# Patient Record
Sex: FEMALE | Race: WHITE | NOT HISPANIC OR LATINO | Employment: OTHER | RURAL
[De-identification: names, ages, dates, MRNs, and addresses within clinical notes are randomized per-mention and may not be internally consistent; named-entity substitution may affect disease eponyms.]

---

## 2021-02-03 ENCOUNTER — HISTORICAL (OUTPATIENT)
Dept: ADMINISTRATIVE | Facility: HOSPITAL | Age: 61
End: 2021-02-03

## 2021-02-03 LAB
BACTERIA #/AREA URNS HPF: ABNORMAL /HPF
BILIRUB UR QL STRIP: NEGATIVE MG/DL
CLARITY UR: ABNORMAL
COLOR UR: YELLOW
CREAT UR-MCNC: 58 MG/DL (ref 28–217)
GLUCOSE UR STRIP-MCNC: NEGATIVE MG/DL
KETONES UR STRIP-SCNC: NEGATIVE MG/DL
LEUKOCYTE ESTERASE UR QL STRIP: NEGATIVE LEU/UL
MICROALBUMIN UR-MCNC: 1.4 MG/DL (ref 0–2.8)
MICROALBUMIN/CREAT RATIO PNL UR: 24.1 MG/G (ref 0–30)
NITRITE UR QL STRIP: POSITIVE
PH UR STRIP: 7 PH UNITS (ref 5–8)
PROT UR QL STRIP: NEGATIVE MG/DL
RBC # UR STRIP: NEGATIVE ERY/UL
RBC #/AREA URNS HPF: ABNORMAL /HPF (ref 0–3)
SP GR UR STRIP: 1.02 (ref 1–1.03)
SQUAMOUS #/AREA URNS LPF: ABNORMAL /LPF
UROBILINOGEN UR STRIP-ACNC: 0.2 EU/DL
WBC #/AREA URNS HPF: ABNORMAL /HPF (ref 0–5)

## 2021-02-04 LAB
25(OH)D3 SERPL-MCNC: 21.5 NG/ML
ALBUMIN SERPL BCP-MCNC: 3.8 G/DL (ref 3.5–5)
ALBUMIN/GLOB SERPL: 1.1 {RATIO}
ALP SERPL-CCNC: 155 U/L (ref 46–118)
ALT SERPL W P-5'-P-CCNC: 48 U/L (ref 13–56)
ANION GAP SERPL CALCULATED.3IONS-SCNC: 5 MMOL/L (ref 7–16)
AST SERPL W P-5'-P-CCNC: 29 U/L (ref 15–37)
BASOPHILS # BLD AUTO: 0.08 X10E3/UL (ref 0–0.2)
BASOPHILS NFR BLD AUTO: 0.8 % (ref 0–1)
BILIRUB SERPL-MCNC: 0.5 MG/DL (ref 0–1.2)
BUN SERPL-MCNC: 25 MG/DL (ref 7–18)
BUN/CREAT SERPL: 22
CALCIUM SERPL-MCNC: 8.8 MG/DL (ref 8.5–10.1)
CHLORIDE SERPL-SCNC: 105 MMOL/L (ref 98–107)
CO2 SERPL-SCNC: 33 MMOL/L (ref 21–32)
CREAT SERPL-MCNC: 1.14 MG/DL (ref 0.5–1.02)
EOSINOPHIL # BLD AUTO: 0.47 X10E3/UL (ref 0–0.5)
EOSINOPHIL NFR BLD AUTO: 5 % (ref 1–4)
ERYTHROCYTE [DISTWIDTH] IN BLOOD BY AUTOMATED COUNT: 14.1 % (ref 11.5–14.5)
EST. AVERAGE GLUCOSE BLD GHB EST-MCNC: 177 MG/DL
GLOBULIN SER-MCNC: 3.4 G/DL (ref 2–4)
GLUCOSE SERPL-MCNC: 214 MG/DL (ref 74–106)
HBA1C MFR BLD HPLC: 7.9 %
HCT VFR BLD AUTO: 38.6 % (ref 38–47)
HGB BLD-MCNC: 11.8 G/DL (ref 12–16)
IMM GRANULOCYTES # BLD AUTO: 0.04 X10E3/UL (ref 0–0.04)
IMM GRANULOCYTES NFR BLD: 0.4 % (ref 0–0.4)
LYMPHOCYTES # BLD AUTO: 2 X10E3/UL (ref 1–4.8)
LYMPHOCYTES NFR BLD AUTO: 21.1 % (ref 27–41)
MCH RBC QN AUTO: 27.1 PG (ref 27–31)
MCHC RBC AUTO-ENTMCNC: 30.6 G/DL (ref 32–36)
MCV RBC AUTO: 88.7 FL (ref 80–96)
MONOCYTES # BLD AUTO: 0.74 X10E3/UL (ref 0–0.8)
MONOCYTES NFR BLD AUTO: 7.8 % (ref 2–6)
MPC BLD CALC-MCNC: 12.4 FL (ref 9.4–12.4)
NEUTROPHILS # BLD AUTO: 6.16 X10E3/UL (ref 1.8–7.7)
NEUTROPHILS NFR BLD AUTO: 64.9 % (ref 53–65)
NRBC # BLD AUTO: 0 X10E3/UL (ref 0–0)
NRBC, AUTO (.00): 0 /100 (ref 0–0)
PLATELET # BLD AUTO: 242 X10E3/UL (ref 150–400)
POTASSIUM SERPL-SCNC: 4.3 MMOL/L (ref 3.5–5.1)
PROT SERPL-MCNC: 7.2 G/DL (ref 6.4–8.2)
RBC # BLD AUTO: 4.35 X10E6/UL (ref 4.2–5.4)
SODIUM SERPL-SCNC: 139 MMOL/L (ref 136–145)
T4 FREE SERPL-MCNC: 1.07 NG/DL (ref 0.76–1.46)
TSH SERPL DL<=0.005 MIU/L-ACNC: 1.16 UIU/ML (ref 0.36–3.74)
VIT B12 SERPL-MCNC: 455 PG/ML (ref 193–986)
WBC # BLD AUTO: 9.49 X10E3/UL (ref 4.5–11)

## 2021-02-06 LAB
REPORT: 38
REPORT: NORMAL

## 2021-07-23 ENCOUNTER — HOSPITAL ENCOUNTER (INPATIENT)
Facility: HOSPITAL | Age: 61
LOS: 2 days | Discharge: HOME | DRG: 638 | End: 2021-07-26
Attending: EMERGENCY MEDICINE | Admitting: INTERNAL MEDICINE
Payer: COMMERCIAL

## 2021-07-23 DIAGNOSIS — F32.A ANXIETY AND DEPRESSION: Chronic | ICD-10-CM

## 2021-07-23 DIAGNOSIS — K31.84 GASTROPARESIS DUE TO DM: Chronic | ICD-10-CM

## 2021-07-23 DIAGNOSIS — F41.9 ANXIETY AND DEPRESSION: Chronic | ICD-10-CM

## 2021-07-23 DIAGNOSIS — M79.89 PAIN AND SWELLING OF LEFT LOWER EXTREMITY: ICD-10-CM

## 2021-07-23 DIAGNOSIS — L02.619 CELLULITIS AND ABSCESS OF FOOT: Primary | ICD-10-CM

## 2021-07-23 DIAGNOSIS — E11.43 GASTROPARESIS DUE TO DM: Chronic | ICD-10-CM

## 2021-07-23 DIAGNOSIS — E11.649 TYPE 2 DIABETES MELLITUS WITH HYPOGLYCEMIA, WITH LONG-TERM CURRENT USE OF INSULIN: Chronic | ICD-10-CM

## 2021-07-23 DIAGNOSIS — L03.119 CELLULITIS IN DIABETIC FOOT: ICD-10-CM

## 2021-07-23 DIAGNOSIS — L03.119 CELLULITIS AND ABSCESS OF FOOT: Primary | ICD-10-CM

## 2021-07-23 DIAGNOSIS — M06.9 RA (RHEUMATOID ARTHRITIS): Chronic | ICD-10-CM

## 2021-07-23 DIAGNOSIS — E11.628 CELLULITIS IN DIABETIC FOOT: ICD-10-CM

## 2021-07-23 DIAGNOSIS — Z79.4 TYPE 2 DIABETES MELLITUS WITH HYPOGLYCEMIA, WITH LONG-TERM CURRENT USE OF INSULIN: Chronic | ICD-10-CM

## 2021-07-23 DIAGNOSIS — B37.0 ORAL CANDIDIASIS: Chronic | ICD-10-CM

## 2021-07-23 DIAGNOSIS — K21.9 GERD (GASTROESOPHAGEAL REFLUX DISEASE): Chronic | ICD-10-CM

## 2021-07-23 DIAGNOSIS — M79.605 PAIN AND SWELLING OF LEFT LOWER EXTREMITY: ICD-10-CM

## 2021-07-23 LAB
ALBUMIN SERPL BCP-MCNC: 2.9 G/DL (ref 3.5–5)
ALBUMIN/GLOB SERPL: 0.9 {RATIO}
ALP SERPL-CCNC: 134 U/L (ref 46–118)
ALT SERPL W P-5'-P-CCNC: 29 U/L (ref 13–56)
ANION GAP SERPL CALCULATED.3IONS-SCNC: 11 MMOL/L (ref 7–16)
AST SERPL W P-5'-P-CCNC: 19 U/L (ref 15–37)
BACTERIA #/AREA URNS HPF: ABNORMAL /HPF
BASOPHILS # BLD AUTO: 0.06 K/UL (ref 0–0.2)
BASOPHILS NFR BLD AUTO: 0.8 % (ref 0–1)
BILIRUB SERPL-MCNC: 0.3 MG/DL (ref 0–1.2)
BILIRUB UR QL STRIP: NEGATIVE
BUN SERPL-MCNC: 21 MG/DL (ref 7–18)
BUN/CREAT SERPL: 17 (ref 6–20)
CALCIUM SERPL-MCNC: 8.7 MG/DL (ref 8.5–10.1)
CHLORIDE SERPL-SCNC: 110 MMOL/L (ref 98–107)
CLARITY UR: CLEAR
CO2 SERPL-SCNC: 29 MMOL/L (ref 21–32)
COLOR UR: ABNORMAL
CREAT SERPL-MCNC: 1.21 MG/DL (ref 0.55–1.02)
CRP SERPL-MCNC: <0.2 MG/DL (ref 0–0.8)
DIFFERENTIAL METHOD BLD: ABNORMAL
EOSINOPHIL # BLD AUTO: 0.25 K/UL (ref 0–0.5)
EOSINOPHIL NFR BLD AUTO: 3.2 % (ref 1–4)
ERYTHROCYTE [DISTWIDTH] IN BLOOD BY AUTOMATED COUNT: 13.2 % (ref 11.5–14.5)
ERYTHROCYTE [SEDIMENTATION RATE] IN BLOOD BY WESTERGREN METHOD: 20 MM/HR (ref 0–30)
GLOBULIN SER-MCNC: 3.4 G/DL (ref 2–4)
GLUCOSE SERPL-MCNC: 152 MG/DL (ref 70–105)
GLUCOSE SERPL-MCNC: 51 MG/DL (ref 70–105)
GLUCOSE SERPL-MCNC: 73 MG/DL (ref 74–106)
GLUCOSE UR STRIP-MCNC: NEGATIVE MG/DL
HCT VFR BLD AUTO: 34.3 % (ref 38–47)
HGB BLD-MCNC: 10.7 G/DL (ref 12–16)
IMM GRANULOCYTES # BLD AUTO: 0.04 K/UL (ref 0–0.04)
IMM GRANULOCYTES NFR BLD: 0.5 % (ref 0–0.4)
KETONES UR STRIP-SCNC: NEGATIVE MG/DL
LEUKOCYTE ESTERASE UR QL STRIP: ABNORMAL
LYMPHOCYTES # BLD AUTO: 1.87 K/UL (ref 1–4.8)
LYMPHOCYTES NFR BLD AUTO: 23.9 % (ref 27–41)
MCH RBC QN AUTO: 27 PG (ref 27–31)
MCHC RBC AUTO-ENTMCNC: 31.2 G/DL (ref 32–36)
MCV RBC AUTO: 86.6 FL (ref 80–96)
MONOCYTES # BLD AUTO: 0.63 K/UL (ref 0–0.8)
MONOCYTES NFR BLD AUTO: 8 % (ref 2–6)
MPC BLD CALC-MCNC: 11.6 FL (ref 9.4–12.4)
MUCOUS THREADS #/AREA URNS HPF: ABNORMAL /HPF
NEUTROPHILS # BLD AUTO: 4.98 K/UL (ref 1.8–7.7)
NEUTROPHILS NFR BLD AUTO: 63.6 % (ref 53–65)
NITRITE UR QL STRIP: NEGATIVE
NRBC # BLD AUTO: 0 X10E3/UL
NRBC, AUTO (.00): 0 %
PH UR STRIP: 6.5 PH UNITS
PLATELET # BLD AUTO: 202 K/UL (ref 150–400)
POTASSIUM SERPL-SCNC: 4.4 MMOL/L (ref 3.5–5.1)
PROT SERPL-MCNC: 6.3 G/DL (ref 6.4–8.2)
PROT UR QL STRIP: NEGATIVE
RBC # BLD AUTO: 3.96 M/UL (ref 4.2–5.4)
RBC # UR STRIP: NEGATIVE /UL
RBC #/AREA URNS HPF: ABNORMAL /HPF
SODIUM SERPL-SCNC: 146 MMOL/L (ref 136–145)
SP GR UR STRIP: 1.01
SQUAMOUS #/AREA URNS LPF: ABNORMAL /LPF
TRICHOMONAS #/AREA URNS HPF: ABNORMAL /HPF
UROBILINOGEN UR STRIP-ACNC: 0.2 MG/DL
WBC # BLD AUTO: 7.83 K/UL (ref 4.5–11)
WBC #/AREA URNS HPF: ABNORMAL /HPF
YEAST #/AREA URNS HPF: ABNORMAL /HPF

## 2021-07-23 PROCEDURE — 63600175 PHARM REV CODE 636 W HCPCS: Performed by: EMERGENCY MEDICINE

## 2021-07-23 PROCEDURE — 99285 EMERGENCY DEPT VISIT HI MDM: CPT | Mod: 25

## 2021-07-23 PROCEDURE — 85025 COMPLETE CBC W/AUTO DIFF WBC: CPT | Performed by: EMERGENCY MEDICINE

## 2021-07-23 PROCEDURE — 96365 THER/PROPH/DIAG IV INF INIT: CPT

## 2021-07-23 PROCEDURE — 63600175 PHARM REV CODE 636 W HCPCS

## 2021-07-23 PROCEDURE — 82962 GLUCOSE BLOOD TEST: CPT

## 2021-07-23 PROCEDURE — S0030 INJECTION, METRONIDAZOLE: HCPCS | Performed by: EMERGENCY MEDICINE

## 2021-07-23 PROCEDURE — 86140 C-REACTIVE PROTEIN: CPT | Performed by: EMERGENCY MEDICINE

## 2021-07-23 PROCEDURE — 85651 RBC SED RATE NONAUTOMATED: CPT | Performed by: EMERGENCY MEDICINE

## 2021-07-23 PROCEDURE — 25000003 PHARM REV CODE 250: Performed by: EMERGENCY MEDICINE

## 2021-07-23 PROCEDURE — 81001 URINALYSIS AUTO W/SCOPE: CPT | Performed by: EMERGENCY MEDICINE

## 2021-07-23 PROCEDURE — 96367 TX/PROPH/DG ADDL SEQ IV INF: CPT

## 2021-07-23 PROCEDURE — 99223 1ST HOSP IP/OBS HIGH 75: CPT | Mod: AI,GC,, | Performed by: INTERNAL MEDICINE

## 2021-07-23 PROCEDURE — 96375 TX/PRO/DX INJ NEW DRUG ADDON: CPT

## 2021-07-23 PROCEDURE — 84075 ASSAY ALKALINE PHOSPHATASE: CPT | Performed by: EMERGENCY MEDICINE

## 2021-07-23 PROCEDURE — 81003 URINALYSIS AUTO W/O SCOPE: CPT | Performed by: EMERGENCY MEDICINE

## 2021-07-23 PROCEDURE — 36415 COLL VENOUS BLD VENIPUNCTURE: CPT | Performed by: EMERGENCY MEDICINE

## 2021-07-23 PROCEDURE — 99223 PR INITIAL HOSPITAL CARE,LEVL III: ICD-10-PCS | Mod: AI,GC,, | Performed by: INTERNAL MEDICINE

## 2021-07-23 PROCEDURE — 87040 BLOOD CULTURE FOR BACTERIA: CPT | Performed by: EMERGENCY MEDICINE

## 2021-07-23 PROCEDURE — 99284 EMERGENCY DEPT VISIT MOD MDM: CPT | Mod: ,,, | Performed by: EMERGENCY MEDICINE

## 2021-07-23 PROCEDURE — 99284 PR EMERGENCY DEPT VISIT,LEVEL IV: ICD-10-PCS | Mod: ,,, | Performed by: EMERGENCY MEDICINE

## 2021-07-23 RX ORDER — PREDNISONE 5 MG/1
5 TABLET ORAL DAILY
COMMUNITY

## 2021-07-23 RX ORDER — CITALOPRAM 40 MG/1
20 TABLET, FILM COATED ORAL NIGHTLY
COMMUNITY
End: 2022-05-10

## 2021-07-23 RX ORDER — NYSTATIN 100000 [USP'U]/ML
6 SUSPENSION ORAL
COMMUNITY

## 2021-07-23 RX ORDER — METRONIDAZOLE 500 MG/100ML
500 INJECTION, SOLUTION INTRAVENOUS
Status: COMPLETED | OUTPATIENT
Start: 2021-07-23 | End: 2021-07-23

## 2021-07-23 RX ORDER — GABAPENTIN 600 MG/1
1200 TABLET ORAL 3 TIMES DAILY
COMMUNITY
End: 2021-10-25 | Stop reason: SDUPTHER

## 2021-07-23 RX ORDER — HYDROXYCHLOROQUINE SULFATE 200 MG/1
TABLET, FILM COATED ORAL
COMMUNITY
End: 2021-07-24 | Stop reason: CLARIF

## 2021-07-23 RX ORDER — ONDANSETRON 2 MG/ML
INJECTION INTRAMUSCULAR; INTRAVENOUS
Status: COMPLETED
Start: 2021-07-23 | End: 2021-07-23

## 2021-07-23 RX ORDER — CLINDAMYCIN HYDROCHLORIDE 300 MG/1
200 CAPSULE ORAL EVERY 6 HOURS
Status: ON HOLD | COMMUNITY
End: 2021-07-26 | Stop reason: HOSPADM

## 2021-07-23 RX ORDER — PROMETHAZINE HYDROCHLORIDE 25 MG/1
25 TABLET ORAL EVERY 6 HOURS PRN
COMMUNITY

## 2021-07-23 RX ORDER — OMEPRAZOLE/SODIUM BICARBONATE 20MG-1.1G
1 CAPSULE ORAL DAILY
COMMUNITY
End: 2022-05-10

## 2021-07-23 RX ORDER — ETANERCEPT 50 MG/ML
1 SOLUTION SUBCUTANEOUS
COMMUNITY
Start: 2021-05-13

## 2021-07-23 RX ORDER — ALBUTEROL SULFATE 0.83 MG/ML
SOLUTION RESPIRATORY (INHALATION)
COMMUNITY
End: 2022-05-10

## 2021-07-23 RX ORDER — TRIAMTERENE AND HYDROCHLOROTHIAZIDE 37.5; 25 MG/1; MG/1
1 CAPSULE ORAL DAILY
COMMUNITY

## 2021-07-23 RX ORDER — CYCLOBENZAPRINE HCL 10 MG
10 TABLET ORAL NIGHTLY
COMMUNITY

## 2021-07-23 RX ORDER — OXYCODONE AND ACETAMINOPHEN 10; 325 MG/1; MG/1
1 TABLET ORAL EVERY 8 HOURS PRN
COMMUNITY

## 2021-07-23 RX ORDER — HYDROXYCHLOROQUINE SULFATE 200 MG/1
200 TABLET, FILM COATED ORAL 2 TIMES DAILY
COMMUNITY

## 2021-07-23 RX ORDER — HUMAN INSULIN 100 [IU]/ML
26 INJECTION, SUSPENSION SUBCUTANEOUS
COMMUNITY
End: 2022-05-10

## 2021-07-23 RX ADMIN — METRONIDAZOLE 500 MG: 500 INJECTION, SOLUTION INTRAVENOUS at 09:07

## 2021-07-23 RX ADMIN — ONDANSETRON 4 MG: 2 INJECTION INTRAMUSCULAR; INTRAVENOUS at 09:07

## 2021-07-23 RX ADMIN — CEFTRIAXONE SODIUM 2 G: 2 INJECTION, POWDER, FOR SOLUTION INTRAMUSCULAR; INTRAVENOUS at 08:07

## 2021-07-24 PROBLEM — M06.9 RA (RHEUMATOID ARTHRITIS): Chronic | Status: ACTIVE | Noted: 2021-07-24

## 2021-07-24 PROBLEM — F41.9 ANXIETY AND DEPRESSION: Chronic | Status: ACTIVE | Noted: 2021-07-24

## 2021-07-24 PROBLEM — L03.119 CELLULITIS AND ABSCESS OF FOOT: Status: ACTIVE | Noted: 2021-07-24

## 2021-07-24 PROBLEM — L02.619 CELLULITIS AND ABSCESS OF FOOT: Status: ACTIVE | Noted: 2021-07-24

## 2021-07-24 PROBLEM — F32.A ANXIETY AND DEPRESSION: Chronic | Status: ACTIVE | Noted: 2021-07-24

## 2021-07-24 PROBLEM — E11.649 TYPE 2 DIABETES MELLITUS WITH HYPOGLYCEMIA, WITH LONG-TERM CURRENT USE OF INSULIN: Chronic | Status: ACTIVE | Noted: 2021-07-24

## 2021-07-24 PROBLEM — K31.84 GASTROPARESIS DUE TO DM: Chronic | Status: ACTIVE | Noted: 2021-07-24

## 2021-07-24 PROBLEM — Z79.4 TYPE 2 DIABETES MELLITUS WITH HYPOGLYCEMIA, WITH LONG-TERM CURRENT USE OF INSULIN: Chronic | Status: ACTIVE | Noted: 2021-07-24

## 2021-07-24 PROBLEM — E11.628 CELLULITIS IN DIABETIC FOOT: Status: ACTIVE | Noted: 2021-07-24

## 2021-07-24 PROBLEM — K21.9 GERD (GASTROESOPHAGEAL REFLUX DISEASE): Chronic | Status: ACTIVE | Noted: 2021-07-24

## 2021-07-24 PROBLEM — B37.0 ORAL CANDIDIASIS: Chronic | Status: ACTIVE | Noted: 2021-07-24

## 2021-07-24 PROBLEM — E11.43 GASTROPARESIS DUE TO DM: Chronic | Status: ACTIVE | Noted: 2021-07-24

## 2021-07-24 LAB
EST. AVERAGE GLUCOSE BLD GHB EST-MCNC: 190 MG/DL
GLUCOSE SERPL-MCNC: 318 MG/DL (ref 70–105)
GLUCOSE SERPL-MCNC: 75 MG/DL (ref 70–105)
GLUCOSE SERPL-MCNC: 95 MG/DL (ref 70–105)
GLUCOSE SERPL-MCNC: 98 MG/DL (ref 70–105)
HBA1C MFR BLD HPLC: 8.3 % (ref 4.5–6.6)

## 2021-07-24 PROCEDURE — 63600175 PHARM REV CODE 636 W HCPCS: Performed by: EMERGENCY MEDICINE

## 2021-07-24 PROCEDURE — 11000001 HC ACUTE MED/SURG PRIVATE ROOM

## 2021-07-24 PROCEDURE — 36415 COLL VENOUS BLD VENIPUNCTURE: CPT | Performed by: EMERGENCY MEDICINE

## 2021-07-24 PROCEDURE — C9399 UNCLASSIFIED DRUGS OR BIOLOG: HCPCS | Performed by: EMERGENCY MEDICINE

## 2021-07-24 PROCEDURE — 63600175 PHARM REV CODE 636 W HCPCS: Performed by: FAMILY MEDICINE

## 2021-07-24 PROCEDURE — 25000003 PHARM REV CODE 250: Performed by: FAMILY MEDICINE

## 2021-07-24 PROCEDURE — 25000003 PHARM REV CODE 250: Performed by: EMERGENCY MEDICINE

## 2021-07-24 PROCEDURE — 87070 CULTURE OTHR SPECIMN AEROBIC: CPT | Performed by: EMERGENCY MEDICINE

## 2021-07-24 PROCEDURE — 63600175 PHARM REV CODE 636 W HCPCS: Performed by: INTERNAL MEDICINE

## 2021-07-24 PROCEDURE — 82962 GLUCOSE BLOOD TEST: CPT

## 2021-07-24 PROCEDURE — S0030 INJECTION, METRONIDAZOLE: HCPCS | Performed by: EMERGENCY MEDICINE

## 2021-07-24 PROCEDURE — 83036 HEMOGLOBIN GLYCOSYLATED A1C: CPT | Performed by: EMERGENCY MEDICINE

## 2021-07-24 RX ORDER — KETOROLAC TROMETHAMINE 30 MG/ML
INJECTION, SOLUTION INTRAMUSCULAR; INTRAVENOUS
Status: DISPENSED
Start: 2021-07-24 | End: 2021-07-24

## 2021-07-24 RX ORDER — PANTOPRAZOLE SODIUM 40 MG/1
40 TABLET, DELAYED RELEASE ORAL DAILY
Status: DISCONTINUED | OUTPATIENT
Start: 2021-07-24 | End: 2021-07-26 | Stop reason: HOSPADM

## 2021-07-24 RX ORDER — HYDROXYCHLOROQUINE SULFATE 200 MG/1
200 TABLET, FILM COATED ORAL 2 TIMES DAILY
Status: DISCONTINUED | OUTPATIENT
Start: 2021-07-24 | End: 2021-07-26 | Stop reason: HOSPADM

## 2021-07-24 RX ORDER — NYSTATIN 100000 [USP'U]/ML
6 SUSPENSION ORAL
Status: DISCONTINUED | OUTPATIENT
Start: 2021-07-24 | End: 2021-07-26 | Stop reason: HOSPADM

## 2021-07-24 RX ORDER — CITALOPRAM 20 MG/1
20 TABLET, FILM COATED ORAL NIGHTLY
Status: DISCONTINUED | OUTPATIENT
Start: 2021-07-24 | End: 2021-07-26 | Stop reason: HOSPADM

## 2021-07-24 RX ORDER — SIMETHICONE 80 MG
1 TABLET,CHEWABLE ORAL 3 TIMES DAILY PRN
Status: DISCONTINUED | OUTPATIENT
Start: 2021-07-24 | End: 2021-07-26 | Stop reason: HOSPADM

## 2021-07-24 RX ORDER — OXYCODONE AND ACETAMINOPHEN 10; 325 MG/1; MG/1
1 TABLET ORAL EVERY 8 HOURS PRN
Status: DISCONTINUED | OUTPATIENT
Start: 2021-07-24 | End: 2021-07-26 | Stop reason: HOSPADM

## 2021-07-24 RX ORDER — TALC
6 POWDER (GRAM) TOPICAL NIGHTLY PRN
Status: DISCONTINUED | OUTPATIENT
Start: 2021-07-24 | End: 2021-07-26 | Stop reason: HOSPADM

## 2021-07-24 RX ORDER — IBUPROFEN 200 MG
16 TABLET ORAL
Status: DISCONTINUED | OUTPATIENT
Start: 2021-07-24 | End: 2021-07-24 | Stop reason: RX

## 2021-07-24 RX ORDER — GLUCAGON 1 MG
1 KIT INJECTION
Status: DISCONTINUED | OUTPATIENT
Start: 2021-07-24 | End: 2021-07-24

## 2021-07-24 RX ORDER — SODIUM CHLORIDE 0.9 % (FLUSH) 0.9 %
10 SYRINGE (ML) INJECTION
Status: DISCONTINUED | OUTPATIENT
Start: 2021-07-24 | End: 2021-07-26 | Stop reason: HOSPADM

## 2021-07-24 RX ORDER — PROMETHAZINE HYDROCHLORIDE 25 MG/1
25 TABLET ORAL EVERY 6 HOURS PRN
Status: DISCONTINUED | OUTPATIENT
Start: 2021-07-24 | End: 2021-07-26 | Stop reason: HOSPADM

## 2021-07-24 RX ORDER — KETOROLAC TROMETHAMINE 30 MG/ML
30 INJECTION, SOLUTION INTRAMUSCULAR; INTRAVENOUS
Status: COMPLETED | OUTPATIENT
Start: 2021-07-24 | End: 2021-07-24

## 2021-07-24 RX ORDER — KETOROLAC TROMETHAMINE 15 MG/ML
15 INJECTION, SOLUTION INTRAMUSCULAR; INTRAVENOUS EVERY 6 HOURS PRN
Status: DISCONTINUED | OUTPATIENT
Start: 2021-07-24 | End: 2021-07-26 | Stop reason: HOSPADM

## 2021-07-24 RX ORDER — IBUPROFEN 200 MG
24 TABLET ORAL
Status: DISCONTINUED | OUTPATIENT
Start: 2021-07-24 | End: 2021-07-24

## 2021-07-24 RX ORDER — INSULIN ASPART 100 [IU]/ML
1-10 INJECTION, SOLUTION INTRAVENOUS; SUBCUTANEOUS
Status: DISCONTINUED | OUTPATIENT
Start: 2021-07-24 | End: 2021-07-26 | Stop reason: HOSPADM

## 2021-07-24 RX ORDER — GLUCAGON 1 MG
1 KIT INJECTION
Status: DISCONTINUED | OUTPATIENT
Start: 2021-07-24 | End: 2021-07-26 | Stop reason: HOSPADM

## 2021-07-24 RX ORDER — METRONIDAZOLE 500 MG/100ML
500 INJECTION, SOLUTION INTRAVENOUS
Status: DISCONTINUED | OUTPATIENT
Start: 2021-07-24 | End: 2021-07-26 | Stop reason: HOSPADM

## 2021-07-24 RX ORDER — IBUPROFEN 200 MG
24 TABLET ORAL
Status: DISCONTINUED | OUTPATIENT
Start: 2021-07-24 | End: 2021-07-24 | Stop reason: RX

## 2021-07-24 RX ORDER — HEPARIN SODIUM 5000 [USP'U]/ML
5000 INJECTION, SOLUTION INTRAVENOUS; SUBCUTANEOUS EVERY 12 HOURS
Status: DISCONTINUED | OUTPATIENT
Start: 2021-07-24 | End: 2021-07-26 | Stop reason: HOSPADM

## 2021-07-24 RX ORDER — CYCLOBENZAPRINE HCL 10 MG
10 TABLET ORAL NIGHTLY
Status: DISCONTINUED | OUTPATIENT
Start: 2021-07-24 | End: 2021-07-26 | Stop reason: HOSPADM

## 2021-07-24 RX ORDER — TRIAMTERENE/HYDROCHLOROTHIAZID 37.5-25 MG
1 TABLET ORAL DAILY
Status: DISCONTINUED | OUTPATIENT
Start: 2021-07-24 | End: 2021-07-26 | Stop reason: HOSPADM

## 2021-07-24 RX ORDER — ACETAMINOPHEN 325 MG/1
650 TABLET ORAL EVERY 6 HOURS PRN
Status: DISCONTINUED | OUTPATIENT
Start: 2021-07-24 | End: 2021-07-26 | Stop reason: HOSPADM

## 2021-07-24 RX ORDER — PREDNISONE 2.5 MG/1
5 TABLET ORAL DAILY
Status: DISCONTINUED | OUTPATIENT
Start: 2021-07-24 | End: 2021-07-26 | Stop reason: HOSPADM

## 2021-07-24 RX ORDER — GABAPENTIN 400 MG/1
1200 CAPSULE ORAL 3 TIMES DAILY
Status: DISCONTINUED | OUTPATIENT
Start: 2021-07-24 | End: 2021-07-26 | Stop reason: HOSPADM

## 2021-07-24 RX ADMIN — TRIAMTERENE AND HYDROCHLOROTHIAZIDE 1 TABLET: 37.5; 25 TABLET ORAL at 09:07

## 2021-07-24 RX ADMIN — HYDROXYCHLOROQUINE SULFATE 200 MG: 200 TABLET, FILM COATED ORAL at 09:07

## 2021-07-24 RX ADMIN — NYSTATIN 600000 UNITS: 500000 SUSPENSION ORAL at 06:07

## 2021-07-24 RX ADMIN — PANTOPRAZOLE SODIUM 40 MG: 40 TABLET, DELAYED RELEASE ORAL at 09:07

## 2021-07-24 RX ADMIN — GABAPENTIN 1200 MG: 400 CAPSULE ORAL at 09:07

## 2021-07-24 RX ADMIN — OXYCODONE HYDROCHLORIDE AND ACETAMINOPHEN 1 TABLET: 10; 325 TABLET ORAL at 03:07

## 2021-07-24 RX ADMIN — PROMETHAZINE HYDROCHLORIDE 25 MG: 25 TABLET ORAL at 11:07

## 2021-07-24 RX ADMIN — METRONIDAZOLE 500 MG: 500 INJECTION, SOLUTION INTRAVENOUS at 12:07

## 2021-07-24 RX ADMIN — OXYCODONE HYDROCHLORIDE AND ACETAMINOPHEN 1 TABLET: 10; 325 TABLET ORAL at 10:07

## 2021-07-24 RX ADMIN — CEFEPIME HYDROCHLORIDE 1 G: 1 INJECTION, POWDER, FOR SOLUTION INTRAMUSCULAR; INTRAVENOUS at 08:07

## 2021-07-24 RX ADMIN — INSULIN DETEMIR 10 UNITS: 100 INJECTION, SOLUTION SUBCUTANEOUS at 09:07

## 2021-07-24 RX ADMIN — CITALOPRAM HYDROBROMIDE 20 MG: 20 TABLET ORAL at 09:07

## 2021-07-24 RX ADMIN — CITALOPRAM HYDROBROMIDE 20 MG: 20 TABLET ORAL at 04:07

## 2021-07-24 RX ADMIN — GABAPENTIN 1200 MG: 400 CAPSULE ORAL at 05:07

## 2021-07-24 RX ADMIN — CYCLOBENZAPRINE 10 MG: 10 TABLET, FILM COATED ORAL at 09:07

## 2021-07-24 RX ADMIN — METRONIDAZOLE 500 MG: 500 INJECTION, SOLUTION INTRAVENOUS at 09:07

## 2021-07-24 RX ADMIN — KETOROLAC TROMETHAMINE 30 MG: 30 INJECTION, SOLUTION INTRAMUSCULAR; INTRAVENOUS at 02:07

## 2021-07-24 RX ADMIN — NYSTATIN 600000 UNITS: 500000 SUSPENSION ORAL at 05:07

## 2021-07-24 RX ADMIN — HEPARIN SODIUM 5000 UNITS: 5000 INJECTION INTRAVENOUS; SUBCUTANEOUS at 09:07

## 2021-07-24 RX ADMIN — OXYCODONE HYDROCHLORIDE AND ACETAMINOPHEN 1 TABLET: 10; 325 TABLET ORAL at 12:07

## 2021-07-24 RX ADMIN — CEFEPIME HYDROCHLORIDE 1 G: 1 INJECTION, POWDER, FOR SOLUTION INTRAMUSCULAR; INTRAVENOUS at 12:07

## 2021-07-24 RX ADMIN — CYCLOBENZAPRINE 10 MG: 10 TABLET, FILM COATED ORAL at 03:07

## 2021-07-24 RX ADMIN — PREDNISONE 5 MG: 2.5 TABLET ORAL at 09:07

## 2021-07-25 LAB
ALBUMIN SERPL BCP-MCNC: 2.7 G/DL (ref 3.5–5)
ALBUMIN/GLOB SERPL: 0.8 {RATIO}
ALP SERPL-CCNC: 127 U/L (ref 46–118)
ALT SERPL W P-5'-P-CCNC: 24 U/L (ref 13–56)
ANION GAP SERPL CALCULATED.3IONS-SCNC: 12 MMOL/L (ref 7–16)
AST SERPL W P-5'-P-CCNC: 17 U/L (ref 15–37)
BASOPHILS # BLD AUTO: 0.07 K/UL (ref 0–0.2)
BASOPHILS NFR BLD AUTO: 1 % (ref 0–1)
BILIRUB SERPL-MCNC: 0.3 MG/DL (ref 0–1.2)
BUN SERPL-MCNC: 18 MG/DL (ref 7–18)
BUN/CREAT SERPL: 14 (ref 6–20)
CALCIUM SERPL-MCNC: 8.3 MG/DL (ref 8.5–10.1)
CHLORIDE SERPL-SCNC: 104 MMOL/L (ref 98–107)
CO2 SERPL-SCNC: 30 MMOL/L (ref 21–32)
CREAT SERPL-MCNC: 1.33 MG/DL (ref 0.55–1.02)
DIFFERENTIAL METHOD BLD: ABNORMAL
EOSINOPHIL # BLD AUTO: 0.32 K/UL (ref 0–0.5)
EOSINOPHIL NFR BLD AUTO: 4.4 % (ref 1–4)
ERYTHROCYTE [DISTWIDTH] IN BLOOD BY AUTOMATED COUNT: 13.2 % (ref 11.5–14.5)
GLOBULIN SER-MCNC: 3.2 G/DL (ref 2–4)
GLUCOSE SERPL-MCNC: 179 MG/DL (ref 70–105)
GLUCOSE SERPL-MCNC: 202 MG/DL (ref 70–105)
GLUCOSE SERPL-MCNC: 224 MG/DL (ref 70–105)
GLUCOSE SERPL-MCNC: 235 MG/DL (ref 74–106)
GLUCOSE SERPL-MCNC: 370 MG/DL (ref 70–105)
GLUCOSE SERPL-MCNC: 383 MG/DL (ref 70–105)
HCT VFR BLD AUTO: 34.5 % (ref 38–47)
HGB BLD-MCNC: 10.5 G/DL (ref 12–16)
IMM GRANULOCYTES # BLD AUTO: 0.02 K/UL (ref 0–0.04)
IMM GRANULOCYTES NFR BLD: 0.3 % (ref 0–0.4)
LYMPHOCYTES # BLD AUTO: 2.8 K/UL (ref 1–4.8)
LYMPHOCYTES NFR BLD AUTO: 38.1 % (ref 27–41)
MCH RBC QN AUTO: 26.6 PG (ref 27–31)
MCHC RBC AUTO-ENTMCNC: 30.4 G/DL (ref 32–36)
MCV RBC AUTO: 87.6 FL (ref 80–96)
MONOCYTES # BLD AUTO: 0.71 K/UL (ref 0–0.8)
MONOCYTES NFR BLD AUTO: 9.7 % (ref 2–6)
MPC BLD CALC-MCNC: 11.1 FL (ref 9.4–12.4)
NEUTROPHILS # BLD AUTO: 3.42 K/UL (ref 1.8–7.7)
NEUTROPHILS NFR BLD AUTO: 46.5 % (ref 53–65)
NRBC # BLD AUTO: 0 X10E3/UL
NRBC, AUTO (.00): 0 %
PLATELET # BLD AUTO: 189 K/UL (ref 150–400)
POTASSIUM SERPL-SCNC: 4.1 MMOL/L (ref 3.5–5.1)
PROT SERPL-MCNC: 5.9 G/DL (ref 6.4–8.2)
RBC # BLD AUTO: 3.94 M/UL (ref 4.2–5.4)
SODIUM SERPL-SCNC: 142 MMOL/L (ref 136–145)
WBC # BLD AUTO: 7.34 K/UL (ref 4.5–11)

## 2021-07-25 PROCEDURE — 82962 GLUCOSE BLOOD TEST: CPT

## 2021-07-25 PROCEDURE — 36415 COLL VENOUS BLD VENIPUNCTURE: CPT | Performed by: INTERNAL MEDICINE

## 2021-07-25 PROCEDURE — 25000003 PHARM REV CODE 250: Performed by: EMERGENCY MEDICINE

## 2021-07-25 PROCEDURE — S0030 INJECTION, METRONIDAZOLE: HCPCS | Performed by: EMERGENCY MEDICINE

## 2021-07-25 PROCEDURE — C9399 UNCLASSIFIED DRUGS OR BIOLOG: HCPCS | Performed by: INTERNAL MEDICINE

## 2021-07-25 PROCEDURE — 25000003 PHARM REV CODE 250: Performed by: INTERNAL MEDICINE

## 2021-07-25 PROCEDURE — 99223 PR INITIAL HOSPITAL CARE,LEVL III: ICD-10-PCS | Mod: ,,, | Performed by: SURGERY

## 2021-07-25 PROCEDURE — 99232 SBSQ HOSP IP/OBS MODERATE 35: CPT | Mod: ,,, | Performed by: INTERNAL MEDICINE

## 2021-07-25 PROCEDURE — 63600175 PHARM REV CODE 636 W HCPCS: Performed by: INTERNAL MEDICINE

## 2021-07-25 PROCEDURE — 80053 COMPREHEN METABOLIC PANEL: CPT | Performed by: INTERNAL MEDICINE

## 2021-07-25 PROCEDURE — 63600175 PHARM REV CODE 636 W HCPCS: Performed by: FAMILY MEDICINE

## 2021-07-25 PROCEDURE — 63600175 PHARM REV CODE 636 W HCPCS: Performed by: EMERGENCY MEDICINE

## 2021-07-25 PROCEDURE — 99223 1ST HOSP IP/OBS HIGH 75: CPT | Mod: ,,, | Performed by: SURGERY

## 2021-07-25 PROCEDURE — 85025 COMPLETE CBC W/AUTO DIFF WBC: CPT | Performed by: INTERNAL MEDICINE

## 2021-07-25 PROCEDURE — 11000001 HC ACUTE MED/SURG PRIVATE ROOM

## 2021-07-25 PROCEDURE — 25000003 PHARM REV CODE 250: Performed by: FAMILY MEDICINE

## 2021-07-25 PROCEDURE — 99232 PR SUBSEQUENT HOSPITAL CARE,LEVL II: ICD-10-PCS | Mod: ,,, | Performed by: INTERNAL MEDICINE

## 2021-07-25 RX ORDER — TROLAMINE SALICYLATE 10 G/100G
CREAM TOPICAL
Status: DISCONTINUED | OUTPATIENT
Start: 2021-07-25 | End: 2021-07-26 | Stop reason: HOSPADM

## 2021-07-25 RX ADMIN — CITALOPRAM HYDROBROMIDE 20 MG: 20 TABLET ORAL at 08:07

## 2021-07-25 RX ADMIN — HUMAN INSULIN 10 UNITS: 100 INJECTION, SOLUTION SUBCUTANEOUS at 12:07

## 2021-07-25 RX ADMIN — KETOROLAC TROMETHAMINE 15 MG: 15 INJECTION, SOLUTION INTRAMUSCULAR; INTRAVENOUS at 10:07

## 2021-07-25 RX ADMIN — PREDNISONE 5 MG: 2.5 TABLET ORAL at 09:07

## 2021-07-25 RX ADMIN — HEPARIN SODIUM 5000 UNITS: 5000 INJECTION INTRAVENOUS; SUBCUTANEOUS at 08:07

## 2021-07-25 RX ADMIN — INSULIN ASPART 4 UNITS: 100 INJECTION, SOLUTION INTRAVENOUS; SUBCUTANEOUS at 12:07

## 2021-07-25 RX ADMIN — METRONIDAZOLE 500 MG: 500 INJECTION, SOLUTION INTRAVENOUS at 05:07

## 2021-07-25 RX ADMIN — METRONIDAZOLE 500 MG: 500 INJECTION, SOLUTION INTRAVENOUS at 08:07

## 2021-07-25 RX ADMIN — HEPARIN SODIUM 5000 UNITS: 5000 INJECTION INTRAVENOUS; SUBCUTANEOUS at 09:07

## 2021-07-25 RX ADMIN — CEFEPIME HYDROCHLORIDE 1 G: 1 INJECTION, POWDER, FOR SOLUTION INTRAMUSCULAR; INTRAVENOUS at 12:07

## 2021-07-25 RX ADMIN — VANCOMYCIN HYDROCHLORIDE 2000 MG: 1 INJECTION, POWDER, LYOPHILIZED, FOR SOLUTION INTRAVENOUS at 06:07

## 2021-07-25 RX ADMIN — GABAPENTIN 1200 MG: 400 CAPSULE ORAL at 02:07

## 2021-07-25 RX ADMIN — HYDROXYCHLOROQUINE SULFATE 200 MG: 200 TABLET, FILM COATED ORAL at 09:07

## 2021-07-25 RX ADMIN — CEFEPIME HYDROCHLORIDE 1 G: 1 INJECTION, POWDER, FOR SOLUTION INTRAMUSCULAR; INTRAVENOUS at 03:07

## 2021-07-25 RX ADMIN — OXYCODONE HYDROCHLORIDE AND ACETAMINOPHEN 1 TABLET: 10; 325 TABLET ORAL at 10:07

## 2021-07-25 RX ADMIN — NYSTATIN 600000 UNITS: 500000 SUSPENSION ORAL at 06:07

## 2021-07-25 RX ADMIN — INSULIN ASPART 2 UNITS: 100 INJECTION, SOLUTION INTRAVENOUS; SUBCUTANEOUS at 06:07

## 2021-07-25 RX ADMIN — INSULIN ASPART 5 UNITS: 100 INJECTION, SOLUTION INTRAVENOUS; SUBCUTANEOUS at 08:07

## 2021-07-25 RX ADMIN — PANTOPRAZOLE SODIUM 40 MG: 40 TABLET, DELAYED RELEASE ORAL at 09:07

## 2021-07-25 RX ADMIN — CEFEPIME HYDROCHLORIDE 1 G: 1 INJECTION, POWDER, FOR SOLUTION INTRAMUSCULAR; INTRAVENOUS at 08:07

## 2021-07-25 RX ADMIN — INSULIN DETEMIR 10 UNITS: 100 INJECTION, SOLUTION SUBCUTANEOUS at 09:07

## 2021-07-25 RX ADMIN — GABAPENTIN 1200 MG: 400 CAPSULE ORAL at 08:07

## 2021-07-25 RX ADMIN — HYDROXYCHLOROQUINE SULFATE 200 MG: 200 TABLET, FILM COATED ORAL at 08:07

## 2021-07-25 RX ADMIN — KETOROLAC TROMETHAMINE 15 MG: 15 INJECTION, SOLUTION INTRAMUSCULAR; INTRAVENOUS at 12:07

## 2021-07-25 RX ADMIN — TROLAMINE SALICYLATE: 10 CREAM TOPICAL at 12:07

## 2021-07-25 RX ADMIN — NYSTATIN 600000 UNITS: 500000 SUSPENSION ORAL at 12:07

## 2021-07-25 RX ADMIN — GABAPENTIN 1200 MG: 400 CAPSULE ORAL at 09:07

## 2021-07-25 RX ADMIN — CYCLOBENZAPRINE 10 MG: 10 TABLET, FILM COATED ORAL at 08:07

## 2021-07-25 RX ADMIN — NYSTATIN 600000 UNITS: 500000 SUSPENSION ORAL at 04:07

## 2021-07-25 RX ADMIN — TRIAMTERENE AND HYDROCHLOROTHIAZIDE 1 TABLET: 37.5; 25 TABLET ORAL at 09:07

## 2021-07-25 RX ADMIN — INSULIN ASPART 4 UNITS: 100 INJECTION, SOLUTION INTRAVENOUS; SUBCUTANEOUS at 04:07

## 2021-07-25 RX ADMIN — METRONIDAZOLE 500 MG: 500 INJECTION, SOLUTION INTRAVENOUS at 02:07

## 2021-07-25 RX ADMIN — OXYCODONE HYDROCHLORIDE AND ACETAMINOPHEN 1 TABLET: 10; 325 TABLET ORAL at 09:07

## 2021-07-25 RX ADMIN — KETOROLAC TROMETHAMINE 15 MG: 15 INJECTION, SOLUTION INTRAMUSCULAR; INTRAVENOUS at 04:07

## 2021-07-26 VITALS
DIASTOLIC BLOOD PRESSURE: 66 MMHG | OXYGEN SATURATION: 96 % | HEART RATE: 85 BPM | TEMPERATURE: 99 F | WEIGHT: 213 LBS | HEIGHT: 70 IN | SYSTOLIC BLOOD PRESSURE: 108 MMHG | BODY MASS INDEX: 30.49 KG/M2 | RESPIRATION RATE: 18 BRPM

## 2021-07-26 LAB
ALBUMIN SERPL BCP-MCNC: 2.6 G/DL (ref 3.5–5)
ALBUMIN/GLOB SERPL: 0.9 {RATIO}
ALP SERPL-CCNC: 127 U/L (ref 46–118)
ALT SERPL W P-5'-P-CCNC: 26 U/L (ref 13–56)
ANION GAP SERPL CALCULATED.3IONS-SCNC: 9 MMOL/L (ref 7–16)
AST SERPL W P-5'-P-CCNC: 15 U/L (ref 15–37)
BASOPHILS # BLD AUTO: 0.07 K/UL (ref 0–0.2)
BASOPHILS NFR BLD AUTO: 1 % (ref 0–1)
BILIRUB SERPL-MCNC: 0.3 MG/DL (ref 0–1.2)
BUN SERPL-MCNC: 20 MG/DL (ref 7–18)
BUN SERPL-MCNC: 20 MG/DL (ref 7–18)
BUN/CREAT SERPL: 15 (ref 6–20)
BUN/CREAT SERPL: 15 (ref 6–20)
CALCIUM SERPL-MCNC: 8.3 MG/DL (ref 8.5–10.1)
CHLORIDE SERPL-SCNC: 106 MMOL/L (ref 98–107)
CO2 SERPL-SCNC: 28 MMOL/L (ref 21–32)
CREAT SERPL-MCNC: 1.34 MG/DL (ref 0.55–1.02)
CREAT SERPL-MCNC: 1.34 MG/DL (ref 0.55–1.02)
DIFFERENTIAL METHOD BLD: ABNORMAL
EOSINOPHIL # BLD AUTO: 0.34 K/UL (ref 0–0.5)
EOSINOPHIL NFR BLD AUTO: 4.6 % (ref 1–4)
ERYTHROCYTE [DISTWIDTH] IN BLOOD BY AUTOMATED COUNT: 13.2 % (ref 11.5–14.5)
GLOBULIN SER-MCNC: 3 G/DL (ref 2–4)
GLUCOSE SERPL-MCNC: 320 MG/DL (ref 70–105)
GLUCOSE SERPL-MCNC: 329 MG/DL (ref 70–105)
GLUCOSE SERPL-MCNC: 365 MG/DL (ref 70–105)
GLUCOSE SERPL-MCNC: 385 MG/DL (ref 74–106)
HCT VFR BLD AUTO: 33.5 % (ref 38–47)
HGB BLD-MCNC: 10.3 G/DL (ref 12–16)
IMM GRANULOCYTES # BLD AUTO: 0.04 K/UL (ref 0–0.04)
IMM GRANULOCYTES NFR BLD: 0.5 % (ref 0–0.4)
LYMPHOCYTES # BLD AUTO: 2.52 K/UL (ref 1–4.8)
LYMPHOCYTES NFR BLD AUTO: 34.3 % (ref 27–41)
MCH RBC QN AUTO: 26.7 PG (ref 27–31)
MCHC RBC AUTO-ENTMCNC: 30.7 G/DL (ref 32–36)
MCV RBC AUTO: 86.8 FL (ref 80–96)
MICROORGANISM SPEC CULT: ABNORMAL
MONOCYTES # BLD AUTO: 0.69 K/UL (ref 0–0.8)
MONOCYTES NFR BLD AUTO: 9.4 % (ref 2–6)
MPC BLD CALC-MCNC: 11.2 FL (ref 9.4–12.4)
NEUTROPHILS # BLD AUTO: 3.68 K/UL (ref 1.8–7.7)
NEUTROPHILS NFR BLD AUTO: 50.2 % (ref 53–65)
NRBC # BLD AUTO: 0 X10E3/UL
NRBC, AUTO (.00): 0 %
PLATELET # BLD AUTO: 177 K/UL (ref 150–400)
POTASSIUM SERPL-SCNC: 4.3 MMOL/L (ref 3.5–5.1)
PROT SERPL-MCNC: 5.6 G/DL (ref 6.4–8.2)
RBC # BLD AUTO: 3.86 M/UL (ref 4.2–5.4)
SODIUM SERPL-SCNC: 139 MMOL/L (ref 136–145)
WBC # BLD AUTO: 7.34 K/UL (ref 4.5–11)

## 2021-07-26 PROCEDURE — 82962 GLUCOSE BLOOD TEST: CPT

## 2021-07-26 PROCEDURE — 96372 THER/PROPH/DIAG INJ SC/IM: CPT

## 2021-07-26 PROCEDURE — 99239 PR HOSPITAL DISCHARGE DAY,>30 MIN: ICD-10-PCS | Mod: ,,, | Performed by: INTERNAL MEDICINE

## 2021-07-26 PROCEDURE — 25000003 PHARM REV CODE 250: Performed by: EMERGENCY MEDICINE

## 2021-07-26 PROCEDURE — 25000003 PHARM REV CODE 250: Performed by: FAMILY MEDICINE

## 2021-07-26 PROCEDURE — S0030 INJECTION, METRONIDAZOLE: HCPCS | Performed by: EMERGENCY MEDICINE

## 2021-07-26 PROCEDURE — 63600175 PHARM REV CODE 636 W HCPCS: Performed by: FAMILY MEDICINE

## 2021-07-26 PROCEDURE — 85025 COMPLETE CBC W/AUTO DIFF WBC: CPT | Performed by: INTERNAL MEDICINE

## 2021-07-26 PROCEDURE — C9399 UNCLASSIFIED DRUGS OR BIOLOG: HCPCS | Performed by: INTERNAL MEDICINE

## 2021-07-26 PROCEDURE — 99239 HOSP IP/OBS DSCHRG MGMT >30: CPT | Mod: ,,, | Performed by: INTERNAL MEDICINE

## 2021-07-26 PROCEDURE — 82565 ASSAY OF CREATININE: CPT | Performed by: INTERNAL MEDICINE

## 2021-07-26 PROCEDURE — 63600175 PHARM REV CODE 636 W HCPCS: Performed by: INTERNAL MEDICINE

## 2021-07-26 PROCEDURE — 36415 COLL VENOUS BLD VENIPUNCTURE: CPT | Performed by: INTERNAL MEDICINE

## 2021-07-26 PROCEDURE — 84520 ASSAY OF UREA NITROGEN: CPT | Performed by: INTERNAL MEDICINE

## 2021-07-26 PROCEDURE — 63600175 PHARM REV CODE 636 W HCPCS: Performed by: EMERGENCY MEDICINE

## 2021-07-26 RX ORDER — LEVOFLOXACIN 500 MG/1
500 TABLET, FILM COATED ORAL DAILY
Qty: 10 TABLET | Refills: 0 | Status: SHIPPED | OUTPATIENT
Start: 2021-07-26 | End: 2021-08-05

## 2021-07-26 RX ADMIN — PREDNISONE 5 MG: 2.5 TABLET ORAL at 08:07

## 2021-07-26 RX ADMIN — CEFEPIME HYDROCHLORIDE 1 G: 1 INJECTION, POWDER, FOR SOLUTION INTRAMUSCULAR; INTRAVENOUS at 04:07

## 2021-07-26 RX ADMIN — NYSTATIN 600000 UNITS: 500000 SUSPENSION ORAL at 11:07

## 2021-07-26 RX ADMIN — NYSTATIN 600000 UNITS: 500000 SUSPENSION ORAL at 06:07

## 2021-07-26 RX ADMIN — PANTOPRAZOLE SODIUM 40 MG: 40 TABLET, DELAYED RELEASE ORAL at 08:07

## 2021-07-26 RX ADMIN — INSULIN ASPART 8 UNITS: 100 INJECTION, SOLUTION INTRAVENOUS; SUBCUTANEOUS at 06:07

## 2021-07-26 RX ADMIN — HEPARIN SODIUM 5000 UNITS: 5000 INJECTION INTRAVENOUS; SUBCUTANEOUS at 08:07

## 2021-07-26 RX ADMIN — VANCOMYCIN HYDROCHLORIDE 2000 MG: 1 INJECTION, POWDER, LYOPHILIZED, FOR SOLUTION INTRAVENOUS at 06:07

## 2021-07-26 RX ADMIN — OXYCODONE HYDROCHLORIDE AND ACETAMINOPHEN 1 TABLET: 10; 325 TABLET ORAL at 08:07

## 2021-07-26 RX ADMIN — INSULIN DETEMIR 10 UNITS: 100 INJECTION, SOLUTION SUBCUTANEOUS at 09:07

## 2021-07-26 RX ADMIN — PROMETHAZINE HYDROCHLORIDE 25 MG: 25 TABLET ORAL at 01:07

## 2021-07-26 RX ADMIN — METRONIDAZOLE 500 MG: 500 INJECTION, SOLUTION INTRAVENOUS at 04:07

## 2021-07-26 RX ADMIN — TRIAMTERENE AND HYDROCHLOROTHIAZIDE 1 TABLET: 37.5; 25 TABLET ORAL at 08:07

## 2021-07-26 RX ADMIN — KETOROLAC TROMETHAMINE 15 MG: 15 INJECTION, SOLUTION INTRAMUSCULAR; INTRAVENOUS at 10:07

## 2021-07-26 RX ADMIN — GABAPENTIN 1200 MG: 400 CAPSULE ORAL at 08:07

## 2021-07-29 LAB
BACTERIA BLD CULT: NORMAL
BACTERIA BLD CULT: NORMAL

## 2021-08-02 ENCOUNTER — OFFICE VISIT (OUTPATIENT)
Dept: SURGERY | Facility: CLINIC | Age: 61
End: 2021-08-02
Attending: SURGERY
Payer: COMMERCIAL

## 2021-08-02 DIAGNOSIS — S91.301A WOUND OF RIGHT FOOT: Primary | ICD-10-CM

## 2021-08-02 PROCEDURE — 99213 OFFICE O/P EST LOW 20 MIN: CPT | Mod: PBBFAC | Performed by: SURGERY

## 2021-08-02 PROCEDURE — 99204 PR OFFICE/OUTPT VISIT, NEW, LEVL IV, 45-59 MIN: ICD-10-PCS | Mod: S$PBB,,, | Performed by: SURGERY

## 2021-08-02 PROCEDURE — 99204 OFFICE O/P NEW MOD 45 MIN: CPT | Mod: S$PBB,,, | Performed by: SURGERY

## 2021-10-25 ENCOUNTER — OFFICE VISIT (OUTPATIENT)
Dept: FAMILY MEDICINE | Facility: CLINIC | Age: 61
End: 2021-10-25
Payer: COMMERCIAL

## 2021-10-25 VITALS
WEIGHT: 211.38 LBS | SYSTOLIC BLOOD PRESSURE: 128 MMHG | HEART RATE: 101 BPM | OXYGEN SATURATION: 98 % | HEIGHT: 69 IN | TEMPERATURE: 98 F | BODY MASS INDEX: 31.31 KG/M2 | DIASTOLIC BLOOD PRESSURE: 78 MMHG | RESPIRATION RATE: 16 BRPM

## 2021-10-25 DIAGNOSIS — E11.39 TYPE 2 DIABETES MELLITUS WITH OTHER OPHTHALMIC COMPLICATION, WITH LONG-TERM CURRENT USE OF INSULIN: Primary | ICD-10-CM

## 2021-10-25 DIAGNOSIS — Z79.4 TYPE 2 DIABETES MELLITUS WITH OTHER OPHTHALMIC COMPLICATION, WITH LONG-TERM CURRENT USE OF INSULIN: Primary | ICD-10-CM

## 2021-10-25 DIAGNOSIS — M15.9 GENERALIZED OSTEOARTHRITIS: ICD-10-CM

## 2021-10-25 DIAGNOSIS — G62.9 NEUROPATHY: ICD-10-CM

## 2021-10-25 DIAGNOSIS — K31.84 GASTROPARESIS: ICD-10-CM

## 2021-10-25 PROBLEM — H04.123 DRY EYE SYNDROME, BILATERAL: Status: ACTIVE | Noted: 2017-10-06

## 2021-10-25 PROBLEM — T85.193S: Status: ACTIVE | Noted: 2021-10-25

## 2021-10-25 PROBLEM — I10 HYPERTENSION: Status: ACTIVE | Noted: 2021-10-25

## 2021-10-25 PROBLEM — G89.29 CHRONIC PAIN: Status: ACTIVE | Noted: 2021-10-25

## 2021-10-25 PROBLEM — M06.9 RHEUMATOID ARTHRITIS: Status: ACTIVE | Noted: 2019-09-20

## 2021-10-25 PROBLEM — E11.9 DIABETES MELLITUS: Status: ACTIVE | Noted: 2019-09-20

## 2021-10-25 PROCEDURE — 99202 OFFICE O/P NEW SF 15 MIN: CPT | Mod: 25,,, | Performed by: FAMILY MEDICINE

## 2021-10-25 PROCEDURE — 96372 THER/PROPH/DIAG INJ SC/IM: CPT | Mod: ,,, | Performed by: FAMILY MEDICINE

## 2021-10-25 PROCEDURE — 99202 PR OFFICE/OUTPT VISIT, NEW, LEVL II, 15-29 MIN: ICD-10-PCS | Mod: 25,,, | Performed by: FAMILY MEDICINE

## 2021-10-25 PROCEDURE — 96372 PR INJECTION,THERAP/PROPH/DIAG2ST, IM OR SUBCUT: ICD-10-PCS | Mod: ,,, | Performed by: FAMILY MEDICINE

## 2021-10-25 RX ORDER — LANOLIN ALCOHOL/MO/W.PET/CERES
1 CREAM (GRAM) TOPICAL DAILY
COMMUNITY

## 2021-10-25 RX ORDER — ZINC GLUCONATE 50 MG
50 TABLET ORAL DAILY
COMMUNITY

## 2021-10-25 RX ORDER — LEFLUNOMIDE 20 MG/1
1 TABLET ORAL DAILY
COMMUNITY
Start: 2021-09-21

## 2021-10-25 RX ORDER — PROMETHAZINE HYDROCHLORIDE 25 MG/ML
50 INJECTION, SOLUTION INTRAMUSCULAR; INTRAVENOUS
Status: COMPLETED | OUTPATIENT
Start: 2021-10-25 | End: 2021-10-25

## 2021-10-25 RX ORDER — GABAPENTIN 600 MG/1
1200 TABLET ORAL 3 TIMES DAILY
Qty: 540 TABLET | Refills: 1 | Status: SHIPPED | OUTPATIENT
Start: 2021-10-25 | End: 2022-05-10

## 2021-10-25 RX ORDER — HYDROGEN PEROXIDE 3 %
20 SOLUTION, NON-ORAL MISCELLANEOUS DAILY
COMMUNITY

## 2021-10-25 RX ORDER — PROMETHAZINE HYDROCHLORIDE 50 MG/ML
50 INJECTION, SOLUTION INTRAMUSCULAR; INTRAVENOUS
Status: DISCONTINUED | OUTPATIENT
Start: 2021-10-25 | End: 2021-10-25

## 2021-10-25 RX ORDER — DIAPER,BRIEF,ADULT, DISPOSABLE
500 EACH MISCELLANEOUS 2 TIMES DAILY
COMMUNITY

## 2021-10-25 RX ORDER — FLUCONAZOLE 100 MG/1
TABLET ORAL
COMMUNITY
Start: 2021-10-13 | End: 2022-05-10

## 2021-10-25 RX ORDER — IBUPROFEN 100 MG/5ML
1000 SUSPENSION, ORAL (FINAL DOSE FORM) ORAL DAILY
COMMUNITY

## 2021-10-25 RX ORDER — VITAMIN B COMPLEX
1 CAPSULE ORAL DAILY
COMMUNITY

## 2021-10-25 RX ORDER — METOCLOPRAMIDE 10 MG/1
10 TABLET ORAL 4 TIMES DAILY
Qty: 120 TABLET | Refills: 0 | Status: SHIPPED | OUTPATIENT
Start: 2021-10-25 | End: 2021-11-24

## 2021-10-25 RX ADMIN — PROMETHAZINE HYDROCHLORIDE 50 MG: 25 INJECTION, SOLUTION INTRAMUSCULAR; INTRAVENOUS at 04:10

## 2022-05-11 ENCOUNTER — HOSPITAL ENCOUNTER (INPATIENT)
Facility: HOSPITAL | Age: 62
LOS: 11 days | Discharge: HOME | DRG: 605 | End: 2022-05-23
Attending: EMERGENCY MEDICINE | Admitting: INTERNAL MEDICINE
Payer: COMMERCIAL

## 2022-05-11 DIAGNOSIS — E11.9 TYPE 2 DIABETES MELLITUS WITHOUT COMPLICATION, WITH LONG-TERM CURRENT USE OF INSULIN: ICD-10-CM
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
## 2022-05-11 DIAGNOSIS — S61.459A CAT BITE OF HAND, UNSPECIFIED LATERALITY, INITIAL ENCOUNTER: ICD-10-CM
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
## 2022-05-11 DIAGNOSIS — Z79.4 TYPE 2 DIABETES MELLITUS WITHOUT COMPLICATION, WITH LONG-TERM CURRENT USE OF INSULIN: ICD-10-CM

## 2022-05-11 DIAGNOSIS — M06.9 RHEUMATOID ARTHRITIS, INVOLVING UNSPECIFIED SITE, UNSPECIFIED WHETHER RHEUMATOID FACTOR PRESENT: ICD-10-CM

## 2022-05-11 DIAGNOSIS — S61.451A CAT BITE OF RIGHT HAND, INITIAL ENCOUNTER: ICD-10-CM

## 2022-05-11 DIAGNOSIS — L03.90 WOUND CELLULITIS: ICD-10-CM

## 2022-05-11 LAB
ALBUMIN SERPL BCP-MCNC: 2.9 G/DL (ref 3.5–5)
ALBUMIN/GLOB SERPL: 0.9 {RATIO}
ALP SERPL-CCNC: 189 U/L (ref 46–118)
ALT SERPL W P-5'-P-CCNC: 77 U/L (ref 13–56)
ANION GAP SERPL CALCULATED.3IONS-SCNC: 10 MMOL/L (ref 7–16)
AST SERPL W P-5'-P-CCNC: 54 U/L (ref 15–37)
BASOPHILS # BLD AUTO: 0.03 K/UL (ref 0–0.2)
BASOPHILS NFR BLD AUTO: 0.2 % (ref 0–1)
BILIRUB SERPL-MCNC: 0.5 MG/DL (ref 0–1.2)
BUN SERPL-MCNC: 19 MG/DL (ref 7–18)
BUN/CREAT SERPL: 16 (ref 6–20)
CALCIUM SERPL-MCNC: 9 MG/DL (ref 8.5–10.1)
CHLORIDE SERPL-SCNC: 102 MMOL/L (ref 98–107)
CO2 SERPL-SCNC: 28 MMOL/L (ref 21–32)
CREAT SERPL-MCNC: 1.19 MG/DL (ref 0.55–1.02)
CRP SERPL-MCNC: 12.4 MG/DL (ref 0–0.8)
DIFFERENTIAL METHOD BLD: ABNORMAL
EOSINOPHIL # BLD AUTO: 0.01 K/UL (ref 0–0.5)
EOSINOPHIL NFR BLD AUTO: 0.1 % (ref 1–4)
ERYTHROCYTE [DISTWIDTH] IN BLOOD BY AUTOMATED COUNT: 13.6 % (ref 11.5–14.5)
ERYTHROCYTE [SEDIMENTATION RATE] IN BLOOD BY WESTERGREN METHOD: 41 MM/HR (ref 0–30)
FLUAV AG UPPER RESP QL IA.RAPID: NEGATIVE
FLUBV AG UPPER RESP QL IA.RAPID: NEGATIVE
GLOBULIN SER-MCNC: 3.4 G/DL (ref 2–4)
GLUCOSE SERPL-MCNC: 208 MG/DL (ref 74–106)
GLUCOSE SERPL-MCNC: 295 MG/DL (ref 70–105)
GLUCOSE SERPL-MCNC: 398 MG/DL (ref 70–105)
HCT VFR BLD AUTO: 35.8 % (ref 38–47)
HGB BLD-MCNC: 11.3 G/DL (ref 12–16)
IMM GRANULOCYTES # BLD AUTO: 0.1 K/UL (ref 0–0.04)
IMM GRANULOCYTES NFR BLD: 0.6 % (ref 0–0.4)
LYMPHOCYTES # BLD AUTO: 0.5 K/UL (ref 1–4.8)
LYMPHOCYTES NFR BLD AUTO: 3.2 % (ref 27–41)
MCH RBC QN AUTO: 28.1 PG (ref 27–31)
MCHC RBC AUTO-ENTMCNC: 31.6 G/DL (ref 32–36)
MCV RBC AUTO: 89.1 FL (ref 80–96)
MONOCYTES # BLD AUTO: 1.03 K/UL (ref 0–0.8)
MONOCYTES NFR BLD AUTO: 6.5 % (ref 2–6)
MPC BLD CALC-MCNC: 10.5 FL (ref 9.4–12.4)
NEUTROPHILS # BLD AUTO: 14.2 K/UL (ref 1.8–7.7)
NEUTROPHILS NFR BLD AUTO: 89.4 % (ref 53–65)
NRBC # BLD AUTO: 0 X10E3/UL
NRBC, AUTO (.00): 0 %
PLATELET # BLD AUTO: 230 K/UL (ref 150–400)
POTASSIUM SERPL-SCNC: 4.4 MMOL/L (ref 3.5–5.1)
PROT SERPL-MCNC: 6.3 G/DL (ref 6.4–8.2)
RBC # BLD AUTO: 4.02 M/UL (ref 4.2–5.4)
SARS-COV+SARS-COV-2 AG RESP QL IA.RAPID: NEGATIVE
SODIUM SERPL-SCNC: 136 MMOL/L (ref 136–145)
WBC # BLD AUTO: 15.87 K/UL (ref 4.5–11)

## 2022-05-11 PROCEDURE — 63600175 PHARM REV CODE 636 W HCPCS: Performed by: NURSE PRACTITIONER

## 2022-05-11 PROCEDURE — 96375 TX/PRO/DX INJ NEW DRUG ADDON: CPT | Performed by: NURSE PRACTITIONER

## 2022-05-11 PROCEDURE — 99283 EMERGENCY DEPT VISIT LOW MDM: CPT | Mod: ,,, | Performed by: NURSE PRACTITIONER

## 2022-05-11 PROCEDURE — 96365 THER/PROPH/DIAG IV INF INIT: CPT | Performed by: NURSE PRACTITIONER

## 2022-05-11 PROCEDURE — 82962 GLUCOSE BLOOD TEST: CPT

## 2022-05-11 PROCEDURE — 86140 C-REACTIVE PROTEIN: CPT | Performed by: NURSE PRACTITIONER

## 2022-05-11 PROCEDURE — 87428 SARSCOV & INF VIR A&B AG IA: CPT | Performed by: NURSE PRACTITIONER

## 2022-05-11 PROCEDURE — 85025 COMPLETE CBC W/AUTO DIFF WBC: CPT | Performed by: NURSE PRACTITIONER

## 2022-05-11 PROCEDURE — 99285 EMERGENCY DEPT VISIT HI MDM: CPT | Mod: 25 | Performed by: NURSE PRACTITIONER

## 2022-05-11 PROCEDURE — 80053 COMPREHEN METABOLIC PANEL: CPT | Performed by: NURSE PRACTITIONER

## 2022-05-11 PROCEDURE — 36415 COLL VENOUS BLD VENIPUNCTURE: CPT | Performed by: NURSE PRACTITIONER

## 2022-05-11 PROCEDURE — 96372 THER/PROPH/DIAG INJ SC/IM: CPT

## 2022-05-11 PROCEDURE — 82310 ASSAY OF CALCIUM: CPT | Performed by: NURSE PRACTITIONER

## 2022-05-11 PROCEDURE — 85651 RBC SED RATE NONAUTOMATED: CPT | Performed by: NURSE PRACTITIONER

## 2022-05-11 PROCEDURE — 25000003 PHARM REV CODE 250: Performed by: FAMILY MEDICINE

## 2022-05-11 PROCEDURE — G0378 HOSPITAL OBSERVATION PER HR: HCPCS

## 2022-05-11 PROCEDURE — 99283 PR EMERGENCY DEPT VISIT,LEVEL III: ICD-10-PCS | Mod: ,,, | Performed by: NURSE PRACTITIONER

## 2022-05-11 PROCEDURE — 63600175 PHARM REV CODE 636 W HCPCS: Performed by: FAMILY MEDICINE

## 2022-05-11 PROCEDURE — 84075 ASSAY ALKALINE PHOSPHATASE: CPT | Performed by: NURSE PRACTITIONER

## 2022-05-11 PROCEDURE — 25000003 PHARM REV CODE 250: Performed by: NURSE PRACTITIONER

## 2022-05-11 RX ORDER — CLINDAMYCIN PHOSPHATE 600 MG/50ML
600 INJECTION, SOLUTION INTRAVENOUS
Status: DISCONTINUED | OUTPATIENT
Start: 2022-05-11 | End: 2022-05-11

## 2022-05-11 RX ORDER — ONDANSETRON 2 MG/ML
4 INJECTION INTRAMUSCULAR; INTRAVENOUS ONCE
Status: COMPLETED | OUTPATIENT
Start: 2022-05-11 | End: 2022-05-11

## 2022-05-11 RX ORDER — CLINDAMYCIN PHOSPHATE 900 MG/50ML
900 INJECTION, SOLUTION INTRAVENOUS
Status: COMPLETED | OUTPATIENT
Start: 2022-05-11 | End: 2022-05-11

## 2022-05-11 RX ORDER — NALOXONE HCL 0.4 MG/ML
0.02 VIAL (ML) INJECTION
Status: DISCONTINUED | OUTPATIENT
Start: 2022-05-11 | End: 2022-05-23 | Stop reason: HOSPADM

## 2022-05-11 RX ORDER — CYCLOBENZAPRINE HCL 10 MG
10 TABLET ORAL NIGHTLY
Status: DISCONTINUED | OUTPATIENT
Start: 2022-05-11 | End: 2022-05-23 | Stop reason: HOSPADM

## 2022-05-11 RX ORDER — POLYETHYLENE GLYCOL 3350 17 G/17G
17 POWDER, FOR SOLUTION ORAL DAILY
Status: DISCONTINUED | OUTPATIENT
Start: 2022-05-12 | End: 2022-05-23 | Stop reason: HOSPADM

## 2022-05-11 RX ORDER — ASCORBIC ACID 500 MG
1000 TABLET ORAL DAILY
Status: DISCONTINUED | OUTPATIENT
Start: 2022-05-12 | End: 2022-05-23 | Stop reason: HOSPADM

## 2022-05-11 RX ORDER — INSULIN ASPART 100 [IU]/ML
1-10 INJECTION, SOLUTION INTRAVENOUS; SUBCUTANEOUS
Status: DISCONTINUED | OUTPATIENT
Start: 2022-05-11 | End: 2022-05-15

## 2022-05-11 RX ORDER — ACETAMINOPHEN 325 MG/1
650 TABLET ORAL EVERY 8 HOURS PRN
Status: DISCONTINUED | OUTPATIENT
Start: 2022-05-11 | End: 2022-05-23 | Stop reason: HOSPADM

## 2022-05-11 RX ORDER — LANOLIN ALCOHOL/MO/W.PET/CERES
1 CREAM (GRAM) TOPICAL DAILY
Status: DISCONTINUED | OUTPATIENT
Start: 2022-05-12 | End: 2022-05-23 | Stop reason: HOSPADM

## 2022-05-11 RX ORDER — OXYCODONE AND ACETAMINOPHEN 10; 325 MG/1; MG/1
1 TABLET ORAL EVERY 8 HOURS PRN
Status: DISCONTINUED | OUTPATIENT
Start: 2022-05-11 | End: 2022-05-23 | Stop reason: HOSPADM

## 2022-05-11 RX ORDER — GABAPENTIN 300 MG/1
600 CAPSULE ORAL 3 TIMES DAILY
Refills: 1 | Status: DISCONTINUED | OUTPATIENT
Start: 2022-05-11 | End: 2022-05-23 | Stop reason: HOSPADM

## 2022-05-11 RX ORDER — PANTOPRAZOLE SODIUM 40 MG/1
40 TABLET, DELAYED RELEASE ORAL DAILY
Status: DISCONTINUED | OUTPATIENT
Start: 2022-05-12 | End: 2022-05-23 | Stop reason: HOSPADM

## 2022-05-11 RX ORDER — DOXYCYCLINE 100 MG/1
100 CAPSULE ORAL 2 TIMES DAILY
Status: DISCONTINUED | OUTPATIENT
Start: 2022-05-11 | End: 2022-05-23 | Stop reason: HOSPADM

## 2022-05-11 RX ORDER — SODIUM CHLORIDE 0.9 % (FLUSH) 0.9 %
10 SYRINGE (ML) INJECTION EVERY 12 HOURS PRN
Status: DISCONTINUED | OUTPATIENT
Start: 2022-05-11 | End: 2022-05-23 | Stop reason: HOSPADM

## 2022-05-11 RX ORDER — ENOXAPARIN SODIUM 100 MG/ML
40 INJECTION SUBCUTANEOUS EVERY 24 HOURS
Status: DISCONTINUED | OUTPATIENT
Start: 2022-05-11 | End: 2022-05-23 | Stop reason: HOSPADM

## 2022-05-11 RX ORDER — GLUCAGON 1 MG
1 KIT INJECTION
Status: DISCONTINUED | OUTPATIENT
Start: 2022-05-11 | End: 2022-05-23 | Stop reason: HOSPADM

## 2022-05-11 RX ORDER — TRIAMTERENE/HYDROCHLOROTHIAZID 37.5-25 MG
1 TABLET ORAL DAILY
Status: DISCONTINUED | OUTPATIENT
Start: 2022-05-12 | End: 2022-05-23

## 2022-05-11 RX ORDER — LEFLUNOMIDE 20 MG/1
20 TABLET ORAL DAILY
Status: DISCONTINUED | OUTPATIENT
Start: 2022-05-12 | End: 2022-05-23 | Stop reason: HOSPADM

## 2022-05-11 RX ORDER — ONDANSETRON 2 MG/ML
4 INJECTION INTRAMUSCULAR; INTRAVENOUS EVERY 6 HOURS PRN
Status: DISCONTINUED | OUTPATIENT
Start: 2022-05-11 | End: 2022-05-23 | Stop reason: HOSPADM

## 2022-05-11 RX ORDER — CLINDAMYCIN PHOSPHATE 600 MG/50ML
600 INJECTION, SOLUTION INTRAVENOUS
Status: DISCONTINUED | OUTPATIENT
Start: 2022-05-12 | End: 2022-05-13

## 2022-05-11 RX ORDER — HYDROXYCHLOROQUINE SULFATE 200 MG/1
200 TABLET, FILM COATED ORAL 2 TIMES DAILY
Status: DISCONTINUED | OUTPATIENT
Start: 2022-05-11 | End: 2022-05-23 | Stop reason: HOSPADM

## 2022-05-11 RX ORDER — PROMETHAZINE HYDROCHLORIDE 25 MG/ML
25 INJECTION, SOLUTION INTRAMUSCULAR; INTRAVENOUS EVERY 6 HOURS PRN
Status: DISCONTINUED | OUTPATIENT
Start: 2022-05-11 | End: 2022-05-23 | Stop reason: HOSPADM

## 2022-05-11 RX ORDER — PREDNISONE 5 MG/1
5 TABLET ORAL DAILY
Status: DISCONTINUED | OUTPATIENT
Start: 2022-05-12 | End: 2022-05-23 | Stop reason: HOSPADM

## 2022-05-11 RX ADMIN — CLINDAMYCIN PHOSPHATE 900 MG: 900 INJECTION, SOLUTION INTRAVENOUS at 05:05

## 2022-05-11 RX ADMIN — CYCLOBENZAPRINE 10 MG: 10 TABLET, FILM COATED ORAL at 10:05

## 2022-05-11 RX ADMIN — INSULIN ASPART 10 UNITS: 100 INJECTION, SOLUTION INTRAVENOUS; SUBCUTANEOUS at 11:05

## 2022-05-11 RX ADMIN — OXYCODONE HYDROCHLORIDE AND ACETAMINOPHEN 1 TABLET: 10; 325 TABLET ORAL at 10:05

## 2022-05-11 RX ADMIN — DOXYCYCLINE HYCLATE 100 MG: 100 CAPSULE ORAL at 10:05

## 2022-05-11 RX ADMIN — ENOXAPARIN SODIUM 40 MG: 40 INJECTION SUBCUTANEOUS at 10:05

## 2022-05-11 RX ADMIN — ONDANSETRON 4 MG: 2 INJECTION INTRAMUSCULAR; INTRAVENOUS at 06:05

## 2022-05-11 RX ADMIN — HYDROXYCHLOROQUINE SULFATE 200 MG: 200 TABLET, FILM COATED ORAL at 11:05

## 2022-05-11 RX ADMIN — GABAPENTIN 600 MG: 300 CAPSULE ORAL at 10:05

## 2022-05-11 NOTE — ED TRIAGE NOTES
Pt presents to the ed c/o a cat bite to the right hand. Pt states swelling and redness has gotten worse

## 2022-05-11 NOTE — ED PROVIDER NOTES
Encounter Date: 2022       History     Chief Complaint   Patient presents with    Animal Bite     61-year-old female presents to the ER with complaint of a pain and swelling to her right hand. She states she presented to ER on last night and was given IV antibiotics along with PO antibiotics of doxycycline and Ceftin. She states the pain has gotten worse and the swelling has gotten worse, with limitation and range of motion to right hand. She states she went to primary this morning first thing, and was instructed to return to ED for further evaluation      The history is provided by the patient and medical records. No  was used.   Animal Bite   The incident occurred two days ago. The incident occurred at home. There is an injury to the right hand and left hand. The pain is at a severity of 9/10. Incident type: no foreign body  Associated symptoms include nausea, inability to bear weight, tingling and weakness. Pertinent negatives include no chest pain, no vomiting and no cough. There have been no prior injuries to these areas. She is right-handed. Recently, medical care has been given by the PCP and at this facility. Services received include medications given and tests performed.     Review of patient's allergies indicates:   Allergen Reactions    Influenza virus vaccines     Penicillins      Past Medical History:   Diagnosis Date    Anxiety and depression     Diabetes mellitus     Gastroparesis due to DM     Hypertension     Peripheral neuropathy     Rheumatoid arthritis      Past Surgical History:   Procedure Laterality Date     SECTION      GASTRIC STIMULATOR IMPLANT SURGERY  2010    HYSTERECTOMY       Family History   Problem Relation Age of Onset    Heart disease Mother     Cancer Father     Diabetes Son     Diabetes Son      Social History     Tobacco Use    Smoking status: Never Smoker    Smokeless tobacco: Never Used   Substance Use Topics    Alcohol use:  Never     Review of Systems   Constitutional: Positive for chills and fever.   HENT: Negative for sinus pressure and sinus pain.    Eyes: Negative for pain and redness.   Respiratory: Negative for cough and shortness of breath.    Cardiovascular: Negative for chest pain and palpitations.   Gastrointestinal: Positive for nausea. Negative for abdominal distention and vomiting.   Genitourinary: Negative for difficulty urinating and urgency.   Musculoskeletal: Positive for arthralgias, joint swelling and myalgias.   Skin: Positive for color change and wound.   Allergic/Immunologic: Negative for environmental allergies.   Neurological: Positive for tingling and weakness. Negative for dizziness.   Hematological: Negative for adenopathy. Does not bruise/bleed easily.   Psychiatric/Behavioral: Negative for agitation and confusion.       Physical Exam     Initial Vitals [05/11/22 1656]   BP Pulse Resp Temp SpO2   129/78 (!) 122 18 98.6 °F (37 °C) 97 %      MAP       --         Physical Exam    Nursing note and vitals reviewed.  Constitutional: She appears well-developed and well-nourished.   HENT:   Head: Normocephalic and atraumatic.   Eyes: EOM are normal. Pupils are equal, round, and reactive to light.   Neck: Neck supple.   Normal range of motion.  Cardiovascular: Normal rate and regular rhythm.   Pulmonary/Chest: She has no wheezes. She has no rhonchi.   Abdominal: Abdomen is soft. She exhibits no distension. There is no abdominal tenderness.   Musculoskeletal:         General: Tenderness and edema present.      Right hand: Swelling and tenderness present. Decreased range of motion.      Left hand: Swelling present. Decreased range of motion.        Arms:       Cervical back: Normal range of motion and neck supple.     Neurological: She is alert and oriented to person, place, and time.   Skin: Skin is warm and dry. Capillary refill takes 2 to 3 seconds. There is erythema.        Psychiatric: She has a normal mood and  affect. Thought content normal.         Medical Screening Exam   See Full Note    ED Course   Procedures  Labs Reviewed   C-REACTIVE PROTEIN - Abnormal; Notable for the following components:       Result Value    CRP 12.40 (*)     All other components within normal limits   SEDIMENTATION RATE, AUTOMATED - Abnormal; Notable for the following components:    ESR Westergren 41 (*)     All other components within normal limits   COMPREHENSIVE METABOLIC PANEL - Abnormal; Notable for the following components:    Glucose 208 (*)     BUN 19 (*)     Creatinine 1.19 (*)     Total Protein 6.3 (*)     Albumin 2.9 (*)     Alk Phos 189 (*)     ALT 77 (*)     AST 54 (*)     eGFR 49 (*)     All other components within normal limits   CBC WITH DIFFERENTIAL - Abnormal; Notable for the following components:    WBC 15.87 (*)     RBC 4.02 (*)     Hemoglobin 11.3 (*)     Hematocrit 35.8 (*)     MCHC 31.6 (*)     Neutrophils % 89.4 (*)     Lymphocytes % 3.2 (*)     Monocytes % 6.5 (*)     Eosinophils % 0.1 (*)     Immature Granulocytes % 0.6 (*)     Neutrophils, Abs 14.20 (*)     Lymphocytes, Absolute 0.50 (*)     Monocytes, Absolute 1.03 (*)     Immature Granulocytes, Absolute 0.10 (*)     All other components within normal limits   CBC W/ AUTO DIFFERENTIAL    Narrative:     The following orders were created for panel order CBC auto differential.  Procedure                               Abnormality         Status                     ---------                               -----------         ------                     CBC with Differential[[id number]]        Abnormal            Final result                 Please view results for these tests on the individual orders.   SARS-COV2 (COVID) W/ FLU ANTIGEN          Imaging Results    None          Medications   clindamycin in D5W 900 mg/50 mL IVPB 900 mg (0 mg Intravenous Stopped 5/11/22 1844)   ondansetron injection 4 mg (4 mg Intravenous Given 5/11/22 8095)                       Clinical  Impression:   Final diagnoses:  [L03.90] Wound cellulitis          ED Disposition Condition    Observation               CARISA Vasquez  05/11/22 1948

## 2022-05-12 PROBLEM — S61.459A CAT BITE OF HAND: Status: RESOLVED | Noted: 2022-05-11 | Resolved: 2022-05-12
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
## 2022-05-12 LAB
ANION GAP SERPL CALCULATED.3IONS-SCNC: 18 MMOL/L (ref 7–16)
BASOPHILS # BLD AUTO: 0.05 K/UL (ref 0–0.2)
BASOPHILS NFR BLD AUTO: 0.3 % (ref 0–1)
BUN SERPL-MCNC: 21 MG/DL (ref 7–18)
BUN/CREAT SERPL: 19 (ref 6–20)
CALCIUM SERPL-MCNC: 9.2 MG/DL (ref 8.5–10.1)
CHLORIDE SERPL-SCNC: 100 MMOL/L (ref 98–107)
CO2 SERPL-SCNC: 24 MMOL/L (ref 21–32)
CREAT SERPL-MCNC: 1.11 MG/DL (ref 0.55–1.02)
DIFFERENTIAL METHOD BLD: ABNORMAL
EOSINOPHIL # BLD AUTO: 0.04 K/UL (ref 0–0.5)
EOSINOPHIL NFR BLD AUTO: 0.3 % (ref 1–4)
ERYTHROCYTE [DISTWIDTH] IN BLOOD BY AUTOMATED COUNT: 13.5 % (ref 11.5–14.5)
GLUCOSE SERPL-MCNC: 230 MG/DL (ref 70–105)
GLUCOSE SERPL-MCNC: 247 MG/DL (ref 74–106)
GLUCOSE SERPL-MCNC: 288 MG/DL (ref 70–105)
GLUCOSE SERPL-MCNC: 320 MG/DL (ref 70–105)
GLUCOSE SERPL-MCNC: 366 MG/DL (ref 70–105)
HCT VFR BLD AUTO: 35.6 % (ref 38–47)
HGB BLD-MCNC: 11 G/DL (ref 12–16)
IMM GRANULOCYTES # BLD AUTO: 0.1 K/UL (ref 0–0.04)
IMM GRANULOCYTES NFR BLD: 0.7 % (ref 0–0.4)
LYMPHOCYTES # BLD AUTO: 1.45 K/UL (ref 1–4.8)
LYMPHOCYTES NFR BLD AUTO: 9.7 % (ref 27–41)
MCH RBC QN AUTO: 27.4 PG (ref 27–31)
MCHC RBC AUTO-ENTMCNC: 30.9 G/DL (ref 32–36)
MCV RBC AUTO: 88.6 FL (ref 80–96)
MONOCYTES # BLD AUTO: 1.46 K/UL (ref 0–0.8)
MONOCYTES NFR BLD AUTO: 9.7 % (ref 2–6)
MPC BLD CALC-MCNC: 11.3 FL (ref 9.4–12.4)
NEUTROPHILS # BLD AUTO: 11.9 K/UL (ref 1.8–7.7)
NEUTROPHILS NFR BLD AUTO: 79.3 % (ref 53–65)
NRBC # BLD AUTO: 0.03 X10E3/UL
NRBC, AUTO (.00): 0.2 %
PLATELET # BLD AUTO: 194 K/UL (ref 150–400)
POTASSIUM SERPL-SCNC: 4.9 MMOL/L (ref 3.5–5.1)
RBC # BLD AUTO: 4.02 M/UL (ref 4.2–5.4)
SODIUM SERPL-SCNC: 137 MMOL/L (ref 136–145)
WBC # BLD AUTO: 15 K/UL (ref 4.5–11)

## 2022-05-12 PROCEDURE — 96376 TX/PRO/DX INJ SAME DRUG ADON: CPT

## 2022-05-12 PROCEDURE — 85025 COMPLETE CBC W/AUTO DIFF WBC: CPT | Performed by: FAMILY MEDICINE

## 2022-05-12 PROCEDURE — 99222 PR INITIAL HOSPITAL CARE,LEVL II: ICD-10-PCS | Mod: ,,, | Performed by: STUDENT IN AN ORGANIZED HEALTH CARE EDUCATION/TRAINING PROGRAM

## 2022-05-12 PROCEDURE — 99222 1ST HOSP IP/OBS MODERATE 55: CPT | Mod: GC,,, | Performed by: INTERNAL MEDICINE

## 2022-05-12 PROCEDURE — 11000001 HC ACUTE MED/SURG PRIVATE ROOM

## 2022-05-12 PROCEDURE — C9399 UNCLASSIFIED DRUGS OR BIOLOG: HCPCS | Performed by: FAMILY MEDICINE

## 2022-05-12 PROCEDURE — 82962 GLUCOSE BLOOD TEST: CPT

## 2022-05-12 PROCEDURE — 25000003 PHARM REV CODE 250: Performed by: FAMILY MEDICINE

## 2022-05-12 PROCEDURE — 63600175 PHARM REV CODE 636 W HCPCS: Performed by: FAMILY MEDICINE

## 2022-05-12 PROCEDURE — 63600175 PHARM REV CODE 636 W HCPCS: Performed by: INTERNAL MEDICINE

## 2022-05-12 PROCEDURE — 36415 COLL VENOUS BLD VENIPUNCTURE: CPT | Performed by: FAMILY MEDICINE

## 2022-05-12 PROCEDURE — 63600175 PHARM REV CODE 636 W HCPCS

## 2022-05-12 PROCEDURE — 99222 PR INITIAL HOSPITAL CARE,LEVL II: ICD-10-PCS | Mod: GC,,, | Performed by: INTERNAL MEDICINE

## 2022-05-12 PROCEDURE — 96375 TX/PRO/DX INJ NEW DRUG ADDON: CPT

## 2022-05-12 PROCEDURE — G0378 HOSPITAL OBSERVATION PER HR: HCPCS

## 2022-05-12 PROCEDURE — 99222 1ST HOSP IP/OBS MODERATE 55: CPT | Mod: ,,, | Performed by: STUDENT IN AN ORGANIZED HEALTH CARE EDUCATION/TRAINING PROGRAM

## 2022-05-12 PROCEDURE — 96372 THER/PROPH/DIAG INJ SC/IM: CPT | Mod: 59

## 2022-05-12 PROCEDURE — 80048 BASIC METABOLIC PNL TOTAL CA: CPT | Performed by: FAMILY MEDICINE

## 2022-05-12 RX ORDER — MORPHINE SULFATE 2 MG/ML
INJECTION, SOLUTION INTRAMUSCULAR; INTRAVENOUS
Status: COMPLETED
Start: 2022-05-12 | End: 2022-05-12

## 2022-05-12 RX ORDER — HYDRALAZINE HYDROCHLORIDE 20 MG/ML
10 INJECTION INTRAMUSCULAR; INTRAVENOUS EVERY 6 HOURS PRN
Status: DISCONTINUED | OUTPATIENT
Start: 2022-05-12 | End: 2022-05-23 | Stop reason: HOSPADM

## 2022-05-12 RX ORDER — MORPHINE SULFATE 2 MG/ML
2 INJECTION, SOLUTION INTRAMUSCULAR; INTRAVENOUS
Status: DISCONTINUED | OUTPATIENT
Start: 2022-05-12 | End: 2022-05-17

## 2022-05-12 RX ORDER — MORPHINE SULFATE 2 MG/ML
2 INJECTION, SOLUTION INTRAMUSCULAR; INTRAVENOUS
Status: DISCONTINUED | OUTPATIENT
Start: 2022-05-12 | End: 2022-05-12

## 2022-05-12 RX ADMIN — GABAPENTIN 600 MG: 300 CAPSULE ORAL at 08:05

## 2022-05-12 RX ADMIN — INSULIN ASPART 4 UNITS: 100 INJECTION, SOLUTION INTRAVENOUS; SUBCUTANEOUS at 01:05

## 2022-05-12 RX ADMIN — INSULIN DETEMIR 20 UNITS: 100 INJECTION, SOLUTION SUBCUTANEOUS at 08:05

## 2022-05-12 RX ADMIN — ENOXAPARIN SODIUM 40 MG: 40 INJECTION SUBCUTANEOUS at 05:05

## 2022-05-12 RX ADMIN — MORPHINE SULFATE 2 MG: 2 INJECTION, SOLUTION INTRAMUSCULAR; INTRAVENOUS at 12:05

## 2022-05-12 RX ADMIN — DOXYCYCLINE HYCLATE 100 MG: 100 CAPSULE ORAL at 09:05

## 2022-05-12 RX ADMIN — INSULIN ASPART 10 UNITS: 100 INJECTION, SOLUTION INTRAVENOUS; SUBCUTANEOUS at 08:05

## 2022-05-12 RX ADMIN — CYCLOBENZAPRINE 10 MG: 10 TABLET, FILM COATED ORAL at 10:05

## 2022-05-12 RX ADMIN — OXYCODONE HYDROCHLORIDE AND ACETAMINOPHEN 1000 MG: 500 TABLET ORAL at 08:05

## 2022-05-12 RX ADMIN — ACETAMINOPHEN 650 MG: 325 TABLET ORAL at 08:05

## 2022-05-12 RX ADMIN — GABAPENTIN 600 MG: 300 CAPSULE ORAL at 04:05

## 2022-05-12 RX ADMIN — MORPHINE SULFATE 2 MG: 2 INJECTION, SOLUTION INTRAMUSCULAR; INTRAVENOUS at 08:05

## 2022-05-12 RX ADMIN — MORPHINE SULFATE 2 MG: 2 INJECTION, SOLUTION INTRAMUSCULAR; INTRAVENOUS at 01:05

## 2022-05-12 RX ADMIN — OXYCODONE HYDROCHLORIDE AND ACETAMINOPHEN 1 TABLET: 10; 325 TABLET ORAL at 04:05

## 2022-05-12 RX ADMIN — MORPHINE SULFATE 2 MG: 2 INJECTION, SOLUTION INTRAMUSCULAR; INTRAVENOUS at 10:05

## 2022-05-12 RX ADMIN — CLINDAMYCIN IN 5 PERCENT DEXTROSE 600 MG: 12 INJECTION, SOLUTION INTRAVENOUS at 05:05

## 2022-05-12 RX ADMIN — HYDROXYCHLOROQUINE SULFATE 200 MG: 200 TABLET, FILM COATED ORAL at 10:05

## 2022-05-12 RX ADMIN — INSULIN ASPART 3 UNITS: 100 INJECTION, SOLUTION INTRAVENOUS; SUBCUTANEOUS at 10:05

## 2022-05-12 RX ADMIN — INSULIN ASPART 8 UNITS: 100 INJECTION, SOLUTION INTRAVENOUS; SUBCUTANEOUS at 05:05

## 2022-05-12 RX ADMIN — CLINDAMYCIN IN 5 PERCENT DEXTROSE 600 MG: 12 INJECTION, SOLUTION INTRAVENOUS at 12:05

## 2022-05-12 RX ADMIN — TRIAMTERENE AND HYDROCHLOROTHIAZIDE 1 TABLET: 37.5; 25 TABLET ORAL at 08:05

## 2022-05-12 RX ADMIN — MORPHINE SULFATE 2 MG: 2 INJECTION, SOLUTION INTRAMUSCULAR; INTRAVENOUS at 05:05

## 2022-05-12 RX ADMIN — PANTOPRAZOLE SODIUM 40 MG: 40 TABLET, DELAYED RELEASE ORAL at 08:05

## 2022-05-12 RX ADMIN — POLYETHYLENE GLYCOL 3350 17 G: 17 POWDER, FOR SOLUTION ORAL at 08:05

## 2022-05-12 RX ADMIN — PREDNISONE 5 MG: 5 TABLET ORAL at 08:05

## 2022-05-12 RX ADMIN — FERROUS SULFATE TAB 325 MG (65 MG ELEMENTAL FE) 1 EACH: 325 (65 FE) TAB at 08:05

## 2022-05-12 RX ADMIN — GABAPENTIN 600 MG: 300 CAPSULE ORAL at 10:05

## 2022-05-12 RX ADMIN — HYDROXYCHLOROQUINE SULFATE 200 MG: 200 TABLET, FILM COATED ORAL at 08:05

## 2022-05-12 RX ADMIN — OXYCODONE HYDROCHLORIDE AND ACETAMINOPHEN 1 TABLET: 10; 325 TABLET ORAL at 05:05

## 2022-05-12 RX ADMIN — CLINDAMYCIN IN 5 PERCENT DEXTROSE 600 MG: 12 INJECTION, SOLUTION INTRAVENOUS at 08:05

## 2022-05-12 NOTE — SUBJECTIVE & OBJECTIVE
No current facility-administered medications on file prior to encounter.     Current Outpatient Medications on File Prior to Encounter   Medication Sig    ascorbic acid, vitamin C, (VITAMIN C) 1000 MG tablet Take 1,000 mg by mouth once daily.    b complex vitamins capsule Take 1 capsule by mouth once daily.    cefUROXime (CEFTIN) 500 MG tablet Take 500 mg by mouth 2 (two) times daily.    cyanocobalamin 1,000 mcg/mL injection Inject 1,000 mcg into the muscle every 30 days.    cyclobenzaprine (FLEXERIL) 10 MG tablet Take 10 mg by mouth every evening.     doxycycline (VIBRAMYCIN) 100 MG Cap Take 1 capsule (100 mg total) by mouth 2 (two) times daily. for 10 days    esomeprazole (NEXIUM) 20 MG capsule Take 20 mg by mouth once daily.    etanercept (ENBREL MINI) 50 mg/mL (1 mL) Crtg 1 mL by abdominal subcutaneous route every .     ferrous sulfate (FEOSOL) Tab tablet Take 1 tablet by mouth once daily.    gabapentin (NEURONTIN) 600 MG tablet Take 2 tablets (1,200 mg total) by mouth 3 (three) times daily.    hydrOXYchloroQUINE (PLAQUENIL) 200 mg tablet Take 200 mg by mouth 2 (two) times daily.     insulin regular 100 unit/mL Inj injection 3 (three) times daily before meals.    ketorolac (TORADOL) 10 mg tablet Take 10 mg by mouth every 8 (eight) hours as needed.    leflunomide (ARAVA) 20 MG Tab Take 1 tablet by mouth once daily. Monday, Wednesday, Friday    LEVEMIR U-100 INSULIN 100 unit/mL injection SMARTSI Unit(s) SUB-Q Daily    lysine (L-LYSINE) 500 mg Tab Take 500 mg by mouth 2 (two) times a day.    metroNIDAZOLE (FLAGYL) 500 MG tablet Take 500 mg by mouth 3 (three) times daily.    MIRALAX 17 gram/dose powder Take by mouth.    nystatin (MYCOSTATIN) 100,000 unit/mL suspension Take 6 mLs by mouth 3 (three) times daily before meals.     oxyCODONE (ROXICODONE) 10 mg Tab immediate release tablet Take 10 mg by mouth every 8 (eight) hours as needed.    oxyCODONE-acetaminophen (PERCOCET)  mg per tablet Take 1  tablet by mouth every 8 (eight) hours as needed for Pain.     predniSONE (DELTASONE) 5 MG tablet Take 5 mg by mouth once daily.     promethazine (PHENERGAN) 25 MG tablet Take 25 mg by mouth every 6 (six) hours as needed for Nausea.     sucralfate (CARAFATE) 1 gram tablet Take 1 g by mouth 4 (four) times daily.    triamterene-hydrochlorothiazide 37.5-25 mg (DYAZIDE) 37.5-25 mg per capsule Take 1 capsule by mouth once daily.     zinc gluconate 50 mg tablet Take 50 mg by mouth once daily.       Review of patient's allergies indicates:   Allergen Reactions    Influenza virus vaccines     Penicillins        Past Medical History:   Diagnosis Date    Diabetes mellitus     Gastroparesis due to DM     Hypertension     Peripheral neuropathy     Rheumatoid arthritis      Past Surgical History:   Procedure Laterality Date     SECTION      GASTRIC STIMULATOR IMPLANT SURGERY  2010    HYSTERECTOMY       Family History       Problem Relation (Age of Onset)    Cancer Father    Diabetes Son, Son    Heart disease Mother          Tobacco Use    Smoking status: Never Smoker    Smokeless tobacco: Never Used   Substance and Sexual Activity    Alcohol use: Never    Drug use: Not on file    Sexual activity: Not on file     Review of Systems   Constitutional:  Positive for fever.   Skin:  Positive for wound.   Objective:     Vital Signs (Most Recent):  Temp: 98.1 °F (36.7 °C) (22 1145)  Pulse: 91 (22 1145)  Resp: 18 (22 1336)  BP: 125/67 (22 1145)  SpO2: 97 % (22 1145) Vital Signs (24h Range):  Temp:  [98.1 °F (36.7 °C)-101.1 °F (38.4 °C)] 98.1 °F (36.7 °C)  Pulse:  [] 91  Resp:  [16-20] 18  SpO2:  [97 %-99 %] 97 %  BP: (105-147)/(60-78) 125/67     Weight: 90.7 kg (200 lb)  Body mass index is 28.7 kg/m².    Physical Exam  Vitals and nursing note reviewed.   HENT:      Head: Normocephalic.      Mouth/Throat:      Mouth: Mucous membranes are moist.   Eyes:      Conjunctiva/sclera: Conjunctivae  normal.   Cardiovascular:      Rate and Rhythm: Normal rate.   Pulmonary:      Effort: Pulmonary effort is normal.   Abdominal:      General: Abdomen is flat.      Palpations: Abdomen is soft.   Musculoskeletal:         General: Swelling present.      Comments: RIGHT hand  Decreased mobility right hand/fingers due to edema   Skin:     General: Skin is warm and dry.      Capillary Refill: Capillary refill takes less than 2 seconds.      Findings: Erythema present.   Neurological:      Mental Status: She is alert and oriented to person, place, and time.   Psychiatric:         Mood and Affect: Mood normal.       Significant Labs:  I have reviewed all pertinent lab results within the past 24 hours.  CBC:   Recent Labs   Lab 05/12/22 0454   WBC 15.00*   RBC 4.02*   HGB 11.0*   HCT 35.6*      MCV 88.6   MCH 27.4   MCHC 30.9*     BMP:   Recent Labs   Lab 05/12/22  0454   *      K 4.9      CO2 24   BUN 21*   CREATININE 1.11*   CALCIUM 9.2     CMP:   Recent Labs   Lab 05/11/22  1739 05/12/22  0454   * 247*   CALCIUM 9.0 9.2   ALBUMIN 2.9*  --    PROT 6.3*  --     137   K 4.4 4.9   CO2 28 24    100   BUN 19* 21*   CREATININE 1.19* 1.11*   ALKPHOS 189*  --    ALT 77*  --    AST 54*  --    BILITOT 0.5  --      LFTs:   Recent Labs   Lab 05/11/22 1739   ALT 77*   AST 54*   ALKPHOS 189*   BILITOT 0.5   PROT 6.3*   ALBUMIN 2.9*       Significant Diagnostics:  I have reviewed all pertinent imaging results/findings within the past 24 hours.

## 2022-05-12 NOTE — ASSESSMENT & PLAN NOTE
05/13 Bilateral hands cellulitis right much greater than left  RIGHT hand abscess, edema, cellulitis  Npo after mn for I/D RIGHT Hand per Dr. Sandoval  Continue iv abx  Patient to get PICC in IR

## 2022-05-12 NOTE — H&P
[facility] - Orthopedic  General Surgery  History & Physical    Patient Name: Silvana Sarah  MRN: [id number]  Admission Date: 5/11/2022  Attending Physician: Gabriel Diallo MD   Primary Care Provider: Sera Byrd NP    Patient information was obtained from patient and ER records.     Subjective:     Chief Complaint/Reason for Admission: RIGHT hand pain, edema    History of Present Illness: 62 y/o female cat bite multiple to hands 4 days ago  Failed outpatient treatment   Returned to ER yesterday admitted for iv antibiotics  US with fluid collection right hand approximately 3 cm  Fever 101, leukocytosis  PMH: DM, RA      No current facility-administered medications on file prior to encounter.     Current Outpatient Medications on File Prior to Encounter   Medication Sig    ascorbic acid, vitamin C, (VITAMIN C) 1000 MG tablet Take 1,000 mg by mouth once daily.    b complex vitamins capsule Take 1 capsule by mouth once daily.    cefUROXime (CEFTIN) 500 MG tablet Take 500 mg by mouth 2 (two) times daily.    cyanocobalamin 1,000 mcg/mL injection Inject 1,000 mcg into the muscle every 30 days.    cyclobenzaprine (FLEXERIL) 10 MG tablet Take 10 mg by mouth every evening.     doxycycline (VIBRAMYCIN) 100 MG Cap Take 1 capsule (100 mg total) by mouth 2 (two) times daily. for 10 days    esomeprazole (NEXIUM) 20 MG capsule Take 20 mg by mouth once daily.    etanercept (ENBREL MINI) 50 mg/mL (1 mL) Crtg 1 mL by abdominal subcutaneous route every Sunday.     ferrous sulfate (FEOSOL) Tab tablet Take 1 tablet by mouth once daily.    gabapentin (NEURONTIN) 600 MG tablet Take 2 tablets (1,200 mg total) by mouth 3 (three) times daily.    hydrOXYchloroQUINE (PLAQUENIL) 200 mg tablet Take 200 mg by mouth 2 (two) times daily.     insulin regular 100 unit/mL Inj injection 3 (three) times daily before meals.    ketorolac (TORADOL) 10 mg tablet Take 10 mg by mouth every 8 (eight) hours as needed.    leflunomide  (ARAVA) 20 MG Tab Take 1 tablet by mouth once daily. Monday, Wednesday, Friday    LEVEMIR U-100 INSULIN 100 unit/mL injection SMARTSI Unit(s) SUB-Q Daily    lysine (L-LYSINE) 500 mg Tab Take 500 mg by mouth 2 (two) times a day.    metroNIDAZOLE (FLAGYL) 500 MG tablet Take 500 mg by mouth 3 (three) times daily.    MIRALAX 17 gram/dose powder Take by mouth.    nystatin (MYCOSTATIN) 100,000 unit/mL suspension Take 6 mLs by mouth 3 (three) times daily before meals.     oxyCODONE (ROXICODONE) 10 mg Tab immediate release tablet Take 10 mg by mouth every 8 (eight) hours as needed.    oxyCODONE-acetaminophen (PERCOCET)  mg per tablet Take 1 tablet by mouth every 8 (eight) hours as needed for Pain.     predniSONE (DELTASONE) 5 MG tablet Take 5 mg by mouth once daily.     promethazine (PHENERGAN) 25 MG tablet Take 25 mg by mouth every 6 (six) hours as needed for Nausea.     sucralfate (CARAFATE) 1 gram tablet Take 1 g by mouth 4 (four) times daily.    triamterene-hydrochlorothiazide 37.5-25 mg (DYAZIDE) 37.5-25 mg per capsule Take 1 capsule by mouth once daily.     zinc gluconate 50 mg tablet Take 50 mg by mouth once daily.       Review of patient's allergies indicates:   Allergen Reactions    Influenza virus vaccines     Penicillins        Past Medical History:   Diagnosis Date    Diabetes mellitus     Gastroparesis due to DM     Hypertension     Peripheral neuropathy     Rheumatoid arthritis      Past Surgical History:   Procedure Laterality Date     SECTION      GASTRIC STIMULATOR IMPLANT SURGERY  2010    HYSTERECTOMY       Family History       Problem Relation (Age of Onset)    Cancer Father    Diabetes Son, Son    Heart disease Mother          Tobacco Use    Smoking status: Never Smoker    Smokeless tobacco: Never Used   Substance and Sexual Activity    Alcohol use: Never    Drug use: Not on file    Sexual activity: Not on file     Review of Systems   Constitutional:  Positive  for fever.   Skin:  Positive for wound.   Objective:     Vital Signs (Most Recent):  Temp: 98.1 °F (36.7 °C) (05/12/22 1145)  Pulse: 91 (05/12/22 1145)  Resp: 18 (05/12/22 1336)  BP: 125/67 (05/12/22 1145)  SpO2: 97 % (05/12/22 1145) Vital Signs (24h Range):  Temp:  [98.1 °F (36.7 °C)-101.1 °F (38.4 °C)] 98.1 °F (36.7 °C)  Pulse:  [] 91  Resp:  [16-20] 18  SpO2:  [97 %-99 %] 97 %  BP: (105-147)/(60-78) 125/67     Weight: 90.7 kg (200 lb)  Body mass index is 28.7 kg/m².    Physical Exam  Vitals and nursing note reviewed.   HENT:      Head: Normocephalic.      Mouth/Throat:      Mouth: Mucous membranes are moist.   Eyes:      Conjunctiva/sclera: Conjunctivae normal.   Cardiovascular:      Rate and Rhythm: Normal rate.   Pulmonary:      Effort: Pulmonary effort is normal.   Abdominal:      General: Abdomen is flat.      Palpations: Abdomen is soft.   Musculoskeletal:         General: Swelling present.      Comments: RIGHT hand  Decreased mobility right hand/fingers due to edema   Skin:     General: Skin is warm and dry.      Capillary Refill: Capillary refill takes less than 2 seconds.      Findings: Erythema present.   Neurological:      Mental Status: She is alert and oriented to person, place, and time.   Psychiatric:         Mood and Affect: Mood normal.       Significant Labs:  I have reviewed all pertinent lab results within the past 24 hours.  CBC:   Recent Labs   Lab 05/12/22  0454   WBC 15.00*   RBC 4.02*   HGB 11.0*   HCT 35.6*      MCV 88.6   MCH 27.4   MCHC 30.9*     BMP:   Recent Labs   Lab 05/12/22  0454   *      K 4.9      CO2 24   BUN 21*   CREATININE 1.11*   CALCIUM 9.2     CMP:   Recent Labs   Lab 05/11/22  1739 05/12/22  0454   * 247*   CALCIUM 9.0 9.2   ALBUMIN 2.9*  --    PROT 6.3*  --     137   K 4.4 4.9   CO2 28 24    100   BUN 19* 21*   CREATININE 1.19* 1.11*   ALKPHOS 189*  --    ALT 77*  --    AST 54*  --    BILITOT 0.5  --      LFTs:   Recent  Labs   Lab 05/11/22  1739   ALT 77*   AST 54*   ALKPHOS 189*   BILITOT 0.5   PROT 6.3*   ALBUMIN 2.9*       Significant Diagnostics:  I have reviewed all pertinent imaging results/findings within the past 24 hours.      Assessment/Plan:     Wound cellulitis  05/13 Bilateral hands cellulitis right much greater than left  RIGHT hand abscess, edema, cellulitis  Npo after mn for I/D RIGHT Hand per Dr. Sandoval  Continue iv abx  Patient to get PICC in IR      VTE Risk Mitigation (From admission, onward)         Ordered     enoxaparin injection 40 mg  Daily         05/11/22 2102     IP VTE HIGH RISK PATIENT  Once         05/11/22 2102     Place FRANCISCA hose  Until discontinued         05/11/22 2102                Lashell Becker, YUANP  General Surgery  [facility] - Orthopedic

## 2022-05-12 NOTE — ASSESSMENT & PLAN NOTE
Wounds from cat bites to bilateral hands and forearms with Clindamycin and Doxycycline initiated on 5/10/22. Right hand and Wrist XRays with soft tissue swelling. Leukocytosis of 15K, which is improved from 18K one day prior.  - Clindamycin 600mg IV q8h  - Doxycycline 100mg PO BID   - Continued home Percocet for pain control   - Blood cultures x2 pending  - Monitor CBC  - US right forearm ordered for soft tissue  - Consider surgical consult for I+D

## 2022-05-12 NOTE — HPI
62 yo female with PMH of T1DM, RA and HTN who presents to ED with c/o progressively worsening swelling and redness of bilateral hands. Patient was bitten and scratched multiple times by cat on May 9, 2022. She came to ED on 5/10/22 and received IV Clindamycin and Doxycycline PO. She was also prescribed Flagyl PO outpatient. Xray of right hand and wrist were positive for soft tissue swelling. Leukocytosis of 18K which has improved to 15K. Blood cultures were obtained on 5/10/22 and remain pending. Upon examination, bilateral hands (R >L) and right forearm are erythematous and edematous. There are multiple puncture sites and scratch marks noted to bilateral hands and forearms. Wounds appear clean and dry.      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 Her rheumatologist is Dr. Kamari Almazan and she sees Pain Management for chronic pain treatment. Patient has gastric stimulator to assist with DM gastroparesis. She will be admitted to [facility] at this time for further evaluation and management.

## 2022-05-12 NOTE — ASSESSMENT & PLAN NOTE
Elevated ESR of 41 and CRP of 12.4. Patient takes multiple immunosuppressive medications.   - Continued home medications  - Monitor CBC

## 2022-05-12 NOTE — PROGRESS NOTES
[facility]  [facility] Medicine  Progress Note    Patient Name: Silvana Sarah  MRN: [id number]  Patient Class: IP- Inpatient   Admission Date: 5/11/2022  Length of Stay: 0 days  Attending Physician: Gabriel Diallo MD  Primary Care Provider: Sera Byrd NP        Subjective:     Principal Problem:Cat bite of hand        HPI:  62 yo female with PMH of T1DM, RA and HTN who presents to ED with c/o progressively worsening swelling and redness of bilateral hands. Patient was bitten and scratched multiple times by cat on May 9, 2022. She came to ED on 5/10/22 and received IV Clindamycin and Doxycycline PO. She was also prescribed Flagyl PO outpatient. Xray of right hand and wrist were positive for soft tissue swelling. Leukocytosis of 18K which has improved to 15K. Blood cultures were obtained on 5/10/22 and remain pending. Upon examination, bilateral hands (R >L) and right forearm are erythematous and edematous. There are multiple puncture sites and scratch marks noted to bilateral hands and forearms. Wounds appear clean and dry.      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 Her rheumatologist is Dr. Kamari Almazan and she sees Pain Management for chronic pain treatment. Patient has gastric stimulator to assist with DM gastroparesis. She will be admitted to [facility] at this time for further evaluation and management.            Overview/Hospital Course:  No notes on file    Interval History: Patient complaining of worsening right arm pain today. Her arm looks very tense, red and swollen, so soft tissue US ordered and possible consult for I+D will be placed to surgery. UA grew e.coli, but patient already getting broad spectrum antibiotic coverage for cellulitis. She did also have a fever this morning with a temperature of 101.1 F.     Review of Systems   Constitutional:  Negative for appetite change, chills and fever.   HENT:  Negative for  congestion, rhinorrhea and sore throat.    Eyes:  Negative for visual disturbance.   Respiratory:  Negative for cough, shortness of breath and wheezing.    Cardiovascular:  Negative for chest pain, palpitations and leg swelling.   Gastrointestinal:  Negative for abdominal pain, constipation, diarrhea, nausea and vomiting.   Genitourinary:  Negative for dysuria, frequency and hematuria.   Musculoskeletal:  Positive for arthralgias and myalgias.   Skin:  Positive for wound.   Neurological:  Negative for syncope, weakness and headaches.   Psychiatric/Behavioral:  Negative for dysphoric mood and sleep disturbance. The patient is not nervous/anxious.    Objective:     Vital Signs (Most Recent):  Temp: (!) 101.1 °F (38.4 °C) (05/12/22 0715)  Pulse: 97 (05/12/22 0715)  Resp: 20 (05/12/22 0830)  BP: (!) 144/71 (05/12/22 0715)  SpO2: 97 % (05/12/22 0715)   Vital Signs (24h Range):  Temp:  [98.6 °F (37 °C)-101.1 °F (38.4 °C)] 101.1 °F (38.4 °C)  Pulse:  [] 97  Resp:  [16-20] 20  SpO2:  [97 %-99 %] 97 %  BP: (105-147)/(60-78) 144/71     Weight: 90.7 kg (200 lb)  Body mass index is 28.7 kg/m².    Intake/Output Summary (Last 24 hours) at 5/12/2022 0958  Last data filed at 5/11/2022 1844  Gross per 24 hour   Intake 50 ml   Output --   Net 50 ml      Physical Exam  Vitals and nursing note reviewed.   Constitutional:       General: She is not in acute distress.     Appearance: Normal appearance. She is normal weight. She is ill-appearing.   HENT:      Head: Normocephalic.      Right Ear: External ear normal.      Left Ear: External ear normal.      Nose: Nose normal. No congestion or rhinorrhea.      Mouth/Throat:      Mouth: Mucous membranes are moist.      Pharynx: Oropharynx is clear. No oropharyngeal exudate or posterior oropharyngeal erythema.   Eyes:      General: No scleral icterus.        Right eye: No discharge.         Left eye: No discharge.      Conjunctiva/sclera: Conjunctivae normal.      Pupils: Pupils are  equal, round, and reactive to light.   Cardiovascular:      Rate and Rhythm: Normal rate and regular rhythm.      Pulses: Normal pulses.      Heart sounds: Normal heart sounds. No murmur heard.  Pulmonary:      Effort: Pulmonary effort is normal. No respiratory distress.      Breath sounds: Normal breath sounds. No wheezing, rhonchi or rales.   Abdominal:      General: Bowel sounds are normal. There is no distension.      Palpations: Abdomen is soft. There is no mass.      Tenderness: There is no abdominal tenderness.   Musculoskeletal:      Right forearm: Swelling, edema and tenderness present.      Left forearm: No swelling.      Right wrist: Swelling and tenderness present. No crepitus.      Left wrist: No swelling.      Right hand: Swelling and tenderness present.      Left hand: Swelling and tenderness present.      Cervical back: Normal range of motion and neck supple.      Right lower leg: No edema.      Left lower leg: No edema.   Skin:     General: Skin is warm and dry.      Findings: Lesion (Multiple puncture and scratches to bilateral hands and forearms) present. No rash.   Neurological:      General: No focal deficit present.      Mental Status: She is alert and oriented to person, place, and time.      Cranial Nerves: No cranial nerve deficit.      Sensory: No sensory deficit.      Motor: No weakness.   Psychiatric:         Mood and Affect: Mood normal.         Behavior: Behavior normal.         Thought Content: Thought content normal.         Judgment: Judgment normal.       Significant Labs: All pertinent labs within the past 24 hours have been reviewed.    Significant Imaging: I have reviewed all pertinent imaging results/findings within the past 24 hours.      Assessment/Plan:      Wound cellulitis  Wounds from cat bites to bilateral hands and forearms with Clindamycin and Doxycycline initiated on 5/10/22. Right hand and Wrist XRays with soft tissue swelling. Leukocytosis of 15K, which is improved  from 18K one day prior.  - Clindamycin 600mg IV q8h  - Doxycycline 100mg PO BID   - Continued home Percocet for pain control   - Blood cultures x2 pending  - Monitor CBC  - US right forearm ordered for soft tissue  - Consider surgical consult for I+D      Diabetes mellitus  Complicated with neuropathy and gastroparesis. Patient has gastric stimulator.   - Diabetic Diet  - POCT glucose QID AC & HS  - Levemir 20U Daily   - Moderate SSI    Rheumatoid arthritis  Elevated ESR of 41 and CRP of 12.4. Patient takes multiple immunosuppressive medications.   - Continued home medications  - Monitor CBC        VTE Risk Mitigation (From admission, onward)         Ordered     enoxaparin injection 40 mg  Daily         05/11/22 2102     IP VTE HIGH RISK PATIENT  Once         05/11/22 2102     Place FRANCISCA hose  Until discontinued         05/11/22 2102                Discharge Planning   DANE:      Code Status: Full Code   Is the patient medically ready for discharge?:     Reason for patient still in hospital (select all that apply): Treatment                     Noy Gomez MD  Department of Hospital Medicine   [facility]

## 2022-05-12 NOTE — ASSESSMENT & PLAN NOTE
Complicated with neuropathy and gastroparesis. Patient has gastric stimulator.   - Diabetic Diet  - POCT glucose QID AC & HS  - Levemir 20U Daily   - Moderate SSI

## 2022-05-12 NOTE — HPI
60 y/o female cat bite multiple to hands 4 days ago  Failed outpatient treatment   Returned to ER yesterday admitted for iv antibiotics  US with fluid collection right hand approximately 3 cm  Fever 101, leukocytosis  PMH: DM, RA

## 2022-05-12 NOTE — NURSING
At 1200 I was informed that the pt wanted some pain med, I checked to see if she could have anything for pain, the pt has percocet ordered every 8hrs, the last time she had it was at 0555, I asked the [facility] to call in her room and let her know that she could not have anything at this time.

## 2022-05-12 NOTE — SUBJECTIVE & OBJECTIVE
Past Medical History:   Diagnosis Date    Anxiety and depression     Diabetes mellitus     Gastroparesis due to DM     Hypertension     Peripheral neuropathy     Rheumatoid arthritis        Past Surgical History:   Procedure Laterality Date     SECTION      GASTRIC STIMULATOR IMPLANT SURGERY  2010    HYSTERECTOMY         Review of patient's allergies indicates:   Allergen Reactions    Influenza virus vaccines     Penicillins        Current Facility-Administered Medications on File Prior to Encounter   Medication    [COMPLETED] doxycycline (VIBRAMYCIN) 100 mg in dextrose 5 % 250 mL IVPB     Current Outpatient Medications on File Prior to Encounter   Medication Sig    ascorbic acid, vitamin C, (VITAMIN C) 1000 MG tablet Take 1,000 mg by mouth once daily.    b complex vitamins capsule Take 1 capsule by mouth once daily.    cefUROXime (CEFTIN) 500 MG tablet Take 500 mg by mouth 2 (two) times daily.    cyanocobalamin 1,000 mcg/mL injection Inject 1,000 mcg into the muscle every 30 days.    cyclobenzaprine (FLEXERIL) 10 MG tablet Take 10 mg by mouth every evening.     doxycycline (VIBRAMYCIN) 100 MG Cap Take 1 capsule (100 mg total) by mouth 2 (two) times daily. for 10 days    esomeprazole (NEXIUM) 20 MG capsule Take 20 mg by mouth once daily.    etanercept (ENBREL MINI) 50 mg/mL (1 mL) Crtg 1 mL by abdominal subcutaneous route every .     ferrous sulfate (FEOSOL) Tab tablet Take 1 tablet by mouth once daily.    gabapentin (NEURONTIN) 600 MG tablet Take 2 tablets (1,200 mg total) by mouth 3 (three) times daily.    hydrOXYchloroQUINE (PLAQUENIL) 200 mg tablet Take 200 mg by mouth 2 (two) times daily.     insulin regular 100 unit/mL Inj injection 3 (three) times daily before meals.    ketorolac (TORADOL) 10 mg tablet Take 10 mg by mouth every 8 (eight) hours as needed.    leflunomide (ARAVA) 20 MG Tab Take 1 tablet by mouth once daily. Monday, Wednesday, Friday    LEVEMIR U-100 INSULIN 100 unit/mL injection  SMARTSI Unit(s) SUB-Q Daily    lysine (L-LYSINE) 500 mg Tab Take 500 mg by mouth 2 (two) times a day.    metroNIDAZOLE (FLAGYL) 500 MG tablet Take 500 mg by mouth 3 (three) times daily.    MIRALAX 17 gram/dose powder Take by mouth.    nystatin (MYCOSTATIN) 100,000 unit/mL suspension Take 6 mLs by mouth 3 (three) times daily before meals.     oxyCODONE (ROXICODONE) 10 mg Tab immediate release tablet Take 10 mg by mouth every 8 (eight) hours as needed.    oxyCODONE-acetaminophen (PERCOCET)  mg per tablet Take 1 tablet by mouth every 8 (eight) hours as needed for Pain.     predniSONE (DELTASONE) 5 MG tablet Take 5 mg by mouth once daily.     promethazine (PHENERGAN) 25 MG tablet Take 25 mg by mouth every 6 (six) hours as needed for Nausea.     sucralfate (CARAFATE) 1 gram tablet Take 1 g by mouth 4 (four) times daily.    triamterene-hydrochlorothiazide 37.5-25 mg (DYAZIDE) 37.5-25 mg per capsule Take 1 capsule by mouth once daily.     zinc gluconate 50 mg tablet Take 50 mg by mouth once daily.     Family History       Problem Relation (Age of Onset)    Cancer Father    Diabetes Son, Son    Heart disease Mother          Tobacco Use    Smoking status: Never Smoker    Smokeless tobacco: Never Used   Substance and Sexual Activity    Alcohol use: Never    Drug use: Not on file    Sexual activity: Not on file     Review of Systems   Constitutional:  Negative for appetite change, chills and fever.   HENT:  Negative for congestion, rhinorrhea and sore throat.    Eyes:  Negative for visual disturbance.   Respiratory:  Negative for cough, shortness of breath and wheezing.    Cardiovascular:  Negative for chest pain, palpitations and leg swelling.   Gastrointestinal:  Negative for abdominal pain, constipation, diarrhea, nausea and vomiting.   Genitourinary:  Negative for dysuria, frequency and hematuria.   Musculoskeletal:  Positive for arthralgias and myalgias.   Skin:  Positive for wound.   Neurological:  Negative for  syncope, weakness and headaches.   Psychiatric/Behavioral:  Negative for dysphoric mood and sleep disturbance. The patient is not nervous/anxious.    Objective:     Vital Signs (Most Recent):  Temp: 98.6 °F (37 °C) (05/11/22 1656)  Pulse: (!) 122 (05/11/22 1656)  Resp: 18 (05/11/22 1656)  BP: 129/78 (05/11/22 1656)  SpO2: 97 % (05/11/22 1656)   Vital Signs (24h Range):  Temp:  [98.6 °F (37 °C)] 98.6 °F (37 °C)  Pulse:  [122] 122  Resp:  [18] 18  SpO2:  [97 %] 97 %  BP: (129)/(78) 129/78     Weight: 90.7 kg (200 lb)  Body mass index is 28.7 kg/m².    Physical Exam  Vitals and nursing note reviewed.   Constitutional:       General: She is not in acute distress.     Appearance: Normal appearance. She is normal weight. She is ill-appearing.   HENT:      Head: Normocephalic.      Right Ear: External ear normal.      Left Ear: External ear normal.      Nose: Nose normal. No congestion or rhinorrhea.      Mouth/Throat:      Mouth: Mucous membranes are moist.      Pharynx: Oropharynx is clear. No oropharyngeal exudate or posterior oropharyngeal erythema.   Eyes:      General: No scleral icterus.        Right eye: No discharge.         Left eye: No discharge.      Conjunctiva/sclera: Conjunctivae normal.      Pupils: Pupils are equal, round, and reactive to light.   Cardiovascular:      Rate and Rhythm: Normal rate and regular rhythm.      Pulses: Normal pulses.      Heart sounds: Normal heart sounds. No murmur heard.  Pulmonary:      Effort: Pulmonary effort is normal. No respiratory distress.      Breath sounds: Normal breath sounds. No wheezing, rhonchi or rales.   Abdominal:      General: Bowel sounds are normal. There is no distension.      Palpations: Abdomen is soft. There is no mass.      Tenderness: There is no abdominal tenderness.   Musculoskeletal:      Right forearm: Swelling, edema and tenderness present.      Left forearm: No swelling.      Right wrist: Swelling and tenderness present. No crepitus.      Left  wrist: No swelling.      Right hand: Swelling and tenderness present.      Left hand: Swelling and tenderness present.      Cervical back: Normal range of motion and neck supple.      Right lower leg: No edema.      Left lower leg: No edema.   Skin:     General: Skin is warm and dry.      Findings: Lesion (Multiple puncture and scratches to bilateral hands and forearms) present. No rash.   Neurological:      General: No focal deficit present.      Mental Status: She is alert and oriented to person, place, and time.      Cranial Nerves: No cranial nerve deficit.      Sensory: No sensory deficit.      Motor: No weakness.   Psychiatric:         Mood and Affect: Mood normal.         Behavior: Behavior normal.         Thought Content: Thought content normal.         Judgment: Judgment normal.         CRANIAL NERVES     CN III, IV, VI   Pupils are equal, round, and reactive to light.     Significant Labs: All pertinent labs within the past 24 hours have been reviewed.    Significant Imaging: I have reviewed all pertinent imaging results/findings within the past 24 hours.

## 2022-05-12 NOTE — ASSESSMENT & PLAN NOTE
Wounds to bilateral hands and forearms with Clindamycin and Doxycycline initiated on 5/10/22. Right hand and Wrist XRays with soft tissue swelling. Leukocytosis of 15K, which is improved from 18K one day prior.  - Clindamycin 600mg IV q8h  - Doxycycline 100mg PO BID   - Continued home Percocet for pain control   - Blood cultures x2 pending  - Monitor CBC

## 2022-05-12 NOTE — NURSING
At 1610 another Staff RN has informed me that the pt is standing outside her door looking for the nurse, I checked the order for the picc line, there is not an order for the picc line at this time, I called the [facility] phone and spoke with Dr. Rader and she said she is going to come up to floor, I informed her that the pt does not have an iv again and that I did not have an order for picc line, she said she would order it, I called Radiology to see if they are still here to place a picc line and they are gone for today, I left the pt's name with the lady on the phone to get the pt as early as they could because pt has to have surgery in am.

## 2022-05-12 NOTE — ASSESSMENT & PLAN NOTE
Wounds to bilateral hands and forearms with Clindamycin and Doxycycline initiated on 5/10/22. Right hand and Wrist XRays with soft tissue swelling. Leukocytosis of 15K, which is improved from 18K one day prior.  - Clindamycin 600mg IV q8h  - Doxycycline 100mg PO BID   - Continued home Percocet for pain control   - Blood cultures x2 pending  - Monitor CBC  - US right forearm ordered for soft tissue  - Consider surgical consult for I+D

## 2022-05-12 NOTE — SUBJECTIVE & OBJECTIVE
Interval History: Patient complaining of worsening right arm pain today. Her arm looks very tense, red and swollen, so soft tissue US ordered and possible consult for I+D will be placed to surgery. UA grew e.coli, but patient already getting broad spectrum antibiotic coverage for cellulitis. She did also have a fever this morning with a temperature of 101.1 F.     Review of Systems   Constitutional:  Negative for appetite change, chills and fever.   HENT:  Negative for congestion, rhinorrhea and sore throat.    Eyes:  Negative for visual disturbance.   Respiratory:  Negative for cough, shortness of breath and wheezing.    Cardiovascular:  Negative for chest pain, palpitations and leg swelling.   Gastrointestinal:  Negative for abdominal pain, constipation, diarrhea, nausea and vomiting.   Genitourinary:  Negative for dysuria, frequency and hematuria.   Musculoskeletal:  Positive for arthralgias and myalgias.   Skin:  Positive for wound.   Neurological:  Negative for syncope, weakness and headaches.   Psychiatric/Behavioral:  Negative for dysphoric mood and sleep disturbance. The patient is not nervous/anxious.    Objective:     Vital Signs (Most Recent):  Temp: (!) 101.1 °F (38.4 °C) (05/12/22 0715)  Pulse: 97 (05/12/22 0715)  Resp: 20 (05/12/22 0830)  BP: (!) 144/71 (05/12/22 0715)  SpO2: 97 % (05/12/22 0715)   Vital Signs (24h Range):  Temp:  [98.6 °F (37 °C)-101.1 °F (38.4 °C)] 101.1 °F (38.4 °C)  Pulse:  [] 97  Resp:  [16-20] 20  SpO2:  [97 %-99 %] 97 %  BP: (105-147)/(60-78) 144/71     Weight: 90.7 kg (200 lb)  Body mass index is 28.7 kg/m².    Intake/Output Summary (Last 24 hours) at 5/12/2022 0986  Last data filed at 5/11/2022 1841  Gross per 24 hour   Intake 50 ml   Output --   Net 50 ml      Physical Exam  Vitals and nursing note reviewed.   Constitutional:       General: She is not in acute distress.     Appearance: Normal appearance. She is normal weight. She is ill-appearing.   HENT:      Head:  Normocephalic.      Right Ear: External ear normal.      Left Ear: External ear normal.      Nose: Nose normal. No congestion or rhinorrhea.      Mouth/Throat:      Mouth: Mucous membranes are moist.      Pharynx: Oropharynx is clear. No oropharyngeal exudate or posterior oropharyngeal erythema.   Eyes:      General: No scleral icterus.        Right eye: No discharge.         Left eye: No discharge.      Conjunctiva/sclera: Conjunctivae normal.      Pupils: Pupils are equal, round, and reactive to light.   Cardiovascular:      Rate and Rhythm: Normal rate and regular rhythm.      Pulses: Normal pulses.      Heart sounds: Normal heart sounds. No murmur heard.  Pulmonary:      Effort: Pulmonary effort is normal. No respiratory distress.      Breath sounds: Normal breath sounds. No wheezing, rhonchi or rales.   Abdominal:      General: Bowel sounds are normal. There is no distension.      Palpations: Abdomen is soft. There is no mass.      Tenderness: There is no abdominal tenderness.   Musculoskeletal:      Right forearm: Swelling, edema and tenderness present.      Left forearm: No swelling.      Right wrist: Swelling and tenderness present. No crepitus.      Left wrist: No swelling.      Right hand: Swelling and tenderness present.      Left hand: Swelling and tenderness present.      Cervical back: Normal range of motion and neck supple.      Right lower leg: No edema.      Left lower leg: No edema.   Skin:     General: Skin is warm and dry.      Findings: Lesion (Multiple puncture and scratches to bilateral hands and forearms) present. No rash.   Neurological:      General: No focal deficit present.      Mental Status: She is alert and oriented to person, place, and time.      Cranial Nerves: No cranial nerve deficit.      Sensory: No sensory deficit.      Motor: No weakness.   Psychiatric:         Mood and Affect: Mood normal.         Behavior: Behavior normal.         Thought Content: Thought content normal.          Judgment: Judgment normal.       Significant Labs: All pertinent labs within the past 24 hours have been reviewed.    Significant Imaging: I have reviewed all pertinent imaging results/findings within the past 24 hours.

## 2022-05-12 NOTE — H&P
[facility]  [facility] Medicine  History & Physical    Patient Name: Silvana Sarah  MRN: [id number]  Patient Class: OP- Observation  Admission Date: 2022  Attending Physician: Roverto Michaud MD   Primary Care Provider: Sera Byrd NP         Patient information was obtained from patient and ER records.     Subjective:     Principal Problem:Cat bite of hand    Chief Complaint:   Chief Complaint   Patient presents with    Animal Bite        HPI: 60 yo female with PMH of T1DM, RA and HTN who presents to ED with c/o progressively worsening swelling and redness of bilateral hands. Patient was bitten and scratched multiple times by cat on May 9, 2022. She came to ED on 5/10/22 and received IV Clindamycin and Doxycycline PO. She was also prescribed Flagyl PO outpatient. Xray of right hand and wrist were positive for soft tissue swelling. Leukocytosis of 18K which has improved to 15K. Blood cultures were obtained on 5/10/22 and remain pending. Upon examination, bilateral hands (R >L) and right forearm are erythematous and edematous. There are multiple puncture sites and scratch marks noted to bilateral hands and forearms. Wounds appear clean and dry.      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 Her rheumatologist is Dr. Kamari Almazan and she sees Pain Management for chronic pain treatment. Patient has gastric stimulator to assist with DM gastroparesis. She will be admitted to [facility] at this time for further evaluation and management.            Past Medical History:   Diagnosis Date    Anxiety and depression     Diabetes mellitus     Gastroparesis due to DM     Hypertension     Peripheral neuropathy     Rheumatoid arthritis        Past Surgical History:   Procedure Laterality Date     SECTION      GASTRIC STIMULATOR IMPLANT SURGERY  2010    HYSTERECTOMY         Review of patient's allergies indicates:   Allergen  Reactions    Influenza virus vaccines     Penicillins        Current Facility-Administered Medications on File Prior to Encounter   Medication    [COMPLETED] doxycycline (VIBRAMYCIN) 100 mg in dextrose 5 % 250 mL IVPB     Current Outpatient Medications on File Prior to Encounter   Medication Sig    ascorbic acid, vitamin C, (VITAMIN C) 1000 MG tablet Take 1,000 mg by mouth once daily.    b complex vitamins capsule Take 1 capsule by mouth once daily.    cefUROXime (CEFTIN) 500 MG tablet Take 500 mg by mouth 2 (two) times daily.    cyanocobalamin 1,000 mcg/mL injection Inject 1,000 mcg into the muscle every 30 days.    cyclobenzaprine (FLEXERIL) 10 MG tablet Take 10 mg by mouth every evening.     doxycycline (VIBRAMYCIN) 100 MG Cap Take 1 capsule (100 mg total) by mouth 2 (two) times daily. for 10 days    esomeprazole (NEXIUM) 20 MG capsule Take 20 mg by mouth once daily.    etanercept (ENBREL MINI) 50 mg/mL (1 mL) Crtg 1 mL by abdominal subcutaneous route every .     ferrous sulfate (FEOSOL) Tab tablet Take 1 tablet by mouth once daily.    gabapentin (NEURONTIN) 600 MG tablet Take 2 tablets (1,200 mg total) by mouth 3 (three) times daily.    hydrOXYchloroQUINE (PLAQUENIL) 200 mg tablet Take 200 mg by mouth 2 (two) times daily.     insulin regular 100 unit/mL Inj injection 3 (three) times daily before meals.    ketorolac (TORADOL) 10 mg tablet Take 10 mg by mouth every 8 (eight) hours as needed.    leflunomide (ARAVA) 20 MG Tab Take 1 tablet by mouth once daily. Monday, Wednesday, Friday    LEVEMIR U-100 INSULIN 100 unit/mL injection SMARTSI Unit(s) SUB-Q Daily    lysine (L-LYSINE) 500 mg Tab Take 500 mg by mouth 2 (two) times a day.    metroNIDAZOLE (FLAGYL) 500 MG tablet Take 500 mg by mouth 3 (three) times daily.    MIRALAX 17 gram/dose powder Take by mouth.    nystatin (MYCOSTATIN) 100,000 unit/mL suspension Take 6 mLs by mouth 3 (three) times daily before meals.     oxyCODONE  (ROXICODONE) 10 mg Tab immediate release tablet Take 10 mg by mouth every 8 (eight) hours as needed.    oxyCODONE-acetaminophen (PERCOCET)  mg per tablet Take 1 tablet by mouth every 8 (eight) hours as needed for Pain.     predniSONE (DELTASONE) 5 MG tablet Take 5 mg by mouth once daily.     promethazine (PHENERGAN) 25 MG tablet Take 25 mg by mouth every 6 (six) hours as needed for Nausea.     sucralfate (CARAFATE) 1 gram tablet Take 1 g by mouth 4 (four) times daily.    triamterene-hydrochlorothiazide 37.5-25 mg (DYAZIDE) 37.5-25 mg per capsule Take 1 capsule by mouth once daily.     zinc gluconate 50 mg tablet Take 50 mg by mouth once daily.     Family History       Problem Relation (Age of Onset)    Cancer Father    Diabetes Son, Son    Heart disease Mother          Tobacco Use    Smoking status: Never Smoker    Smokeless tobacco: Never Used   Substance and Sexual Activity    Alcohol use: Never    Drug use: Not on file    Sexual activity: Not on file     Review of Systems   Constitutional:  Negative for appetite change, chills and fever.   HENT:  Negative for congestion, rhinorrhea and sore throat.    Eyes:  Negative for visual disturbance.   Respiratory:  Negative for cough, shortness of breath and wheezing.    Cardiovascular:  Negative for chest pain, palpitations and leg swelling.   Gastrointestinal:  Negative for abdominal pain, constipation, diarrhea, nausea and vomiting.   Genitourinary:  Negative for dysuria, frequency and hematuria.   Musculoskeletal:  Positive for arthralgias and myalgias.   Skin:  Positive for wound.   Neurological:  Negative for syncope, weakness and headaches.   Psychiatric/Behavioral:  Negative for dysphoric mood and sleep disturbance. The patient is not nervous/anxious.    Objective:     Vital Signs (Most Recent):  Temp: 98.6 °F (37 °C) (05/11/22 1656)  Pulse: (!) 122 (05/11/22 1656)  Resp: 18 (05/11/22 1656)  BP: 129/78 (05/11/22 1656)  SpO2: 97 % (05/11/22 1656)    Vital Signs (24h Range):  Temp:  [98.6 °F (37 °C)] 98.6 °F (37 °C)  Pulse:  [122] 122  Resp:  [18] 18  SpO2:  [97 %] 97 %  BP: (129)/(78) 129/78     Weight: 90.7 kg (200 lb)  Body mass index is 28.7 kg/m².    Physical Exam  Vitals and nursing note reviewed.   Constitutional:       General: She is not in acute distress.     Appearance: Normal appearance. She is normal weight. She is ill-appearing.   HENT:      Head: Normocephalic.      Right Ear: External ear normal.      Left Ear: External ear normal.      Nose: Nose normal. No congestion or rhinorrhea.      Mouth/Throat:      Mouth: Mucous membranes are moist.      Pharynx: Oropharynx is clear. No oropharyngeal exudate or posterior oropharyngeal erythema.   Eyes:      General: No scleral icterus.        Right eye: No discharge.         Left eye: No discharge.      Conjunctiva/sclera: Conjunctivae normal.      Pupils: Pupils are equal, round, and reactive to light.   Cardiovascular:      Rate and Rhythm: Normal rate and regular rhythm.      Pulses: Normal pulses.      Heart sounds: Normal heart sounds. No murmur heard.  Pulmonary:      Effort: Pulmonary effort is normal. No respiratory distress.      Breath sounds: Normal breath sounds. No wheezing, rhonchi or rales.   Abdominal:      General: Bowel sounds are normal. There is no distension.      Palpations: Abdomen is soft. There is no mass.      Tenderness: There is no abdominal tenderness.   Musculoskeletal:      Right forearm: Swelling, edema and tenderness present.      Left forearm: No swelling.      Right wrist: Swelling and tenderness present. No crepitus.      Left wrist: No swelling.      Right hand: Swelling and tenderness present.      Left hand: Swelling and tenderness present.      Cervical back: Normal range of motion and neck supple.      Right lower leg: No edema.      Left lower leg: No edema.   Skin:     General: Skin is warm and dry.      Findings: Lesion (Multiple puncture and scratches to  bilateral hands and forearms) present. No rash.   Neurological:      General: No focal deficit present.      Mental Status: She is alert and oriented to person, place, and time.      Cranial Nerves: No cranial nerve deficit.      Sensory: No sensory deficit.      Motor: No weakness.   Psychiatric:         Mood and Affect: Mood normal.         Behavior: Behavior normal.         Thought Content: Thought content normal.         Judgment: Judgment normal.         CRANIAL NERVES     CN III, IV, VI   Pupils are equal, round, and reactive to light.     Significant Labs: All pertinent labs within the past 24 hours have been reviewed.    Significant Imaging: I have reviewed all pertinent imaging results/findings within the past 24 hours.    Assessment/Plan:     * Cat bite of hand  Wounds to bilateral hands and forearms with Clindamycin and Doxycycline initiated on 5/10/22. Right hand and Wrist XRays with soft tissue swelling. Leukocytosis of 15K, which is improved from 18K one day prior.  - Clindamycin 600mg IV q8h  - Doxycycline 100mg PO BID   - Continued home Percocet for pain control   - Blood cultures x2 pending  - Monitor CBC    Diabetes mellitus  Complicated with neuropathy and gastroparesis. Patient has gastric stimulator.   - Diabetic Diet  - POCT glucose QID AC & HS  - Levemir 20U Daily   - Moderate SSI    Rheumatoid arthritis  Elevated ESR of 41 and CRP of 12.4. Patient takes multiple immunosuppressive medications.   - Continued home medications  - Monitor CBC    VTE Risk Mitigation (From admission, onward)         Ordered     enoxaparin injection 40 mg  Daily         05/11/22 2102     IP VTE HIGH RISK PATIENT  Once         05/11/22 2102     Place FRANCISCA hose  Until discontinued         05/11/22 2102                   Irma Diaz DO  Department of Hospital Medicine   [facility] - Orthopedic

## 2022-05-12 NOTE — NURSING
At 1320 the pt's called light is on and she wants something for pain, Ya DEVRIES informed me that the pt does not have an iv any longer and that the Residence MD was going to order a picc line for the pt, there is no order for picc line at this time, Ya DEVRIES called the Residence MD and asked about the picc line order and to see if I could give the Morphine IM, the MD said yes at 1335 I gave pt the morphine 2mg IM.

## 2022-05-13 ENCOUNTER — ANESTHESIA EVENT (OUTPATIENT)
Dept: SURGERY | Facility: HOSPITAL | Age: 62
DRG: 605 | End: 2022-05-13
Payer: COMMERCIAL

## 2022-05-13 ENCOUNTER — ANESTHESIA (OUTPATIENT)
Dept: SURGERY | Facility: HOSPITAL | Age: 62
DRG: 605 | End: 2022-05-13
Payer: COMMERCIAL

## 2022-05-13 VITALS
HEART RATE: 100 BPM | OXYGEN SATURATION: 94 % | SYSTOLIC BLOOD PRESSURE: 112 MMHG | RESPIRATION RATE: 17 BRPM | DIASTOLIC BLOOD PRESSURE: 71 MMHG

## 2022-05-13 LAB
ANION GAP SERPL CALCULATED.3IONS-SCNC: 9 MMOL/L (ref 7–16)
BASOPHILS # BLD AUTO: 0.05 K/UL (ref 0–0.2)
BASOPHILS NFR BLD AUTO: 0.4 % (ref 0–1)
BUN SERPL-MCNC: 15 MG/DL (ref 7–18)
BUN/CREAT SERPL: 15 (ref 6–20)
CALCIUM SERPL-MCNC: 8.9 MG/DL (ref 8.5–10.1)
CHLORIDE SERPL-SCNC: 101 MMOL/L (ref 98–107)
CO2 SERPL-SCNC: 30 MMOL/L (ref 21–32)
CREAT SERPL-MCNC: 1.03 MG/DL (ref 0.55–1.02)
DIFFERENTIAL METHOD BLD: ABNORMAL
EOSINOPHIL # BLD AUTO: 0.13 K/UL (ref 0–0.5)
EOSINOPHIL NFR BLD AUTO: 1.1 % (ref 1–4)
ERYTHROCYTE [DISTWIDTH] IN BLOOD BY AUTOMATED COUNT: 13.2 % (ref 11.5–14.5)
GLUCOSE SERPL-MCNC: 157 MG/DL (ref 70–105)
GLUCOSE SERPL-MCNC: 163 MG/DL (ref 74–106)
GLUCOSE SERPL-MCNC: 189 MG/DL (ref 70–105)
GLUCOSE SERPL-MCNC: 228 MG/DL (ref 70–105)
GLUCOSE SERPL-MCNC: 241 MG/DL (ref 70–105)
HCT VFR BLD AUTO: 33.5 % (ref 38–47)
HGB BLD-MCNC: 10.4 G/DL (ref 12–16)
IMM GRANULOCYTES # BLD AUTO: 0.06 K/UL (ref 0–0.04)
IMM GRANULOCYTES NFR BLD: 0.5 % (ref 0–0.4)
LYMPHOCYTES # BLD AUTO: 2.71 K/UL (ref 1–4.8)
LYMPHOCYTES NFR BLD AUTO: 21.9 % (ref 27–41)
MCH RBC QN AUTO: 28 PG (ref 27–31)
MCHC RBC AUTO-ENTMCNC: 31 G/DL (ref 32–36)
MCV RBC AUTO: 90.3 FL (ref 80–96)
MONOCYTES # BLD AUTO: 1.06 K/UL (ref 0–0.8)
MONOCYTES NFR BLD AUTO: 8.6 % (ref 2–6)
MPC BLD CALC-MCNC: 11.2 FL (ref 9.4–12.4)
NEUTROPHILS # BLD AUTO: 8.35 K/UL (ref 1.8–7.7)
NEUTROPHILS NFR BLD AUTO: 67.5 % (ref 53–65)
NRBC # BLD AUTO: 0 X10E3/UL
NRBC, AUTO (.00): 0 %
PLATELET # BLD AUTO: 263 K/UL (ref 150–400)
POTASSIUM SERPL-SCNC: 4.4 MMOL/L (ref 3.5–5.1)
RBC # BLD AUTO: 3.71 M/UL (ref 4.2–5.4)
SODIUM SERPL-SCNC: 136 MMOL/L (ref 136–145)
WBC # BLD AUTO: 12.36 K/UL (ref 4.5–11)

## 2022-05-13 PROCEDURE — 71000033 HC RECOVERY, INTIAL HOUR: Performed by: STUDENT IN AN ORGANIZED HEALTH CARE EDUCATION/TRAINING PROGRAM

## 2022-05-13 PROCEDURE — 99232 PR SUBSEQUENT HOSPITAL CARE,LEVL II: ICD-10-PCS | Mod: GC,,, | Performed by: INTERNAL MEDICINE

## 2022-05-13 PROCEDURE — 27201423 OPTIME MED/SURG SUP & DEVICES STERILE SUPPLY: Performed by: STUDENT IN AN ORGANIZED HEALTH CARE EDUCATION/TRAINING PROGRAM

## 2022-05-13 PROCEDURE — 25000003 PHARM REV CODE 250: Performed by: INTERNAL MEDICINE

## 2022-05-13 PROCEDURE — 99232 SBSQ HOSP IP/OBS MODERATE 35: CPT | Mod: GC,,, | Performed by: INTERNAL MEDICINE

## 2022-05-13 PROCEDURE — 36000705 HC OR TIME LEV I EA ADD 15 MIN: Performed by: STUDENT IN AN ORGANIZED HEALTH CARE EDUCATION/TRAINING PROGRAM

## 2022-05-13 PROCEDURE — 36000704 HC OR TIME LEV I 1ST 15 MIN: Performed by: STUDENT IN AN ORGANIZED HEALTH CARE EDUCATION/TRAINING PROGRAM

## 2022-05-13 PROCEDURE — 87075 CULTR BACTERIA EXCEPT BLOOD: CPT | Performed by: STUDENT IN AN ORGANIZED HEALTH CARE EDUCATION/TRAINING PROGRAM

## 2022-05-13 PROCEDURE — 25000003 PHARM REV CODE 250: Performed by: STUDENT IN AN ORGANIZED HEALTH CARE EDUCATION/TRAINING PROGRAM

## 2022-05-13 PROCEDURE — 25000003 PHARM REV CODE 250: Performed by: FAMILY MEDICINE

## 2022-05-13 PROCEDURE — 63600175 PHARM REV CODE 636 W HCPCS: Performed by: FAMILY MEDICINE

## 2022-05-13 PROCEDURE — 76998 US GUIDE INTRAOP: CPT | Mod: 26,,, | Performed by: STUDENT IN AN ORGANIZED HEALTH CARE EDUCATION/TRAINING PROGRAM

## 2022-05-13 PROCEDURE — C9399 UNCLASSIFIED DRUGS OR BIOLOG: HCPCS | Performed by: FAMILY MEDICINE

## 2022-05-13 PROCEDURE — 63600175 PHARM REV CODE 636 W HCPCS: Performed by: ANESTHESIOLOGY

## 2022-05-13 PROCEDURE — 10061 I&D ABSCESS COMP/MULTIPLE: CPT | Mod: ,,, | Performed by: STUDENT IN AN ORGANIZED HEALTH CARE EDUCATION/TRAINING PROGRAM

## 2022-05-13 PROCEDURE — 11000001 HC ACUTE MED/SURG PRIVATE ROOM

## 2022-05-13 PROCEDURE — 82962 GLUCOSE BLOOD TEST: CPT

## 2022-05-13 PROCEDURE — 63600175 PHARM REV CODE 636 W HCPCS: Performed by: NURSE ANESTHETIST, CERTIFIED REGISTERED

## 2022-05-13 PROCEDURE — 36415 COLL VENOUS BLD VENIPUNCTURE: CPT

## 2022-05-13 PROCEDURE — D9220A PRA ANESTHESIA: ICD-10-PCS | Mod: ANES,,, | Performed by: ANESTHESIOLOGY

## 2022-05-13 PROCEDURE — 63600175 PHARM REV CODE 636 W HCPCS: Performed by: INTERNAL MEDICINE

## 2022-05-13 PROCEDURE — 80048 BASIC METABOLIC PNL TOTAL CA: CPT

## 2022-05-13 PROCEDURE — D9220A PRA ANESTHESIA: Mod: ANES,,, | Performed by: ANESTHESIOLOGY

## 2022-05-13 PROCEDURE — D9220A PRA ANESTHESIA: ICD-10-PCS | Mod: CRNA,,, | Performed by: NURSE ANESTHETIST, CERTIFIED REGISTERED

## 2022-05-13 PROCEDURE — 85025 COMPLETE CBC W/AUTO DIFF WBC: CPT

## 2022-05-13 PROCEDURE — 37000009 HC ANESTHESIA EA ADD 15 MINS: Performed by: STUDENT IN AN ORGANIZED HEALTH CARE EDUCATION/TRAINING PROGRAM

## 2022-05-13 PROCEDURE — 63600175 PHARM REV CODE 636 W HCPCS: Performed by: STUDENT IN AN ORGANIZED HEALTH CARE EDUCATION/TRAINING PROGRAM

## 2022-05-13 PROCEDURE — D9220A PRA ANESTHESIA: Mod: CRNA,,, | Performed by: NURSE ANESTHETIST, CERTIFIED REGISTERED

## 2022-05-13 PROCEDURE — 25000003 PHARM REV CODE 250: Performed by: NURSE ANESTHETIST, CERTIFIED REGISTERED

## 2022-05-13 PROCEDURE — 87070 CULTURE OTHR SPECIMN AEROBIC: CPT | Performed by: INTERNAL MEDICINE

## 2022-05-13 PROCEDURE — 10061 PR DRAIN SKIN ABSCESS COMPLIC: ICD-10-PCS | Mod: ,,, | Performed by: STUDENT IN AN ORGANIZED HEALTH CARE EDUCATION/TRAINING PROGRAM

## 2022-05-13 PROCEDURE — 76998 PR  ULTRASONIC GUIDANCE, INTRAOPERATIVE: ICD-10-PCS | Mod: 26,,, | Performed by: STUDENT IN AN ORGANIZED HEALTH CARE EDUCATION/TRAINING PROGRAM

## 2022-05-13 PROCEDURE — 37000008 HC ANESTHESIA 1ST 15 MINUTES: Performed by: STUDENT IN AN ORGANIZED HEALTH CARE EDUCATION/TRAINING PROGRAM

## 2022-05-13 RX ORDER — MIDAZOLAM HYDROCHLORIDE 5 MG/ML
INJECTION INTRAMUSCULAR; INTRAVENOUS
Status: DISCONTINUED | OUTPATIENT
Start: 2022-05-13 | End: 2022-05-13

## 2022-05-13 RX ORDER — ONDANSETRON 2 MG/ML
INJECTION INTRAMUSCULAR; INTRAVENOUS
Status: DISCONTINUED | OUTPATIENT
Start: 2022-05-13 | End: 2022-05-13

## 2022-05-13 RX ORDER — LIDOCAINE HYDROCHLORIDE AND EPINEPHRINE 10; 10 MG/ML; UG/ML
INJECTION, SOLUTION INFILTRATION; PERINEURAL
Status: DISCONTINUED | OUTPATIENT
Start: 2022-05-13 | End: 2022-05-13 | Stop reason: HOSPADM

## 2022-05-13 RX ORDER — MEPERIDINE HYDROCHLORIDE 25 MG/ML
25 INJECTION INTRAMUSCULAR; INTRAVENOUS; SUBCUTANEOUS EVERY 10 MIN PRN
Status: DISCONTINUED | OUTPATIENT
Start: 2022-05-13 | End: 2022-05-13 | Stop reason: HOSPADM

## 2022-05-13 RX ORDER — HYDROMORPHONE HYDROCHLORIDE 2 MG/ML
0.5 INJECTION, SOLUTION INTRAMUSCULAR; INTRAVENOUS; SUBCUTANEOUS EVERY 5 MIN PRN
Status: DISCONTINUED | OUTPATIENT
Start: 2022-05-13 | End: 2022-05-13 | Stop reason: HOSPADM

## 2022-05-13 RX ORDER — OXYCODONE HYDROCHLORIDE 5 MG/1
5 TABLET ORAL
Status: DISCONTINUED | OUTPATIENT
Start: 2022-05-13 | End: 2022-05-13 | Stop reason: HOSPADM

## 2022-05-13 RX ORDER — HYDROMORPHONE HYDROCHLORIDE 2 MG/ML
0.5 INJECTION, SOLUTION INTRAMUSCULAR; INTRAVENOUS; SUBCUTANEOUS
Status: DISCONTINUED | OUTPATIENT
Start: 2022-05-13 | End: 2022-05-16

## 2022-05-13 RX ORDER — SODIUM CHLORIDE, SODIUM LACTATE, POTASSIUM CHLORIDE, CALCIUM CHLORIDE 600; 310; 30; 20 MG/100ML; MG/100ML; MG/100ML; MG/100ML
125 INJECTION, SOLUTION INTRAVENOUS CONTINUOUS
Status: DISCONTINUED | OUTPATIENT
Start: 2022-05-13 | End: 2022-05-14

## 2022-05-13 RX ORDER — FENTANYL CITRATE 50 UG/ML
INJECTION, SOLUTION INTRAMUSCULAR; INTRAVENOUS
Status: DISCONTINUED | OUTPATIENT
Start: 2022-05-13 | End: 2022-05-13

## 2022-05-13 RX ORDER — DIPHENHYDRAMINE HYDROCHLORIDE 50 MG/ML
25 INJECTION INTRAMUSCULAR; INTRAVENOUS EVERY 6 HOURS PRN
Status: DISCONTINUED | OUTPATIENT
Start: 2022-05-13 | End: 2022-05-13 | Stop reason: HOSPADM

## 2022-05-13 RX ORDER — CLINDAMYCIN PHOSPHATE 900 MG/50ML
INJECTION, SOLUTION INTRAVENOUS
Status: DISCONTINUED | OUTPATIENT
Start: 2022-05-13 | End: 2022-05-13

## 2022-05-13 RX ORDER — ONDANSETRON 2 MG/ML
4 INJECTION INTRAMUSCULAR; INTRAVENOUS DAILY PRN
Status: DISCONTINUED | OUTPATIENT
Start: 2022-05-13 | End: 2022-05-13 | Stop reason: HOSPADM

## 2022-05-13 RX ORDER — MORPHINE SULFATE 8 MG/ML
4 INJECTION INTRAMUSCULAR; INTRAVENOUS; SUBCUTANEOUS EVERY 5 MIN PRN
Status: DISCONTINUED | OUTPATIENT
Start: 2022-05-13 | End: 2022-05-13 | Stop reason: HOSPADM

## 2022-05-13 RX ORDER — BUPIVACAINE HYDROCHLORIDE 2.5 MG/ML
INJECTION, SOLUTION EPIDURAL; INFILTRATION; INTRACAUDAL
Status: DISCONTINUED | OUTPATIENT
Start: 2022-05-13 | End: 2022-05-13 | Stop reason: HOSPADM

## 2022-05-13 RX ADMIN — OXYCODONE HYDROCHLORIDE AND ACETAMINOPHEN 1000 MG: 500 TABLET ORAL at 09:05

## 2022-05-13 RX ADMIN — HYDROMORPHONE HYDROCHLORIDE 0.5 MG: 2 INJECTION, SOLUTION INTRAMUSCULAR; INTRAVENOUS; SUBCUTANEOUS at 06:05

## 2022-05-13 RX ADMIN — INSULIN DETEMIR 20 UNITS: 100 INJECTION, SOLUTION SUBCUTANEOUS at 09:05

## 2022-05-13 RX ADMIN — GABAPENTIN 600 MG: 300 CAPSULE ORAL at 09:05

## 2022-05-13 RX ADMIN — GABAPENTIN 600 MG: 300 CAPSULE ORAL at 08:05

## 2022-05-13 RX ADMIN — INSULIN ASPART 2 UNITS: 100 INJECTION, SOLUTION INTRAVENOUS; SUBCUTANEOUS at 10:05

## 2022-05-13 RX ADMIN — SODIUM CHLORIDE, POTASSIUM CHLORIDE, SODIUM LACTATE AND CALCIUM CHLORIDE 125 ML/HR: 600; 310; 30; 20 INJECTION, SOLUTION INTRAVENOUS at 02:05

## 2022-05-13 RX ADMIN — LEFLUNOMIDE 20 MG: 20 TABLET ORAL at 09:05

## 2022-05-13 RX ADMIN — DOXYCYCLINE HYCLATE 100 MG: 100 CAPSULE ORAL at 09:05

## 2022-05-13 RX ADMIN — PIPERACILLIN AND TAZOBACTAM 4.5 G: 4; .5 INJECTION, POWDER, LYOPHILIZED, FOR SOLUTION INTRAVENOUS at 09:05

## 2022-05-13 RX ADMIN — MIDAZOLAM HYDROCHLORIDE 2 MG: 5 INJECTION, SOLUTION INTRAMUSCULAR; INTRAVENOUS at 11:05

## 2022-05-13 RX ADMIN — PANTOPRAZOLE SODIUM 40 MG: 40 TABLET, DELAYED RELEASE ORAL at 08:05

## 2022-05-13 RX ADMIN — ONDANSETRON 8 MG: 2 INJECTION INTRAMUSCULAR; INTRAVENOUS at 11:05

## 2022-05-13 RX ADMIN — OXYCODONE HYDROCHLORIDE AND ACETAMINOPHEN 1 TABLET: 10; 325 TABLET ORAL at 05:05

## 2022-05-13 RX ADMIN — CLINDAMYCIN IN 5 PERCENT DEXTROSE 900 MG: 18 INJECTION, SOLUTION INTRAVENOUS at 11:05

## 2022-05-13 RX ADMIN — TRIAMTERENE AND HYDROCHLOROTHIAZIDE 1 TABLET: 37.5; 25 TABLET ORAL at 09:05

## 2022-05-13 RX ADMIN — MORPHINE SULFATE 4 MG: 8 INJECTION INTRAVENOUS at 01:05

## 2022-05-13 RX ADMIN — CLINDAMYCIN IN 5 PERCENT DEXTROSE 600 MG: 12 INJECTION, SOLUTION INTRAVENOUS at 12:05

## 2022-05-13 RX ADMIN — PREDNISONE 5 MG: 5 TABLET ORAL at 08:05

## 2022-05-13 RX ADMIN — FENTANYL CITRATE 50 MCG: 50 INJECTION INTRAMUSCULAR; INTRAVENOUS at 11:05

## 2022-05-13 RX ADMIN — SODIUM CHLORIDE: 9 INJECTION, SOLUTION INTRAVENOUS at 11:05

## 2022-05-13 RX ADMIN — MORPHINE SULFATE 2 MG: 2 INJECTION, SOLUTION INTRAMUSCULAR; INTRAVENOUS at 02:05

## 2022-05-13 RX ADMIN — GABAPENTIN 600 MG: 300 CAPSULE ORAL at 02:05

## 2022-05-13 RX ADMIN — MORPHINE SULFATE 2 MG: 2 INJECTION, SOLUTION INTRAMUSCULAR; INTRAVENOUS at 07:05

## 2022-05-13 RX ADMIN — ENOXAPARIN SODIUM 40 MG: 40 INJECTION SUBCUTANEOUS at 05:05

## 2022-05-13 RX ADMIN — PIPERACILLIN AND TAZOBACTAM 4.5 G: 4; .5 INJECTION, POWDER, LYOPHILIZED, FOR SOLUTION INTRAVENOUS at 05:05

## 2022-05-13 RX ADMIN — HYDROXYCHLOROQUINE SULFATE 200 MG: 200 TABLET, FILM COATED ORAL at 09:05

## 2022-05-13 RX ADMIN — ONDANSETRON 4 MG: 2 INJECTION INTRAMUSCULAR; INTRAVENOUS at 12:05

## 2022-05-13 RX ADMIN — FERROUS SULFATE TAB 325 MG (65 MG ELEMENTAL FE) 1 EACH: 325 (65 FE) TAB at 09:05

## 2022-05-13 RX ADMIN — CYCLOBENZAPRINE 10 MG: 10 TABLET, FILM COATED ORAL at 09:05

## 2022-05-13 RX ADMIN — HYDROMORPHONE HYDROCHLORIDE 0.5 MG: 2 INJECTION, SOLUTION INTRAMUSCULAR; INTRAVENOUS; SUBCUTANEOUS at 10:05

## 2022-05-13 NOTE — ANESTHESIA PROCEDURE NOTES
Intubation    Date/Time: 5/13/2022 11:29 AM  Performed by: Nina Card CRNA  Authorized by: Manish Heard MD     Intubation:     Induction:  Inhalational - mask    Intubated:  Postinduction    Attempted By:  CRNA    Difficult Airway Encountered?: No      Complications:  None    Airway Device:  Supraglottic airway/LMA    Airway Device Size:  4.0    Style/Cuff Inflation:  Cuffed (inflated to minimal occlusive pressure)    Placement Verified By:  Capnometry    Complicating Factors:  None    Findings Post-Intubation:  BS equal bilateral

## 2022-05-13 NOTE — TRANSFER OF CARE
"Anesthesia Transfer of Care Note    Patient: Silvana Sarah    Procedure(s) Performed: Procedure(s) (LRB):  INCISION AND DRAINAGE, ABSCESS (Right)    Patient location: PACU    Anesthesia Type: general    Transport from OR: Transported from OR on 6-10 L/min O2 by face mask with adequate spontaneous ventilation    Post pain: adequate analgesia    Post assessment: no apparent anesthetic complications    Post vital signs: stable    Level of consciousness: responds to stimulation and sedated    Nausea/Vomiting: no nausea/vomiting    Complications: none    Transfer of care protocol was followedComments: Good SV continue, NAD noted, VSS, RTRN      Last vitals:   Visit Vitals  BP (!) 155/86 (BP Location: Right leg, Patient Position: Lying)   Pulse 99   Temp 37.6 °C (99.7 °F)   Resp 14   Ht 5' 10" (1.778 m)   Wt 90.7 kg (200 lb)   SpO2 98%   Breastfeeding No   BMI 28.70 kg/m²     "

## 2022-05-13 NOTE — PROGRESS NOTES
Pharmacy consulted to dose vancomycin for SSTI. Based on patient's weight and kidney function, start vanc 1750 mg IV Q24H. Trough level ordered for 5/15/22 1630. Pharmacy will continue to follow and dose.

## 2022-05-13 NOTE — PLAN OF CARE
Problem: Impaired Wound Healing  Goal: Optimal Wound Healing  Outcome: Ongoing, Progressing  Intervention: Promote Wound Healing  Flowsheets (Taken 5/13/2022 1336)  Sleep/Rest Enhancement: family presence promoted  Activity Management:   Walk with assistive devise and /or staff member - L3   Arm raise - L1  Pain Management Interventions: relaxation techniques promoted

## 2022-05-13 NOTE — PLAN OF CARE
Problem: Adult Inpatient Plan of Care  Goal: Plan of Care Review  Outcome: Ongoing, Progressing  Flowsheets (Taken 5/13/2022 0800)  Plan of Care Reviewed With: patient  Goal: Patient-Specific Goal (Individualized)  Outcome: Ongoing, Progressing  Flowsheets (Taken 5/13/2022 0800)  Individualized Care Needs: Pain management  Patient-Specific Goals (Include Timeframe): Pain management  Goal: Absence of Hospital-Acquired Illness or Injury  Outcome: Ongoing, Progressing  Intervention: Identify and Manage Fall Risk  Flowsheets (Taken 5/13/2022 0800)  Safety Promotion/Fall Prevention:   assistive device/personal item within reach   diversional activities provided   medications reviewed   nonskid shoes/socks when out of bed   lighting adjusted   side rails raised x 3  Intervention: Prevent Skin Injury  Flowsheets (Taken 5/13/2022 0800)  Body Position:   position changed independently   supine  Skin Protection: adhesive use limited  Intervention: Prevent and Manage VTE (Venous Thromboembolism) Risk  Flowsheets (Taken 5/13/2022 0800)  Activity Management: Arm raise - L1  VTE Prevention/Management:   ambulation promoted   fluids promoted   ROM (active) performed  Range of Motion: ROM (range of motion) performed  Intervention: Prevent Infection  Flowsheets (Taken 5/13/2022 0800)  Infection Prevention:   equipment surfaces disinfected   hand hygiene promoted   rest/sleep promoted   single patient room provided  Goal: Optimal Comfort and Wellbeing  Outcome: Ongoing, Progressing  Intervention: Monitor Pain and Promote Comfort  Flowsheets (Taken 5/13/2022 0800)  Pain Management Interventions:   care clustered   quiet environment facilitated   relaxation techniques promoted   diversional activity provided  Intervention: Provide Person-Centered Care  Flowsheets (Taken 5/13/2022 0800)  Trust Relationship/Rapport:   care explained   choices provided   questions encouraged  Goal: Readiness for Transition of Care  Outcome: Ongoing,  Progressing  Intervention: Mutually Develop Transition Plan  Flowsheets (Taken 5/13/2022 0800)  Equipment Currently Used at Home: none  Transportation Anticipated: family or friend will provide  Communicated DANE with patient/caregiver: Date not available/Unable to determine  Do you expect to return to your current living situation?: Yes  Do you have help at home or someone to help you manage your care at home?: Yes     Problem: Diabetes Comorbidity  Goal: Blood Glucose Level Within Targeted Range  Outcome: Ongoing, Progressing  Intervention: Monitor and Manage Glycemia  Flowsheets (Taken 5/13/2022 0800)  Glycemic Management: blood glucose monitored     Problem: Impaired Wound Healing  Goal: Optimal Wound Healing  Outcome: Ongoing, Progressing  Intervention: Promote Wound Healing  Flowsheets (Taken 5/13/2022 0800)  Oral Nutrition Promotion: rest periods promoted  Sleep/Rest Enhancement:   regular sleep/rest pattern promoted   relaxation techniques promoted  Activity Management: Arm raise - L1  Pain Management Interventions:   care clustered   quiet environment facilitated   relaxation techniques promoted   diversional activity provided

## 2022-05-13 NOTE — ASSESSMENT & PLAN NOTE
Wounds from cat bites to bilateral hands and forearms with Clindamycin and Doxycycline initiated on 5/10/22. Right hand and Wrist XRays with soft tissue swelling. Leukocytosis of 12K, which is improved from 15K one day prior.  - US right forearm ordered for soft tissue showed fluid collection  - Patient getting I+D by surgery today   - Clindamycin 600mg IV q8h  - Doxycycline 100mg PO BID   - PICC line placement today  - Continued home Percocet for pain control   - Blood cultures x2 NG @24 hours  - Monitor CBC

## 2022-05-13 NOTE — PLAN OF CARE
1230 pt to pacu pt resp reg and non labored pt dsg d/i to r arm fingers red color pt l arm warm to touch pt sao2 97% on 7l fm will monitor     1245 pt sao2 90% on ra pt placed on o2 at 2l nbp pt sao2 up to 94%     1300 pt c/o pain in r hand gave morphine 4mg ivp will titrate for pain control     1330 pt vs stable pt resting well pt sao2 96% on 2l nbp orders to release to room per md    1340 pt released to  lucretia hammonds rn b/p 137/53 pulse 95 resp 18 sao2 92% on ra temp 98.6 oral

## 2022-05-13 NOTE — SUBJECTIVE & OBJECTIVE
Interval History: Patient still complaining of right forearm pain this morning. Still has significant swelling and redness in that area and through the right hand. US showed a fluid abscess, patient will get an I+D today. Patient will also get PICC line place for easier venous access for Abx administration.     Review of Systems   Constitutional:  Negative for appetite change, chills and fever.   HENT:  Negative for congestion, rhinorrhea and sore throat.    Eyes:  Negative for visual disturbance.   Respiratory:  Negative for cough, shortness of breath and wheezing.    Cardiovascular:  Negative for chest pain, palpitations and leg swelling.   Gastrointestinal:  Negative for abdominal pain, constipation, diarrhea, nausea and vomiting.   Genitourinary:  Negative for dysuria, frequency and hematuria.   Musculoskeletal:  Positive for arthralgias and myalgias.   Skin:  Positive for wound.   Neurological:  Negative for syncope, weakness and headaches.   Psychiatric/Behavioral:  Negative for dysphoric mood and sleep disturbance. The patient is not nervous/anxious.    Objective:     Vital Signs (Most Recent):  Temp: 99.7 °F (37.6 °C) (05/13/22 0739)  Pulse: 99 (05/13/22 0739)  Resp: 20 (05/13/22 0739)  BP: 138/63 (05/13/22 0739)  SpO2: (!) 94 % (05/13/22 0739)   Vital Signs (24h Range):  Temp:  [97.9 °F (36.6 °C)-99.7 °F (37.6 °C)] 99.7 °F (37.6 °C)  Pulse:  [] 99  Resp:  [17-20] 20  SpO2:  [94 %-98 %] 94 %  BP: (125-161)/(63-82) 138/63     Weight: 90.7 kg (200 lb)  Body mass index is 28.7 kg/m².    Intake/Output Summary (Last 24 hours) at 5/13/2022 0944  Last data filed at 5/12/2022 1900  Gross per 24 hour   Intake 786.67 ml   Output 1000 ml   Net -213.33 ml      Physical Exam  Vitals and nursing note reviewed.   Constitutional:       General: She is not in acute distress.     Appearance: Normal appearance. She is normal weight. She is ill-appearing.   HENT:      Head: Normocephalic.      Right Ear: External ear  normal.      Left Ear: External ear normal.      Nose: Nose normal. No congestion or rhinorrhea.      Mouth/Throat:      Mouth: Mucous membranes are moist.      Pharynx: Oropharynx is clear. No oropharyngeal exudate or posterior oropharyngeal erythema.   Eyes:      General: No scleral icterus.        Right eye: No discharge.         Left eye: No discharge.      Conjunctiva/sclera: Conjunctivae normal.      Pupils: Pupils are equal, round, and reactive to light.   Cardiovascular:      Rate and Rhythm: Normal rate and regular rhythm.      Pulses: Normal pulses.      Heart sounds: Normal heart sounds. No murmur heard.  Pulmonary:      Effort: Pulmonary effort is normal. No respiratory distress.      Breath sounds: Normal breath sounds. No wheezing, rhonchi or rales.   Abdominal:      General: Bowel sounds are normal. There is no distension.      Palpations: Abdomen is soft. There is no mass.      Tenderness: There is no abdominal tenderness.   Musculoskeletal:      Right forearm: Swelling, edema and tenderness present.      Left forearm: No swelling.      Right wrist: Swelling and tenderness present. No crepitus.      Left wrist: No swelling.      Right hand: Swelling and tenderness present.      Left hand: Swelling and tenderness present.      Cervical back: Normal range of motion and neck supple.      Right lower leg: No edema.      Left lower leg: No edema.   Skin:     General: Skin is warm and dry.      Findings: Lesion (Multiple puncture and scratches to bilateral hands and forearms) present. No rash.   Neurological:      General: No focal deficit present.      Mental Status: She is alert and oriented to person, place, and time.      Cranial Nerves: No cranial nerve deficit.      Sensory: No sensory deficit.      Motor: No weakness.   Psychiatric:         Mood and Affect: Mood normal.         Behavior: Behavior normal.         Thought Content: Thought content normal.         Judgment: Judgment normal.        Significant Labs: All pertinent labs within the past 24 hours have been reviewed.    Significant Imaging: I have reviewed all pertinent imaging results/findings within the past 24 hours.

## 2022-05-13 NOTE — PROGRESS NOTES
[facility] Medicine  Progress Note    Patient Name: Silvana Sarah  MRN: [id number]  Patient Class: IP- Inpatient   Admission Date: 5/11/2022  Length of Stay: 1 days  Attending Physician: Gabriel Diallo MD  Primary Care Provider: Sera Byrd NP        Subjective:     Principal Problem:Wound cellulitis        HPI:  62 yo female with PMH of T1DM, RA and HTN who presents to ED with c/o progressively worsening swelling and redness of bilateral hands. Patient was bitten and scratched multiple times by cat on May 9, 2022. She came to ED on 5/10/22 and received IV Clindamycin and Doxycycline PO. She was also prescribed Flagyl PO outpatient. Xray of right hand and wrist were positive for soft tissue swelling. Leukocytosis of 18K which has improved to 15K. Blood cultures were obtained on 5/10/22 and remain pending. Upon examination, bilateral hands (R >L) and right forearm are erythematous and edematous. There are multiple puncture sites and scratch marks noted to bilateral hands and forearms. Wounds appear clean and dry.      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 Her rheumatologist is Dr. Kamari Almazan and she sees Pain Management for chronic pain treatment. Patient has gastric stimulator to assist with DM gastroparesis. She will be admitted to [facility] at this time for further evaluation and management.            Overview/Hospital Course:  No notes on file    Interval History: Patient still complaining of right forearm pain this morning. Still has significant swelling and redness in that area and through the right hand. US showed a fluid abscess, patient will get an I+D today. Patient will also get PICC line place for easier venous access for Abx administration.     Review of Systems   Constitutional:  Negative for appetite change, chills and fever.   HENT:  Negative for congestion, rhinorrhea and sore throat.    Eyes:  Negative  for visual disturbance.   Respiratory:  Negative for cough, shortness of breath and wheezing.    Cardiovascular:  Negative for chest pain, palpitations and leg swelling.   Gastrointestinal:  Negative for abdominal pain, constipation, diarrhea, nausea and vomiting.   Genitourinary:  Negative for dysuria, frequency and hematuria.   Musculoskeletal:  Positive for arthralgias and myalgias.   Skin:  Positive for wound.   Neurological:  Negative for syncope, weakness and headaches.   Psychiatric/Behavioral:  Negative for dysphoric mood and sleep disturbance. The patient is not nervous/anxious.    Objective:     Vital Signs (Most Recent):  Temp: 99.7 °F (37.6 °C) (05/13/22 0739)  Pulse: 99 (05/13/22 0739)  Resp: 20 (05/13/22 0739)  BP: 138/63 (05/13/22 0739)  SpO2: (!) 94 % (05/13/22 0739)   Vital Signs (24h Range):  Temp:  [97.9 °F (36.6 °C)-99.7 °F (37.6 °C)] 99.7 °F (37.6 °C)  Pulse:  [] 99  Resp:  [17-20] 20  SpO2:  [94 %-98 %] 94 %  BP: (125-161)/(63-82) 138/63     Weight: 90.7 kg (200 lb)  Body mass index is 28.7 kg/m².    Intake/Output Summary (Last 24 hours) at 5/13/2022 0944  Last data filed at 5/12/2022 1900  Gross per 24 hour   Intake 786.67 ml   Output 1000 ml   Net -213.33 ml      Physical Exam  Vitals and nursing note reviewed.   Constitutional:       General: She is not in acute distress.     Appearance: Normal appearance. She is normal weight. She is ill-appearing.   HENT:      Head: Normocephalic.      Right Ear: External ear normal.      Left Ear: External ear normal.      Nose: Nose normal. No congestion or rhinorrhea.      Mouth/Throat:      Mouth: Mucous membranes are moist.      Pharynx: Oropharynx is clear. No oropharyngeal exudate or posterior oropharyngeal erythema.   Eyes:      General: No scleral icterus.        Right eye: No discharge.         Left eye: No discharge.      Conjunctiva/sclera: Conjunctivae normal.      Pupils: Pupils are equal, round, and reactive to light.   Cardiovascular:       Rate and Rhythm: Normal rate and regular rhythm.      Pulses: Normal pulses.      Heart sounds: Normal heart sounds. No murmur heard.  Pulmonary:      Effort: Pulmonary effort is normal. No respiratory distress.      Breath sounds: Normal breath sounds. No wheezing, rhonchi or rales.   Abdominal:      General: Bowel sounds are normal. There is no distension.      Palpations: Abdomen is soft. There is no mass.      Tenderness: There is no abdominal tenderness.   Musculoskeletal:      Right forearm: Swelling, edema and tenderness present.      Left forearm: No swelling.      Right wrist: Swelling and tenderness present. No crepitus.      Left wrist: No swelling.      Right hand: Swelling and tenderness present.      Left hand: Swelling and tenderness present.      Cervical back: Normal range of motion and neck supple.      Right lower leg: No edema.      Left lower leg: No edema.   Skin:     General: Skin is warm and dry.      Findings: Lesion (Multiple puncture and scratches to bilateral hands and forearms) present. No rash.   Neurological:      General: No focal deficit present.      Mental Status: She is alert and oriented to person, place, and time.      Cranial Nerves: No cranial nerve deficit.      Sensory: No sensory deficit.      Motor: No weakness.   Psychiatric:         Mood and Affect: Mood normal.         Behavior: Behavior normal.         Thought Content: Thought content normal.         Judgment: Judgment normal.       Significant Labs: All pertinent labs within the past 24 hours have been reviewed.    Significant Imaging: I have reviewed all pertinent imaging results/findings within the past 24 hours.      Assessment/Plan:      * Wound cellulitis  Wounds from cat bites to bilateral hands and forearms with Clindamycin and Doxycycline initiated on 5/10/22. Right hand and Wrist XRays with soft tissue swelling. Leukocytosis of 12K, which is improved from 15K one day prior.  - US right forearm ordered  for soft tissue showed fluid collection  - Patient getting I+D by surgery today   - Clindamycin 600mg IV q8h  - Doxycycline 100mg PO BID   - PICC line placement today  - Continued home Percocet for pain control   - Blood cultures x2 NG @24 hours  - Monitor CBC      Diabetes mellitus  Complicated with neuropathy and gastroparesis. Patient has gastric stimulator.   - Diabetic Diet  - POCT glucose QID AC & HS  - Levemir 20U Daily   - Moderate SSI    Rheumatoid arthritis  Elevated ESR of 41 and CRP of 12.4. Patient takes multiple immunosuppressive medications.   - Continued home medications  - Monitor CBC        VTE Risk Mitigation (From admission, onward)         Ordered     enoxaparin injection 40 mg  Daily         05/11/22 2102     IP VTE HIGH RISK PATIENT  Once         05/11/22 2102     Place FRANCISCA hose  Until discontinued         05/11/22 2102                Discharge Planning   DANE:      Code Status: Full Code   Is the patient medically ready for discharge?:     Reason for patient still in hospital (select all that apply): Treatment                     Noy Gomez MD  Department of Hospital Medicine   [facility]

## 2022-05-13 NOTE — OP NOTE
[facility] - Periop Services  Surgery Department  Operative Note    SUMMARY     Date of Procedure: 5/13/2022     Procedure: Procedure(s) (LRB):  INCISION AND DRAINAGE, ABSCESS (Right)     Surgeon(s) and Role:     * Kamari Sandoval DO - Primary    Assisting Surgeon: None    Pre-Operative Diagnosis: Wound cellulitis [L03.90]    Post-Operative Diagnosis: Post-Op Diagnosis Codes:     * Wound cellulitis [L03.90]    Anesthesia: General    Operative Findings (including complications, if any): abscess pocket to right hand with purulent drainage    Description of Technical Procedures:  Patient is taken the operating room and placed on operating table in supine position.  Patient underwent general anesthesia per the anesthesia team.  The right hand was prepped and draped in usual sterile fashion.  An ultrasound was used to identify large fluid collection on the dorsal portion of the right hand adjacent to the wrist.  We localize the skin over this area with 0.25% Marcaine with 1% lidocaine with epinephrine.  Made a 2 cm cruciate incision over this area with a 15 blade scalpel and purulent drainage was expressed.  This was cultured and sent to microbiology.  We probed the cavity with a Q-tip and found the area underneath the skin which tunneled for a total size of 7 x 6 x 1 cm.  We irrigated the cavity with Irrisept solution.  We then packed with quarter-inch iodoform packing and dressed with 4 x 4 gauze and Kerlix.  Patient tolerated procedure well; was awakened, extubated and taken the PACU in stable condition.      Estimated Blood Loss (EBL): 5 cc           Implants: * No implants in log *    Specimens:   Specimen (24h ago, onward)            None                  Condition: Good    Disposition: PACU - hemodynamically stable.    Attestation: I was present and scrubbed for the entire procedure.

## 2022-05-13 NOTE — NURSING
0739 Pt resting in bed. No distress noted. Redness and swelling noted to RUE. Complains of pain. Night shift just gave pain medication. Call bell in reach. Side rails up x2.     1003 Pt went to surgery.     1346 Pt back from surgery. VS stable. Family at bedside. No distress noted. RUE wrapped. No complaints or requests at this time. Call bell in reach. Side rails up x3.     1445 Pt resting in bed.  at bedside. Started fluids. Requesting soup but did not want the soup offered. Stated her mother would bring food. No distress noted. No other complaints or requests at this time. Call bell in reach. Side rails up x2.     1647 Pt resting in bed. Assisted to restroom. Repositioned in bed.  at bedside. No distress noted. No complaints or requests at this time. Call bell in reach. Side rails up x2.

## 2022-05-13 NOTE — ANESTHESIA POSTPROCEDURE EVALUATION
Anesthesia Post Evaluation    Patient: Silvana Sarah    Procedure(s) Performed: Procedure(s) (LRB):  INCISION AND DRAINAGE, ABSCESS (Right)    Final Anesthesia Type: general      Patient location during evaluation: PACU  Patient participation: Yes- Able to Participate  Level of consciousness: awake and sedated  Post-procedure vital signs: reviewed and stable  Pain management: adequate  Airway patency: patent    PONV status at discharge: No PONV  Anesthetic complications: no      Cardiovascular status: blood pressure returned to baseline  Respiratory status: unassisted  Hydration status: euvolemic  Follow-up not needed.          Vitals Value Taken Time   /53 05/13/22 1346   Temp 37 °C (98.6 °F) 05/13/22 1346   Pulse 96 05/13/22 1346   Resp 18 05/13/22 1346   SpO2 93 % 05/13/22 1346         Event Time   Out of Recovery 05/13/2022 13:30:00         Pain/Anabela Score: Pain Rating Prior to Med Admin: 8 (5/13/2022  1:00 PM)  Pain Rating Post Med Admin: 3 (5/12/2022  6:52 AM)  Anabela Score: 8 (5/13/2022  1:15 PM)

## 2022-05-13 NOTE — PROGRESS NOTES
To the OR for incision and drainage of a right hand abscess.    Risks and benefits explained with the patient including risks for infection, bleeding, injury to surrounding structures, hematoma/seroma formation with need for possible evacuation, possible open.  The patient verbalized understanding, agrees and wishes to proceed with surgery.

## 2022-05-13 NOTE — ANESTHESIA PREPROCEDURE EVALUATION
05/13/2022  Silvana Sarah is a 61 y.o., female.      Pre-op Assessment    I have reviewed the Patient Summary Reports.     I have reviewed the Nursing Notes. I have reviewed the NPO Status.   I have reviewed the Medications.     Review of Systems  Anesthesia Hx:  No problems with previous Anesthesia    Social:  Non-Smoker, No Alcohol Use    Hematology/Oncology:  Hematology Normal   Oncology Normal     EENT/Dental:EENT/Dental Normal   Cardiovascular:   Hypertension    Pulmonary:  Pulmonary Normal    Renal/:  Renal/ Normal     Hepatic/GI:   GERD    Musculoskeletal:   Arthritis  Rheumatoid a   Neurological:  Neurology Normal    Endocrine:   Diabetes, well controlled, type 1  Obesity / BMI > 30  Dermatological:  Skin Normal    Psych:   Psychiatric History          Physical Exam  General: Well nourished    Airway:  Mallampati: III / III  Mouth Opening: Normal  TM Distance: > 6 cm  Tongue: Normal  Neck ROM: Normal ROM    Chest/Lungs:  Clear to auscultation, Normal Respiratory Rate    Heart:  Rate: Normal  Rhythm: Regular Rhythm        Anesthesia Plan  Type of Anesthesia, risks & benefits discussed:    Anesthesia Type: Gen Supraglottic Airway  Intra-op Monitoring Plan: Standard ASA Monitors  Post Op Pain Control Plan: multimodal analgesia  Induction:  IV  Informed Consent: Informed consent signed with the Patient and all parties understand the risks and agree with anesthesia plan.  All questions answered.   ASA Score: 3  Day of Surgery Review of History & Physical: H&P Update referred to the surgeon/provider.I have interviewed and examined the patient. I have reviewed the patient's H&P dated: There are no significant changes. H&P completed by Anesthesiologist.    Ready For Surgery From Anesthesia Perspective.     .

## 2022-05-14 PROBLEM — E10.9 TYPE 1 DIABETES MELLITUS: Status: ACTIVE | Noted: 2019-09-20

## 2022-05-14 LAB
ANION GAP SERPL CALCULATED.3IONS-SCNC: 12 MMOL/L (ref 7–16)
BASOPHILS # BLD AUTO: 0.05 K/UL (ref 0–0.2)
BASOPHILS NFR BLD AUTO: 0.6 % (ref 0–1)
BUN SERPL-MCNC: 14 MG/DL (ref 7–18)
BUN/CREAT SERPL: 13 (ref 6–20)
CALCIUM SERPL-MCNC: 8.5 MG/DL (ref 8.5–10.1)
CHLORIDE SERPL-SCNC: 101 MMOL/L (ref 98–107)
CO2 SERPL-SCNC: 27 MMOL/L (ref 21–32)
CREAT SERPL-MCNC: 1.06 MG/DL (ref 0.55–1.02)
CRP SERPL-MCNC: 6.9 MG/DL (ref 0–0.8)
DIFFERENTIAL METHOD BLD: ABNORMAL
EOSINOPHIL # BLD AUTO: 0.14 K/UL (ref 0–0.5)
EOSINOPHIL NFR BLD AUTO: 1.7 % (ref 1–4)
ERYTHROCYTE [DISTWIDTH] IN BLOOD BY AUTOMATED COUNT: 13.1 % (ref 11.5–14.5)
ERYTHROCYTE [SEDIMENTATION RATE] IN BLOOD BY WESTERGREN METHOD: 52 MM/HR (ref 0–30)
GLUCOSE SERPL-MCNC: 166 MG/DL (ref 74–106)
GLUCOSE SERPL-MCNC: 210 MG/DL (ref 70–105)
GLUCOSE SERPL-MCNC: 245 MG/DL (ref 70–105)
GLUCOSE SERPL-MCNC: 435 MG/DL (ref 70–105)
HCT VFR BLD AUTO: 32.8 % (ref 38–47)
HGB BLD-MCNC: 10.3 G/DL (ref 12–16)
IMM GRANULOCYTES # BLD AUTO: 0.05 K/UL (ref 0–0.04)
IMM GRANULOCYTES NFR BLD: 0.6 % (ref 0–0.4)
LYMPHOCYTES # BLD AUTO: 2.07 K/UL (ref 1–4.8)
LYMPHOCYTES NFR BLD AUTO: 25.6 % (ref 27–41)
MCH RBC QN AUTO: 27.8 PG (ref 27–31)
MCHC RBC AUTO-ENTMCNC: 31.4 G/DL (ref 32–36)
MCV RBC AUTO: 88.4 FL (ref 80–96)
MONOCYTES # BLD AUTO: 0.85 K/UL (ref 0–0.8)
MONOCYTES NFR BLD AUTO: 10.5 % (ref 2–6)
MPC BLD CALC-MCNC: 10.3 FL (ref 9.4–12.4)
NEUTROPHILS # BLD AUTO: 4.94 K/UL (ref 1.8–7.7)
NEUTROPHILS NFR BLD AUTO: 61 % (ref 53–65)
NRBC # BLD AUTO: 0 X10E3/UL
NRBC, AUTO (.00): 0 %
PLATELET # BLD AUTO: 244 K/UL (ref 150–400)
POTASSIUM SERPL-SCNC: 4.5 MMOL/L (ref 3.5–5.1)
RBC # BLD AUTO: 3.71 M/UL (ref 4.2–5.4)
SODIUM SERPL-SCNC: 135 MMOL/L (ref 136–145)
WBC # BLD AUTO: 8.1 K/UL (ref 4.5–11)

## 2022-05-14 PROCEDURE — 99232 SBSQ HOSP IP/OBS MODERATE 35: CPT | Mod: GC,,, | Performed by: INTERNAL MEDICINE

## 2022-05-14 PROCEDURE — 82962 GLUCOSE BLOOD TEST: CPT

## 2022-05-14 PROCEDURE — 25000003 PHARM REV CODE 250: Performed by: INTERNAL MEDICINE

## 2022-05-14 PROCEDURE — 25000003 PHARM REV CODE 250: Performed by: FAMILY MEDICINE

## 2022-05-14 PROCEDURE — 63600175 PHARM REV CODE 636 W HCPCS: Performed by: FAMILY MEDICINE

## 2022-05-14 PROCEDURE — 36415 COLL VENOUS BLD VENIPUNCTURE: CPT

## 2022-05-14 PROCEDURE — 86140 C-REACTIVE PROTEIN: CPT

## 2022-05-14 PROCEDURE — 11000001 HC ACUTE MED/SURG PRIVATE ROOM

## 2022-05-14 PROCEDURE — 80048 BASIC METABOLIC PNL TOTAL CA: CPT

## 2022-05-14 PROCEDURE — 85025 COMPLETE CBC W/AUTO DIFF WBC: CPT

## 2022-05-14 PROCEDURE — 63600175 PHARM REV CODE 636 W HCPCS: Performed by: INTERNAL MEDICINE

## 2022-05-14 PROCEDURE — 85651 RBC SED RATE NONAUTOMATED: CPT

## 2022-05-14 PROCEDURE — 63600175 PHARM REV CODE 636 W HCPCS: Performed by: STUDENT IN AN ORGANIZED HEALTH CARE EDUCATION/TRAINING PROGRAM

## 2022-05-14 PROCEDURE — C9399 UNCLASSIFIED DRUGS OR BIOLOG: HCPCS | Performed by: FAMILY MEDICINE

## 2022-05-14 PROCEDURE — 99232 PR SUBSEQUENT HOSPITAL CARE,LEVL II: ICD-10-PCS | Mod: GC,,, | Performed by: INTERNAL MEDICINE

## 2022-05-14 RX ADMIN — GABAPENTIN 600 MG: 300 CAPSULE ORAL at 02:05

## 2022-05-14 RX ADMIN — HYDROXYCHLOROQUINE SULFATE 200 MG: 200 TABLET, FILM COATED ORAL at 11:05

## 2022-05-14 RX ADMIN — HYDROMORPHONE HYDROCHLORIDE 0.5 MG: 2 INJECTION, SOLUTION INTRAMUSCULAR; INTRAVENOUS; SUBCUTANEOUS at 07:05

## 2022-05-14 RX ADMIN — PIPERACILLIN AND TAZOBACTAM 4.5 G: 4; .5 INJECTION, POWDER, LYOPHILIZED, FOR SOLUTION INTRAVENOUS at 09:05

## 2022-05-14 RX ADMIN — INSULIN ASPART 4 UNITS: 100 INJECTION, SOLUTION INTRAVENOUS; SUBCUTANEOUS at 01:05

## 2022-05-14 RX ADMIN — ENOXAPARIN SODIUM 40 MG: 40 INJECTION SUBCUTANEOUS at 04:05

## 2022-05-14 RX ADMIN — PIPERACILLIN AND TAZOBACTAM 4.5 G: 4; .5 INJECTION, POWDER, LYOPHILIZED, FOR SOLUTION INTRAVENOUS at 03:05

## 2022-05-14 RX ADMIN — TRIAMTERENE AND HYDROCHLOROTHIAZIDE 1 TABLET: 37.5; 25 TABLET ORAL at 09:05

## 2022-05-14 RX ADMIN — OXYCODONE HYDROCHLORIDE AND ACETAMINOPHEN 1 TABLET: 10; 325 TABLET ORAL at 10:05

## 2022-05-14 RX ADMIN — DOXYCYCLINE HYCLATE 100 MG: 100 CAPSULE ORAL at 09:05

## 2022-05-14 RX ADMIN — OXYCODONE HYDROCHLORIDE AND ACETAMINOPHEN 1 TABLET: 10; 325 TABLET ORAL at 06:05

## 2022-05-14 RX ADMIN — INSULIN ASPART 5 UNITS: 100 INJECTION, SOLUTION INTRAVENOUS; SUBCUTANEOUS at 08:05

## 2022-05-14 RX ADMIN — FERROUS SULFATE TAB 325 MG (65 MG ELEMENTAL FE) 1 EACH: 325 (65 FE) TAB at 09:05

## 2022-05-14 RX ADMIN — HYDROMORPHONE HYDROCHLORIDE 0.5 MG: 2 INJECTION, SOLUTION INTRAMUSCULAR; INTRAVENOUS; SUBCUTANEOUS at 11:05

## 2022-05-14 RX ADMIN — PREDNISONE 5 MG: 5 TABLET ORAL at 09:05

## 2022-05-14 RX ADMIN — PIPERACILLIN AND TAZOBACTAM 4.5 G: 4; .5 INJECTION, POWDER, LYOPHILIZED, FOR SOLUTION INTRAVENOUS at 01:05

## 2022-05-14 RX ADMIN — HYDROMORPHONE HYDROCHLORIDE 0.5 MG: 2 INJECTION, SOLUTION INTRAMUSCULAR; INTRAVENOUS; SUBCUTANEOUS at 01:05

## 2022-05-14 RX ADMIN — HYDROMORPHONE HYDROCHLORIDE 0.5 MG: 2 INJECTION, SOLUTION INTRAMUSCULAR; INTRAVENOUS; SUBCUTANEOUS at 04:05

## 2022-05-14 RX ADMIN — INSULIN DETEMIR 20 UNITS: 100 INJECTION, SOLUTION SUBCUTANEOUS at 09:05

## 2022-05-14 RX ADMIN — HYDROXYCHLOROQUINE SULFATE 200 MG: 200 TABLET, FILM COATED ORAL at 09:05

## 2022-05-14 RX ADMIN — LEFLUNOMIDE 20 MG: 20 TABLET ORAL at 09:05

## 2022-05-14 RX ADMIN — GABAPENTIN 600 MG: 300 CAPSULE ORAL at 09:05

## 2022-05-14 RX ADMIN — GABAPENTIN 600 MG: 300 CAPSULE ORAL at 08:05

## 2022-05-14 RX ADMIN — OXYCODONE HYDROCHLORIDE AND ACETAMINOPHEN 1000 MG: 500 TABLET ORAL at 09:05

## 2022-05-14 RX ADMIN — VANCOMYCIN HYDROCHLORIDE 1750 MG: 5 INJECTION, POWDER, LYOPHILIZED, FOR SOLUTION INTRAVENOUS at 04:05

## 2022-05-14 RX ADMIN — CYCLOBENZAPRINE 10 MG: 10 TABLET, FILM COATED ORAL at 08:05

## 2022-05-14 RX ADMIN — PANTOPRAZOLE SODIUM 40 MG: 40 TABLET, DELAYED RELEASE ORAL at 09:05

## 2022-05-14 NOTE — PROGRESS NOTES
[facility]  General Surgery  Progress Note    Subjective:     Interval History:  Patient doing well overall.  Still having some soreness in the hand and erythema and pain overall improved.  No fever chills overnight    Post-Op Info:  Procedure(s) (LRB):  INCISION AND DRAINAGE, ABSCESS (Right)   1 Day Post-Op      Medications:  Continuous Infusions:  Scheduled Meds:   ascorbic acid (vitamin C)  1,000 mg Oral Daily    cyclobenzaprine  10 mg Oral QHS    doxycycline  100 mg Oral BID    enoxaparin  40 mg Subcutaneous Daily    ferrous sulfate  1 tablet Oral Daily    gabapentin  600 mg Oral TID    hydrOXYchloroQUINE  200 mg Oral BID    insulin detemir U-100  20 Units Subcutaneous Daily    leflunomide  20 mg Oral Daily    pantoprazole  40 mg Oral Daily    piperacillin-tazobactam (ZOSYN) IVPB  4.5 g Intravenous Q8H    polyethylene glycol  17 g Oral Daily    predniSONE  5 mg Oral Daily    triamterene-hydrochlorothiazide 37.5-25 mg  1 tablet Oral Daily    vancomycin (VANCOCIN) IVPB  1,750 mg Intravenous Q24H     PRN Meds:acetaminophen, dextrose 50%, dextrose 50%, glucagon (human recombinant), hydrALAZINE, HYDROmorphone, insulin aspart U-100, morphine, naloxone, ondansetron, oxyCODONE-acetaminophen, promethazine, sodium chloride 0.9%, vancomycin - pharmacy to dose     Objective:     Vital Signs (Most Recent):  Temp: 98.5 °F (36.9 °C) (05/14/22 0500)  Pulse: 87 (05/14/22 0500)  Resp: 18 (05/14/22 1047)  BP: (!) 117/57 (05/14/22 0500)  SpO2: (!) 92 % (05/14/22 0500) Vital Signs (24h Range):  Temp:  [98.5 °F (36.9 °C)-99 °F (37.2 °C)] 98.5 °F (36.9 °C)  Pulse:  [] 87  Resp:  [12-20] 18  SpO2:  [90 %-98 %] 92 %  BP: (109-180)/(31-94) 117/57       Intake/Output Summary (Last 24 hours) at 5/14/2022 1219  Last data filed at 5/13/2022 1227  Gross per 24 hour   Intake 700 ml   Output --   Net 700 ml       Physical Exam  Constitutional:       General: She is not in acute distress.  HENT:      Head:  Normocephalic.   Cardiovascular:      Rate and Rhythm: Normal rate and regular rhythm.      Pulses: Normal pulses.   Pulmonary:      Effort: Pulmonary effort is normal. No respiratory distress.      Breath sounds: Normal breath sounds.   Abdominal:      General: Abdomen is flat. There is no distension.      Palpations: Abdomen is soft.      Tenderness: There is no abdominal tenderness.   Musculoskeletal:         General: Normal range of motion.   Skin:     General: Skin is warm.      Findings: Lesion (Packing removed her right hand with no more purulence.  Redness and pain improved) present.   Neurological:      General: No focal deficit present.      Mental Status: She is oriented to person, place, and time.       Left 3rd finger with some mild swelling at the distal aspect but fluctuance and mild tenderness  Significant Labs:  BMP:   Recent Labs   Lab 05/14/22  0534   *   *   K 4.5      CO2 27   BUN 14   CREATININE 1.06*   CALCIUM 8.5     CBC:   Recent Labs   Lab 05/14/22  0534   WBC 8.10   RBC 3.71*   HGB 10.3*   HCT 32.8*      MCV 88.4   MCH 27.8   MCHC 31.4*     CMP:   Recent Labs   Lab 05/14/22  0534   *   CALCIUM 8.5   *   K 4.5   CO2 27      BUN 14   CREATININE 1.06*       Significant Diagnostics:  None    Assessment/Plan:     Active Diagnoses:    Diagnosis Date Noted POA    PRINCIPAL PROBLEM:  Wound cellulitis [L03.90]  Yes    Rheumatoid arthritis [M06.9] 09/20/2019 Yes    Diabetes mellitus [E11.9] 09/20/2019 Yes      Problems Resolved During this Admission:    Diagnosis Date Noted Date Resolved POA    Cat bite of hand [S61.459A, W55.01XA] 05/11/2022 05/12/2022 Yes     Will continue IV antibiotics await for speciation of the cultures.  Continue packing with quarter-inch iodoform.    David Aguilera MD  General Surgery  [facility]

## 2022-05-14 NOTE — ASSESSMENT & PLAN NOTE
Complicated with neuropathy and gastroparesis. Patient has gastric stimulator.   - Well controlled,  today  - Diabetic Diet  - POCT glucose QID AC & HS  - Levemir 20U Daily   - Moderate SSI

## 2022-05-14 NOTE — PROGRESS NOTES
[facility]  [facility] Medicine  Progress Note    Patient Name: Silvana Sarah  MRN: [id number]  Patient Class: IP- Inpatient   Admission Date: 5/11/2022  Length of Stay: 2 days  Attending Physician: Gabriel Diallo MD  Primary Care Provider: Sera Byrd NP        Subjective:     Principal Problem:Wound cellulitis        HPI:  60 yo female with PMH of T1DM, RA and HTN who presents to ED with c/o progressively worsening swelling and redness of bilateral hands. Patient was bitten and scratched multiple times by cat on May 9, 2022. She came to ED on 5/10/22 and received IV Clindamycin and Doxycycline PO. She was also prescribed Flagyl PO outpatient. Xray of right hand and wrist were positive for soft tissue swelling. Leukocytosis of 18K which has improved to 15K. Blood cultures were obtained on 5/10/22 and remain pending. Upon examination, bilateral hands (R >L) and right forearm are erythematous and edematous. There are multiple puncture sites and scratch marks noted to bilateral hands and forearms. Wounds appear clean and dry.      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 Her rheumatologist is Dr. Kamari Almazan and she sees Pain Management for chronic pain treatment. Patient has gastric stimulator to assist with DM gastroparesis. She will be admitted to [facility] at this time for further evaluation and management.            Overview/Hospital Course:  No notes on file    Interval History: Patient resting comfortably in bed. No acute complaints. Right hand/forearm is much less swollen and shiny/tight appearing on physical examination. Patient is on empiric antibiotics as she is a diabetic with cellulitis, bcx are negative so far and wound cultures taken during surgery are pending.     Review of Systems   Constitutional:  Negative for appetite change, chills and fever.   HENT:  Negative for congestion, rhinorrhea and sore throat.    Eyes:   Negative for visual disturbance.   Respiratory:  Negative for cough, shortness of breath and wheezing.    Cardiovascular:  Negative for chest pain, palpitations and leg swelling.   Gastrointestinal:  Negative for abdominal pain, constipation, diarrhea, nausea and vomiting.   Genitourinary:  Negative for dysuria, frequency and hematuria.   Musculoskeletal:  Positive for arthralgias and myalgias.   Skin:  Positive for wound.   Neurological:  Negative for syncope, weakness and headaches.   Psychiatric/Behavioral:  Negative for dysphoric mood and sleep disturbance. The patient is not nervous/anxious.    Objective:     Vital Signs (Most Recent):  Temp: 98.5 °F (36.9 °C) (05/14/22 0500)  Pulse: 87 (05/14/22 0500)  Resp: 18 (05/14/22 1047)  BP: (!) 117/57 (05/14/22 0500)  SpO2: (!) 92 % (05/14/22 0500)   Vital Signs (24h Range):  Temp:  [98.5 °F (36.9 °C)-99 °F (37.2 °C)] 98.5 °F (36.9 °C)  Pulse:  [] 87  Resp:  [12-20] 18  SpO2:  [90 %-98 %] 92 %  BP: (109-180)/(31-94) 117/57     Weight: 90.7 kg (200 lb)  Body mass index is 28.7 kg/m².    Intake/Output Summary (Last 24 hours) at 5/14/2022 1106  Last data filed at 5/13/2022 1227  Gross per 24 hour   Intake 750 ml   Output --   Net 750 ml      Physical Exam  Vitals and nursing note reviewed.   Constitutional:       General: She is not in acute distress.     Appearance: Normal appearance. She is normal weight. She is ill-appearing.   HENT:      Head: Normocephalic.      Right Ear: External ear normal.      Left Ear: External ear normal.      Nose: Nose normal. No congestion or rhinorrhea.      Mouth/Throat:      Mouth: Mucous membranes are moist.      Pharynx: Oropharynx is clear. No oropharyngeal exudate or posterior oropharyngeal erythema.   Eyes:      General: No scleral icterus.        Right eye: No discharge.         Left eye: No discharge.      Conjunctiva/sclera: Conjunctivae normal.      Pupils: Pupils are equal, round, and reactive to light.   Cardiovascular:       Rate and Rhythm: Normal rate and regular rhythm.      Pulses: Normal pulses.      Heart sounds: Normal heart sounds. No murmur heard.  Pulmonary:      Effort: Pulmonary effort is normal. No respiratory distress.      Breath sounds: Normal breath sounds. No wheezing, rhonchi or rales.   Abdominal:      General: Bowel sounds are normal. There is no distension.      Palpations: Abdomen is soft. There is no mass.      Tenderness: There is no abdominal tenderness.   Musculoskeletal:      Right forearm: Swelling, edema and tenderness present.      Left forearm: No swelling.      Right wrist: Swelling and tenderness present. No crepitus.      Left wrist: No swelling.      Right hand: Swelling and tenderness present.      Left hand: Swelling and tenderness present.      Cervical back: Normal range of motion and neck supple.      Right lower leg: No edema.      Left lower leg: No edema.   Skin:     General: Skin is warm and dry.      Findings: Lesion (Multiple puncture and scratches to bilateral hands and forearms) present. No rash.   Neurological:      General: No focal deficit present.      Mental Status: She is alert and oriented to person, place, and time.      Cranial Nerves: No cranial nerve deficit.      Sensory: No sensory deficit.      Motor: No weakness.   Psychiatric:         Mood and Affect: Mood normal.         Behavior: Behavior normal.         Thought Content: Thought content normal.         Judgment: Judgment normal.       Significant Labs: All pertinent labs within the past 24 hours have been reviewed.    Significant Imaging: I have reviewed all pertinent imaging results/findings within the past 24 hours.      Assessment/Plan:      * Wound cellulitis  -Got I+D yesterday with Dr. Sandoval  -Improved, continue to monitor closely  -On empiric doxycycline po, and vanc and zosyn IV while wound cultures pending as patient is diabetic so needs broad spectrum coverage against pathogens including  pseudomonas  -bcx ng@72 hours      Diabetes mellitus  Complicated with neuropathy and gastroparesis. Patient has gastric stimulator.   - Well controlled,  today  - Diabetic Diet  - POCT glucose QID AC & HS  - Levemir 20U Daily   - Moderate SSI    Rheumatoid arthritis  -ESR 52 today and CRP 6.9, continue to trend daily   - Continued home medications  - Monitor CBC        VTE Risk Mitigation (From admission, onward)         Ordered     enoxaparin injection 40 mg  Daily         05/11/22 2102     IP VTE HIGH RISK PATIENT  Once         05/11/22 2102     Place FRANCISCA hose  Until discontinued         05/11/22 2102                Discharge Planning   DANE:      Code Status: Full Code   Is the patient medically ready for discharge?:     Reason for patient still in hospital (select all that apply): Treatment                     Noy Gomez MD  Department of Hospital Medicine   [facility]

## 2022-05-14 NOTE — SUBJECTIVE & OBJECTIVE
Interval History: Patient resting comfortably in bed. No acute complaints. Right hand/forearm is much less swollen and shiny/tight appearing on physical examination. Patient is on empiric antibiotics as she is a diabetic with cellulitis, bcx are negative so far and wound cultures taken during surgery are pending.     Review of Systems   Constitutional:  Negative for appetite change, chills and fever.   HENT:  Negative for congestion, rhinorrhea and sore throat.    Eyes:  Negative for visual disturbance.   Respiratory:  Negative for cough, shortness of breath and wheezing.    Cardiovascular:  Negative for chest pain, palpitations and leg swelling.   Gastrointestinal:  Negative for abdominal pain, constipation, diarrhea, nausea and vomiting.   Genitourinary:  Negative for dysuria, frequency and hematuria.   Musculoskeletal:  Positive for arthralgias and myalgias.   Skin:  Positive for wound.   Neurological:  Negative for syncope, weakness and headaches.   Psychiatric/Behavioral:  Negative for dysphoric mood and sleep disturbance. The patient is not nervous/anxious.    Objective:     Vital Signs (Most Recent):  Temp: 98.5 °F (36.9 °C) (05/14/22 0500)  Pulse: 87 (05/14/22 0500)  Resp: 18 (05/14/22 1047)  BP: (!) 117/57 (05/14/22 0500)  SpO2: (!) 92 % (05/14/22 0500)   Vital Signs (24h Range):  Temp:  [98.5 °F (36.9 °C)-99 °F (37.2 °C)] 98.5 °F (36.9 °C)  Pulse:  [] 87  Resp:  [12-20] 18  SpO2:  [90 %-98 %] 92 %  BP: (109-180)/(31-94) 117/57     Weight: 90.7 kg (200 lb)  Body mass index is 28.7 kg/m².    Intake/Output Summary (Last 24 hours) at 5/14/2022 1106  Last data filed at 5/13/2022 1227  Gross per 24 hour   Intake 750 ml   Output --   Net 750 ml      Physical Exam  Vitals and nursing note reviewed.   Constitutional:       General: She is not in acute distress.     Appearance: Normal appearance. She is normal weight. She is ill-appearing.   HENT:      Head: Normocephalic.      Right Ear: External ear normal.       Left Ear: External ear normal.      Nose: Nose normal. No congestion or rhinorrhea.      Mouth/Throat:      Mouth: Mucous membranes are moist.      Pharynx: Oropharynx is clear. No oropharyngeal exudate or posterior oropharyngeal erythema.   Eyes:      General: No scleral icterus.        Right eye: No discharge.         Left eye: No discharge.      Conjunctiva/sclera: Conjunctivae normal.      Pupils: Pupils are equal, round, and reactive to light.   Cardiovascular:      Rate and Rhythm: Normal rate and regular rhythm.      Pulses: Normal pulses.      Heart sounds: Normal heart sounds. No murmur heard.  Pulmonary:      Effort: Pulmonary effort is normal. No respiratory distress.      Breath sounds: Normal breath sounds. No wheezing, rhonchi or rales.   Abdominal:      General: Bowel sounds are normal. There is no distension.      Palpations: Abdomen is soft. There is no mass.      Tenderness: There is no abdominal tenderness.   Musculoskeletal:      Right forearm: Swelling, edema and tenderness present.      Left forearm: No swelling.      Right wrist: Swelling and tenderness present. No crepitus.      Left wrist: No swelling.      Right hand: Swelling and tenderness present.      Left hand: Swelling and tenderness present.      Cervical back: Normal range of motion and neck supple.      Right lower leg: No edema.      Left lower leg: No edema.   Skin:     General: Skin is warm and dry.      Findings: Lesion (Multiple puncture and scratches to bilateral hands and forearms) present. No rash.   Neurological:      General: No focal deficit present.      Mental Status: She is alert and oriented to person, place, and time.      Cranial Nerves: No cranial nerve deficit.      Sensory: No sensory deficit.      Motor: No weakness.   Psychiatric:         Mood and Affect: Mood normal.         Behavior: Behavior normal.         Thought Content: Thought content normal.         Judgment: Judgment normal.       Significant  Labs: All pertinent labs within the past 24 hours have been reviewed.    Significant Imaging: I have reviewed all pertinent imaging results/findings within the past 24 hours.

## 2022-05-14 NOTE — ASSESSMENT & PLAN NOTE
-Got I+D yesterday with Dr. Sandoval  -Improved, continue to monitor closely  -On empiric doxycycline po, and vanc and zosyn IV while wound cultures pending as patient is diabetic so needs broad spectrum coverage against pathogens including pseudomonas  -bcx ng@72 hours

## 2022-05-14 NOTE — NURSING
0722 Pt resting in bed.  at bedside. Complained of pain. Gave pain medication. VS stable. No distress noted. No complaints or requests at this time. Call bell in reach. Side rails up x2.     0906 Pt resting in bed.  at bedside. Morning medications given. Wound care done to RUE. No distress noted. No complaints or requests at this time. Call bell in reach. Side rails up x2.     1047 Pt resting in bed. Family at bedside. Gave pain medication. No distress noted. No complaints or requests at this time. Call bell in reach. Side rails up x2.     1225 Pt resting in bed. Family at bedside. Requesting pain medication. Small amount of drainage noted to RLE dressing. No distress noted. No other complaints or requests at this time. Call bell in reach. Side rails up x2.     1450 Pt resting in bed writing in planner. Gave gabapentin. Gave cranberry juice. No distress noted. No other complaints or requests at this time.     1617 Pt resting in bed. Complains of pain. Gave medication. Gave ice pack. No distress noted. No complaints or requests at this time. Call bell in reach. Side rails up x2.

## 2022-05-14 NOTE — ASSESSMENT & PLAN NOTE
-ESR 52 today and CRP 6.9, continue to trend daily   - Continued home medications  - Monitor CBC

## 2022-05-14 NOTE — PLAN OF CARE
Problem: Adult Inpatient Plan of Care  Goal: Plan of Care Review  5/14/2022 1632 by Dejon Low RN  Outcome: Ongoing, Progressing  5/14/2022 1629 by Dejon Low RN  Outcome: Ongoing, Progressing  Goal: Patient-Specific Goal (Individualized)  5/14/2022 1632 by Dejon Low RN  Outcome: Ongoing, Progressing  5/14/2022 1629 by Dejon Low RN  Outcome: Ongoing, Progressing  Goal: Absence of Hospital-Acquired Illness or Injury  5/14/2022 1632 by Dejon Low RN  Outcome: Ongoing, Progressing  5/14/2022 1629 by Dejon Low RN  Outcome: Ongoing, Progressing  Goal: Optimal Comfort and Wellbeing  5/14/2022 1632 by Dejon Low RN  Outcome: Ongoing, Progressing  5/14/2022 1629 by Dejon Low RN  Outcome: Ongoing, Progressing  Goal: Readiness for Transition of Care  5/14/2022 1632 by Dejon Low RN  Outcome: Ongoing, Progressing  5/14/2022 1629 by Dejon Low RN  Outcome: Ongoing, Progressing     Problem: Diabetes Comorbidity  Goal: Blood Glucose Level Within Targeted Range  5/14/2022 1632 by Dejon Low RN  Outcome: Ongoing, Progressing  5/14/2022 1629 by Dejon Low RN  Outcome: Ongoing, Progressing     Problem: Impaired Wound Healing  Goal: Optimal Wound Healing  5/14/2022 1708 by Dejon Low RN  Outcome: Ongoing, Progressing  5/14/2022 1632 by Dejon Low RN  Outcome: Ongoing, Progressing  5/14/2022 1629 by Dejon Low RN  Outcome: Ongoing, Progressing  Intervention: Promote Wound Healing  Flowsheets (Taken 5/14/2022 1708)  Oral Nutrition Promotion: calorie-dense foods provided  Sleep/Rest Enhancement: noise level reduced  Activity Management: Ambulated -L4  Pain Management Interventions: pain management plan reviewed with patient/caregiver

## 2022-05-15 LAB
ANION GAP SERPL CALCULATED.3IONS-SCNC: 15 MMOL/L (ref 7–16)
BASOPHILS # BLD AUTO: 0.06 K/UL (ref 0–0.2)
BASOPHILS NFR BLD AUTO: 0.8 % (ref 0–1)
BUN SERPL-MCNC: 15 MG/DL (ref 7–18)
BUN/CREAT SERPL: 12 (ref 6–20)
CALCIUM SERPL-MCNC: 9.2 MG/DL (ref 8.5–10.1)
CHLORIDE SERPL-SCNC: 98 MMOL/L (ref 98–107)
CO2 SERPL-SCNC: 28 MMOL/L (ref 21–32)
CREAT SERPL-MCNC: 1.29 MG/DL (ref 0.55–1.02)
CRP SERPL-MCNC: 5.1 MG/DL (ref 0–0.8)
DIFFERENTIAL METHOD BLD: ABNORMAL
EOSINOPHIL # BLD AUTO: 0.22 K/UL (ref 0–0.5)
EOSINOPHIL NFR BLD AUTO: 2.9 % (ref 1–4)
ERYTHROCYTE [DISTWIDTH] IN BLOOD BY AUTOMATED COUNT: 12.9 % (ref 11.5–14.5)
ERYTHROCYTE [SEDIMENTATION RATE] IN BLOOD BY WESTERGREN METHOD: 53 MM/HR (ref 0–30)
EST. AVERAGE GLUCOSE BLD GHB EST-MCNC: 170 MG/DL
GLUCOSE SERPL-MCNC: 359 MG/DL (ref 74–106)
GLUCOSE SERPL-MCNC: 372 MG/DL (ref 70–105)
GLUCOSE SERPL-MCNC: 406 MG/DL (ref 70–105)
GLUCOSE SERPL-MCNC: 435 MG/DL (ref 70–105)
HBA1C MFR BLD HPLC: 7.7 % (ref 4.5–6.6)
HCT VFR BLD AUTO: 33.5 % (ref 38–47)
HGB BLD-MCNC: 10.5 G/DL (ref 12–16)
IMM GRANULOCYTES # BLD AUTO: 0.08 K/UL (ref 0–0.04)
IMM GRANULOCYTES NFR BLD: 1 % (ref 0–0.4)
LYMPHOCYTES # BLD AUTO: 1.78 K/UL (ref 1–4.8)
LYMPHOCYTES NFR BLD AUTO: 23.3 % (ref 27–41)
MCH RBC QN AUTO: 27.6 PG (ref 27–31)
MCHC RBC AUTO-ENTMCNC: 31.3 G/DL (ref 32–36)
MCV RBC AUTO: 88.2 FL (ref 80–96)
MICROORGANISM SPEC CULT: NORMAL
MONOCYTES # BLD AUTO: 1.1 K/UL (ref 0–0.8)
MONOCYTES NFR BLD AUTO: 14.4 % (ref 2–6)
MPC BLD CALC-MCNC: 10.5 FL (ref 9.4–12.4)
NEUTROPHILS # BLD AUTO: 4.4 K/UL (ref 1.8–7.7)
NEUTROPHILS NFR BLD AUTO: 57.6 % (ref 53–65)
NRBC # BLD AUTO: 0 X10E3/UL
NRBC, AUTO (.00): 0 %
PLATELET # BLD AUTO: 239 K/UL (ref 150–400)
POTASSIUM SERPL-SCNC: 4.5 MMOL/L (ref 3.5–5.1)
RBC # BLD AUTO: 3.8 M/UL (ref 4.2–5.4)
SODIUM SERPL-SCNC: 136 MMOL/L (ref 136–145)
VANCOMYCIN TROUGH SERPL-MCNC: 8.6 ΜG/ML (ref 10–20)
WBC # BLD AUTO: 7.64 K/UL (ref 4.5–11)

## 2022-05-15 PROCEDURE — 11000001 HC ACUTE MED/SURG PRIVATE ROOM

## 2022-05-15 PROCEDURE — 86140 C-REACTIVE PROTEIN: CPT

## 2022-05-15 PROCEDURE — 85025 COMPLETE CBC W/AUTO DIFF WBC: CPT

## 2022-05-15 PROCEDURE — C9399 UNCLASSIFIED DRUGS OR BIOLOG: HCPCS | Performed by: FAMILY MEDICINE

## 2022-05-15 PROCEDURE — 63600175 PHARM REV CODE 636 W HCPCS: Performed by: STUDENT IN AN ORGANIZED HEALTH CARE EDUCATION/TRAINING PROGRAM

## 2022-05-15 PROCEDURE — 83036 HEMOGLOBIN GLYCOSYLATED A1C: CPT | Performed by: INTERNAL MEDICINE

## 2022-05-15 PROCEDURE — 25000003 PHARM REV CODE 250: Performed by: INTERNAL MEDICINE

## 2022-05-15 PROCEDURE — 25000003 PHARM REV CODE 250: Performed by: FAMILY MEDICINE

## 2022-05-15 PROCEDURE — 99232 SBSQ HOSP IP/OBS MODERATE 35: CPT | Mod: GC,,, | Performed by: INTERNAL MEDICINE

## 2022-05-15 PROCEDURE — 36415 COLL VENOUS BLD VENIPUNCTURE: CPT

## 2022-05-15 PROCEDURE — 85651 RBC SED RATE NONAUTOMATED: CPT

## 2022-05-15 PROCEDURE — 82962 GLUCOSE BLOOD TEST: CPT

## 2022-05-15 PROCEDURE — 80048 BASIC METABOLIC PNL TOTAL CA: CPT

## 2022-05-15 PROCEDURE — 99232 PR SUBSEQUENT HOSPITAL CARE,LEVL II: ICD-10-PCS | Mod: GC,,, | Performed by: INTERNAL MEDICINE

## 2022-05-15 PROCEDURE — 80202 ASSAY OF VANCOMYCIN: CPT | Performed by: INTERNAL MEDICINE

## 2022-05-15 PROCEDURE — 63600175 PHARM REV CODE 636 W HCPCS: Performed by: FAMILY MEDICINE

## 2022-05-15 PROCEDURE — 63600175 PHARM REV CODE 636 W HCPCS: Performed by: INTERNAL MEDICINE

## 2022-05-15 RX ORDER — GLUCAGON 1 MG
1 KIT INJECTION
Status: DISCONTINUED | OUTPATIENT
Start: 2022-05-15 | End: 2022-05-23 | Stop reason: HOSPADM

## 2022-05-15 RX ORDER — INSULIN ASPART 100 [IU]/ML
0-26 INJECTION, SOLUTION INTRAVENOUS; SUBCUTANEOUS
Status: DISCONTINUED | OUTPATIENT
Start: 2022-05-15 | End: 2022-05-23 | Stop reason: HOSPADM

## 2022-05-15 RX ADMIN — OXYCODONE HYDROCHLORIDE AND ACETAMINOPHEN 1000 MG: 500 TABLET ORAL at 09:05

## 2022-05-15 RX ADMIN — HYDROMORPHONE HYDROCHLORIDE 0.5 MG: 2 INJECTION, SOLUTION INTRAMUSCULAR; INTRAVENOUS; SUBCUTANEOUS at 07:05

## 2022-05-15 RX ADMIN — INSULIN DETEMIR 20 UNITS: 100 INJECTION, SOLUTION SUBCUTANEOUS at 09:05

## 2022-05-15 RX ADMIN — GABAPENTIN 600 MG: 300 CAPSULE ORAL at 09:05

## 2022-05-15 RX ADMIN — LEFLUNOMIDE 20 MG: 20 TABLET ORAL at 09:05

## 2022-05-15 RX ADMIN — CYCLOBENZAPRINE 10 MG: 10 TABLET, FILM COATED ORAL at 09:05

## 2022-05-15 RX ADMIN — PIPERACILLIN AND TAZOBACTAM 4.5 G: 4; .5 INJECTION, POWDER, LYOPHILIZED, FOR SOLUTION INTRAVENOUS at 04:05

## 2022-05-15 RX ADMIN — POLYETHYLENE GLYCOL 3350 17 G: 17 POWDER, FOR SOLUTION ORAL at 09:05

## 2022-05-15 RX ADMIN — HYDROMORPHONE HYDROCHLORIDE 0.5 MG: 2 INJECTION, SOLUTION INTRAMUSCULAR; INTRAVENOUS; SUBCUTANEOUS at 08:05

## 2022-05-15 RX ADMIN — OXYCODONE HYDROCHLORIDE AND ACETAMINOPHEN 1 TABLET: 10; 325 TABLET ORAL at 09:05

## 2022-05-15 RX ADMIN — HYDROMORPHONE HYDROCHLORIDE 0.5 MG: 2 INJECTION, SOLUTION INTRAMUSCULAR; INTRAVENOUS; SUBCUTANEOUS at 11:05

## 2022-05-15 RX ADMIN — HYDROXYCHLOROQUINE SULFATE 200 MG: 200 TABLET, FILM COATED ORAL at 09:05

## 2022-05-15 RX ADMIN — VANCOMYCIN HYDROCHLORIDE 1750 MG: 5 INJECTION, POWDER, LYOPHILIZED, FOR SOLUTION INTRAVENOUS at 04:05

## 2022-05-15 RX ADMIN — HYDROMORPHONE HYDROCHLORIDE 0.5 MG: 2 INJECTION, SOLUTION INTRAMUSCULAR; INTRAVENOUS; SUBCUTANEOUS at 01:05

## 2022-05-15 RX ADMIN — PREDNISONE 5 MG: 5 TABLET ORAL at 09:05

## 2022-05-15 RX ADMIN — PIPERACILLIN AND TAZOBACTAM 4.5 G: 4; .5 INJECTION, POWDER, LYOPHILIZED, FOR SOLUTION INTRAVENOUS at 07:05

## 2022-05-15 RX ADMIN — GABAPENTIN 600 MG: 300 CAPSULE ORAL at 02:05

## 2022-05-15 RX ADMIN — PANTOPRAZOLE SODIUM 40 MG: 40 TABLET, DELAYED RELEASE ORAL at 09:05

## 2022-05-15 RX ADMIN — INSULIN ASPART 10 UNITS: 100 INJECTION, SOLUTION INTRAVENOUS; SUBCUTANEOUS at 04:05

## 2022-05-15 RX ADMIN — DOXYCYCLINE HYCLATE 100 MG: 100 CAPSULE ORAL at 09:05

## 2022-05-15 RX ADMIN — ENOXAPARIN SODIUM 40 MG: 40 INJECTION SUBCUTANEOUS at 04:05

## 2022-05-15 RX ADMIN — PIPERACILLIN AND TAZOBACTAM 4.5 G: 4; .5 INJECTION, POWDER, LYOPHILIZED, FOR SOLUTION INTRAVENOUS at 11:05

## 2022-05-15 RX ADMIN — INSULIN ASPART 10 UNITS: 100 INJECTION, SOLUTION INTRAVENOUS; SUBCUTANEOUS at 02:05

## 2022-05-15 RX ADMIN — OXYCODONE HYDROCHLORIDE AND ACETAMINOPHEN 1 TABLET: 10; 325 TABLET ORAL at 10:05

## 2022-05-15 RX ADMIN — HYDROMORPHONE HYDROCHLORIDE 0.5 MG: 2 INJECTION, SOLUTION INTRAMUSCULAR; INTRAVENOUS; SUBCUTANEOUS at 04:05

## 2022-05-15 RX ADMIN — TRIAMTERENE AND HYDROCHLOROTHIAZIDE 1 TABLET: 37.5; 25 TABLET ORAL at 09:05

## 2022-05-15 RX ADMIN — FERROUS SULFATE TAB 325 MG (65 MG ELEMENTAL FE) 1 EACH: 325 (65 FE) TAB at 09:05

## 2022-05-15 NOTE — ASSESSMENT & PLAN NOTE
-On empiric doxycycline po, and vanc and zosyn IV while wound cultures pending as patient is diabetic so needs broad spectrum coverage against pathogens including pseudomonas  -bcx (-)

## 2022-05-15 NOTE — SUBJECTIVE & OBJECTIVE
Interval History: patient seen this morning laying in bed with eyes open.  Dressing on had clean and dry  with minimal bleeding noted.  Patient states that she is in a lot of pain and would liek to talk to doctor raad about this.  I see she has oxy 10 Q8Prn and she is not always taking it.  She also has dilaudid on q3 which she has been getting regularly.  Patient was advised that the oral meds are to handle baseline pain and IV was for breakthrough.  Patient was advised to ask for her PRN meds regularly and resort to iv only in owens [place]aker of 10 breakthrough.  I advised her that I may can increase oral dosage if she is getting to much breakthrough but that oral pain control will be preferred.  Will monitor this closely and follow up with patient.  All questions answered and all complaints addressed.  Plan discussed with Silvana Sarah who voices understanding and agreement.      Review of Systems   Constitutional:  Negative for appetite change, chills and fever.   HENT:  Negative for congestion, rhinorrhea and sore throat.    Eyes:  Negative for visual disturbance.   Respiratory:  Negative for cough, shortness of breath and wheezing.    Cardiovascular:  Negative for chest pain, palpitations and leg swelling.   Gastrointestinal:  Negative for abdominal pain, constipation, diarrhea, nausea and vomiting.   Genitourinary:  Negative for dysuria, frequency and hematuria.   Musculoskeletal:  Positive for arthralgias and myalgias.   Skin:  Positive for wound.   Neurological:  Negative for syncope, weakness and headaches.   Psychiatric/Behavioral:  Negative for dysphoric mood and sleep disturbance. The patient is not nervous/anxious.    Objective:     Vital Signs (Most Recent):  Temp: 98.7 °F (37.1 °C) (05/15/22 0723)  Pulse: 97 (05/15/22 0723)  Resp: 16 (05/15/22 0844)  BP: (!) 147/68 (05/15/22 0723)  SpO2: (!) 93 % (05/15/22 0723)   Vital Signs (24h Range):  Temp:  [98 °F (36.7 °C)-99.5 °F (37.5 °C)] 98.7 °F (37.1  °C)  Pulse:  [] 97  Resp:  [16-18] 16  SpO2:  [93 %-96 %] 93 %  BP: (103-159)/(66-80) 147/68     Weight: 90.7 kg (200 lb)  Body mass index is 28.7 kg/m².    Intake/Output Summary (Last 24 hours) at 5/15/2022 0935  Last data filed at 5/14/2022 1807  Gross per 24 hour   Intake 800 ml   Output --   Net 800 ml      Physical Exam  Vitals and nursing note reviewed.   Constitutional:       General: She is not in acute distress.     Appearance: Normal appearance. She is normal weight. She is ill-appearing.   HENT:      Head: Normocephalic.      Right Ear: External ear normal.      Left Ear: External ear normal.      Nose: Nose normal. No congestion or rhinorrhea.      Mouth/Throat:      Mouth: Mucous membranes are moist.      Pharynx: Oropharynx is clear. No oropharyngeal exudate or posterior oropharyngeal erythema.   Eyes:      General: No scleral icterus.        Right eye: No discharge.         Left eye: No discharge.      Conjunctiva/sclera: Conjunctivae normal.      Pupils: Pupils are equal, round, and reactive to light.   Cardiovascular:      Rate and Rhythm: Normal rate and regular rhythm.      Pulses: Normal pulses.      Heart sounds: Normal heart sounds. No murmur heard.  Pulmonary:      Effort: Pulmonary effort is normal. No respiratory distress.      Breath sounds: Normal breath sounds. No wheezing, rhonchi or rales.   Abdominal:      General: Bowel sounds are normal. There is no distension.      Palpations: Abdomen is soft. There is no mass.      Tenderness: There is no abdominal tenderness.   Musculoskeletal:      Right forearm: Swelling, edema and tenderness present.      Left forearm: No swelling.      Right wrist: Swelling and tenderness present. No crepitus.      Left wrist: No swelling.      Right hand: Swelling and tenderness present.      Left hand: Swelling and tenderness present.      Cervical back: Normal range of motion and neck supple.      Right lower leg: No edema.      Left lower leg: No  edema.   Skin:     General: Skin is warm and dry.      Findings: Lesion (Multiple puncture and scratches to bilateral hands and forearms) present. No rash.   Neurological:      General: No focal deficit present.      Mental Status: She is alert and oriented to person, place, and time.      Cranial Nerves: No cranial nerve deficit.      Sensory: No sensory deficit.      Motor: No weakness.   Psychiatric:         Mood and Affect: Mood normal.         Behavior: Behavior normal.         Thought Content: Thought content normal.         Judgment: Judgment normal.       Significant Labs: All pertinent labs within the past 24 hours have been reviewed.  Blood Culture: No results for input(s): LABBLOO in the last 48 hours.  Respiratory Culture: No results for input(s): GSRESP, RESPIRATORYC in the last 48 hours.  Urine Culture: No results for input(s): LABURIN in the last 48 hours.    Significant Imaging: I have reviewed all pertinent imaging results/findings within the past 24 hours.

## 2022-05-15 NOTE — ASSESSMENT & PLAN NOTE
Complicated with neuropathy and gastroparesis. Patient has gastric stimulator.   - Well controlled  - Diabetic Diet  - POCT glucose QID AC & HS  - Levemir 20U Daily   - Moderate SSI

## 2022-05-15 NOTE — NURSING
Pt blood sugar 406. Called Md Bowser to clarify if she would like to continue 10u of insulin. Deposit of insulin recently given. Md stated yes please.

## 2022-05-15 NOTE — HOSPITAL COURSE
5/12 - Patient complaining of worsening right arm pain today. Her arm looks very tense, red and swollen, so soft tissue US ordered and possible consult for I+D will be placed to surgery. UA grew e.coli, but patient already getting broad spectrum antibiotic coverage for cellulitis. She did also have a fever this morning with a temperature of 101.1 F.     5/13 -  Patient still complaining of right forearm pain this morning. Still has significant swelling and redness in that area and through the right hand. US showed a fluid abscess, patient will get an I+D today. Patient will also get PICC line place for easier venous access for Abx administration.     5/14 - Patient resting comfortably in bed. No acute complaints. Right hand/forearm is much less swollen and shiny/tight appearing on physical examination. Patient is on empiric antibiotics as she is a diabetic with cellulitis, bcx are negative so far and wound cultures taken during surgery are pending    5/15 -  Dressing on had clean and dry  with minimal bleeding noted.  Patient states that she is in a lot of pain and would liek to talk to doctor raad about this.  I see she has oxy 10 Q8Prn and she is not always taking it.  She also has dilaudid on q3 which she has been getting regularly.  Patient was advised that the oral meds are to handle baseline pain and IV was for breakthrough.  Patient was advised to ask for her PRN meds regularly and resort to iv only in ow[place]aker of 10 breakthrough.  I advised her that I may can increase oral dosage if she is getting to much breakthrough but that oral pain control will be preferred.  Will monitor this closely and follow up with patient.  All questions answered and all complaints addressed.  Plan discussed with Silvana Sarah who voices understanding and agreement.    5/16 - no acute complaints. No overnight events reported. Patient is getting another I+D today of her right hand by Dr. Sandoval to create two more ports for fluid  drainage to continue to treat her cellulitis. Her BG was high this morning, so will increase nightly insulin dose. Patient also noted to have some oral candida on examination this morning.     5/17 - Patient complaining of severe right hand pain this morning and asking for morphine. No overnight events reported. Had second I+D of right hand done by Dr. Sandoval yesterday. Repeat wound cultures pending, no growth so far. Fasting glucose so high so will further increase night levemir dose.     5/18 - pt noted pus draining from her right hand this morning when wound care was changing the bandage. Chest xray was ordered and showed mild infiltrates. UA was done which showed no evidence of UTI. Blood cultures and wound cultures show no growth so far, will stop vancomycin but continue sulbactam and doxycycline for now. Patient may need another I+D or procedure in light of remaining severe right hand pain and purulent discharge noted from previous surgical site on hand.     5/19 - No acute complaints. No overnight events reported. MRI of the right hand was ordered to check for osteomyelitis but patient cannot get due to presence of pacemaker. Blood and wound cx no growth to date. General surgery following.     5/20 - No overnight events. Pt afebrile last 24 hrs. She is without complaints and wants to go home. CT R hand showed soft tissue swelling and thickening of extensor tendons overlying carpal bones consistent with tenosynovitis.    5/21 - no acute complaints. No overnight events reported. Patient still has right hand pain today, but says she can move her fingers on that hand a little better. Orthopedic surgeon is to see her this morning to assess her tenosynovitis seen on CT. Potassium a little elevated so will watch that.     5/22 -  No overnight events. Reports increased pain from RA but not from wound. Would like to receive some Ibuprofen for the arthritic pain. Reports that she is trying to deescalate to only PO  medications for pain control. She would like to go home as soon as possible.     5/23 - Pt has no acute complaints, no overnight events reported. Pt states that she is ready to go home. Although this is my first day seeing her, Dr. Guardado and Dr. Sandoval confirm that hand continues to improve. Pt has med maximum inpatient benefit at this time and can be transitioned to PO antibiotics at this time. Patient will begin Augmentin 850mg PO BID for 4 weeks. Pt should follow up with PCP within 1 week for BP medication management and repeat lab work for mildly worsening Cr and eGFR, and should follow up with Dr. Sandoval and Dr. Almazan within 2 weeks. Patient instructed to call Dr. Almazan to discuss resuming biologic therapy for RA, She states that she has not taken in approximately 3 weeks.

## 2022-05-15 NOTE — PROGRESS NOTES
[facility]  [facility] Medicine  Progress Note    Patient Name: Silvana Sarah  MRN: [id number]  Patient Class: IP- Inpatient   Admission Date: 5/11/2022  Length of Stay: 3 days  Attending Physician: Gabriel Diallo MD  Primary Care Provider: Sera Byrd NP        Subjective:     Principal Problem:Wound cellulitis        HPI:  62 yo female with PMH of T1DM, RA and HTN who presents to ED with c/o progressively worsening swelling and redness of bilateral hands. Patient was bitten and scratched multiple times by cat on May 9, 2022. She came to ED on 5/10/22 and received IV Clindamycin and Doxycycline PO. She was also prescribed Flagyl PO outpatient. Xray of right hand and wrist were positive for soft tissue swelling. Leukocytosis of 18K which has improved to 15K. Blood cultures were obtained on 5/10/22 and remain pending. Upon examination, bilateral hands (R >L) and right forearm are erythematous and edematous. There are multiple puncture sites and scratch marks noted to bilateral hands and forearms. Wounds appear clean and dry.      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 Her rheumatologist is Dr. Kamari Almazan and she sees Pain Management for chronic pain treatment. Patient has gastric stimulator to assist with DM gastroparesis. She will be admitted to [facility] at this time for further evaluation and management.            Overview/Hospital Course:  05/15/2022 - patient seen this morning laying in bed with eyes open.  Dressing on had clean and dry  with minimal bleeding noted.  Patient states that she is in a lot of pain and would liek to talk to doctor raad about this.  I see she has oxy 10 Q8Prn and she is not always taking it.  She also has dilaudid on q3 which she has been getting regularly.  Patient was advised that the oral meds are to handle baseline pain and IV was for breakthrough.  Patient was advised to ask for her PRN meds  regularly and resort to iv only in [facility] baker of 10 breakthrough.  I advised her that I may can increase oral dosage if she is getting to much breakthrough but that oral pain control will be preferred.  Will monitor this closely and follow up with patient.  All questions answered and all complaints addressed.  Plan discussed with Silvana Sarah who voices understanding and agreement.      Interval History: patient seen this morning laying in bed with eyes open.  Dressing on had clean and dry  with minimal bleeding noted.  Patient states that she is in a lot of pain and would liek to talk to doctor raad about this.  I see she has oxy 10 Q8Prn and she is not always taking it.  She also has dilaudid on q3 which she has been getting regularly.  Patient was advised that the oral meds are to handle baseline pain and IV was for breakthrough.  Patient was advised to ask for her PRN meds regularly and resort to iv only in [facility] b[facility] of 10 breakthrough.  I advised her that I may can increase oral dosage if she is getting to much breakthrough but that oral pain control will be preferred.  Will monitor this closely and follow up with patient.  All questions answered and all complaints addressed.  Plan discussed with Silvana Sarah who voices understanding and agreement.      Review of Systems   Constitutional:  Negative for appetite change, chills and fever.   HENT:  Negative for congestion, rhinorrhea and sore throat.    Eyes:  Negative for visual disturbance.   Respiratory:  Negative for cough, shortness of breath and wheezing.    Cardiovascular:  Negative for chest pain, palpitations and leg swelling.   Gastrointestinal:  Negative for abdominal pain, constipation, diarrhea, nausea and vomiting.   Genitourinary:  Negative for dysuria, frequency and hematuria.   Musculoskeletal:  Positive for arthralgias and myalgias.   Skin:  Positive for wound.   Neurological:  Negative for syncope, weakness and headaches.    Psychiatric/Behavioral:  Negative for dysphoric mood and sleep disturbance. The patient is not nervous/anxious.    Objective:     Vital Signs (Most Recent):  Temp: 98.7 °F (37.1 °C) (05/15/22 0723)  Pulse: 97 (05/15/22 0723)  Resp: 16 (05/15/22 0844)  BP: (!) 147/68 (05/15/22 0723)  SpO2: (!) 93 % (05/15/22 0723)   Vital Signs (24h Range):  Temp:  [98 °F (36.7 °C)-99.5 °F (37.5 °C)] 98.7 °F (37.1 °C)  Pulse:  [] 97  Resp:  [16-18] 16  SpO2:  [93 %-96 %] 93 %  BP: (103-159)/(66-80) 147/68     Weight: 90.7 kg (200 lb)  Body mass index is 28.7 kg/m².    Intake/Output Summary (Last 24 hours) at 5/15/2022 0996  Last data filed at 5/14/2022 1807  Gross per 24 hour   Intake 800 ml   Output --   Net 800 ml      Physical Exam  Vitals and nursing note reviewed.   Constitutional:       General: She is not in acute distress.     Appearance: Normal appearance. She is normal weight. She is ill-appearing.   HENT:      Head: Normocephalic.      Right Ear: External ear normal.      Left Ear: External ear normal.      Nose: Nose normal. No congestion or rhinorrhea.      Mouth/Throat:      Mouth: Mucous membranes are moist.      Pharynx: Oropharynx is clear. No oropharyngeal exudate or posterior oropharyngeal erythema.   Eyes:      General: No scleral icterus.        Right eye: No discharge.         Left eye: No discharge.      Conjunctiva/sclera: Conjunctivae normal.      Pupils: Pupils are equal, round, and reactive to light.   Cardiovascular:      Rate and Rhythm: Normal rate and regular rhythm.      Pulses: Normal pulses.      Heart sounds: Normal heart sounds. No murmur heard.  Pulmonary:      Effort: Pulmonary effort is normal. No respiratory distress.      Breath sounds: Normal breath sounds. No wheezing, rhonchi or rales.   Abdominal:      General: Bowel sounds are normal. There is no distension.      Palpations: Abdomen is soft. There is no mass.      Tenderness: There is no abdominal tenderness.   Musculoskeletal:       Right forearm: Swelling, edema and tenderness present.      Left forearm: No swelling.      Right wrist: Swelling and tenderness present. No crepitus.      Left wrist: No swelling.      Right hand: Swelling and tenderness present.      Left hand: Swelling and tenderness present.      Cervical back: Normal range of motion and neck supple.      Right lower leg: No edema.      Left lower leg: No edema.   Skin:     General: Skin is warm and dry.      Findings: Lesion (Multiple puncture and scratches to bilateral hands and forearms) present. No rash.   Neurological:      General: No focal deficit present.      Mental Status: She is alert and oriented to person, place, and time.      Cranial Nerves: No cranial nerve deficit.      Sensory: No sensory deficit.      Motor: No weakness.   Psychiatric:         Mood and Affect: Mood normal.         Behavior: Behavior normal.         Thought Content: Thought content normal.         Judgment: Judgment normal.       Significant Labs: All pertinent labs within the past 24 hours have been reviewed.  Blood Culture: No results for input(s): LABBLOO in the last 48 hours.  Respiratory Culture: No results for input(s): GSRESP, RESPIRATORYC in the last 48 hours.  Urine Culture: No results for input(s): LABURIN in the last 48 hours.    Significant Imaging: I have reviewed all pertinent imaging results/findings within the past 24 hours.      Assessment/Plan:      * Wound cellulitis  -On empiric doxycycline po, and vanc and zosyn IV while wound cultures pending as patient is diabetic so needs broad spectrum coverage against pathogens including pseudomonas  -bcx (-)    Type 1 diabetes mellitus  Complicated with neuropathy and gastroparesis. Patient has gastric stimulator.   - Well controlled  - Diabetic Diet  - POCT glucose QID AC & HS  - Levemir 20U Daily   - Moderate SSI    Rheumatoid arthritis  - crp of 5 today  - Continued home medications  - Monitor CBC        VTE Risk Mitigation (From  admission, onward)         Ordered     enoxaparin injection 40 mg  Daily         05/11/22 2102     IP VTE HIGH RISK PATIENT  Once         05/11/22 2102     Place FRANCISCA hose  Until discontinued         05/11/22 2102                Discharge Planning   DANE:      Code Status: Full Code   Is the patient medically ready for discharge?:     Reason for patient still in hospital (select all that apply): Treatment and Pending disposition                     Julio César Diaz IV, DO  Department of Hospital Medicine   [facility] - Orthopedic

## 2022-05-15 NOTE — PROGRESS NOTES
[facility] - Orthopedic  General Surgery  Progress Note    Subjective:     Interval History:  Patient doing okay overall about the same as yesterday.  Redness little better per the patient but having the back of the hand still with some soreness and tightness.  No fever chills.    Post-Op Info:  Procedure(s) (LRB):  INCISION AND DRAINAGE, ABSCESS (Right)   2 Days Post-Op      Medications:  Continuous Infusions:  Scheduled Meds:   ascorbic acid (vitamin C)  1,000 mg Oral Daily    cyclobenzaprine  10 mg Oral QHS    doxycycline  100 mg Oral BID    enoxaparin  40 mg Subcutaneous Daily    ferrous sulfate  1 tablet Oral Daily    gabapentin  600 mg Oral TID    hydrOXYchloroQUINE  200 mg Oral BID    insulin detemir U-100  20 Units Subcutaneous Daily    leflunomide  20 mg Oral Daily    pantoprazole  40 mg Oral Daily    piperacillin-tazobactam (ZOSYN) IVPB  4.5 g Intravenous Q8H    polyethylene glycol  17 g Oral Daily    predniSONE  5 mg Oral Daily    triamterene-hydrochlorothiazide 37.5-25 mg  1 tablet Oral Daily    vancomycin (VANCOCIN) IVPB  1,750 mg Intravenous Q24H     PRN Meds:acetaminophen, dextrose 50%, dextrose 50%, glucagon (human recombinant), hydrALAZINE, HYDROmorphone, insulin aspart U-100, morphine, naloxone, ondansetron, oxyCODONE-acetaminophen, promethazine, sodium chloride 0.9%, vancomycin - pharmacy to dose     Objective:     Vital Signs (Most Recent):  Temp: 98.7 °F (37.1 °C) (05/15/22 0723)  Pulse: 97 (05/15/22 0723)  Resp: 17 (05/15/22 1041)  BP: (!) 147/68 (05/15/22 0723)  SpO2: (!) 93 % (05/15/22 0723) Vital Signs (24h Range):  Temp:  [98 °F (36.7 °C)-99.5 °F (37.5 °C)] 98.7 °F (37.1 °C)  Pulse:  [] 97  Resp:  [16-18] 17  SpO2:  [93 %-96 %] 93 %  BP: (103-159)/(66-80) 147/68       Intake/Output Summary (Last 24 hours) at 5/15/2022 1124  Last data filed at 5/15/2022 0821  Gross per 24 hour   Intake 900 ml   Output --   Net 900 ml       Physical Exam  Skin:     General: Skin is  warm.      Comments: Right hand overall cellulitis is better less than the forearm area.  Along the back of the hand still little tender with some mild erythema.  Packing removed no purulent drainage but still kind of sore and taut.         Significant Labs:  CBC:   Recent Labs   Lab 05/15/22  0605   WBC 7.64   RBC 3.80*   HGB 10.5*   HCT 33.5*      MCV 88.2   MCH 27.6   MCHC 31.3*     CMP:   Recent Labs   Lab 05/15/22  0605   *   CALCIUM 9.2      K 4.5   CO2 28   CL 98   BUN 15   CREATININE 1.29*       Significant Diagnostics:  None    Assessment/Plan:     Active Diagnoses:    Diagnosis Date Noted POA    PRINCIPAL PROBLEM:  Wound cellulitis [L03.90]  Yes    Rheumatoid arthritis [M06.9] 09/20/2019 Yes    Type 1 diabetes mellitus [E10.9] 09/20/2019 Yes      Problems Resolved During this Admission:    Diagnosis Date Noted Date Resolved POA    Cat bite of hand [S61.459A, W55.01XA] 05/11/2022 05/12/2022 Yes     Await culture results.  We will make patient NPO in case requires further debridement tomorrow.  Continue broad-spectrum antibiotics for now.    David Aguilera MD  General Surgery  [facility] - Orthopedic

## 2022-05-16 ENCOUNTER — ANESTHESIA (OUTPATIENT)
Dept: SURGERY | Facility: HOSPITAL | Age: 62
DRG: 605 | End: 2022-05-16
Payer: COMMERCIAL

## 2022-05-16 ENCOUNTER — ANESTHESIA EVENT (OUTPATIENT)
Dept: SURGERY | Facility: HOSPITAL | Age: 62
DRG: 605 | End: 2022-05-16
Payer: COMMERCIAL

## 2022-05-16 LAB
ANION GAP SERPL CALCULATED.3IONS-SCNC: 12 MMOL/L (ref 7–16)
BACTERIA SPEC ANAEROBE CULT: NORMAL
BASOPHILS # BLD AUTO: 0.1 K/UL (ref 0–0.2)
BASOPHILS NFR BLD AUTO: 1.3 % (ref 0–1)
BUN SERPL-MCNC: 10 MG/DL (ref 7–18)
BUN/CREAT SERPL: 10 (ref 6–20)
CALCIUM SERPL-MCNC: 9.5 MG/DL (ref 8.5–10.1)
CHLORIDE SERPL-SCNC: 99 MMOL/L (ref 98–107)
CO2 SERPL-SCNC: 30 MMOL/L (ref 21–32)
CREAT SERPL-MCNC: 1.05 MG/DL (ref 0.55–1.02)
CRP SERPL-MCNC: 3.31 MG/DL (ref 0–0.8)
DIFFERENTIAL METHOD BLD: ABNORMAL
EOSINOPHIL # BLD AUTO: 0.29 K/UL (ref 0–0.5)
EOSINOPHIL NFR BLD AUTO: 3.8 % (ref 1–4)
ERYTHROCYTE [DISTWIDTH] IN BLOOD BY AUTOMATED COUNT: 12.8 % (ref 11.5–14.5)
GLUCOSE SERPL-MCNC: 222 MG/DL (ref 70–105)
GLUCOSE SERPL-MCNC: 269 MG/DL (ref 70–105)
GLUCOSE SERPL-MCNC: 302 MG/DL (ref 74–106)
GLUCOSE SERPL-MCNC: 307 MG/DL (ref 70–105)
GLUCOSE SERPL-MCNC: 314 MG/DL (ref 70–105)
GLUCOSE SERPL-MCNC: 379 MG/DL (ref 70–105)
HCT VFR BLD AUTO: 35.8 % (ref 38–47)
HGB BLD-MCNC: 11.3 G/DL (ref 12–16)
IMM GRANULOCYTES # BLD AUTO: 0.12 K/UL (ref 0–0.04)
IMM GRANULOCYTES NFR BLD: 1.6 % (ref 0–0.4)
LYMPHOCYTES # BLD AUTO: 2.42 K/UL (ref 1–4.8)
LYMPHOCYTES NFR BLD AUTO: 32 % (ref 27–41)
MCH RBC QN AUTO: 27.5 PG (ref 27–31)
MCHC RBC AUTO-ENTMCNC: 31.6 G/DL (ref 32–36)
MCV RBC AUTO: 87.1 FL (ref 80–96)
MONOCYTES # BLD AUTO: 1.11 K/UL (ref 0–0.8)
MONOCYTES NFR BLD AUTO: 14.7 % (ref 2–6)
MPC BLD CALC-MCNC: 10 FL (ref 9.4–12.4)
NEUTROPHILS # BLD AUTO: 3.52 K/UL (ref 1.8–7.7)
NEUTROPHILS NFR BLD AUTO: 46.6 % (ref 53–65)
NRBC # BLD AUTO: 0 X10E3/UL
NRBC, AUTO (.00): 0 %
PLATELET # BLD AUTO: 269 K/UL (ref 150–400)
POTASSIUM SERPL-SCNC: 4.3 MMOL/L (ref 3.5–5.1)
RBC # BLD AUTO: 4.11 M/UL (ref 4.2–5.4)
SODIUM SERPL-SCNC: 137 MMOL/L (ref 136–145)
WBC # BLD AUTO: 7.56 K/UL (ref 4.5–11)

## 2022-05-16 PROCEDURE — 27201423 OPTIME MED/SURG SUP & DEVICES STERILE SUPPLY: Performed by: STUDENT IN AN ORGANIZED HEALTH CARE EDUCATION/TRAINING PROGRAM

## 2022-05-16 PROCEDURE — 87070 CULTURE OTHR SPECIMN AEROBIC: CPT | Performed by: STUDENT IN AN ORGANIZED HEALTH CARE EDUCATION/TRAINING PROGRAM

## 2022-05-16 PROCEDURE — D9220A PRA ANESTHESIA: Mod: CRNA,,, | Performed by: NURSE ANESTHETIST, CERTIFIED REGISTERED

## 2022-05-16 PROCEDURE — D9220A PRA ANESTHESIA: ICD-10-PCS | Mod: ANES,,, | Performed by: ANESTHESIOLOGY

## 2022-05-16 PROCEDURE — 63600175 PHARM REV CODE 636 W HCPCS: Performed by: INTERNAL MEDICINE

## 2022-05-16 PROCEDURE — 25000003 PHARM REV CODE 250: Performed by: STUDENT IN AN ORGANIZED HEALTH CARE EDUCATION/TRAINING PROGRAM

## 2022-05-16 PROCEDURE — D9220A PRA ANESTHESIA: Mod: ANES,,, | Performed by: ANESTHESIOLOGY

## 2022-05-16 PROCEDURE — 27000510 HC BLANKET BAIR HUGGER ANY SIZE: Performed by: ANESTHESIOLOGY

## 2022-05-16 PROCEDURE — 37000008 HC ANESTHESIA 1ST 15 MINUTES: Performed by: STUDENT IN AN ORGANIZED HEALTH CARE EDUCATION/TRAINING PROGRAM

## 2022-05-16 PROCEDURE — C9399 UNCLASSIFIED DRUGS OR BIOLOG: HCPCS | Performed by: FAMILY MEDICINE

## 2022-05-16 PROCEDURE — 36415 COLL VENOUS BLD VENIPUNCTURE: CPT

## 2022-05-16 PROCEDURE — 37000009 HC ANESTHESIA EA ADD 15 MINS: Performed by: STUDENT IN AN ORGANIZED HEALTH CARE EDUCATION/TRAINING PROGRAM

## 2022-05-16 PROCEDURE — 25000003 PHARM REV CODE 250: Performed by: INTERNAL MEDICINE

## 2022-05-16 PROCEDURE — 10061 PR DRAIN SKIN ABSCESS COMPLIC: ICD-10-PCS | Mod: 58,,, | Performed by: STUDENT IN AN ORGANIZED HEALTH CARE EDUCATION/TRAINING PROGRAM

## 2022-05-16 PROCEDURE — 99233 PR SUBSEQUENT HOSPITAL CARE,LEVL III: ICD-10-PCS | Mod: GC,,, | Performed by: INTERNAL MEDICINE

## 2022-05-16 PROCEDURE — 11000001 HC ACUTE MED/SURG PRIVATE ROOM

## 2022-05-16 PROCEDURE — 25000003 PHARM REV CODE 250: Performed by: FAMILY MEDICINE

## 2022-05-16 PROCEDURE — 25000003 PHARM REV CODE 250: Performed by: ANESTHESIOLOGY

## 2022-05-16 PROCEDURE — 63600175 PHARM REV CODE 636 W HCPCS: Performed by: NURSE ANESTHETIST, CERTIFIED REGISTERED

## 2022-05-16 PROCEDURE — 63600175 PHARM REV CODE 636 W HCPCS: Performed by: ANESTHESIOLOGY

## 2022-05-16 PROCEDURE — 63600175 PHARM REV CODE 636 W HCPCS: Performed by: STUDENT IN AN ORGANIZED HEALTH CARE EDUCATION/TRAINING PROGRAM

## 2022-05-16 PROCEDURE — 82962 GLUCOSE BLOOD TEST: CPT

## 2022-05-16 PROCEDURE — D9220A PRA ANESTHESIA: ICD-10-PCS | Mod: CRNA,,, | Performed by: NURSE ANESTHETIST, CERTIFIED REGISTERED

## 2022-05-16 PROCEDURE — 87075 CULTR BACTERIA EXCEPT BLOOD: CPT | Performed by: STUDENT IN AN ORGANIZED HEALTH CARE EDUCATION/TRAINING PROGRAM

## 2022-05-16 PROCEDURE — 99233 SBSQ HOSP IP/OBS HIGH 50: CPT | Mod: GC,,, | Performed by: INTERNAL MEDICINE

## 2022-05-16 PROCEDURE — 36000707: Performed by: STUDENT IN AN ORGANIZED HEALTH CARE EDUCATION/TRAINING PROGRAM

## 2022-05-16 PROCEDURE — 36415 COLL VENOUS BLD VENIPUNCTURE: CPT | Performed by: INTERNAL MEDICINE

## 2022-05-16 PROCEDURE — 25000003 PHARM REV CODE 250: Performed by: NURSE ANESTHETIST, CERTIFIED REGISTERED

## 2022-05-16 PROCEDURE — 63600175 PHARM REV CODE 636 W HCPCS: Performed by: FAMILY MEDICINE

## 2022-05-16 PROCEDURE — 27000177 HC AIRWAY, LARYNGEAL MASK: Performed by: ANESTHESIOLOGY

## 2022-05-16 PROCEDURE — 27000716 HC OXISENSOR PROBE, ANY SIZE: Performed by: ANESTHESIOLOGY

## 2022-05-16 PROCEDURE — 85025 COMPLETE CBC W/AUTO DIFF WBC: CPT

## 2022-05-16 PROCEDURE — 80048 BASIC METABOLIC PNL TOTAL CA: CPT

## 2022-05-16 PROCEDURE — 36000706: Performed by: STUDENT IN AN ORGANIZED HEALTH CARE EDUCATION/TRAINING PROGRAM

## 2022-05-16 PROCEDURE — 10061 I&D ABSCESS COMP/MULTIPLE: CPT | Mod: 58,,, | Performed by: STUDENT IN AN ORGANIZED HEALTH CARE EDUCATION/TRAINING PROGRAM

## 2022-05-16 PROCEDURE — 71000033 HC RECOVERY, INTIAL HOUR: Performed by: STUDENT IN AN ORGANIZED HEALTH CARE EDUCATION/TRAINING PROGRAM

## 2022-05-16 PROCEDURE — 86140 C-REACTIVE PROTEIN: CPT

## 2022-05-16 RX ORDER — MORPHINE SULFATE 8 MG/ML
4 INJECTION INTRAMUSCULAR; INTRAVENOUS; SUBCUTANEOUS EVERY 5 MIN PRN
Status: DISCONTINUED | OUTPATIENT
Start: 2022-05-16 | End: 2022-05-16 | Stop reason: HOSPADM

## 2022-05-16 RX ORDER — HYDROMORPHONE HYDROCHLORIDE 2 MG/ML
0.5 INJECTION, SOLUTION INTRAMUSCULAR; INTRAVENOUS; SUBCUTANEOUS EVERY 5 MIN PRN
Status: DISCONTINUED | OUTPATIENT
Start: 2022-05-16 | End: 2022-05-16 | Stop reason: HOSPADM

## 2022-05-16 RX ORDER — SODIUM CHLORIDE 9 MG/ML
INJECTION, SOLUTION INTRAVENOUS CONTINUOUS
Status: DISCONTINUED | OUTPATIENT
Start: 2022-05-16 | End: 2022-05-22

## 2022-05-16 RX ORDER — FENTANYL CITRATE 50 UG/ML
INJECTION, SOLUTION INTRAMUSCULAR; INTRAVENOUS
Status: DISCONTINUED | OUTPATIENT
Start: 2022-05-16 | End: 2022-05-16

## 2022-05-16 RX ORDER — OXYCODONE HYDROCHLORIDE 5 MG/1
5 TABLET ORAL
Status: DISCONTINUED | OUTPATIENT
Start: 2022-05-16 | End: 2022-05-16 | Stop reason: HOSPADM

## 2022-05-16 RX ORDER — NYSTATIN 100000 [USP'U]/ML
500000 SUSPENSION ORAL 4 TIMES DAILY
Status: DISCONTINUED | OUTPATIENT
Start: 2022-05-16 | End: 2022-05-23 | Stop reason: HOSPADM

## 2022-05-16 RX ORDER — MEPERIDINE HYDROCHLORIDE 25 MG/ML
25 INJECTION INTRAMUSCULAR; INTRAVENOUS; SUBCUTANEOUS EVERY 10 MIN PRN
Status: DISCONTINUED | OUTPATIENT
Start: 2022-05-16 | End: 2022-05-16 | Stop reason: HOSPADM

## 2022-05-16 RX ORDER — PROPOFOL 10 MG/ML
VIAL (ML) INTRAVENOUS
Status: DISCONTINUED | OUTPATIENT
Start: 2022-05-16 | End: 2022-05-16

## 2022-05-16 RX ORDER — MIDAZOLAM HYDROCHLORIDE 1 MG/ML
INJECTION INTRAMUSCULAR; INTRAVENOUS
Status: DISCONTINUED | OUTPATIENT
Start: 2022-05-16 | End: 2022-05-16

## 2022-05-16 RX ORDER — SODIUM CHLORIDE 450 MG/100ML
INJECTION, SOLUTION INTRAVENOUS CONTINUOUS
Status: DISCONTINUED | OUTPATIENT
Start: 2022-05-16 | End: 2022-05-16

## 2022-05-16 RX ORDER — LIDOCAINE HYDROCHLORIDE 20 MG/ML
INJECTION INTRAVENOUS
Status: DISCONTINUED | OUTPATIENT
Start: 2022-05-16 | End: 2022-05-16

## 2022-05-16 RX ORDER — MORPHINE SULFATE 2 MG/ML
2 INJECTION, SOLUTION INTRAMUSCULAR; INTRAVENOUS EVERY 4 HOURS PRN
Status: DISCONTINUED | OUTPATIENT
Start: 2022-05-17 | End: 2022-05-17

## 2022-05-16 RX ORDER — DIPHENHYDRAMINE HYDROCHLORIDE 50 MG/ML
25 INJECTION INTRAMUSCULAR; INTRAVENOUS EVERY 6 HOURS PRN
Status: DISCONTINUED | OUTPATIENT
Start: 2022-05-16 | End: 2022-05-16 | Stop reason: HOSPADM

## 2022-05-16 RX ORDER — ONDANSETRON 2 MG/ML
4 INJECTION INTRAMUSCULAR; INTRAVENOUS DAILY PRN
Status: DISCONTINUED | OUTPATIENT
Start: 2022-05-16 | End: 2022-05-16 | Stop reason: HOSPADM

## 2022-05-16 RX ORDER — FLUCONAZOLE 100 MG/1
200 TABLET ORAL DAILY
Status: ACTIVE | OUTPATIENT
Start: 2022-05-16 | End: 2022-05-17

## 2022-05-16 RX ADMIN — INSULIN ASPART 20 UNITS: 100 INJECTION, SOLUTION INTRAVENOUS; SUBCUTANEOUS at 05:05

## 2022-05-16 RX ADMIN — NYSTATIN 500000 UNITS: 500000 SUSPENSION ORAL at 08:05

## 2022-05-16 RX ADMIN — AMPICILLIN SODIUM AND SULBACTAM SODIUM 3 G: 2; 1 INJECTION, POWDER, FOR SOLUTION INTRAMUSCULAR; INTRAVENOUS at 08:05

## 2022-05-16 RX ADMIN — HYDROMORPHONE HYDROCHLORIDE 0.5 MG: 2 INJECTION, SOLUTION INTRAMUSCULAR; INTRAVENOUS; SUBCUTANEOUS at 02:05

## 2022-05-16 RX ADMIN — PROPOFOL 150 MG: 10 INJECTION, EMULSION INTRAVENOUS at 12:05

## 2022-05-16 RX ADMIN — INSULIN DETEMIR 20 UNITS: 100 INJECTION, SOLUTION SUBCUTANEOUS at 09:05

## 2022-05-16 RX ADMIN — HYDROXYCHLOROQUINE SULFATE 200 MG: 200 TABLET, FILM COATED ORAL at 08:05

## 2022-05-16 RX ADMIN — DOXYCYCLINE HYCLATE 100 MG: 100 CAPSULE ORAL at 09:05

## 2022-05-16 RX ADMIN — MORPHINE SULFATE 2 MG: 2 INJECTION, SOLUTION INTRAMUSCULAR; INTRAVENOUS at 11:05

## 2022-05-16 RX ADMIN — FENTANYL CITRATE 100 MCG: 50 INJECTION INTRAMUSCULAR; INTRAVENOUS at 12:05

## 2022-05-16 RX ADMIN — OXYCODONE HYDROCHLORIDE AND ACETAMINOPHEN 1000 MG: 500 TABLET ORAL at 08:05

## 2022-05-16 RX ADMIN — ENOXAPARIN SODIUM 40 MG: 40 INJECTION SUBCUTANEOUS at 05:05

## 2022-05-16 RX ADMIN — SODIUM CHLORIDE: 9 INJECTION, SOLUTION INTRAVENOUS at 08:05

## 2022-05-16 RX ADMIN — HYDROMORPHONE HYDROCHLORIDE 0.5 MG: 2 INJECTION, SOLUTION INTRAMUSCULAR; INTRAVENOUS; SUBCUTANEOUS at 05:05

## 2022-05-16 RX ADMIN — HYDROMORPHONE HYDROCHLORIDE 0.5 MG: 2 INJECTION, SOLUTION INTRAMUSCULAR; INTRAVENOUS; SUBCUTANEOUS at 08:05

## 2022-05-16 RX ADMIN — PIPERACILLIN AND TAZOBACTAM 4.5 G: 4; .5 INJECTION, POWDER, LYOPHILIZED, FOR SOLUTION INTRAVENOUS at 12:05

## 2022-05-16 RX ADMIN — GABAPENTIN 600 MG: 300 CAPSULE ORAL at 08:05

## 2022-05-16 RX ADMIN — MORPHINE SULFATE 2 MG: 2 INJECTION, SOLUTION INTRAMUSCULAR; INTRAVENOUS at 04:05

## 2022-05-16 RX ADMIN — HYDROMORPHONE HYDROCHLORIDE 0.5 MG: 2 INJECTION, SOLUTION INTRAMUSCULAR; INTRAVENOUS; SUBCUTANEOUS at 01:05

## 2022-05-16 RX ADMIN — LIDOCAINE HYDROCHLORIDE 100 MG: 20 INJECTION, SOLUTION INTRAVENOUS at 12:05

## 2022-05-16 RX ADMIN — PREDNISONE 5 MG: 5 TABLET ORAL at 08:05

## 2022-05-16 RX ADMIN — OXYCODONE HYDROCHLORIDE AND ACETAMINOPHEN 1 TABLET: 10; 325 TABLET ORAL at 02:05

## 2022-05-16 RX ADMIN — OXYCODONE HYDROCHLORIDE AND ACETAMINOPHEN 1 TABLET: 10; 325 TABLET ORAL at 11:05

## 2022-05-16 RX ADMIN — NYSTATIN 500000 UNITS: 500000 SUSPENSION ORAL at 05:05

## 2022-05-16 RX ADMIN — GABAPENTIN 600 MG: 300 CAPSULE ORAL at 02:05

## 2022-05-16 RX ADMIN — FERROUS SULFATE TAB 325 MG (65 MG ELEMENTAL FE) 1 EACH: 325 (65 FE) TAB at 08:05

## 2022-05-16 RX ADMIN — MIDAZOLAM HYDROCHLORIDE 2 MG: 1 INJECTION, SOLUTION INTRAMUSCULAR; INTRAVENOUS at 12:05

## 2022-05-16 RX ADMIN — PANTOPRAZOLE SODIUM 40 MG: 40 TABLET, DELAYED RELEASE ORAL at 08:05

## 2022-05-16 RX ADMIN — ONDANSETRON 4 MG: 2 INJECTION INTRAMUSCULAR; INTRAVENOUS at 12:05

## 2022-05-16 RX ADMIN — CYCLOBENZAPRINE 10 MG: 10 TABLET, FILM COATED ORAL at 08:05

## 2022-05-16 RX ADMIN — PIPERACILLIN AND TAZOBACTAM 4.5 G: 4; .5 INJECTION, POWDER, LYOPHILIZED, FOR SOLUTION INTRAVENOUS at 04:05

## 2022-05-16 RX ADMIN — SODIUM CHLORIDE: 9 INJECTION, SOLUTION INTRAVENOUS at 12:05

## 2022-05-16 NOTE — PLAN OF CARE
Problem: Adult Inpatient Plan of Care  Goal: Plan of Care Review  Outcome: Ongoing, Progressing  Flowsheets (Taken 5/16/2022 1738)  Plan of Care Reviewed With: patient  Goal: Patient-Specific Goal (Individualized)  Outcome: Ongoing, Progressing  Flowsheets (Taken 5/16/2022 1738)  Anxieties, Fears or Concerns: Staying in hospital for 5 days  Individualized Care Needs: Pain management, antibiotics  Patient-Specific Goals (Include Timeframe): Pain management, antibiotics  Goal: Absence of Hospital-Acquired Illness or Injury  Outcome: Ongoing, Progressing  Intervention: Prevent Skin Injury  Flowsheets (Taken 5/16/2022 1738)  Skin Protection:   adhesive use limited   incontinence pads utilized  Intervention: Prevent and Manage VTE (Venous Thromboembolism) Risk  Flowsheets (Taken 5/16/2022 1738)  VTE Prevention/Management:   remove, assess skin, and reapply sequential compression device   ambulation promoted  Range of Motion: active ROM (range of motion) encouraged  Intervention: Prevent Infection  Flowsheets (Taken 5/16/2022 1738)  Infection Prevention:   rest/sleep promoted   single patient room provided   hand hygiene promoted  Goal: Optimal Comfort and Wellbeing  Outcome: Ongoing, Progressing  Intervention: Provide Person-Centered Care  Flowsheets (Taken 5/16/2022 1738)  Trust Relationship/Rapport:   care explained   choices provided   emotional support provided   empathic listening provided   thoughts/feelings acknowledged   reassurance provided   questions encouraged   questions answered  Goal: Readiness for Transition of Care  Outcome: Ongoing, Progressing     Problem: Diabetes Comorbidity  Goal: Blood Glucose Level Within Targeted Range  Outcome: Ongoing, Progressing  Intervention: Monitor and Manage Glycemia  Flowsheets (Taken 5/16/2022 1738)  Glycemic Management: blood glucose monitored     Problem: Impaired Wound Healing  Goal: Optimal Wound Healing  Outcome: Ongoing, Progressing  Intervention: Promote Wound  Healing  Flowsheets (Taken 5/16/2022 7403)  Oral Nutrition Promotion: calorie-dense foods provided  Sleep/Rest Enhancement:   regular sleep/rest pattern promoted   relaxation techniques promoted   Plan of care reviewed. Patient progressing

## 2022-05-16 NOTE — OP NOTE
[facility] - Periop Services  Surgery Department  Operative Note    SUMMARY     Date of Procedure: 5/16/2022     Procedure: Procedure(s) (LRB):  IRRIGATION AND DEBRIDEMENT, UPPER EXTREMITY (Right)     Surgeon(s) and Role:     * Kamari Sandoval DO - Primary    Assisting Surgeon: None    Pre-Operative Diagnosis: Cat bite of hand, unspecified laterality, initial encounter [S61.459A, W55.01XA]    Post-Operative Diagnosis: Post-Op Diagnosis Codes:     * Cat bite of hand, unspecified laterality, initial encounter [S61.459A, W55.01XA]    Anesthesia: Choice    Operative Findings (including complications, if any):  Edematous dorsum of right hand; no significant purulent drainage.  Made 2 counter incisions and connected subdermal plane; packed with quarter-inch iodoform packing; re-cultured and sent to microbiology    Description of Technical Procedures:  Patient was taken the operating room and placed on the operating table in the supine position with the right arm extended.  Patient underwent general anesthesia per the anesthesia team.  The right hand was then prepped and draped in usual sterile fashion.  There was no pus found in the cavity from the prior incision, which tunneled distally approximately 3 cm from the knuckles.  We made 2 counter incision 2 cm cruciate incisions on the medial lateral portion of the dorsum of the hand just adjacent to the knuckles.  We used a Q-tip to probe and connect the cavity to the previous cavity as well as connecting between each of the incisions; no pus or purulent pocket found.  Mostly just edema in the tissue.  Bleeding controlled electrocautery.  We irrigated the cavity after culturing and sent to microbiology.  We then packed with quarter-inch iodoform packing in each incision and then wrapped with 4 x 4 gauze, Kerlix and Ace wrap.  Patient tolerated procedure well.  Patient was awakened, extubated and taken the PACU in stable condition.      Estimated Blood Loss (EBL): 10  cc           Implants: * No implants in log *    Specimens:   Specimen (24h ago, onward)            None                  Condition: Good    Disposition: PACU - hemodynamically stable.    Attestation: I was present and scrubbed for the entire procedure.

## 2022-05-16 NOTE — ASSESSMENT & PLAN NOTE
- CRP 3.31 and ESR 53 today  - Continue home hydroxychloroquine, leflunimide and prednisone  - Monitor CBC

## 2022-05-16 NOTE — PROGRESS NOTES
[facility]  [facility] Medicine  Progress Note    Patient Name: Silvana Sarah  MRN: [id number]  Patient Class: IP- Inpatient   Admission Date: 5/11/2022  Length of Stay: 4 days  Attending Physician: Citlaly Landers,*  Primary Care Provider: Sera Byrd NP        Subjective:     Principal Problem:Wound cellulitis        HPI:  60 yo female with PMH of T1DM, RA and HTN who presents to ED with c/o progressively worsening swelling and redness of bilateral hands. Patient was bitten and scratched multiple times by cat on May 9, 2022. She came to ED on 5/10/22 and received IV Clindamycin and Doxycycline PO. She was also prescribed Flagyl PO outpatient. Xray of right hand and wrist were positive for soft tissue swelling. Leukocytosis of 18K which has improved to 15K. Blood cultures were obtained on 5/10/22 and remain pending. Upon examination, bilateral hands (R >L) and right forearm are erythematous and edematous. There are multiple puncture sites and scratch marks noted to bilateral hands and forearms. Wounds appear clean and dry.      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 Her rheumatologist is Dr. Kamari Almazan and she sees Pain Management for chronic pain treatment. Patient has gastric stimulator to assist with DM gastroparesis. She will be admitted to [facility] at this time for further evaluation and management.            Overview/Hospital Course:  05/15/2022 - patient seen this morning laying in bed with eyes open.  Dressing on had clean and dry  with minimal bleeding noted.  Patient states that she is in a lot of pain and would liek to talk to doctor raad about this.  I see she has oxy 10 Q8Prn and she is not always taking it.  She also has dilaudid on q3 which she has been getting regularly.  Patient was advised that the oral meds are to handle baseline pain and IV was for breakthrough.  Patient was advised to ask for her PRN  meds regularly and resort to iv only in [facility] baker of 10 breakthrough.  I advised her that I may can increase oral dosage if she is getting to much breakthrough but that oral pain control will be preferred.  Will monitor this closely and follow up with patient.  All questions answered and all complaints addressed.  Plan discussed with Silvana Sarah who voices understanding and agreement.      Interval History: Patient is resting comfortably in bed with no acute complaints. No overnight events reported. Patient is getting another I+D today of her right hand by Dr. Sandoval to create two more ports for fluid drainage to continue to treat her cellulitis. Her BG was high this morning, so will increase nightly insulin dose. Patient also noted to have some oral candida on examination this morning.     Review of Systems   Constitutional:  Negative for appetite change, chills and fever.   HENT:  Negative for congestion, rhinorrhea and sore throat.    Eyes:  Negative for visual disturbance.   Respiratory:  Negative for cough, shortness of breath and wheezing.    Cardiovascular:  Negative for chest pain, palpitations and leg swelling.   Gastrointestinal:  Negative for abdominal pain, constipation, diarrhea, nausea and vomiting.   Genitourinary:  Negative for dysuria, frequency and hematuria.   Musculoskeletal:  Positive for arthralgias and myalgias.   Skin:  Positive for wound.   Neurological:  Negative for syncope, weakness and headaches.   Psychiatric/Behavioral:  Negative for dysphoric mood and sleep disturbance. The patient is not nervous/anxious.    Objective:     Vital Signs (Most Recent):  Temp: 98.3 °F (36.8 °C) (05/16/22 1259)  Pulse: 88 (05/16/22 1335)  Resp: 12 (05/16/22 1333)  BP: (!) 147/79 (05/16/22 1335)  SpO2: (!) 94 % (05/16/22 1335)   Vital Signs (24h Range):  Temp:  [98.3 °F (36.8 °C)-98.8 °F (37.1 °C)] 98.3 °F (36.8 °C)  Pulse:  [85-93] 88  Resp:  [0-18] 12  SpO2:  [90 %-97 %] 94 %  BP: (127-163)/(40-80)  147/79     Weight: 90.7 kg (200 lb)  Body mass index is 28.7 kg/m².    Intake/Output Summary (Last 24 hours) at 5/16/2022 1350  Last data filed at 5/15/2022 1602  Gross per 24 hour   Intake 100 ml   Output --   Net 100 ml      Physical Exam  Constitutional:       General: She is not in acute distress.     Appearance: Normal appearance.   HENT:      Head: Normocephalic.   Cardiovascular:      Rate and Rhythm: Normal rate.   Pulmonary:      Effort: Pulmonary effort is normal. No respiratory distress.   Abdominal:      General: There is no distension.      Tenderness: There is no abdominal tenderness.   Musculoskeletal:         General: Normal range of motion.   Skin:     General: Skin is warm.      Coloration: Skin is not jaundiced.      Comments: Improved erythema/swelling the right wrist, but increased swelling distally around the knuckles with increased tenderness to palpation on the right hand; improved erythema and swelling in the left   Neurological:      General: No focal deficit present.      Mental Status: She is alert and oriented to person, place, and time.      Cranial Nerves: No cranial nerve deficit.       Significant Labs: All pertinent labs within the past 24 hours have been reviewed.    Significant Imaging: I have reviewed all pertinent imaging results/findings within the past 24 hours.      Assessment/Plan:      * Wound cellulitis  --getting second I+D today with Dr. Sandoval  -continue doxycycline, vanc and zosyn for now      Type 1 diabetes mellitus  Complicated with neuropathy and gastroparesis. Patient has gastric stimulator.   -  today  - Diabetic Diet  - POCT glucose QID AC & HS  - Increase Levemir to 25U Daily   - Moderate SSI    Rheumatoid arthritis  - CRP 3.31 and ESR 53 today  - Continue home hydroxychloroquine, leflunimide and prednisone  - Monitor CBC      Oral candidiasis  -fluconazole x1 dose  -nystatin swish        VTE Risk Mitigation (From admission, onward)         Ordered      enoxaparin injection 40 mg  Daily         05/11/22 2102     IP VTE HIGH RISK PATIENT  Once         05/11/22 2102     Place FRANCISCA hose  Until discontinued         05/11/22 2102                Discharge Planning   DANE:      Code Status: Full Code   Is the patient medically ready for discharge?:     Reason for patient still in hospital (select all that apply): Treatment                     Noy Gomez MD  Department of Hospital Medicine   [facility] - Orthopedic

## 2022-05-16 NOTE — PROGRESS NOTES
[facility], [place] - Orthopedic  General Surgery  Progress Note    Subjective:     History of Present Illness:  60 y/o female cat bite multiple to hands 4 days ago  Failed outpatient treatment   Returned to ER yesterday admitted for iv antibiotics  US with fluid collection right hand approximately 3 cm  Fever 101, leukocytosis  PMH: DM, RA      Post-Op Info:  Procedure(s) (LRB):  INCISION AND DRAINAGE, ABSCESS (Right)   3 Days Post-Op     Interval History:  Stable, no acute events overnight.  Patient still complain of some pain and increased swelling around her knuckles on the right hand; states her left hand has improved    Medications:  Continuous Infusions:  Scheduled Meds:   ascorbic acid (vitamin C)  1,000 mg Oral Daily    cyclobenzaprine  10 mg Oral QHS    doxycycline  100 mg Oral BID    enoxaparin  40 mg Subcutaneous Daily    ferrous sulfate  1 tablet Oral Daily    gabapentin  600 mg Oral TID    hydrOXYchloroQUINE  200 mg Oral BID    insulin aspart U-100  0-26 Units Subcutaneous TIDWM    insulin detemir U-100  20 Units Subcutaneous Daily    leflunomide  20 mg Oral Daily    pantoprazole  40 mg Oral Daily    piperacillin-tazobactam (ZOSYN) IVPB  4.5 g Intravenous Q8H    polyethylene glycol  17 g Oral Daily    predniSONE  5 mg Oral Daily    triamterene-hydrochlorothiazide 37.5-25 mg  1 tablet Oral Daily    vancomycin (VANCOCIN) IVPB  1,750 mg Intravenous Q18H     PRN Meds:acetaminophen, dextrose 50%, dextrose 50%, dextrose 50%, dextrose 50%, glucagon (human recombinant), glucagon (human recombinant), hydrALAZINE, HYDROmorphone, morphine, naloxone, ondansetron, oxyCODONE-acetaminophen, promethazine, sodium chloride 0.9%, vancomycin - pharmacy to dose     Review of patient's allergies indicates:   Allergen Reactions    Influenza virus vaccines     Penicillins      Objective:     Vital Signs (Most Recent):  Temp: 98.8 °F (37.1 °C) (05/16/22 0712)  Pulse: 90 (05/16/22 0712)  Resp: 17 (05/16/22  0712)  BP: (!) 130/43 (05/16/22 0712)  SpO2: 95 % (05/16/22 0712)   Vital Signs (24h Range):  Temp:  [98.4 °F (36.9 °C)-98.8 °F (37.1 °C)] 98.8 °F (37.1 °C)  Pulse:  [85-92] 90  Resp:  [16-18] 17  SpO2:  [95 %-97 %] 95 %  BP: (127-163)/(40-71) 130/43     Weight: 90.7 kg (200 lb)  Body mass index is 28.7 kg/m².    Intake/Output - Last 3 Shifts         05/14 0700  05/15 0659 05/15 0700  05/16 0659 05/16 0700  05/17 0659    IV Piggyback 800 200     Total Intake(mL/kg) 800 (8.8) 200 (2.2)     Net +800 +200                    Physical Exam  Constitutional:       General: She is not in acute distress.     Appearance: Normal appearance.   HENT:      Head: Normocephalic.   Cardiovascular:      Rate and Rhythm: Normal rate.   Pulmonary:      Effort: Pulmonary effort is normal. No respiratory distress.   Abdominal:      General: There is no distension.      Tenderness: There is no abdominal tenderness.   Musculoskeletal:         General: Normal range of motion.   Skin:     General: Skin is warm.      Coloration: Skin is not jaundiced.      Comments: Improved erythema/swelling the right wrist, but increased swelling distally around the knuckles with increased tenderness to palpation on the right hand; improved erythema and swelling in the left   Neurological:      General: No focal deficit present.      Mental Status: She is alert and oriented to person, place, and time.      Cranial Nerves: No cranial nerve deficit.       Significant Labs:  I have reviewed all pertinent lab results within the past 24 hours.  CBC:   Recent Labs   Lab 05/15/22  0605   WBC 7.64   RBC 3.80*   HGB 10.5*   HCT 33.5*      MCV 88.2   MCH 27.6   MCHC 31.3*     BMP:   Recent Labs   Lab 05/15/22  0605   *      K 4.5   CL 98   CO2 28   BUN 15   CREATININE 1.29*   CALCIUM 9.2       Significant Diagnostics:  I have reviewed all pertinent imaging results/findings within the past 24 hours.    Assessment/Plan:     * Wound cellulitis  05/13  Bilateral hands cellulitis right much greater than left  RIGHT hand abscess, edema, cellulitis  S/P  I/D RIGHT Hand per Dr. Sandoval  Continue iv abx    5/16:  To OR for washout and debridement of right hand.  Risks and benefits explained with the patient including risks for infection, bleeding, injury to surrounding structures, hematoma/seroma formation with need for possible evacuation, possible open.  The patient verbalized understanding, agrees and wishes to proceed with surgery.        Kamari Sandoval, DO  General Surgery  [facility], [place] - Orthopedic

## 2022-05-16 NOTE — PROGRESS NOTES
Trough below desired range. Frequency of Vancomycin changed to every 18 hours. Pharmacy to follow.

## 2022-05-16 NOTE — OR NURSING
1256 REC'D TO PACU IN STABLE COND. SLEEPING, WAKES TO VOICE. VS STABLE. NO DISTRESS NOTED @ THIS TIME.     1315 PT REQUEST PAIN MEDS. RATES PAIN 8 ON 0/10 SCALE.    1335 PAIN RATED @ 3 ON 0/10 SCALE @ THIS TIME.    1340 TRANSFERRED TO ROOM 469 IN STABLE COND. PT AWAKE VS STABLE BEDSIDE REPORT GIVEN TO LES LIU RN.  NO DISTRESS NOTED @ THIS TIME.

## 2022-05-16 NOTE — ANESTHESIA PREPROCEDURE EVALUATION
05/16/2022  Silvana Sarah is a 61 y.o., female.    Past Medical History:   Diagnosis Date    Diabetes mellitus     Gastroparesis due to DM     Hypertension     Peripheral neuropathy     Rheumatoid arthritis        Pre-op Assessment    I have reviewed the Patient Summary Reports.     I have reviewed the Nursing Notes. I have reviewed the NPO Status.   I have reviewed the Medications.     Review of Systems  Anesthesia Hx:  No problems with previous Anesthesia    Social:  Non-Smoker, No Alcohol Use    Hematology/Oncology:  Hematology Normal   Oncology Normal     EENT/Dental:EENT/Dental Normal   Cardiovascular:   Hypertension    Pulmonary:  Pulmonary Normal    Renal/:  Renal/ Normal     Hepatic/GI:   GERD    Musculoskeletal:   Arthritis  Rheumatoid a   Neurological:  Neurology Normal    Endocrine:   Diabetes, well controlled, type 1  Obesity / BMI > 30  Dermatological:  Skin Normal    Psych:   Psychiatric History          Physical Exam  General: Well nourished    Airway:  Mallampati: III / III  Mouth Opening: Normal  TM Distance: > 6 cm  Tongue: Normal  Neck ROM: Normal ROM    Chest/Lungs:  Clear to auscultation, Normal Respiratory Rate    Heart:  Rate: Normal  Rhythm: Regular Rhythm        Chemistry        Component Value Date/Time     05/16/2022 0817    K 4.3 05/16/2022 0817    CL 99 05/16/2022 0817    CO2 30 05/16/2022 0817    BUN 10 05/16/2022 0817    CREATININE 1.05 (H) 05/16/2022 0817     (H) 05/16/2022 0817        Component Value Date/Time    CALCIUM 9.5 05/16/2022 0817    ALKPHOS 189 (H) 05/11/2022 1739    AST 54 (H) 05/11/2022 1739    ALT 77 (H) 05/11/2022 1739    BILITOT 0.5 05/11/2022 1739    ESTGFRAFRICA 63 02/03/2021 1211    EGFRNONAA 57 (L) 05/16/2022 0817        Lab Results   Component Value Date    WBC 7.56 05/16/2022    RBC 4.11 (L) 05/16/2022    HGB 11.3 (L) 05/16/2022     MCV 87.1 05/16/2022    MCH 27.5 05/16/2022    MCHC 31.6 (L) 05/16/2022    RDW 12.8 05/16/2022     05/16/2022    MPV 10.0 05/16/2022    LYMPH 32.0 05/16/2022    LYMPH 2.42 05/16/2022    MONO 14.7 (H) 05/16/2022    EOS 0.29 05/16/2022    BASO 0.10 05/16/2022     No results found for this or any previous visit.      Anesthesia Plan  Type of Anesthesia, risks & benefits discussed:    Anesthesia Type: Gen Supraglottic Airway  Intra-op Monitoring Plan: Standard ASA Monitors  Post Op Pain Control Plan: multimodal analgesia  Induction:  IV  Informed Consent: Informed consent signed with the Patient and all parties understand the risks and agree with anesthesia plan.  All questions answered.   ASA Score: 3  Day of Surgery Review of History & Physical: H&P Update referred to the surgeon/provider.I have interviewed and examined the patient. I have reviewed the patient's H&P dated: There are no significant changes. H&P completed by Anesthesiologist.    Ready For Surgery From Anesthesia Perspective.     .

## 2022-05-16 NOTE — SUBJECTIVE & OBJECTIVE
Interval History:  Stable, no acute events overnight.  Patient still complain of some pain and increased swelling around her knuckles on the right hand; states her left hand has improved    Medications:  Continuous Infusions:  Scheduled Meds:   ascorbic acid (vitamin C)  1,000 mg Oral Daily    cyclobenzaprine  10 mg Oral QHS    doxycycline  100 mg Oral BID    enoxaparin  40 mg Subcutaneous Daily    ferrous sulfate  1 tablet Oral Daily    gabapentin  600 mg Oral TID    hydrOXYchloroQUINE  200 mg Oral BID    insulin aspart U-100  0-26 Units Subcutaneous TIDWM    insulin detemir U-100  20 Units Subcutaneous Daily    leflunomide  20 mg Oral Daily    pantoprazole  40 mg Oral Daily    piperacillin-tazobactam (ZOSYN) IVPB  4.5 g Intravenous Q8H    polyethylene glycol  17 g Oral Daily    predniSONE  5 mg Oral Daily    triamterene-hydrochlorothiazide 37.5-25 mg  1 tablet Oral Daily    vancomycin (VANCOCIN) IVPB  1,750 mg Intravenous Q18H     PRN Meds:acetaminophen, dextrose 50%, dextrose 50%, dextrose 50%, dextrose 50%, glucagon (human recombinant), glucagon (human recombinant), hydrALAZINE, HYDROmorphone, morphine, naloxone, ondansetron, oxyCODONE-acetaminophen, promethazine, sodium chloride 0.9%, vancomycin - pharmacy to dose     Review of patient's allergies indicates:   Allergen Reactions    Influenza virus vaccines     Penicillins      Objective:     Vital Signs (Most Recent):  Temp: 98.8 °F (37.1 °C) (05/16/22 0712)  Pulse: 90 (05/16/22 0712)  Resp: 17 (05/16/22 0712)  BP: (!) 130/43 (05/16/22 0712)  SpO2: 95 % (05/16/22 0712)   Vital Signs (24h Range):  Temp:  [98.4 °F (36.9 °C)-98.8 °F (37.1 °C)] 98.8 °F (37.1 °C)  Pulse:  [85-92] 90  Resp:  [16-18] 17  SpO2:  [95 %-97 %] 95 %  BP: (127-163)/(40-71) 130/43     Weight: 90.7 kg (200 lb)  Body mass index is 28.7 kg/m².    Intake/Output - Last 3 Shifts         05/14 0700  05/15 0659 05/15 0700  05/16 0659 05/16 0700  05/17 0659    IV Piggyback 800 200     Total  Intake(mL/kg) 800 (8.8) 200 (2.2)     Net +800 +200                    Physical Exam  Constitutional:       General: She is not in acute distress.     Appearance: Normal appearance.   HENT:      Head: Normocephalic.   Cardiovascular:      Rate and Rhythm: Normal rate.   Pulmonary:      Effort: Pulmonary effort is normal. No respiratory distress.   Abdominal:      General: There is no distension.      Tenderness: There is no abdominal tenderness.   Musculoskeletal:         General: Normal range of motion.   Skin:     General: Skin is warm.      Coloration: Skin is not jaundiced.      Comments: Improved erythema/swelling the right wrist, but increased swelling distally around the knuckles with increased tenderness to palpation on the right hand; improved erythema and swelling in the left   Neurological:      General: No focal deficit present.      Mental Status: She is alert and oriented to person, place, and time.      Cranial Nerves: No cranial nerve deficit.       Significant Labs:  I have reviewed all pertinent lab results within the past 24 hours.  CBC:   Recent Labs   Lab 05/15/22  0605   WBC 7.64   RBC 3.80*   HGB 10.5*   HCT 33.5*      MCV 88.2   MCH 27.6   MCHC 31.3*     BMP:   Recent Labs   Lab 05/15/22  0605   *      K 4.5   CL 98   CO2 28   BUN 15   CREATININE 1.29*   CALCIUM 9.2       Significant Diagnostics:  I have reviewed all pertinent imaging results/findings within the past 24 hours.

## 2022-05-16 NOTE — SUBJECTIVE & OBJECTIVE
Interval History: Patient is resting comfortably in bed with no acute complaints. No overnight events reported. Patient is getting another I+D today of her right hand by Dr. Sandoval to create two more ports for fluid drainage to continue to treat her cellulitis. Her BG was high this morning, so will increase nightly insulin dose. Patient also noted to have some oral candida on examination this morning.     Review of Systems   Constitutional:  Negative for appetite change, chills and fever.   HENT:  Negative for congestion, rhinorrhea and sore throat.    Eyes:  Negative for visual disturbance.   Respiratory:  Negative for cough, shortness of breath and wheezing.    Cardiovascular:  Negative for chest pain, palpitations and leg swelling.   Gastrointestinal:  Negative for abdominal pain, constipation, diarrhea, nausea and vomiting.   Genitourinary:  Negative for dysuria, frequency and hematuria.   Musculoskeletal:  Positive for arthralgias and myalgias.   Skin:  Positive for wound.   Neurological:  Negative for syncope, weakness and headaches.   Psychiatric/Behavioral:  Negative for dysphoric mood and sleep disturbance. The patient is not nervous/anxious.    Objective:     Vital Signs (Most Recent):  Temp: 98.3 °F (36.8 °C) (05/16/22 1259)  Pulse: 88 (05/16/22 1335)  Resp: 12 (05/16/22 1333)  BP: (!) 147/79 (05/16/22 1335)  SpO2: (!) 94 % (05/16/22 1335)   Vital Signs (24h Range):  Temp:  [98.3 °F (36.8 °C)-98.8 °F (37.1 °C)] 98.3 °F (36.8 °C)  Pulse:  [85-93] 88  Resp:  [0-18] 12  SpO2:  [90 %-97 %] 94 %  BP: (127-163)/(40-80) 147/79     Weight: 90.7 kg (200 lb)  Body mass index is 28.7 kg/m².    Intake/Output Summary (Last 24 hours) at 5/16/2022 1350  Last data filed at 5/15/2022 1602  Gross per 24 hour   Intake 100 ml   Output --   Net 100 ml      Physical Exam  Constitutional:       General: She is not in acute distress.     Appearance: Normal appearance.   HENT:      Head: Normocephalic.   Cardiovascular:       Rate and Rhythm: Normal rate.   Pulmonary:      Effort: Pulmonary effort is normal. No respiratory distress.   Abdominal:      General: There is no distension.      Tenderness: There is no abdominal tenderness.   Musculoskeletal:         General: Normal range of motion.   Skin:     General: Skin is warm.      Coloration: Skin is not jaundiced.      Comments: Improved erythema/swelling the right wrist, but increased swelling distally around the knuckles with increased tenderness to palpation on the right hand; improved erythema and swelling in the left   Neurological:      General: No focal deficit present.      Mental Status: She is alert and oriented to person, place, and time.      Cranial Nerves: No cranial nerve deficit.       Significant Labs: All pertinent labs within the past 24 hours have been reviewed.    Significant Imaging: I have reviewed all pertinent imaging results/findings within the past 24 hours.

## 2022-05-16 NOTE — ANESTHESIA POSTPROCEDURE EVALUATION
Anesthesia Post Evaluation    Patient: Silvana Sarah    Procedure(s) Performed: Procedure(s) (LRB):  IRRIGATION AND DEBRIDEMENT, UPPER EXTREMITY (Right)    Final Anesthesia Type: general      Patient location during evaluation: PACU  Patient participation: Yes- Able to Participate  Level of consciousness: awake and alert  Post-procedure vital signs: reviewed and stable  Pain management: adequate  Airway patency: patent  RENA mitigation strategies: Multimodal analgesia  PONV status at discharge: No PONV  Anesthetic complications: no      Cardiovascular status: blood pressure returned to baseline  Respiratory status: unassisted  Hydration status: euvolemic  Follow-up not needed.          Vitals Value Taken Time   /74 05/16/22 1405   Temp 35.9 °C (96.7 °F) 05/16/22 1405   Pulse 91 05/16/22 1405   Resp 16 05/16/22 1443   SpO2 98 % 05/16/22 1405         Event Time   Out of Recovery 05/16/2022 13:40:00         Pain/Anabela Score: Pain Rating Prior to Med Admin: 7 (5/16/2022  2:43 PM)  Pain Rating Post Med Admin: 0 (5/16/2022  3:43 PM)  Anabela Score: 10 (5/16/2022  1:40 PM)

## 2022-05-16 NOTE — ASSESSMENT & PLAN NOTE
05/13 Bilateral hands cellulitis right much greater than left  RIGHT hand abscess, edema, cellulitis  S/P  I/D RIGHT Hand per Dr. Sandoval  Continue iv abx    5/16:  To OR for washout and debridement of right hand.  Risks and benefits explained with the patient including risks for infection, bleeding, injury to surrounding structures, hematoma/seroma formation with need for possible evacuation, possible open.  The patient verbalized understanding, agrees and wishes to proceed with surgery.

## 2022-05-16 NOTE — ANESTHESIA PROCEDURE NOTES
Intubation    Date/Time: 5/16/2022 12:15 PM  Performed by: Clay Sampson CRNA  Authorized by: Nestor Smith MD     Intubation:     Induction:  Intravenous    Intubated:  Postinduction    Mask Ventilation:  Easy mask    Attempts:  1    Attempted By:  CRNA    Difficult Airway Encountered?: No      Complications:  None    Airway Device:  Supraglottic airway/LMA    Airway Device Size:  4.0    Style/Cuff Inflation:  Cuffed (inflated to minimal occlusive pressure)    Placement Verified By:  Capnometry    Complicating Factors:  None    Findings Post-Intubation:  BS equal bilateral

## 2022-05-16 NOTE — TRANSFER OF CARE
"Anesthesia Transfer of Care Note    Patient: Silvana Sarah    Procedure(s) Performed: Procedure(s) (LRB):  IRRIGATION AND DEBRIDEMENT, UPPER EXTREMITY (Right)    Patient location: PACU    Anesthesia Type: general    Transport from OR: Transported from OR on room air with adequate spontaneous ventilation    Post pain: adequate analgesia    Post assessment: no apparent anesthetic complications    Post vital signs: stable    Level of consciousness: sedated    Nausea/Vomiting: no nausea/vomiting    Complications: none    Transfer of care protocol was followed      Last vitals:   Visit Vitals  BP (!) 143/80   Pulse 93   Temp 36.8 °C (98.3 °F) (Oral)   Resp 16   Ht 5' 10" (1.778 m)   Wt 90.7 kg (200 lb)   SpO2 95%   Breastfeeding No   BMI 28.70 kg/m²     "

## 2022-05-17 LAB
ANION GAP SERPL CALCULATED.3IONS-SCNC: 12 MMOL/L (ref 7–16)
BASOPHILS # BLD AUTO: 0.08 K/UL (ref 0–0.2)
BASOPHILS NFR BLD AUTO: 0.8 % (ref 0–1)
BUN SERPL-MCNC: 9 MG/DL (ref 7–18)
BUN/CREAT SERPL: 8 (ref 6–20)
CALCIUM SERPL-MCNC: 9.1 MG/DL (ref 8.5–10.1)
CHLORIDE SERPL-SCNC: 102 MMOL/L (ref 98–107)
CO2 SERPL-SCNC: 29 MMOL/L (ref 21–32)
CREAT SERPL-MCNC: 1.06 MG/DL (ref 0.55–1.02)
DIFFERENTIAL METHOD BLD: ABNORMAL
EOSINOPHIL # BLD AUTO: 0.3 K/UL (ref 0–0.5)
EOSINOPHIL NFR BLD AUTO: 3.1 % (ref 1–4)
ERYTHROCYTE [DISTWIDTH] IN BLOOD BY AUTOMATED COUNT: 12.9 % (ref 11.5–14.5)
GLUCOSE SERPL-MCNC: 193 MG/DL (ref 70–105)
GLUCOSE SERPL-MCNC: 218 MG/DL (ref 74–106)
GLUCOSE SERPL-MCNC: 247 MG/DL (ref 70–105)
GLUCOSE SERPL-MCNC: 68 MG/DL (ref 70–105)
GLUCOSE SERPL-MCNC: 99 MG/DL (ref 70–105)
HCT VFR BLD AUTO: 33.2 % (ref 38–47)
HGB BLD-MCNC: 10.6 G/DL (ref 12–16)
IMM GRANULOCYTES # BLD AUTO: 0.2 K/UL (ref 0–0.04)
IMM GRANULOCYTES NFR BLD: 2.1 % (ref 0–0.4)
LYMPHOCYTES # BLD AUTO: 2.88 K/UL (ref 1–4.8)
LYMPHOCYTES NFR BLD AUTO: 30.2 % (ref 27–41)
MCH RBC QN AUTO: 27.9 PG (ref 27–31)
MCHC RBC AUTO-ENTMCNC: 31.9 G/DL (ref 32–36)
MCV RBC AUTO: 87.4 FL (ref 80–96)
MONOCYTES # BLD AUTO: 1.33 K/UL (ref 0–0.8)
MONOCYTES NFR BLD AUTO: 13.9 % (ref 2–6)
MPC BLD CALC-MCNC: 10 FL (ref 9.4–12.4)
NEUTROPHILS # BLD AUTO: 4.75 K/UL (ref 1.8–7.7)
NEUTROPHILS NFR BLD AUTO: 49.9 % (ref 53–65)
NRBC # BLD AUTO: 0 X10E3/UL
NRBC, AUTO (.00): 0 %
PLATELET # BLD AUTO: 248 K/UL (ref 150–400)
POTASSIUM SERPL-SCNC: 4 MMOL/L (ref 3.5–5.1)
RBC # BLD AUTO: 3.8 M/UL (ref 4.2–5.4)
SODIUM SERPL-SCNC: 139 MMOL/L (ref 136–145)
VANCOMYCIN TROUGH SERPL-MCNC: 15.2 ΜG/ML (ref 10–20)
WBC # BLD AUTO: 9.54 K/UL (ref 4.5–11)

## 2022-05-17 PROCEDURE — 80202 ASSAY OF VANCOMYCIN: CPT | Performed by: STUDENT IN AN ORGANIZED HEALTH CARE EDUCATION/TRAINING PROGRAM

## 2022-05-17 PROCEDURE — 25000003 PHARM REV CODE 250: Performed by: INTERNAL MEDICINE

## 2022-05-17 PROCEDURE — 36415 COLL VENOUS BLD VENIPUNCTURE: CPT

## 2022-05-17 PROCEDURE — 82962 GLUCOSE BLOOD TEST: CPT

## 2022-05-17 PROCEDURE — 11000001 HC ACUTE MED/SURG PRIVATE ROOM

## 2022-05-17 PROCEDURE — 63600175 PHARM REV CODE 636 W HCPCS: Performed by: STUDENT IN AN ORGANIZED HEALTH CARE EDUCATION/TRAINING PROGRAM

## 2022-05-17 PROCEDURE — 85025 COMPLETE CBC W/AUTO DIFF WBC: CPT

## 2022-05-17 PROCEDURE — 99232 SBSQ HOSP IP/OBS MODERATE 35: CPT | Mod: GC,,, | Performed by: INTERNAL MEDICINE

## 2022-05-17 PROCEDURE — 25000003 PHARM REV CODE 250: Performed by: STUDENT IN AN ORGANIZED HEALTH CARE EDUCATION/TRAINING PROGRAM

## 2022-05-17 PROCEDURE — 63600175 PHARM REV CODE 636 W HCPCS

## 2022-05-17 PROCEDURE — C9399 UNCLASSIFIED DRUGS OR BIOLOG: HCPCS

## 2022-05-17 PROCEDURE — 80048 BASIC METABOLIC PNL TOTAL CA: CPT

## 2022-05-17 PROCEDURE — 63600175 PHARM REV CODE 636 W HCPCS: Performed by: INTERNAL MEDICINE

## 2022-05-17 PROCEDURE — 99232 PR SUBSEQUENT HOSPITAL CARE,LEVL II: ICD-10-PCS | Mod: GC,,, | Performed by: INTERNAL MEDICINE

## 2022-05-17 RX ORDER — HYDROMORPHONE HYDROCHLORIDE 2 MG/ML
0.5 INJECTION, SOLUTION INTRAMUSCULAR; INTRAVENOUS; SUBCUTANEOUS
Status: DISCONTINUED | OUTPATIENT
Start: 2022-05-17 | End: 2022-05-22

## 2022-05-17 RX ADMIN — PREDNISONE 5 MG: 5 TABLET ORAL at 08:05

## 2022-05-17 RX ADMIN — HYDROXYCHLOROQUINE SULFATE 200 MG: 200 TABLET, FILM COATED ORAL at 09:05

## 2022-05-17 RX ADMIN — CYCLOBENZAPRINE 10 MG: 10 TABLET, FILM COATED ORAL at 09:05

## 2022-05-17 RX ADMIN — MORPHINE SULFATE 2 MG: 2 INJECTION, SOLUTION INTRAMUSCULAR; INTRAVENOUS at 08:05

## 2022-05-17 RX ADMIN — AMPICILLIN SODIUM AND SULBACTAM SODIUM 3 G: 2; 1 INJECTION, POWDER, FOR SOLUTION INTRAMUSCULAR; INTRAVENOUS at 08:05

## 2022-05-17 RX ADMIN — INSULIN ASPART 10 UNITS: 100 INJECTION, SOLUTION INTRAVENOUS; SUBCUTANEOUS at 04:05

## 2022-05-17 RX ADMIN — OXYCODONE HYDROCHLORIDE AND ACETAMINOPHEN 1000 MG: 500 TABLET ORAL at 08:05

## 2022-05-17 RX ADMIN — HYDROXYCHLOROQUINE SULFATE 200 MG: 200 TABLET, FILM COATED ORAL at 08:05

## 2022-05-17 RX ADMIN — MORPHINE SULFATE 2 MG: 2 INJECTION, SOLUTION INTRAMUSCULAR; INTRAVENOUS at 03:05

## 2022-05-17 RX ADMIN — NYSTATIN 500000 UNITS: 500000 SUSPENSION ORAL at 08:05

## 2022-05-17 RX ADMIN — POLYETHYLENE GLYCOL 3350 17 G: 17 POWDER, FOR SOLUTION ORAL at 08:05

## 2022-05-17 RX ADMIN — AMPICILLIN SODIUM AND SULBACTAM SODIUM 3 G: 2; 1 INJECTION, POWDER, FOR SOLUTION INTRAMUSCULAR; INTRAVENOUS at 01:05

## 2022-05-17 RX ADMIN — ENOXAPARIN SODIUM 40 MG: 40 INJECTION SUBCUTANEOUS at 04:05

## 2022-05-17 RX ADMIN — SODIUM CHLORIDE: 9 INJECTION, SOLUTION INTRAVENOUS at 01:05

## 2022-05-17 RX ADMIN — GABAPENTIN 600 MG: 300 CAPSULE ORAL at 09:05

## 2022-05-17 RX ADMIN — NYSTATIN 500000 UNITS: 500000 SUSPENSION ORAL at 04:05

## 2022-05-17 RX ADMIN — HYDROMORPHONE HYDROCHLORIDE 0.5 MG: 2 INJECTION INTRAMUSCULAR; INTRAVENOUS; SUBCUTANEOUS at 09:05

## 2022-05-17 RX ADMIN — HYDROMORPHONE HYDROCHLORIDE 0.5 MG: 2 INJECTION INTRAMUSCULAR; INTRAVENOUS; SUBCUTANEOUS at 11:05

## 2022-05-17 RX ADMIN — AMPICILLIN SODIUM AND SULBACTAM SODIUM 3 G: 2; 1 INJECTION, POWDER, FOR SOLUTION INTRAMUSCULAR; INTRAVENOUS at 02:05

## 2022-05-17 RX ADMIN — DOXYCYCLINE HYCLATE 100 MG: 100 CAPSULE ORAL at 09:05

## 2022-05-17 RX ADMIN — GABAPENTIN 600 MG: 300 CAPSULE ORAL at 02:05

## 2022-05-17 RX ADMIN — GABAPENTIN 600 MG: 300 CAPSULE ORAL at 08:05

## 2022-05-17 RX ADMIN — AMPICILLIN SODIUM AND SULBACTAM SODIUM 3 G: 2; 1 INJECTION, POWDER, FOR SOLUTION INTRAMUSCULAR; INTRAVENOUS at 09:05

## 2022-05-17 RX ADMIN — HYDROMORPHONE HYDROCHLORIDE 0.5 MG: 2 INJECTION INTRAMUSCULAR; INTRAVENOUS; SUBCUTANEOUS at 06:05

## 2022-05-17 RX ADMIN — VANCOMYCIN HYDROCHLORIDE 1750 MG: 5 INJECTION, POWDER, LYOPHILIZED, FOR SOLUTION INTRAVENOUS at 05:05

## 2022-05-17 RX ADMIN — FERROUS SULFATE TAB 325 MG (65 MG ELEMENTAL FE) 1 EACH: 325 (65 FE) TAB at 08:05

## 2022-05-17 RX ADMIN — NYSTATIN 500000 UNITS: 500000 SUSPENSION ORAL at 09:05

## 2022-05-17 RX ADMIN — INSULIN DETEMIR 25 UNITS: 100 INJECTION, SOLUTION SUBCUTANEOUS at 08:05

## 2022-05-17 RX ADMIN — LEFLUNOMIDE 20 MG: 20 TABLET ORAL at 08:05

## 2022-05-17 RX ADMIN — TRIAMTERENE AND HYDROCHLOROTHIAZIDE 1 TABLET: 37.5; 25 TABLET ORAL at 08:05

## 2022-05-17 RX ADMIN — HYDROMORPHONE HYDROCHLORIDE 0.5 MG: 2 INJECTION INTRAMUSCULAR; INTRAVENOUS; SUBCUTANEOUS at 02:05

## 2022-05-17 RX ADMIN — NYSTATIN 500000 UNITS: 500000 SUSPENSION ORAL at 01:05

## 2022-05-17 RX ADMIN — PANTOPRAZOLE SODIUM 40 MG: 40 TABLET, DELAYED RELEASE ORAL at 08:05

## 2022-05-17 RX ADMIN — VANCOMYCIN HYDROCHLORIDE 1750 MG: 5 INJECTION, POWDER, LYOPHILIZED, FOR SOLUTION INTRAVENOUS at 11:05

## 2022-05-17 RX ADMIN — INSULIN ASPART 14 UNITS: 100 INJECTION, SOLUTION INTRAVENOUS; SUBCUTANEOUS at 08:05

## 2022-05-17 NOTE — ASSESSMENT & PLAN NOTE
- CRP 3.31 and ESR 53 presently  - Continue home hydroxychloroquine, leflunimide and prednisone  - Monitor CBC

## 2022-05-17 NOTE — NURSING
0732 Pt resting in bed. Complains of pain in RUE. RUE covered with dressing. Redness and swelling looks to have decreased since last having pt. No other complaints or requests at this time. Call bell in reach. Side rails up x2.     1010 Pt sitting up in bed. Requesting pain medication. Pt had morphine around 0830 too soon to get dilaudid. No other complaints or requests at this time. Call bell in reach. Side rails up x3.     1134 Pt sitting up in bed. Gave dilaudid for pain. No distress noted. No other complaints or requests at this time. Call bell in reach. Side rails up x2.     1334 Pt resting in bed. Mother just at bedside. No distress noted. Started unasyn. No complaints or requests at this time. Call bell in reach. Side rails up x2.     1500 Pt resting in bed talking on phone. Complains of pain. Gave dilaudid. No distress noted. No other complaints or requests at this time. Call bell in reach. Side rails up x2.     1700 Pt resting in bed. Mother at bedside. No distress noted. No complaints or requests at this time. Call bell in reach. Side rails up x2.     1814 Pt resting in bed complains of pain. Gave dilaudid. No distress noted. No complaints or requests at this time. Call bell in reach. Side rails up x2.

## 2022-05-17 NOTE — SUBJECTIVE & OBJECTIVE
Interval History: Patient complaining of severe right hand pain this morning and asking for morphine. No overnight events reported. Had second I+D of right hand done by Dr. Sandoval yesterday. Repeat wound cultures pending, no growth so far. Fasting glucose so high so will further increase night levemir dose.   Review of Systems   Constitutional:  Negative for appetite change, chills and fever.   HENT:  Negative for congestion, rhinorrhea and sore throat.    Eyes:  Negative for visual disturbance.   Respiratory:  Negative for cough, shortness of breath and wheezing.    Cardiovascular:  Negative for chest pain, palpitations and leg swelling.   Gastrointestinal:  Negative for abdominal pain, constipation, diarrhea, nausea and vomiting.   Genitourinary:  Negative for dysuria, frequency and hematuria.   Musculoskeletal:  Positive for arthralgias and myalgias.   Skin:  Positive for wound.   Neurological:  Negative for syncope, weakness and headaches.   Psychiatric/Behavioral:  Negative for dysphoric mood and sleep disturbance. The patient is not nervous/anxious.    Objective:     Vital Signs (Most Recent):  Temp: 99.3 °F (37.4 °C) (05/17/22 0842)  Pulse: 93 (05/17/22 0842)  Resp: 19 (05/17/22 0842)  BP: (!) 160/78 (05/17/22 0842)  SpO2: 95 % (05/17/22 0842)   Vital Signs (24h Range):  Temp:  [96.7 °F (35.9 °C)-99.3 °F (37.4 °C)] 99.3 °F (37.4 °C)  Pulse:  [] 93  Resp:  [0-19] 19  SpO2:  [90 %-99 %] 95 %  BP: (109-161)/(52-80) 160/78     Weight: 90.7 kg (200 lb)  Body mass index is 28.7 kg/m².  No intake or output data in the 24 hours ending 05/17/22 0934   Physical Exam  Constitutional:       General: She is not in acute distress.     Appearance: Normal appearance.   HENT:      Head: Normocephalic.   Cardiovascular:      Rate and Rhythm: Normal rate.   Pulmonary:      Effort: Pulmonary effort is normal. No respiratory distress.   Abdominal:      General: There is no distension.      Tenderness: There is no abdominal  tenderness.   Musculoskeletal:         General: Normal range of motion.   Skin:     General: Skin is warm.      Coloration: Skin is not jaundiced.      Comments: Improved erythema/swelling the right wrist, but increased swelling distally around the knuckles with increased tenderness to palpation on the right hand; improved erythema and swelling in the left   Neurological:      General: No focal deficit present.      Mental Status: She is alert and oriented to person, place, and time.      Cranial Nerves: No cranial nerve deficit.       Significant Labs: All pertinent labs within the past 24 hours have been reviewed.    Significant Imaging: I have reviewed all pertinent imaging results/findings within the past 24 hours.

## 2022-05-17 NOTE — ASSESSMENT & PLAN NOTE
-Got I+D of right hand on 05/12 and 05/17  -Wound cultures no growth so far as preliminary result  -Continue vanc, zosyn and doxycycline for now  -percocet and morphine prn for pain

## 2022-05-17 NOTE — PLAN OF CARE
Problem: Impaired Wound Healing  Goal: Optimal Wound Healing  Outcome: Ongoing, Progressing  Intervention: Promote Wound Healing  Flowsheets (Taken 5/17/2022 1536)  Oral Nutrition Promotion:   medicated   physical activity promoted  Sleep/Rest Enhancement:   family presence promoted   natural light exposure provided  Activity Management:   Ambulated to bathroom - L4   Heel slide - L1   Arm raise - L1  Pain Management Interventions:   medication offered   pain management plan reviewed with patient/caregiver

## 2022-05-17 NOTE — PROGRESS NOTES
[facility]  [facility] Medicine  Progress Note    Patient Name: Silvana Sarah  MRN: [id number]  Patient Class: IP- Inpatient   Admission Date: 5/11/2022  Length of Stay: 5 days  Attending Physician: Citlaly Landers,*  Primary Care Provider: Sera Byrd NP        Subjective:     Principal Problem:Wound cellulitis        HPI:  62 yo female with PMH of T1DM, RA and HTN who presents to ED with c/o progressively worsening swelling and redness of bilateral hands. Patient was bitten and scratched multiple times by cat on May 9, 2022. She came to ED on 5/10/22 and received IV Clindamycin and Doxycycline PO. She was also prescribed Flagyl PO outpatient. Xray of right hand and wrist were positive for soft tissue swelling. Leukocytosis of 18K which has improved to 15K. Blood cultures were obtained on 5/10/22 and remain pending. Upon examination, bilateral hands (R >L) and right forearm are erythematous and edematous. There are multiple puncture sites and scratch marks noted to bilateral hands and forearms. Wounds appear clean and dry.      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 Her rheumatologist is Dr. Kamari Almazan and she sees Pain Management for chronic pain treatment. Patient has gastric stimulator to assist with DM gastroparesis. She will be admitted to [facility] at this time for further evaluation and management.            Overview/Hospital Course:  05/15/2022 - patient seen this morning laying in bed with eyes open.  Dressing on had clean and dry  with minimal bleeding noted.  Patient states that she is in a lot of pain and would liek to talk to doctor raad about this.  I see she has oxy 10 Q8Prn and she is not always taking it.  She also has dilaudid on q3 which she has been getting regularly.  Patient was advised that the oral meds are to handle baseline pain and IV was for breakthrough.  Patient was advised to ask for her PRN  meds regularly and resort to iv only in owens baker of 10 breakthrough.  I advised her that I may can increase oral dosage if she is getting to much breakthrough but that oral pain control will be preferred.  Will monitor this closely and follow up with patient.  All questions answered and all complaints addressed.  Plan discussed with Silvana Sarah who voices understanding and agreement.      Interval History: Patient complaining of severe right hand pain this morning and asking for morphine. No overnight events reported. Had second I+D of right hand done by Dr. Sandoval yesterday. Repeat wound cultures pending, no growth so far. Fasting glucose so high so will further increase night levemir dose.   Review of Systems   Constitutional:  Negative for appetite change, chills and fever.   HENT:  Negative for congestion, rhinorrhea and sore throat.    Eyes:  Negative for visual disturbance.   Respiratory:  Negative for cough, shortness of breath and wheezing.    Cardiovascular:  Negative for chest pain, palpitations and leg swelling.   Gastrointestinal:  Negative for abdominal pain, constipation, diarrhea, nausea and vomiting.   Genitourinary:  Negative for dysuria, frequency and hematuria.   Musculoskeletal:  Positive for arthralgias and myalgias.   Skin:  Positive for wound.   Neurological:  Negative for syncope, weakness and headaches.   Psychiatric/Behavioral:  Negative for dysphoric mood and sleep disturbance. The patient is not nervous/anxious.    Objective:     Vital Signs (Most Recent):  Temp: 99.3 °F (37.4 °C) (05/17/22 0842)  Pulse: 93 (05/17/22 0842)  Resp: 19 (05/17/22 0842)  BP: (!) 160/78 (05/17/22 0842)  SpO2: 95 % (05/17/22 0842)   Vital Signs (24h Range):  Temp:  [96.7 °F (35.9 °C)-99.3 °F (37.4 °C)] 99.3 °F (37.4 °C)  Pulse:  [] 93  Resp:  [0-19] 19  SpO2:  [90 %-99 %] 95 %  BP: (109-161)/(52-80) 160/78     Weight: 90.7 kg (200 lb)  Body mass index is 28.7 kg/m².  No intake or output data in the  24 hours ending 05/17/22 0934   Physical Exam  Constitutional:       General: She is not in acute distress.     Appearance: Normal appearance.   HENT:      Head: Normocephalic.   Cardiovascular:      Rate and Rhythm: Normal rate.   Pulmonary:      Effort: Pulmonary effort is normal. No respiratory distress.   Abdominal:      General: There is no distension.      Tenderness: There is no abdominal tenderness.   Musculoskeletal:         General: Normal range of motion.   Skin:     General: Skin is warm.      Coloration: Skin is not jaundiced.      Comments: Improved erythema/swelling the right wrist, but increased swelling distally around the knuckles with increased tenderness to palpation on the right hand; improved erythema and swelling in the left   Neurological:      General: No focal deficit present.      Mental Status: She is alert and oriented to person, place, and time.      Cranial Nerves: No cranial nerve deficit.       Significant Labs: All pertinent labs within the past 24 hours have been reviewed.    Significant Imaging: I have reviewed all pertinent imaging results/findings within the past 24 hours.      Assessment/Plan:      * Wound cellulitis  -Got I+D of right hand on 05/12 and 05/17  -Wound cultures no growth so far as preliminary result  -Continue vanc, zosyn and doxycycline for now  -percocet and morphine prn for pain      Type 1 diabetes mellitus  Complicated with neuropathy and gastroparesis. Patient has gastric stimulator.   -  today  - Diabetic Diet  - POCT glucose QID AC & HS  - Increase Levemir to 30U Daily   - Moderate SSI    Rheumatoid arthritis  - CRP 3.31 and ESR 53 presently  - Continue home hydroxychloroquine, leflunimide and prednisone  - Monitor CBC      Oral candidiasis  -fluconazole x1 dose  -nystatin swish        VTE Risk Mitigation (From admission, onward)         Ordered     enoxaparin injection 40 mg  Daily         05/11/22 2102     IP VTE HIGH RISK PATIENT  Once          05/11/22 2102     Place FRANCISCA hose  Until discontinued         05/11/22 2102                Discharge Planning   DANE:      Code Status: Full Code   Is the patient medically ready for discharge?:     Reason for patient still in hospital (select all that apply): Treatment                     Noy Gomez MD  Department of Hospital Medicine   [facility] - Orthopedic

## 2022-05-17 NOTE — ASSESSMENT & PLAN NOTE
Complicated with neuropathy and gastroparesis. Patient has gastric stimulator.   -  today  - Diabetic Diet  - POCT glucose QID AC & HS  - Increase Levemir to 30U Daily   - Moderate SSI

## 2022-05-18 LAB
ANION GAP SERPL CALCULATED.3IONS-SCNC: 11 MMOL/L (ref 7–16)
BACTERIA #/AREA URNS HPF: ABNORMAL /HPF
BASOPHILS # BLD AUTO: 0.1 K/UL (ref 0–0.2)
BASOPHILS NFR BLD AUTO: 0.9 % (ref 0–1)
BILIRUB UR QL STRIP: NEGATIVE
BUN SERPL-MCNC: 10 MG/DL (ref 7–18)
BUN/CREAT SERPL: 9 (ref 6–20)
CALCIUM SERPL-MCNC: 8.6 MG/DL (ref 8.5–10.1)
CHLORIDE SERPL-SCNC: 100 MMOL/L (ref 98–107)
CLARITY UR: CLEAR
CO2 SERPL-SCNC: 30 MMOL/L (ref 21–32)
COLOR UR: YELLOW
CREAT SERPL-MCNC: 1.11 MG/DL (ref 0.55–1.02)
DIFFERENTIAL METHOD BLD: ABNORMAL
EOSINOPHIL # BLD AUTO: 0.22 K/UL (ref 0–0.5)
EOSINOPHIL NFR BLD AUTO: 2 % (ref 1–4)
ERYTHROCYTE [DISTWIDTH] IN BLOOD BY AUTOMATED COUNT: 13 % (ref 11.5–14.5)
GLUCOSE SERPL-MCNC: 186 MG/DL (ref 70–105)
GLUCOSE SERPL-MCNC: 193 MG/DL (ref 70–105)
GLUCOSE SERPL-MCNC: 297 MG/DL (ref 70–105)
GLUCOSE SERPL-MCNC: 302 MG/DL (ref 74–106)
GLUCOSE SERPL-MCNC: 64 MG/DL (ref 70–105)
GLUCOSE UR STRIP-MCNC: 100 MG/DL
HCT VFR BLD AUTO: 32.7 % (ref 38–47)
HGB BLD-MCNC: 10.1 G/DL (ref 12–16)
IMM GRANULOCYTES # BLD AUTO: 0.19 K/UL (ref 0–0.04)
IMM GRANULOCYTES NFR BLD: 1.7 % (ref 0–0.4)
KETONES UR STRIP-SCNC: NEGATIVE MG/DL
LEUKOCYTE ESTERASE UR QL STRIP: NEGATIVE
LYMPHOCYTES # BLD AUTO: 2.25 K/UL (ref 1–4.8)
LYMPHOCYTES NFR BLD AUTO: 20.1 % (ref 27–41)
MCH RBC QN AUTO: 27.5 PG (ref 27–31)
MCHC RBC AUTO-ENTMCNC: 30.9 G/DL (ref 32–36)
MCV RBC AUTO: 89.1 FL (ref 80–96)
MONOCYTES # BLD AUTO: 1.42 K/UL (ref 0–0.8)
MONOCYTES NFR BLD AUTO: 12.7 % (ref 2–6)
MPC BLD CALC-MCNC: 10.6 FL (ref 9.4–12.4)
MUCOUS THREADS #/AREA URNS HPF: ABNORMAL /HPF
NEUTROPHILS # BLD AUTO: 7 K/UL (ref 1.8–7.7)
NEUTROPHILS NFR BLD AUTO: 62.6 % (ref 53–65)
NITRITE UR QL STRIP: NEGATIVE
NRBC # BLD AUTO: 0 X10E3/UL
NRBC, AUTO (.00): 0 %
PH UR STRIP: 6.5 PH UNITS
PLATELET # BLD AUTO: 260 K/UL (ref 150–400)
POTASSIUM SERPL-SCNC: 3.9 MMOL/L (ref 3.5–5.1)
PROT UR QL STRIP: NEGATIVE
RBC # BLD AUTO: 3.67 M/UL (ref 4.2–5.4)
RBC # UR STRIP: ABNORMAL /UL
RBC #/AREA URNS HPF: ABNORMAL /HPF
SODIUM SERPL-SCNC: 137 MMOL/L (ref 136–145)
SP GR UR STRIP: 1.01
SQUAMOUS #/AREA URNS LPF: ABNORMAL /LPF
UROBILINOGEN UR STRIP-ACNC: 0.2 MG/DL
WBC # BLD AUTO: 11.18 K/UL (ref 4.5–11)
WBC #/AREA URNS HPF: ABNORMAL /HPF

## 2022-05-18 PROCEDURE — 63600175 PHARM REV CODE 636 W HCPCS: Performed by: STUDENT IN AN ORGANIZED HEALTH CARE EDUCATION/TRAINING PROGRAM

## 2022-05-18 PROCEDURE — 81001 URINALYSIS AUTO W/SCOPE: CPT

## 2022-05-18 PROCEDURE — 87040 BLOOD CULTURE FOR BACTERIA: CPT

## 2022-05-18 PROCEDURE — C9399 UNCLASSIFIED DRUGS OR BIOLOG: HCPCS

## 2022-05-18 PROCEDURE — 25000003 PHARM REV CODE 250: Performed by: STUDENT IN AN ORGANIZED HEALTH CARE EDUCATION/TRAINING PROGRAM

## 2022-05-18 PROCEDURE — 36415 COLL VENOUS BLD VENIPUNCTURE: CPT

## 2022-05-18 PROCEDURE — 82962 GLUCOSE BLOOD TEST: CPT

## 2022-05-18 PROCEDURE — 94761 N-INVAS EAR/PLS OXIMETRY MLT: CPT

## 2022-05-18 PROCEDURE — 63600175 PHARM REV CODE 636 W HCPCS: Performed by: INTERNAL MEDICINE

## 2022-05-18 PROCEDURE — 11000001 HC ACUTE MED/SURG PRIVATE ROOM

## 2022-05-18 PROCEDURE — 99233 PR SUBSEQUENT HOSPITAL CARE,LEVL III: ICD-10-PCS | Mod: GC,,, | Performed by: INTERNAL MEDICINE

## 2022-05-18 PROCEDURE — 80048 BASIC METABOLIC PNL TOTAL CA: CPT

## 2022-05-18 PROCEDURE — 63600175 PHARM REV CODE 636 W HCPCS

## 2022-05-18 PROCEDURE — 99233 SBSQ HOSP IP/OBS HIGH 50: CPT | Mod: GC,,, | Performed by: INTERNAL MEDICINE

## 2022-05-18 PROCEDURE — 85025 COMPLETE CBC W/AUTO DIFF WBC: CPT

## 2022-05-18 PROCEDURE — 25000003 PHARM REV CODE 250: Performed by: INTERNAL MEDICINE

## 2022-05-18 RX ORDER — DOXYCYCLINE HYCLATE 100 MG
100 TABLET ORAL EVERY 12 HOURS
Status: DISCONTINUED | OUTPATIENT
Start: 2022-05-18 | End: 2022-05-18

## 2022-05-18 RX ADMIN — SODIUM CHLORIDE: 9 INJECTION, SOLUTION INTRAVENOUS at 05:05

## 2022-05-18 RX ADMIN — NYSTATIN 500000 UNITS: 500000 SUSPENSION ORAL at 08:05

## 2022-05-18 RX ADMIN — AMPICILLIN SODIUM AND SULBACTAM SODIUM 3 G: 2; 1 INJECTION, POWDER, FOR SOLUTION INTRAMUSCULAR; INTRAVENOUS at 02:05

## 2022-05-18 RX ADMIN — INSULIN ASPART 10 UNITS: 100 INJECTION, SOLUTION INTRAVENOUS; SUBCUTANEOUS at 05:05

## 2022-05-18 RX ADMIN — AMPICILLIN SODIUM AND SULBACTAM SODIUM 3 G: 2; 1 INJECTION, POWDER, FOR SOLUTION INTRAMUSCULAR; INTRAVENOUS at 09:05

## 2022-05-18 RX ADMIN — GABAPENTIN 600 MG: 300 CAPSULE ORAL at 08:05

## 2022-05-18 RX ADMIN — HYDROXYCHLOROQUINE SULFATE 200 MG: 200 TABLET, FILM COATED ORAL at 09:05

## 2022-05-18 RX ADMIN — ACETAMINOPHEN 650 MG: 325 TABLET ORAL at 08:05

## 2022-05-18 RX ADMIN — LEFLUNOMIDE 20 MG: 20 TABLET ORAL at 08:05

## 2022-05-18 RX ADMIN — HYDROMORPHONE HYDROCHLORIDE 0.5 MG: 2 INJECTION INTRAMUSCULAR; INTRAVENOUS; SUBCUTANEOUS at 09:05

## 2022-05-18 RX ADMIN — GABAPENTIN 600 MG: 300 CAPSULE ORAL at 09:05

## 2022-05-18 RX ADMIN — FERROUS SULFATE TAB 325 MG (65 MG ELEMENTAL FE) 1 EACH: 325 (65 FE) TAB at 08:05

## 2022-05-18 RX ADMIN — PREDNISONE 5 MG: 5 TABLET ORAL at 08:05

## 2022-05-18 RX ADMIN — NYSTATIN 500000 UNITS: 500000 SUSPENSION ORAL at 12:05

## 2022-05-18 RX ADMIN — DOXYCYCLINE HYCLATE 100 MG: 100 CAPSULE ORAL at 09:05

## 2022-05-18 RX ADMIN — NYSTATIN 500000 UNITS: 500000 SUSPENSION ORAL at 09:05

## 2022-05-18 RX ADMIN — OXYCODONE HYDROCHLORIDE AND ACETAMINOPHEN 1 TABLET: 10; 325 TABLET ORAL at 05:05

## 2022-05-18 RX ADMIN — CYCLOBENZAPRINE 10 MG: 10 TABLET, FILM COATED ORAL at 09:05

## 2022-05-18 RX ADMIN — POLYETHYLENE GLYCOL 3350 17 G: 17 POWDER, FOR SOLUTION ORAL at 08:05

## 2022-05-18 RX ADMIN — AMPICILLIN SODIUM AND SULBACTAM SODIUM 3 G: 2; 1 INJECTION, POWDER, FOR SOLUTION INTRAMUSCULAR; INTRAVENOUS at 08:05

## 2022-05-18 RX ADMIN — TRIAMTERENE AND HYDROCHLOROTHIAZIDE 1 TABLET: 37.5; 25 TABLET ORAL at 08:05

## 2022-05-18 RX ADMIN — HYDROMORPHONE HYDROCHLORIDE 0.5 MG: 2 INJECTION INTRAMUSCULAR; INTRAVENOUS; SUBCUTANEOUS at 08:05

## 2022-05-18 RX ADMIN — HYDROXYCHLOROQUINE SULFATE 200 MG: 200 TABLET, FILM COATED ORAL at 08:05

## 2022-05-18 RX ADMIN — HYDROMORPHONE HYDROCHLORIDE 0.5 MG: 2 INJECTION INTRAMUSCULAR; INTRAVENOUS; SUBCUTANEOUS at 02:05

## 2022-05-18 RX ADMIN — NYSTATIN 500000 UNITS: 500000 SUSPENSION ORAL at 04:05

## 2022-05-18 RX ADMIN — INSULIN DETEMIR 30 UNITS: 100 INJECTION, SOLUTION SUBCUTANEOUS at 08:05

## 2022-05-18 RX ADMIN — OXYCODONE HYDROCHLORIDE AND ACETAMINOPHEN 1000 MG: 500 TABLET ORAL at 08:05

## 2022-05-18 RX ADMIN — INSULIN ASPART 10 UNITS: 100 INJECTION, SOLUTION INTRAVENOUS; SUBCUTANEOUS at 12:05

## 2022-05-18 RX ADMIN — HYDROMORPHONE HYDROCHLORIDE 0.5 MG: 2 INJECTION INTRAMUSCULAR; INTRAVENOUS; SUBCUTANEOUS at 04:05

## 2022-05-18 RX ADMIN — ENOXAPARIN SODIUM 40 MG: 40 INJECTION SUBCUTANEOUS at 04:05

## 2022-05-18 RX ADMIN — GABAPENTIN 600 MG: 300 CAPSULE ORAL at 02:05

## 2022-05-18 RX ADMIN — HYDROMORPHONE HYDROCHLORIDE 0.5 MG: 2 INJECTION INTRAMUSCULAR; INTRAVENOUS; SUBCUTANEOUS at 12:05

## 2022-05-18 RX ADMIN — INSULIN ASPART 17 UNITS: 100 INJECTION, SOLUTION INTRAVENOUS; SUBCUTANEOUS at 08:05

## 2022-05-18 RX ADMIN — PANTOPRAZOLE SODIUM 40 MG: 40 TABLET, DELAYED RELEASE ORAL at 08:05

## 2022-05-18 NOTE — PLAN OF CARE
Problem: Adult Inpatient Plan of Care  Goal: Plan of Care Review  Outcome: Ongoing, Progressing  Flowsheets (Taken 5/18/2022 0328)  Plan of Care Reviewed With: patient  Goal: Patient-Specific Goal (Individualized)  Outcome: Ongoing, Progressing  Flowsheets (Taken 5/18/2022 0328)  Anxieties, Fears or Concerns: pain  Individualized Care Needs: pain management, antibiotic therapy  Patient-Specific Goals (Include Timeframe): pain management, antibiotic therapy  Goal: Absence of Hospital-Acquired Illness or Injury  Outcome: Ongoing, Progressing  Intervention: Identify and Manage Fall Risk  Flowsheets (Taken 5/18/2022 0328)  Safety Promotion/Fall Prevention: assistive device/personal item within reach  Intervention: Prevent Skin Injury  Flowsheets (Taken 5/18/2022 0328)  Body Position: position changed independently  Skin Protection: adhesive use limited  Intervention: Prevent and Manage VTE (Venous Thromboembolism) Risk  Flowsheets (Taken 5/18/2022 0328)  Activity Management: Rolling - L1  VTE Prevention/Management: intravenous hydration  Range of Motion: active ROM (range of motion) encouraged  Intervention: Prevent Infection  Flowsheets (Taken 5/18/2022 0328)  Infection Prevention:   equipment surfaces disinfected   hand hygiene promoted   personal protective equipment utilized   rest/sleep promoted   single patient room provided  Goal: Optimal Comfort and Wellbeing  Outcome: Ongoing, Progressing  Intervention: Monitor Pain and Promote Comfort  Flowsheets (Taken 5/18/2022 0328)  Pain Management Interventions:   care clustered   medication offered   pain management plan reviewed with patient/caregiver   pillow support provided   position adjusted   quiet environment facilitated   relaxation techniques promoted  Intervention: Provide Person-Centered Care  Flowsheets (Taken 5/18/2022 0328)  Trust Relationship/Rapport:   care explained   thoughts/feelings acknowledged  Goal: Readiness for Transition of Care  Outcome:  Ongoing, Progressing   Plan of care reviewed, patient progressing.

## 2022-05-18 NOTE — PROGRESS NOTES
[facility] - Orthopedic  General Surgery  Progress Note    Subjective:     History of Present Illness:  62 y/o female cat bite multiple to hands 4 days ago  Failed outpatient treatment   Returned to ER yesterday admitted for iv antibiotics  US with fluid collection right hand approximately 3 cm  Fever 101, leukocytosis  PMH: DM, RA      Post-Op Info:  Procedure(s) (LRB):  IRRIGATION AND DEBRIDEMENT, UPPER EXTREMITY (Right)   2 Days Post-Op     Interval History:  Stable, no acute events overnight.  Pain slightly improved    Medications:  Continuous Infusions:   sodium chloride 0.9% 100 mL/hr at 05/18/22 0535     Scheduled Meds:   ampicillin-sulbactim (UNASYN) IVPB  3 g Intravenous Q6H    ascorbic acid (vitamin C)  1,000 mg Oral Daily    cyclobenzaprine  10 mg Oral QHS    doxycycline  100 mg Oral BID    enoxaparin  40 mg Subcutaneous Daily    ferrous sulfate  1 tablet Oral Daily    gabapentin  600 mg Oral TID    hydrOXYchloroQUINE  200 mg Oral BID    insulin aspart U-100  0-26 Units Subcutaneous TIDWM    [START ON 5/19/2022] insulin detemir U-100  35 Units Subcutaneous Daily    leflunomide  20 mg Oral Daily    nystatin  500,000 Units Oral QID    pantoprazole  40 mg Oral Daily    polyethylene glycol  17 g Oral Daily    predniSONE  5 mg Oral Daily    triamterene-hydrochlorothiazide 37.5-25 mg  1 tablet Oral Daily     PRN Meds:acetaminophen, dextrose 50%, dextrose 50%, dextrose 50%, dextrose 50%, glucagon (human recombinant), glucagon (human recombinant), hydrALAZINE, HYDROmorphone, naloxone, ondansetron, oxyCODONE-acetaminophen, promethazine, sodium chloride 0.9%, vancomycin - pharmacy to dose     Review of patient's allergies indicates:   Allergen Reactions    Influenza virus vaccines     Penicillins      Objective:     Vital Signs (Most Recent):  Temp: 97.8 °F (36.6 °C) (05/18/22 1210)  Pulse: 94 (05/18/22 1210)  Resp: 18 (05/18/22 1219)  BP: 122/70 (05/18/22 1210)  SpO2: 98 % (05/18/22 1210)  Vital Signs (24h Range):  Temp:  [97.8 °F (36.6 °C)-100.4 °F (38 °C)] 97.8 °F (36.6 °C)  Pulse:  [] 94  Resp:  [16-18] 18  SpO2:  [95 %-98 %] 98 %  BP: (119-151)/(55-77) 122/70     Weight: 90.7 kg (200 lb)  Body mass index is 28.7 kg/m².    Intake/Output - Last 3 Shifts         05/16 0700  05/17 0659 05/17 0700 05/18 0659 05/18 0700 05/19 0659    I.V. (mL/kg)  600 (6.6)     IV Piggyback  680     Total Intake(mL/kg)  1280 (14.1)     Net  +1280            Urine Occurrence  1 x     Stool Occurrence  1 x             Physical Exam  Constitutional:       General: She is not in acute distress.     Appearance: Normal appearance.   HENT:      Head: Normocephalic.   Cardiovascular:      Rate and Rhythm: Normal rate.   Pulmonary:      Effort: Pulmonary effort is normal. No respiratory distress.   Abdominal:      General: There is no distension.      Tenderness: There is no abdominal tenderness.   Musculoskeletal:         General: Normal range of motion.      Comments: Right hand with slight improvement in edema, still cellulitis present; some purulent drainage coming from packing on incision closed the wrist; no purulent drainage from other incisions; left hand cellulitis/edema improving   Skin:     General: Skin is warm.      Coloration: Skin is not jaundiced.   Neurological:      General: No focal deficit present.      Mental Status: She is alert and oriented to person, place, and time.      Cranial Nerves: No cranial nerve deficit.       Significant Labs:  I have reviewed all pertinent lab results within the past 24 hours.  CBC:   Recent Labs   Lab 05/18/22 0431   WBC 11.18*   RBC 3.67*   HGB 10.1*   HCT 32.7*      MCV 89.1   MCH 27.5   MCHC 30.9*     BMP:   Recent Labs   Lab 05/18/22 0431   *      K 3.9      CO2 30   BUN 10   CREATININE 1.11*   CALCIUM 8.6       Significant Diagnostics:  I have reviewed all pertinent imaging results/findings within the past 24 hours.    Assessment/Plan:      * Wound cellulitis  05/13 Bilateral hands cellulitis right much greater than left  RIGHT hand abscess, edema, cellulitis  S/P  I/D RIGHT Hand per Dr. Sandoval  Continue iv abx    5/16:  To OR for washout and debridement of right hand.  Risks and benefits explained with the patient including risks for infection, bleeding, injury to surrounding structures, hematoma/seroma formation with need for possible evacuation, possible open.  The patient verbalized understanding, agrees and wishes to proceed with surgery.    5/18:  Waiting on cultures.  Continue antibiotics.  Infectious Disease following.  Continue to change dressings daily        Kamari Sandoval, DO  General Surgery  [facility] - Orthopedic

## 2022-05-18 NOTE — PROGRESS NOTES
[facility]  [facility] Medicine  Progress Note    Patient Name: Silvana Sarah  MRN: [id number]  Patient Class: IP- Inpatient   Admission Date: 5/11/2022  Length of Stay: 6 days  Attending Physician: Citlaly Landers,*  Primary Care Provider: Sera Byrd NP        Subjective:     Principal Problem:Wound cellulitis        HPI:  62 yo female with PMH of T1DM, RA and HTN who presents to ED with c/o progressively worsening swelling and redness of bilateral hands. Patient was bitten and scratched multiple times by cat on May 9, 2022. She came to ED on 5/10/22 and received IV Clindamycin and Doxycycline PO. She was also prescribed Flagyl PO outpatient. Xray of right hand and wrist were positive for soft tissue swelling. Leukocytosis of 18K which has improved to 15K. Blood cultures were obtained on 5/10/22 and remain pending. Upon examination, bilateral hands (R >L) and right forearm are erythematous and edematous. There are multiple puncture sites and scratch marks noted to bilateral hands and forearms. Wounds appear clean and dry.      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 Her rheumatologist is Dr. Kamari Almazan and she sees Pain Management for chronic pain treatment. Patient has gastric stimulator to assist with DM gastroparesis. She will be admitted to [facility] at this time for further evaluation and management.            Overview/Hospital Course:  05/15/2022 - patient seen this morning laying in bed with eyes open.  Dressing on had clean and dry  with minimal bleeding noted.  Patient states that she is in a lot of pain and would liek to talk to doctor raad about this.  I see she has oxy 10 Q8Prn and she is not always taking it.  She also has dilaudid on q3 which she has been getting regularly.  Patient was advised that the oral meds are to handle baseline pain and IV was for breakthrough.  Patient was advised to ask for her PRN  meds regularly and resort to iv only in ow[place] baker of 10 breakthrough.  I advised her that I may can increase oral dosage if she is getting to much breakthrough but that oral pain control will be preferred.  Will monitor this closely and follow up with patient.  All questions answered and all complaints addressed.  Plan discussed with Silvana Sarah who voices understanding and agreement.      Interval History: Patient resting in bed saying she is feeling weaker today and not herself. Says she noted pus draining from her right hand this morning when wound care was changing the bandage. Chest xray was ordered and showed mild infiltrates. UA was done which showed no evidence of UTI. Blood cultures and wound cultures show no growth so far, will stop vancomycin but continue sulbactam and doxycycline for now. Patient may need another I+D or procedure in light of remaining severe right hand pain and purulent discharge noted from previous surgical site on hand.     Review of Systems   Constitutional:  Negative for appetite change, chills and fever.   HENT:  Negative for congestion, rhinorrhea and sore throat.    Eyes:  Negative for visual disturbance.   Respiratory:  Negative for cough, shortness of breath and wheezing.    Cardiovascular:  Negative for chest pain, palpitations and leg swelling.   Gastrointestinal:  Negative for abdominal pain, constipation, diarrhea, nausea and vomiting.   Genitourinary:  Negative for dysuria, frequency and hematuria.   Musculoskeletal:  Positive for arthralgias and myalgias.   Skin:  Positive for wound.   Neurological:  Negative for syncope, weakness and headaches.   Psychiatric/Behavioral:  Negative for dysphoric mood and sleep disturbance. The patient is not nervous/anxious.    Objective:     Vital Signs (Most Recent):  Temp: 97.8 °F (36.6 °C) (05/18/22 1210)  Pulse: 94 (05/18/22 1210)  Resp: 18 (05/18/22 1219)  BP: 122/70 (05/18/22 1210)  SpO2: 98 % (05/18/22 1210) Vital Signs (24h  Range):  Temp:  [97.8 °F (36.6 °C)-100.4 °F (38 °C)] 97.8 °F (36.6 °C)  Pulse:  [] 94  Resp:  [16-18] 18  SpO2:  [95 %-98 %] 98 %  BP: (119-151)/(55-77) 122/70     Weight: 90.7 kg (200 lb)  Body mass index is 28.7 kg/m².    Intake/Output Summary (Last 24 hours) at 5/18/2022 1443  Last data filed at 5/18/2022 0530  Gross per 24 hour   Intake 1280 ml   Output --   Net 1280 ml      Physical Exam  Constitutional:       General: She is not in acute distress.     Appearance: Normal appearance.   HENT:      Head: Normocephalic.   Cardiovascular:      Rate and Rhythm: Normal rate.   Pulmonary:      Effort: Pulmonary effort is normal. No respiratory distress.   Abdominal:      General: There is no distension.      Tenderness: There is no abdominal tenderness.   Musculoskeletal:         General: Normal range of motion.   Skin:     General: Skin is warm.      Coloration: Skin is not jaundiced.      Comments: Improved erythema/swelling the right wrist, but increased swelling distally around the knuckles with increased tenderness to palpation on the right hand; improved erythema and swelling in the left   Neurological:      General: No focal deficit present.      Mental Status: She is alert and oriented to person, place, and time.      Cranial Nerves: No cranial nerve deficit.       Significant Labs: All pertinent labs within the past 24 hours have been reviewed.    Significant Imaging: I have reviewed all pertinent imaging results/findings within the past 24 hours.      Assessment/Plan:      * Wound cellulitis  -Got I+D of right hand on 05/12 and 05/17  -Wound cultures no growth so far  -Continue zosyn and doxycycline for now  -percocet and morphine prn for pain      Type 1 diabetes mellitus  Complicated with neuropathy and gastroparesis. Patient has gastric stimulator.   -  today  - Diabetic Diet  - POCT glucose QID AC & HS  - Increase Levemir to 35U Daily   - Moderate SSI    Rheumatoid arthritis  - CRP 3.31 and  ESR 53 presently  - Continue home hydroxychloroquine, leflunimide and prednisone  - Monitor CBC      Oral candidiasis  -nystatin swish 4 times daily        VTE Risk Mitigation (From admission, onward)         Ordered     enoxaparin injection 40 mg  Daily         05/11/22 2102     IP VTE HIGH RISK PATIENT  Once         05/11/22 2102     Place FRANCISCA hose  Until discontinued         05/11/22 2102                Discharge Planning   DANE:      Code Status: Full Code   Is the patient medically ready for discharge?:     Reason for patient still in hospital (select all that apply): Treatment                     Noy Gomez MD  Department of Hospital Medicine   [facility] - Orthopedic

## 2022-05-18 NOTE — ASSESSMENT & PLAN NOTE
-Got I+D of right hand on 05/12 and 05/17  -Wound cultures no growth so far  -Continue zosyn and doxycycline for now  -percocet and morphine prn for pain

## 2022-05-18 NOTE — PLAN OF CARE
Problem: Impaired Wound Healing  Goal: Optimal Wound Healing  5/18/2022 1540 by Leatha Borja RN  Outcome: Ongoing, Progressing  5/18/2022 1540 by Leahta Borja RN  Outcome: Ongoing, Progressing  Intervention: Promote Wound Healing  Flowsheets (Taken 5/18/2022 1540)  Oral Nutrition Promotion:   medicated   physical activity promoted   rest periods promoted  Activity Management: Ambulated to bathroom - L4  Pain Management Interventions:   care clustered   breathing exercises utilized   medication offered   pain management plan reviewed with patient/caregiver

## 2022-05-18 NOTE — SUBJECTIVE & OBJECTIVE
Interval History: Patient resting in bed saying she is feeling weaker today and not herself. Says she noted pus draining from her right hand this morning when wound care was changing the bandage. Chest xray was ordered and showed mild infiltrates. UA was done which showed no evidence of UTI. Blood cultures and wound cultures show no growth so far, will stop vancomycin but continue sulbactam and doxycycline for now. Patient may need another I+D or procedure in light of remaining severe right hand pain and purulent discharge noted from previous surgical site on hand.     Review of Systems   Constitutional:  Negative for appetite change, chills and fever.   HENT:  Negative for congestion, rhinorrhea and sore throat.    Eyes:  Negative for visual disturbance.   Respiratory:  Negative for cough, shortness of breath and wheezing.    Cardiovascular:  Negative for chest pain, palpitations and leg swelling.   Gastrointestinal:  Negative for abdominal pain, constipation, diarrhea, nausea and vomiting.   Genitourinary:  Negative for dysuria, frequency and hematuria.   Musculoskeletal:  Positive for arthralgias and myalgias.   Skin:  Positive for wound.   Neurological:  Negative for syncope, weakness and headaches.   Psychiatric/Behavioral:  Negative for dysphoric mood and sleep disturbance. The patient is not nervous/anxious.    Objective:     Vital Signs (Most Recent):  Temp: 97.8 °F (36.6 °C) (05/18/22 1210)  Pulse: 94 (05/18/22 1210)  Resp: 18 (05/18/22 1219)  BP: 122/70 (05/18/22 1210)  SpO2: 98 % (05/18/22 1210) Vital Signs (24h Range):  Temp:  [97.8 °F (36.6 °C)-100.4 °F (38 °C)] 97.8 °F (36.6 °C)  Pulse:  [] 94  Resp:  [16-18] 18  SpO2:  [95 %-98 %] 98 %  BP: (119-151)/(55-77) 122/70     Weight: 90.7 kg (200 lb)  Body mass index is 28.7 kg/m².    Intake/Output Summary (Last 24 hours) at 5/18/2022 8058  Last data filed at 5/18/2022 0530  Gross per 24 hour   Intake 1280 ml   Output --   Net 1280 ml      Physical  Exam  Constitutional:       General: She is not in acute distress.     Appearance: Normal appearance.   HENT:      Head: Normocephalic.   Cardiovascular:      Rate and Rhythm: Normal rate.   Pulmonary:      Effort: Pulmonary effort is normal. No respiratory distress.   Abdominal:      General: There is no distension.      Tenderness: There is no abdominal tenderness.   Musculoskeletal:         General: Normal range of motion.   Skin:     General: Skin is warm.      Coloration: Skin is not jaundiced.      Comments: Improved erythema/swelling the right wrist, but increased swelling distally around the knuckles with increased tenderness to palpation on the right hand; improved erythema and swelling in the left   Neurological:      General: No focal deficit present.      Mental Status: She is alert and oriented to person, place, and time.      Cranial Nerves: No cranial nerve deficit.       Significant Labs: All pertinent labs within the past 24 hours have been reviewed.    Significant Imaging: I have reviewed all pertinent imaging results/findings within the past 24 hours.

## 2022-05-18 NOTE — NURSING
0716 Pt resting in bed. No distress noted. VS stable but has low grade temp. Pt removed herself from under the blanket. Gave fresh ice. No complaints or requests at this time. Call bell in reach. Side rails up x2.     0955 Pt resting in bed using cell phone. No distress noted. No complaints or requests at this time. Call bell in reach. Side rails up x2.     1120 Pt sitting up in bed. Mother at bedside. Urine sample collected. No distress noted. No complaints or requests at this time. Call bell in reach. Side rails up x2.     1230 Pt resting in bed. Lab at bedside attempting to stick pt. No distress noted. Complains of pain. Gave dilaudid. No other complaints or requests at this time. Call bell in reach. Side rails up x2.     1406 Pt sitting up in chair using cell phone. Mother at bedside. No distress noted. No complaints or requests at this time. Call bell in reach.     1500 Pt resting in bed. Started antibiotics. Assisted with fixing coffee. No distress noted. No complaints or requests at this time. Call bell in reach. Side rails up x2.     1610 Pt resting in bed using cell phone. Complains of pain. Gave dilaudid. No distress noted. No other complaints or requests at this time. Call bell in reach. Side rails up x2.     1803 Pt resting in bed on L side. No distress noted. No complaints or requests at this time. Call bell in reach. Side rails up x2.

## 2022-05-18 NOTE — PROGRESS NOTES
Trough within a desired range. Will continue dosing as scheduled. Thank you. Will check level again on 5/20.

## 2022-05-18 NOTE — SUBJECTIVE & OBJECTIVE
Interval History:  Stable, no acute events overnight.  Pain slightly improved    Medications:  Continuous Infusions:   sodium chloride 0.9% 100 mL/hr at 05/18/22 0535     Scheduled Meds:   ampicillin-sulbactim (UNASYN) IVPB  3 g Intravenous Q6H    ascorbic acid (vitamin C)  1,000 mg Oral Daily    cyclobenzaprine  10 mg Oral QHS    doxycycline  100 mg Oral BID    enoxaparin  40 mg Subcutaneous Daily    ferrous sulfate  1 tablet Oral Daily    gabapentin  600 mg Oral TID    hydrOXYchloroQUINE  200 mg Oral BID    insulin aspart U-100  0-26 Units Subcutaneous TIDWM    [START ON 5/19/2022] insulin detemir U-100  35 Units Subcutaneous Daily    leflunomide  20 mg Oral Daily    nystatin  500,000 Units Oral QID    pantoprazole  40 mg Oral Daily    polyethylene glycol  17 g Oral Daily    predniSONE  5 mg Oral Daily    triamterene-hydrochlorothiazide 37.5-25 mg  1 tablet Oral Daily     PRN Meds:acetaminophen, dextrose 50%, dextrose 50%, dextrose 50%, dextrose 50%, glucagon (human recombinant), glucagon (human recombinant), hydrALAZINE, HYDROmorphone, naloxone, ondansetron, oxyCODONE-acetaminophen, promethazine, sodium chloride 0.9%, vancomycin - pharmacy to dose     Review of patient's allergies indicates:   Allergen Reactions    Influenza virus vaccines     Penicillins      Objective:     Vital Signs (Most Recent):  Temp: 97.8 °F (36.6 °C) (05/18/22 1210)  Pulse: 94 (05/18/22 1210)  Resp: 18 (05/18/22 1219)  BP: 122/70 (05/18/22 1210)  SpO2: 98 % (05/18/22 1210) Vital Signs (24h Range):  Temp:  [97.8 °F (36.6 °C)-100.4 °F (38 °C)] 97.8 °F (36.6 °C)  Pulse:  [] 94  Resp:  [16-18] 18  SpO2:  [95 %-98 %] 98 %  BP: (119-151)/(55-77) 122/70     Weight: 90.7 kg (200 lb)  Body mass index is 28.7 kg/m².    Intake/Output - Last 3 Shifts         05/16 0700  05/17 0659 05/17 0700 05/18 0659 05/18 0700 05/19 0659    I.V. (mL/kg)  600 (6.6)     IV Piggyback  680     Total Intake(mL/kg)  1280 (14.1)     Net  +1280            Urine  Occurrence  1 x     Stool Occurrence  1 x             Physical Exam  Constitutional:       General: She is not in acute distress.     Appearance: Normal appearance.   HENT:      Head: Normocephalic.   Cardiovascular:      Rate and Rhythm: Normal rate.   Pulmonary:      Effort: Pulmonary effort is normal. No respiratory distress.   Abdominal:      General: There is no distension.      Tenderness: There is no abdominal tenderness.   Musculoskeletal:         General: Normal range of motion.      Comments: Right hand with slight improvement in edema, still cellulitis present; some purulent drainage coming from packing on incision closed the wrist; no purulent drainage from other incisions; left hand cellulitis/edema improving   Skin:     General: Skin is warm.      Coloration: Skin is not jaundiced.   Neurological:      General: No focal deficit present.      Mental Status: She is alert and oriented to person, place, and time.      Cranial Nerves: No cranial nerve deficit.       Significant Labs:  I have reviewed all pertinent lab results within the past 24 hours.  CBC:   Recent Labs   Lab 05/18/22  0431   WBC 11.18*   RBC 3.67*   HGB 10.1*   HCT 32.7*      MCV 89.1   MCH 27.5   MCHC 30.9*     BMP:   Recent Labs   Lab 05/18/22  0431   *      K 3.9      CO2 30   BUN 10   CREATININE 1.11*   CALCIUM 8.6       Significant Diagnostics:  I have reviewed all pertinent imaging results/findings within the past 24 hours.

## 2022-05-18 NOTE — ASSESSMENT & PLAN NOTE
Complicated with neuropathy and gastroparesis. Patient has gastric stimulator.   -  today  - Diabetic Diet  - POCT glucose QID AC & HS  - Increase Levemir to 35U Daily   - Moderate SSI

## 2022-05-19 PROBLEM — N17.9 AKI (ACUTE KIDNEY INJURY): Status: ACTIVE | Noted: 2022-05-19

## 2022-05-19 LAB
ANION GAP SERPL CALCULATED.3IONS-SCNC: 17 MMOL/L (ref 7–16)
BASOPHILS # BLD AUTO: 0.08 K/UL (ref 0–0.2)
BASOPHILS NFR BLD AUTO: 0.9 % (ref 0–1)
BUN SERPL-MCNC: 11 MG/DL (ref 7–18)
BUN/CREAT SERPL: 9 (ref 6–20)
CALCIUM SERPL-MCNC: 8.6 MG/DL (ref 8.5–10.1)
CHLORIDE SERPL-SCNC: 103 MMOL/L (ref 98–107)
CO2 SERPL-SCNC: 24 MMOL/L (ref 21–32)
CREAT SERPL-MCNC: 1.21 MG/DL (ref 0.55–1.02)
DIFFERENTIAL METHOD BLD: ABNORMAL
EOSINOPHIL # BLD AUTO: 0.19 K/UL (ref 0–0.5)
EOSINOPHIL NFR BLD AUTO: 2.2 % (ref 1–4)
ERYTHROCYTE [DISTWIDTH] IN BLOOD BY AUTOMATED COUNT: 12.9 % (ref 11.5–14.5)
GLUCOSE SERPL-MCNC: 107 MG/DL (ref 70–105)
GLUCOSE SERPL-MCNC: 131 MG/DL (ref 70–105)
GLUCOSE SERPL-MCNC: 272 MG/DL (ref 70–105)
GLUCOSE SERPL-MCNC: 289 MG/DL (ref 74–106)
GLUCOSE SERPL-MCNC: 332 MG/DL (ref 70–105)
HCT VFR BLD AUTO: 33.3 % (ref 38–47)
HGB BLD-MCNC: 10.2 G/DL (ref 12–16)
IMM GRANULOCYTES # BLD AUTO: 0.16 K/UL (ref 0–0.04)
IMM GRANULOCYTES NFR BLD: 1.9 % (ref 0–0.4)
LYMPHOCYTES # BLD AUTO: 2.92 K/UL (ref 1–4.8)
LYMPHOCYTES NFR BLD AUTO: 33.8 % (ref 27–41)
MCH RBC QN AUTO: 27.3 PG (ref 27–31)
MCHC RBC AUTO-ENTMCNC: 30.6 G/DL (ref 32–36)
MCV RBC AUTO: 89 FL (ref 80–96)
MONOCYTES # BLD AUTO: 0.97 K/UL (ref 0–0.8)
MONOCYTES NFR BLD AUTO: 11.2 % (ref 2–6)
MPC BLD CALC-MCNC: 10.7 FL (ref 9.4–12.4)
NEUTROPHILS # BLD AUTO: 4.31 K/UL (ref 1.8–7.7)
NEUTROPHILS NFR BLD AUTO: 50 % (ref 53–65)
NRBC # BLD AUTO: 0 X10E3/UL
NRBC, AUTO (.00): 0 %
PLATELET # BLD AUTO: 237 K/UL (ref 150–400)
POTASSIUM SERPL-SCNC: 4.5 MMOL/L (ref 3.5–5.1)
RBC # BLD AUTO: 3.74 M/UL (ref 4.2–5.4)
SODIUM SERPL-SCNC: 139 MMOL/L (ref 136–145)
WBC # BLD AUTO: 8.63 K/UL (ref 4.5–11)

## 2022-05-19 PROCEDURE — 99232 SBSQ HOSP IP/OBS MODERATE 35: CPT | Mod: GC,,, | Performed by: INTERNAL MEDICINE

## 2022-05-19 PROCEDURE — 63600175 PHARM REV CODE 636 W HCPCS: Performed by: STUDENT IN AN ORGANIZED HEALTH CARE EDUCATION/TRAINING PROGRAM

## 2022-05-19 PROCEDURE — 82962 GLUCOSE BLOOD TEST: CPT

## 2022-05-19 PROCEDURE — 94761 N-INVAS EAR/PLS OXIMETRY MLT: CPT

## 2022-05-19 PROCEDURE — 80048 BASIC METABOLIC PNL TOTAL CA: CPT

## 2022-05-19 PROCEDURE — 25000003 PHARM REV CODE 250: Performed by: INTERNAL MEDICINE

## 2022-05-19 PROCEDURE — C9399 UNCLASSIFIED DRUGS OR BIOLOG: HCPCS

## 2022-05-19 PROCEDURE — 11000001 HC ACUTE MED/SURG PRIVATE ROOM

## 2022-05-19 PROCEDURE — 85025 COMPLETE CBC W/AUTO DIFF WBC: CPT

## 2022-05-19 PROCEDURE — 25000003 PHARM REV CODE 250: Performed by: STUDENT IN AN ORGANIZED HEALTH CARE EDUCATION/TRAINING PROGRAM

## 2022-05-19 PROCEDURE — 36415 COLL VENOUS BLD VENIPUNCTURE: CPT | Performed by: INTERNAL MEDICINE

## 2022-05-19 PROCEDURE — 99232 PR SUBSEQUENT HOSPITAL CARE,LEVL II: ICD-10-PCS | Mod: GC,,, | Performed by: INTERNAL MEDICINE

## 2022-05-19 PROCEDURE — 63600175 PHARM REV CODE 636 W HCPCS

## 2022-05-19 PROCEDURE — 63600175 PHARM REV CODE 636 W HCPCS: Performed by: INTERNAL MEDICINE

## 2022-05-19 PROCEDURE — 36415 COLL VENOUS BLD VENIPUNCTURE: CPT

## 2022-05-19 RX ORDER — GLUCAGON 1 MG
1 KIT INJECTION
Status: DISCONTINUED | OUTPATIENT
Start: 2022-05-19 | End: 2022-05-23 | Stop reason: HOSPADM

## 2022-05-19 RX ORDER — INSULIN ASPART 100 [IU]/ML
1-10 INJECTION, SOLUTION INTRAVENOUS; SUBCUTANEOUS
Status: DISCONTINUED | OUTPATIENT
Start: 2022-05-19 | End: 2022-05-23 | Stop reason: HOSPADM

## 2022-05-19 RX ADMIN — GABAPENTIN 600 MG: 300 CAPSULE ORAL at 02:05

## 2022-05-19 RX ADMIN — POLYETHYLENE GLYCOL 3350 17 G: 17 POWDER, FOR SOLUTION ORAL at 08:05

## 2022-05-19 RX ADMIN — SODIUM CHLORIDE: 9 INJECTION, SOLUTION INTRAVENOUS at 04:05

## 2022-05-19 RX ADMIN — HYDROXYCHLOROQUINE SULFATE 200 MG: 200 TABLET, FILM COATED ORAL at 08:05

## 2022-05-19 RX ADMIN — GABAPENTIN 600 MG: 300 CAPSULE ORAL at 08:05

## 2022-05-19 RX ADMIN — INSULIN ASPART 20 UNITS: 100 INJECTION, SOLUTION INTRAVENOUS; SUBCUTANEOUS at 08:05

## 2022-05-19 RX ADMIN — HYDROMORPHONE HYDROCHLORIDE 0.5 MG: 2 INJECTION INTRAMUSCULAR; INTRAVENOUS; SUBCUTANEOUS at 04:05

## 2022-05-19 RX ADMIN — TRIAMTERENE AND HYDROCHLOROTHIAZIDE 1 TABLET: 37.5; 25 TABLET ORAL at 08:05

## 2022-05-19 RX ADMIN — FERROUS SULFATE TAB 325 MG (65 MG ELEMENTAL FE) 1 EACH: 325 (65 FE) TAB at 08:05

## 2022-05-19 RX ADMIN — PANTOPRAZOLE SODIUM 40 MG: 40 TABLET, DELAYED RELEASE ORAL at 08:05

## 2022-05-19 RX ADMIN — DOXYCYCLINE HYCLATE 100 MG: 100 CAPSULE ORAL at 09:05

## 2022-05-19 RX ADMIN — INSULIN ASPART 3 UNITS: 100 INJECTION, SOLUTION INTRAVENOUS; SUBCUTANEOUS at 08:05

## 2022-05-19 RX ADMIN — HYDROMORPHONE HYDROCHLORIDE 0.5 MG: 2 INJECTION INTRAMUSCULAR; INTRAVENOUS; SUBCUTANEOUS at 08:05

## 2022-05-19 RX ADMIN — ENOXAPARIN SODIUM 40 MG: 40 INJECTION SUBCUTANEOUS at 05:05

## 2022-05-19 RX ADMIN — CYCLOBENZAPRINE 10 MG: 10 TABLET, FILM COATED ORAL at 08:05

## 2022-05-19 RX ADMIN — HYDROMORPHONE HYDROCHLORIDE 0.5 MG: 2 INJECTION INTRAMUSCULAR; INTRAVENOUS; SUBCUTANEOUS at 12:05

## 2022-05-19 RX ADMIN — NYSTATIN 500000 UNITS: 500000 SUSPENSION ORAL at 12:05

## 2022-05-19 RX ADMIN — NYSTATIN 500000 UNITS: 500000 SUSPENSION ORAL at 08:05

## 2022-05-19 RX ADMIN — AMPICILLIN SODIUM AND SULBACTAM SODIUM 3 G: 2; 1 INJECTION, POWDER, FOR SOLUTION INTRAMUSCULAR; INTRAVENOUS at 01:05

## 2022-05-19 RX ADMIN — AMPICILLIN SODIUM AND SULBACTAM SODIUM 3 G: 2; 1 INJECTION, POWDER, FOR SOLUTION INTRAMUSCULAR; INTRAVENOUS at 08:05

## 2022-05-19 RX ADMIN — INSULIN DETEMIR 35 UNITS: 100 INJECTION, SOLUTION SUBCUTANEOUS at 08:05

## 2022-05-19 RX ADMIN — SODIUM CHLORIDE: 9 INJECTION, SOLUTION INTRAVENOUS at 09:05

## 2022-05-19 RX ADMIN — PREDNISONE 5 MG: 5 TABLET ORAL at 08:05

## 2022-05-19 RX ADMIN — LEFLUNOMIDE 20 MG: 20 TABLET ORAL at 08:05

## 2022-05-19 RX ADMIN — NYSTATIN 500000 UNITS: 500000 SUSPENSION ORAL at 04:05

## 2022-05-19 RX ADMIN — OXYCODONE HYDROCHLORIDE AND ACETAMINOPHEN 1000 MG: 500 TABLET ORAL at 08:05

## 2022-05-19 RX ADMIN — AMPICILLIN SODIUM AND SULBACTAM SODIUM 3 G: 2; 1 INJECTION, POWDER, FOR SOLUTION INTRAMUSCULAR; INTRAVENOUS at 02:05

## 2022-05-19 NOTE — ASSESSMENT & PLAN NOTE
Complicated with neuropathy and gastroparesis. Patient has gastric stimulator.   - BG 29 today, patient says she had a lot of sweets last night   - Diabetic Diet  - POCT glucose QID AC & HS  - Levemir 35U qhs  - Moderate SSI

## 2022-05-19 NOTE — PROGRESS NOTES
Fever 99.2 overnight  Packing removed this am per dr gamble with pus at wrist  CT hand evaluate  for tenosynovitis unable to have MRI due to gastric pacemeaker

## 2022-05-19 NOTE — NURSING
0715 Pt resting in bed. States she had a good night until about 0300. No distress noted. No complaints or requests at this time. Call bell in reach. Side rails up x2.     1015 Pt resting in bed with eyes closed. No distress noted. No complaints or requests at this time. Call bell in reach. Side rails up x2.     1234 Pt sitting up in chair. Gave pain medication. States she did not have an appetite. No distress noted. No complaints or requests at this time. Call bell in reach. Side rails up x2.     1414 Pt sitting up in bed putting on makeup. Mother at bedside. No distress noted. Started antibiotic. No complaints or requests at this time. Call bell in reach. Side rails up x2.     1610 Pt sitting up in bed watching tv. No distress noted. Reports pain. Gave dilaudid. No complaints or requests at this time. Call bell in reach. Side rails up x2.     1750 Pt sitting up in bed using cell phone. No distress noted. No complaints or requests at this time. Call bell in reach. Side rails up x2.

## 2022-05-19 NOTE — PLAN OF CARE
Problem: Adult Inpatient Plan of Care  Goal: Plan of Care Review  Outcome: Ongoing, Progressing  Flowsheets (Taken 5/19/2022 0154)  Plan of Care Reviewed With: patient  Goal: Patient-Specific Goal (Individualized)  Outcome: Ongoing, Progressing  Flowsheets (Taken 5/19/2022 0154)  Anxieties, Fears or Concerns: pain  Individualized Care Needs: pain management, antibiotic therapy  Patient-Specific Goals (Include Timeframe): pain managment, antibiotic therapy.  Goal: Absence of Hospital-Acquired Illness or Injury  Outcome: Ongoing, Progressing  Intervention: Identify and Manage Fall Risk  Flowsheets (Taken 5/19/2022 0154)  Safety Promotion/Fall Prevention: assistive device/personal item within reach  Intervention: Prevent Skin Injury  Flowsheets (Taken 5/19/2022 0154)  Body Position: position changed independently  Skin Protection: adhesive use limited  Intervention: Prevent and Manage VTE (Venous Thromboembolism) Risk  Flowsheets (Taken 5/19/2022 0154)  Activity Management: Rolling - L1  VTE Prevention/Management:   ambulation promoted   fluids promoted   intravenous hydration   ROM (active) performed  Range of Motion: active ROM (range of motion) encouraged  Intervention: Prevent Infection  Flowsheets (Taken 5/19/2022 0154)  Infection Prevention:   hand hygiene promoted   personal protective equipment utilized   rest/sleep promoted   single patient room provided  Goal: Optimal Comfort and Wellbeing  Outcome: Ongoing, Progressing  Intervention: Monitor Pain and Promote Comfort  Flowsheets (Taken 5/19/2022 0154)  Pain Management Interventions:   care clustered   pain management plan reviewed with patient/caregiver   pillow support provided   position adjusted   quiet environment facilitated   relaxation techniques promoted   warm blanket provided  Intervention: Provide Person-Centered Care  Flowsheets (Taken 5/19/2022 0154)  Trust Relationship/Rapport:   care explained   choices provided   emotional support provided    empathic listening provided   questions answered   questions encouraged   reassurance provided   thoughts/feelings acknowledged  Goal: Readiness for Transition of Care  Outcome: Ongoing, Progressing   Plan of care reviewed, patient progressing.

## 2022-05-19 NOTE — PLAN OF CARE
Problem: Impaired Wound Healing  Goal: Optimal Wound Healing  Outcome: Ongoing, Progressing  Intervention: Promote Wound Healing  Flowsheets (Taken 5/19/2022 1142)  Oral Nutrition Promotion:   rest periods promoted   physical activity promoted  Activity Management:   Ambulated to bathroom - L4   Ambulated in room - L4   Ankle pumps - L1   Arm raise - L1   Heel slide - L1   Leg kicks - L2  Pain Management Interventions:   medication offered   pain management plan reviewed with patient/caregiver

## 2022-05-19 NOTE — PROGRESS NOTES
"Patient seen per LOS. CBW is 90.7kg, 70" tall, BMI 28.7. Patient on a diabetic diet and has a good meal intake. No current nutritional suggestions. Taking Vit C. RD following.  "

## 2022-05-19 NOTE — SUBJECTIVE & OBJECTIVE
Interval History: Patient resting comfortably in bed. No acute complaints. No overnight events reported. MRI of the right hand was ordered to check for osteomyelitis but patient cannot get due to presence of pacemaker. Blood and wound cx no growth to date. General surgery following.     Review of Systems   Constitutional:  Negative for appetite change, chills and fever.   HENT:  Negative for congestion, rhinorrhea and sore throat.    Eyes:  Negative for visual disturbance.   Respiratory:  Negative for cough, shortness of breath and wheezing.    Cardiovascular:  Negative for chest pain, palpitations and leg swelling.   Gastrointestinal:  Negative for abdominal pain, constipation, diarrhea, nausea and vomiting.   Genitourinary:  Negative for dysuria, frequency and hematuria.   Musculoskeletal:  Positive for arthralgias and myalgias.   Skin:  Positive for wound.   Neurological:  Negative for syncope, weakness and headaches.   Psychiatric/Behavioral:  Negative for dysphoric mood and sleep disturbance. The patient is not nervous/anxious.    Objective:     Vital Signs (Most Recent):  Temp: 99.2 °F (37.3 °C) (05/19/22 0400)  Pulse: 87 (05/19/22 0715)  Resp: 18 (05/19/22 0838)  BP: 132/68 (05/19/22 0715)  SpO2: 96 % (05/19/22 0715) Vital Signs (24h Range):  Temp:  [97.8 °F (36.6 °C)-99.2 °F (37.3 °C)] 99.2 °F (37.3 °C)  Pulse:  [83-94] 87  Resp:  [16-20] 18  SpO2:  [96 %-98 %] 96 %  BP: (122-140)/(60-71) 132/68     Weight: 90.7 kg (200 lb)  Body mass index is 28.7 kg/m².  No intake or output data in the 24 hours ending 05/19/22 0943   Physical Exam  Constitutional:       General: She is not in acute distress.     Appearance: Normal appearance.   HENT:      Head: Normocephalic.   Cardiovascular:      Rate and Rhythm: Normal rate.   Pulmonary:      Effort: Pulmonary effort is normal. No respiratory distress.   Abdominal:      General: There is no distension.      Tenderness: There is no abdominal tenderness.    Musculoskeletal:         General: Normal range of motion.      Comments: Right hand with slight improvement in edema, still cellulitis present; some purulent drainage coming from packing on incision closed the wrist; no purulent drainage from other incisions; left hand cellulitis/edema improving   Skin:     General: Skin is warm.      Coloration: Skin is not jaundiced.   Neurological:      General: No focal deficit present.      Mental Status: She is alert and oriented to person, place, and time.      Cranial Nerves: No cranial nerve deficit.       Significant Labs: All pertinent labs within the past 24 hours have been reviewed.    Significant Imaging: I have reviewed all pertinent imaging results/findings within the past 24 hours.

## 2022-05-19 NOTE — PROGRESS NOTES
[facility]  [facility] Medicine  Progress Note    Patient Name: Silvana Sarah  MRN: [id number]  Patient Class: IP- Inpatient   Admission Date: 5/11/2022  Length of Stay: 7 days  Attending Physician: Citlaly Landers,*  Primary Care Provider: Sera Byrd NP        Subjective:     Principal Problem:Wound cellulitis        HPI:  60 yo female with PMH of T1DM, RA and HTN who presents to ED with c/o progressively worsening swelling and redness of bilateral hands. Patient was bitten and scratched multiple times by cat on May 9, 2022. She came to ED on 5/10/22 and received IV Clindamycin and Doxycycline PO. She was also prescribed Flagyl PO outpatient. Xray of right hand and wrist were positive for soft tissue swelling. Leukocytosis of 18K which has improved to 15K. Blood cultures were obtained on 5/10/22 and remain pending. Upon examination, bilateral hands (R >L) and right forearm are erythematous and edematous. There are multiple puncture sites and scratch marks noted to bilateral hands and forearms. Wounds appear clean and dry.      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 Her rheumatologist is Dr. Kamari Almazan and she sees Pain Management for chronic pain treatment. Patient has gastric stimulator to assist with DM gastroparesis. She will be admitted to [facility] at this time for further evaluation and management.            Overview/Hospital Course:  05/15/2022 - patient seen this morning laying in bed with eyes open.  Dressing on had clean and dry  with minimal bleeding noted.  Patient states that she is in a lot of pain and would liek to talk to doctor raad about this.  I see she has oxy 10 Q8Prn and she is not always taking it.  She also has dilaudid on q3 which she has been getting regularly.  Patient was advised that the oral meds are to handle baseline pain and IV was for breakthrough.  Patient was advised to ask for her PRN  meds regularly and resort to iv only in owens baker of 10 breakthrough.  I advised her that I may can increase oral dosage if she is getting to much breakthrough but that oral pain control will be preferred.  Will monitor this closely and follow up with patient.  All questions answered and all complaints addressed.  Plan discussed with Silvana Sarah who voices understanding and agreement.      Interval History: Patient resting comfortably in bed. No acute complaints. No overnight events reported. MRI of the right hand was ordered to check for osteomyelitis but patient cannot get due to presence of pacemaker. Blood and wound cx no growth to date. General surgery following.     Review of Systems   Constitutional:  Negative for appetite change, chills and fever.   HENT:  Negative for congestion, rhinorrhea and sore throat.    Eyes:  Negative for visual disturbance.   Respiratory:  Negative for cough, shortness of breath and wheezing.    Cardiovascular:  Negative for chest pain, palpitations and leg swelling.   Gastrointestinal:  Negative for abdominal pain, constipation, diarrhea, nausea and vomiting.   Genitourinary:  Negative for dysuria, frequency and hematuria.   Musculoskeletal:  Positive for arthralgias and myalgias.   Skin:  Positive for wound.   Neurological:  Negative for syncope, weakness and headaches.   Psychiatric/Behavioral:  Negative for dysphoric mood and sleep disturbance. The patient is not nervous/anxious.    Objective:     Vital Signs (Most Recent):  Temp: 99.2 °F (37.3 °C) (05/19/22 0400)  Pulse: 87 (05/19/22 0715)  Resp: 18 (05/19/22 0838)  BP: 132/68 (05/19/22 0715)  SpO2: 96 % (05/19/22 0715) Vital Signs (24h Range):  Temp:  [97.8 °F (36.6 °C)-99.2 °F (37.3 °C)] 99.2 °F (37.3 °C)  Pulse:  [83-94] 87  Resp:  [16-20] 18  SpO2:  [96 %-98 %] 96 %  BP: (122-140)/(60-71) 132/68     Weight: 90.7 kg (200 lb)  Body mass index is 28.7 kg/m².  No intake or output data in the 24 hours ending 05/19/22 0943    Physical Exam  Constitutional:       General: She is not in acute distress.     Appearance: Normal appearance.   HENT:      Head: Normocephalic.   Cardiovascular:      Rate and Rhythm: Normal rate.   Pulmonary:      Effort: Pulmonary effort is normal. No respiratory distress.   Abdominal:      General: There is no distension.      Tenderness: There is no abdominal tenderness.   Musculoskeletal:         General: Normal range of motion.      Comments: Right hand with slight improvement in edema, still cellulitis present; some purulent drainage coming from packing on incision closed the wrist; no purulent drainage from other incisions; left hand cellulitis/edema improving   Skin:     General: Skin is warm.      Coloration: Skin is not jaundiced.   Neurological:      General: No focal deficit present.      Mental Status: She is alert and oriented to person, place, and time.      Cranial Nerves: No cranial nerve deficit.       Significant Labs: All pertinent labs within the past 24 hours have been reviewed.    Significant Imaging: I have reviewed all pertinent imaging results/findings within the past 24 hours.      Assessment/Plan:      * Wound cellulitis  -Got I+D of right hand on 05/12 and 05/17  -Wound cultures no growth so far  -Continue Unasyn and doxycycline for now  -percocet and morphine prn for pain      Type 1 diabetes mellitus  Complicated with neuropathy and gastroparesis. Patient has gastric stimulator.   - BG 29 today, patient says she had a lot of sweets last night   - Diabetic Diet  - POCT glucose QID AC & HS  - Levemir 35U qhs  - Moderate SSI    Rheumatoid arthritis  - CRP 3.31 and ESR 53 presently  - Continue home hydroxychloroquine, leflunimide and prednisone  - Monitor CBC      Oral candidiasis  -nystatin swish 4 times daily      VTE Risk Mitigation (From admission, onward)         Ordered     enoxaparin injection 40 mg  Daily         05/11/22 2102     IP VTE HIGH RISK PATIENT  Once         05/11/22  2102     Place FRANCISCA hose  Until discontinued         05/11/22 2102                Discharge Planning   DANE:      Code Status: Full Code   Is the patient medically ready for discharge?:     Reason for patient still in hospital (select all that apply): Treatment                     Noy Gomez MD  Department of Hospital Medicine   [facility]

## 2022-05-19 NOTE — ASSESSMENT & PLAN NOTE
- 2/2 cat bites on R hand  - Surgery consulted  - 5/12 I+D, 05/17 Debridement  - Wound cultures NGTD   - Blood cultures NGTD x 5 days x 2 and NGTD x 24 Hrs x 2  - CT R hand: CT R hand showed soft tissue swelling and thickening of extensor tendons overlying carpal bones consistent with tenosynovitis  - Continue Unasyn and doxycycline

## 2022-05-20 PROBLEM — M65.9 TENOSYNOVITIS OF HAND: Status: ACTIVE | Noted: 2022-05-20

## 2022-05-20 PROBLEM — M65.949 TENOSYNOVITIS OF HAND: Status: ACTIVE | Noted: 2022-05-20

## 2022-05-20 LAB
ANION GAP SERPL CALCULATED.3IONS-SCNC: 14 MMOL/L (ref 7–16)
BASOPHILS # BLD AUTO: 0.08 K/UL (ref 0–0.2)
BASOPHILS NFR BLD AUTO: 1 % (ref 0–1)
BUN SERPL-MCNC: 9 MG/DL (ref 7–18)
BUN/CREAT SERPL: 7 (ref 6–20)
CALCIUM SERPL-MCNC: 9 MG/DL (ref 8.5–10.1)
CHLORIDE SERPL-SCNC: 105 MMOL/L (ref 98–107)
CO2 SERPL-SCNC: 27 MMOL/L (ref 21–32)
CREAT SERPL-MCNC: 1.21 MG/DL (ref 0.55–1.02)
DIFFERENTIAL METHOD BLD: ABNORMAL
EOSINOPHIL # BLD AUTO: 0.21 K/UL (ref 0–0.5)
EOSINOPHIL NFR BLD AUTO: 2.7 % (ref 1–4)
ERYTHROCYTE [DISTWIDTH] IN BLOOD BY AUTOMATED COUNT: 13.3 % (ref 11.5–14.5)
GLUCOSE SERPL-MCNC: 133 MG/DL (ref 70–105)
GLUCOSE SERPL-MCNC: 160 MG/DL (ref 74–106)
GLUCOSE SERPL-MCNC: 258 MG/DL (ref 70–105)
GLUCOSE SERPL-MCNC: 86 MG/DL (ref 70–105)
HCT VFR BLD AUTO: 33.7 % (ref 38–47)
HGB BLD-MCNC: 10.5 G/DL (ref 12–16)
IMM GRANULOCYTES # BLD AUTO: 0.15 K/UL (ref 0–0.04)
IMM GRANULOCYTES NFR BLD: 1.9 % (ref 0–0.4)
LYMPHOCYTES # BLD AUTO: 2.62 K/UL (ref 1–4.8)
LYMPHOCYTES NFR BLD AUTO: 33.7 % (ref 27–41)
MCH RBC QN AUTO: 27.3 PG (ref 27–31)
MCHC RBC AUTO-ENTMCNC: 31.2 G/DL (ref 32–36)
MCV RBC AUTO: 87.8 FL (ref 80–96)
MONOCYTES # BLD AUTO: 0.7 K/UL (ref 0–0.8)
MONOCYTES NFR BLD AUTO: 9 % (ref 2–6)
MPC BLD CALC-MCNC: 9.5 FL (ref 9.4–12.4)
NEUTROPHILS # BLD AUTO: 4.02 K/UL (ref 1.8–7.7)
NEUTROPHILS NFR BLD AUTO: 51.7 % (ref 53–65)
NRBC # BLD AUTO: 0 X10E3/UL
NRBC, AUTO (.00): 0 %
PLATELET # BLD AUTO: 260 K/UL (ref 150–400)
POTASSIUM SERPL-SCNC: 3.8 MMOL/L (ref 3.5–5.1)
RBC # BLD AUTO: 3.84 M/UL (ref 4.2–5.4)
SODIUM SERPL-SCNC: 142 MMOL/L (ref 136–145)
WBC # BLD AUTO: 7.78 K/UL (ref 4.5–11)

## 2022-05-20 PROCEDURE — 94761 N-INVAS EAR/PLS OXIMETRY MLT: CPT

## 2022-05-20 PROCEDURE — 25000003 PHARM REV CODE 250: Performed by: INTERNAL MEDICINE

## 2022-05-20 PROCEDURE — 82962 GLUCOSE BLOOD TEST: CPT

## 2022-05-20 PROCEDURE — C9399 UNCLASSIFIED DRUGS OR BIOLOG: HCPCS

## 2022-05-20 PROCEDURE — 80048 BASIC METABOLIC PNL TOTAL CA: CPT

## 2022-05-20 PROCEDURE — 25000003 PHARM REV CODE 250: Performed by: STUDENT IN AN ORGANIZED HEALTH CARE EDUCATION/TRAINING PROGRAM

## 2022-05-20 PROCEDURE — 63600175 PHARM REV CODE 636 W HCPCS: Performed by: INTERNAL MEDICINE

## 2022-05-20 PROCEDURE — 36415 COLL VENOUS BLD VENIPUNCTURE: CPT

## 2022-05-20 PROCEDURE — 85025 COMPLETE CBC W/AUTO DIFF WBC: CPT

## 2022-05-20 PROCEDURE — 11000001 HC ACUTE MED/SURG PRIVATE ROOM

## 2022-05-20 PROCEDURE — 99233 SBSQ HOSP IP/OBS HIGH 50: CPT | Mod: GC,,, | Performed by: INTERNAL MEDICINE

## 2022-05-20 PROCEDURE — 63600175 PHARM REV CODE 636 W HCPCS: Performed by: STUDENT IN AN ORGANIZED HEALTH CARE EDUCATION/TRAINING PROGRAM

## 2022-05-20 PROCEDURE — 25500020 PHARM REV CODE 255: Performed by: INTERNAL MEDICINE

## 2022-05-20 PROCEDURE — 99233 PR SUBSEQUENT HOSPITAL CARE,LEVL III: ICD-10-PCS | Mod: GC,,, | Performed by: INTERNAL MEDICINE

## 2022-05-20 PROCEDURE — 63600175 PHARM REV CODE 636 W HCPCS

## 2022-05-20 RX ADMIN — DOXYCYCLINE HYCLATE 100 MG: 100 CAPSULE ORAL at 09:05

## 2022-05-20 RX ADMIN — AMPICILLIN SODIUM AND SULBACTAM SODIUM 3 G: 2; 1 INJECTION, POWDER, FOR SOLUTION INTRAMUSCULAR; INTRAVENOUS at 09:05

## 2022-05-20 RX ADMIN — INSULIN DETEMIR 35 UNITS: 100 INJECTION, SOLUTION SUBCUTANEOUS at 09:05

## 2022-05-20 RX ADMIN — INSULIN ASPART 10 UNITS: 100 INJECTION, SOLUTION INTRAVENOUS; SUBCUTANEOUS at 07:05

## 2022-05-20 RX ADMIN — PANTOPRAZOLE SODIUM 40 MG: 40 TABLET, DELAYED RELEASE ORAL at 09:05

## 2022-05-20 RX ADMIN — GABAPENTIN 600 MG: 300 CAPSULE ORAL at 06:05

## 2022-05-20 RX ADMIN — HYDROMORPHONE HYDROCHLORIDE 0.5 MG: 2 INJECTION INTRAMUSCULAR; INTRAVENOUS; SUBCUTANEOUS at 09:05

## 2022-05-20 RX ADMIN — LEFLUNOMIDE 20 MG: 20 TABLET ORAL at 09:05

## 2022-05-20 RX ADMIN — GABAPENTIN 600 MG: 300 CAPSULE ORAL at 09:05

## 2022-05-20 RX ADMIN — HYDROMORPHONE HYDROCHLORIDE 0.5 MG: 2 INJECTION INTRAMUSCULAR; INTRAVENOUS; SUBCUTANEOUS at 12:05

## 2022-05-20 RX ADMIN — SODIUM CHLORIDE: 9 INJECTION, SOLUTION INTRAVENOUS at 08:05

## 2022-05-20 RX ADMIN — NYSTATIN 500000 UNITS: 500000 SUSPENSION ORAL at 01:05

## 2022-05-20 RX ADMIN — HYDROMORPHONE HYDROCHLORIDE 0.5 MG: 2 INJECTION INTRAMUSCULAR; INTRAVENOUS; SUBCUTANEOUS at 08:05

## 2022-05-20 RX ADMIN — FERROUS SULFATE TAB 325 MG (65 MG ELEMENTAL FE) 1 EACH: 325 (65 FE) TAB at 09:05

## 2022-05-20 RX ADMIN — NYSTATIN 500000 UNITS: 500000 SUSPENSION ORAL at 08:05

## 2022-05-20 RX ADMIN — HYDROXYCHLOROQUINE SULFATE 200 MG: 200 TABLET, FILM COATED ORAL at 09:05

## 2022-05-20 RX ADMIN — HYDROMORPHONE HYDROCHLORIDE 0.5 MG: 2 INJECTION INTRAMUSCULAR; INTRAVENOUS; SUBCUTANEOUS at 02:05

## 2022-05-20 RX ADMIN — PREDNISONE 5 MG: 5 TABLET ORAL at 09:05

## 2022-05-20 RX ADMIN — HYDROMORPHONE HYDROCHLORIDE 0.5 MG: 2 INJECTION INTRAMUSCULAR; INTRAVENOUS; SUBCUTANEOUS at 06:05

## 2022-05-20 RX ADMIN — TRIAMTERENE AND HYDROCHLOROTHIAZIDE 1 TABLET: 37.5; 25 TABLET ORAL at 09:05

## 2022-05-20 RX ADMIN — ONDANSETRON 4 MG: 2 INJECTION INTRAMUSCULAR; INTRAVENOUS at 11:05

## 2022-05-20 RX ADMIN — AMPICILLIN SODIUM AND SULBACTAM SODIUM 3 G: 2; 1 INJECTION, POWDER, FOR SOLUTION INTRAMUSCULAR; INTRAVENOUS at 01:05

## 2022-05-20 RX ADMIN — SODIUM CHLORIDE: 9 INJECTION, SOLUTION INTRAVENOUS at 09:05

## 2022-05-20 RX ADMIN — IOPAMIDOL 100 ML: 755 INJECTION, SOLUTION INTRAVENOUS at 08:05

## 2022-05-20 RX ADMIN — POLYETHYLENE GLYCOL 3350 17 G: 17 POWDER, FOR SOLUTION ORAL at 09:05

## 2022-05-20 RX ADMIN — OXYCODONE HYDROCHLORIDE AND ACETAMINOPHEN 1000 MG: 500 TABLET ORAL at 09:05

## 2022-05-20 RX ADMIN — CYCLOBENZAPRINE 10 MG: 10 TABLET, FILM COATED ORAL at 08:05

## 2022-05-20 RX ADMIN — AMPICILLIN SODIUM AND SULBACTAM SODIUM 3 G: 2; 1 INJECTION, POWDER, FOR SOLUTION INTRAMUSCULAR; INTRAVENOUS at 08:05

## 2022-05-20 RX ADMIN — HYDROMORPHONE HYDROCHLORIDE 0.5 MG: 2 INJECTION INTRAMUSCULAR; INTRAVENOUS; SUBCUTANEOUS at 04:05

## 2022-05-20 RX ADMIN — GABAPENTIN 600 MG: 300 CAPSULE ORAL at 08:05

## 2022-05-20 RX ADMIN — NYSTATIN 500000 UNITS: 500000 SUSPENSION ORAL at 06:05

## 2022-05-20 RX ADMIN — NYSTATIN 500000 UNITS: 500000 SUSPENSION ORAL at 09:05

## 2022-05-20 RX ADMIN — ENOXAPARIN SODIUM 40 MG: 40 INJECTION SUBCUTANEOUS at 06:05

## 2022-05-20 RX ADMIN — AMPICILLIN SODIUM AND SULBACTAM SODIUM 3 G: 2; 1 INJECTION, POWDER, FOR SOLUTION INTRAMUSCULAR; INTRAVENOUS at 02:05

## 2022-05-20 RX ADMIN — HYDROXYCHLOROQUINE SULFATE 200 MG: 200 TABLET, FILM COATED ORAL at 08:05

## 2022-05-20 NOTE — ASSESSMENT & PLAN NOTE
Complicated with neuropathy and gastroparesis. Patient has gastric stimulator.   - Diabetic Diet  - Levemir 40 U qhs  - Moderate SSI

## 2022-05-20 NOTE — SUBJECTIVE & OBJECTIVE
Interval History: No overnight events. Pt afebrile last 24 hrs. She is without complaints and wants to go home. CT R hand showed soft tissue swelling and thickening of extensor tendons overlying carpal bones consistent with tenosynovitis.     Review of Systems   Constitutional:  Negative for appetite change, chills and fever.   HENT:  Negative for congestion, rhinorrhea and sore throat.    Eyes:  Negative for visual disturbance.   Respiratory:  Negative for cough, shortness of breath and wheezing.    Cardiovascular:  Negative for chest pain, palpitations and leg swelling.   Gastrointestinal:  Negative for abdominal pain, constipation, diarrhea, nausea and vomiting.   Genitourinary:  Negative for dysuria, frequency and hematuria.   Musculoskeletal:  Positive for arthralgias and myalgias.   Skin:  Positive for wound.   Neurological:  Negative for syncope, weakness and headaches.   Psychiatric/Behavioral:  Negative for dysphoric mood and sleep disturbance. The patient is not nervous/anxious.    Objective:     Vital Signs (Most Recent):  Temp: 99.3 °F (37.4 °C) (05/20/22 0400)  Pulse: 83 (05/20/22 0400)  Resp: 16 (05/20/22 1251)  BP: (!) 152/83 (05/20/22 0638)  SpO2: 98 % (05/20/22 0400)   Vital Signs (24h Range):  Temp:  [98.1 °F (36.7 °C)-99.3 °F (37.4 °C)] 99.3 °F (37.4 °C)  Pulse:  [] 83  Resp:  [16-20] 16  SpO2:  [96 %-100 %] 98 %  BP: (135-152)/(69-83) 152/83     Weight: 90.7 kg (199 lb 15.3 oz)  Body mass index is 28.69 kg/m².    Intake/Output Summary (Last 24 hours) at 5/20/2022 1340  Last data filed at 5/20/2022 0303  Gross per 24 hour   Intake 100 ml   Output --   Net 100 ml      Physical Exam  Vitals and nursing note reviewed.   Constitutional:       General: She is not in acute distress.     Appearance: Normal appearance.   HENT:      Head: Normocephalic.   Cardiovascular:      Rate and Rhythm: Normal rate.   Pulmonary:      Effort: Pulmonary effort is normal. No respiratory distress.   Abdominal:       General: There is no distension.      Tenderness: There is no abdominal tenderness.   Musculoskeletal:         General: Normal range of motion.      Comments: Right hand with slight improvement in edema, still cellulitis present; some purulent drainage coming from packing on incision closed the wrist; no purulent drainage from other incisions; left hand cellulitis/edema improving   Skin:     General: Skin is warm.      Coloration: Skin is not jaundiced.   Neurological:      General: No focal deficit present.      Mental Status: She is alert and oriented to person, place, and time.      Cranial Nerves: No cranial nerve deficit.       Significant Labs: All pertinent labs within the past 24 hours have been reviewed.    Significant Imaging: I have reviewed all pertinent imaging results/findings within the past 24 hours.

## 2022-05-20 NOTE — ASSESSMENT & PLAN NOTE
- CRP and ESR trending  - Continue home hydroxychloroquine, leflunimide and prednisone  - Monitor CBC

## 2022-05-20 NOTE — PROGRESS NOTES
[facility] [place] Orthopedic  [facility] Medicine  Progress Note    Patient Name: Silvana Sarah  MRN: [id number]  Patient Class: IP- Inpatient   Admission Date: 5/11/2022  Length of Stay: 8 days  Attending Physician: Citlaly Landers,*  Primary Care Provider: Sera Byrd NP        Subjective:     Principal Problem:Wound cellulitis        HPI:  60 yo female with PMH of T1DM, RA and HTN who presents to ED with c/o progressively worsening swelling and redness of bilateral hands. Patient was bitten and scratched multiple times by cat on May 9, 2022. She came to ED on 5/10/22 and received IV Clindamycin and Doxycycline PO. She was also prescribed Flagyl PO outpatient. Xray of right hand and wrist were positive for soft tissue swelling. Leukocytosis of 18K which has improved to 15K. Blood cultures were obtained on 5/10/22 and remain pending. Upon examination, bilateral hands (R >L) and right forearm are erythematous and edematous. There are multiple puncture sites and scratch marks noted to bilateral hands and forearms. Wounds appear clean and dry.      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 Her rheumatologist is Dr. Kamari Almazan and she sees Pain Management for chronic pain treatment. Patient has gastric stimulator to assist with DM gastroparesis. She will be admitted to [facility] at this time for further evaluation and management.            Overview/Hospital Course:  05/15/2022 - patient seen this morning laying in bed with eyes open.  Dressing on had clean and dry  with minimal bleeding noted.  Patient states that she is in a lot of pain and would liek to talk to doctor raad about this.  I see she has oxy 10 Q8Prn and she is not always taking it.  She also has dilaudid on q3 which she has been getting regularly.  Patient was advised that the oral meds are to handle baseline pain and IV was for breakthrough.  Patient was advised to ask for her PRN  meds regularly and resort to iv only in owens baker of 10 breakthrough.  I advised her that I may can increase oral dosage if she is getting to much breakthrough but that oral pain control will be preferred.  Will monitor this closely and follow up with patient.  All questions answered and all complaints addressed.  Plan discussed with Silvana Sarah who voices understanding and agreement.      Interval History: No overnight events. Pt afebrile last 24 hrs. She is without complaints and wants to go home. CT R hand showed soft tissue swelling and thickening of extensor tendons overlying carpal bones consistent with tenosynovitis.     Review of Systems   Constitutional:  Negative for appetite change, chills and fever.   HENT:  Negative for congestion, rhinorrhea and sore throat.    Eyes:  Negative for visual disturbance.   Respiratory:  Negative for cough, shortness of breath and wheezing.    Cardiovascular:  Negative for chest pain, palpitations and leg swelling.   Gastrointestinal:  Negative for abdominal pain, constipation, diarrhea, nausea and vomiting.   Genitourinary:  Negative for dysuria, frequency and hematuria.   Musculoskeletal:  Positive for arthralgias and myalgias.   Skin:  Positive for wound.   Neurological:  Negative for syncope, weakness and headaches.   Psychiatric/Behavioral:  Negative for dysphoric mood and sleep disturbance. The patient is not nervous/anxious.    Objective:     Vital Signs (Most Recent):  Temp: 99.3 °F (37.4 °C) (05/20/22 0400)  Pulse: 83 (05/20/22 0400)  Resp: 16 (05/20/22 1251)  BP: (!) 152/83 (05/20/22 0638)  SpO2: 98 % (05/20/22 0400)   Vital Signs (24h Range):  Temp:  [98.1 °F (36.7 °C)-99.3 °F (37.4 °C)] 99.3 °F (37.4 °C)  Pulse:  [] 83  Resp:  [16-20] 16  SpO2:  [96 %-100 %] 98 %  BP: (135-152)/(69-83) 152/83     Weight: 90.7 kg (199 lb 15.3 oz)  Body mass index is 28.69 kg/m².    Intake/Output Summary (Last 24 hours) at 5/20/2022 1340  Last data filed at 5/20/2022  0303  Gross per 24 hour   Intake 100 ml   Output --   Net 100 ml      Physical Exam  Vitals and nursing note reviewed.   Constitutional:       General: She is not in acute distress.     Appearance: Normal appearance.   HENT:      Head: Normocephalic.   Cardiovascular:      Rate and Rhythm: Normal rate.   Pulmonary:      Effort: Pulmonary effort is normal. No respiratory distress.   Abdominal:      General: There is no distension.      Tenderness: There is no abdominal tenderness.   Musculoskeletal:         General: Normal range of motion.      Comments: Right hand with slight improvement in edema, still cellulitis present; some purulent drainage coming from packing on incision closed the wrist; no purulent drainage from other incisions; left hand cellulitis/edema improving   Skin:     General: Skin is warm.      Coloration: Skin is not jaundiced.   Neurological:      General: No focal deficit present.      Mental Status: She is alert and oriented to person, place, and time.      Cranial Nerves: No cranial nerve deficit.       Significant Labs: All pertinent labs within the past 24 hours have been reviewed.    Significant Imaging: I have reviewed all pertinent imaging results/findings within the past 24 hours.      Assessment/Plan:      * Wound cellulitis  - 2/2 cat bites on R hand  - Surgery consulted  - 5/12 I+D, 05/17 Debridement  - Wound cultures NGTD   - Blood cultures NGTD x 5 days x 2 and NGTD x 24 Hrs x 2  - CT R hand: CT R hand showed soft tissue swelling and thickening of extensor tendons overlying carpal bones consistent with tenosynovitis  - Continue Unasyn and doxycycline    Tenosynovitis of hand  - See plan for cellultis      Type 1 diabetes mellitus  Complicated with neuropathy and gastroparesis. Patient has gastric stimulator.   - Diabetic Diet  - Levemir 40 U qhs  - Moderate SSI    Oral candidiasis  -nystatin swish 4 times daily      Rheumatoid arthritis  - CRP and ESR trending  - Continue home  hydroxychloroquine, leflunimide and prednisone  - Monitor CBC      Hypertension  - Continue home medications  - Hydralazine prn with parameters        VTE Risk Mitigation (From admission, onward)         Ordered     enoxaparin injection 40 mg  Daily         05/11/22 2102     IP VTE HIGH RISK PATIENT  Once         05/11/22 2102     Place FRANCISCA hose  Until discontinued         05/11/22 2102                Discharge Planning   DANE:      Code Status: Full Code   Is the patient medically ready for discharge?:     Reason for patient still in hospital (select all that apply): Treatment and Consult recommendations                     Sara Guardado DO  Department of Hospital Medicine   [facility] - Orthopedic

## 2022-05-20 NOTE — PROGRESS NOTES
General surgery    Repacked right hand with iodoform, 4x4/kerlix  Minimum purulence   Edematous cellulitis has improved  NGTD on cx, 99.2 fever   Ortho has been consulted for ct findings c/w tenosynovitos

## 2022-05-21 PROBLEM — E87.5 HYPERKALEMIA: Status: ACTIVE | Noted: 2022-05-21

## 2022-05-21 LAB
ANION GAP SERPL CALCULATED.3IONS-SCNC: 16 MMOL/L (ref 7–16)
BASOPHILS # BLD AUTO: 0.07 K/UL (ref 0–0.2)
BASOPHILS NFR BLD AUTO: 0.9 % (ref 0–1)
BUN SERPL-MCNC: 13 MG/DL (ref 7–18)
BUN/CREAT SERPL: 11 (ref 6–20)
CALCIUM SERPL-MCNC: 8.6 MG/DL (ref 8.5–10.1)
CHLORIDE SERPL-SCNC: 105 MMOL/L (ref 98–107)
CO2 SERPL-SCNC: 25 MMOL/L (ref 21–32)
CREAT SERPL-MCNC: 1.21 MG/DL (ref 0.55–1.02)
DIFFERENTIAL METHOD BLD: ABNORMAL
EOSINOPHIL # BLD AUTO: 0.21 K/UL (ref 0–0.5)
EOSINOPHIL NFR BLD AUTO: 2.8 % (ref 1–4)
ERYTHROCYTE [DISTWIDTH] IN BLOOD BY AUTOMATED COUNT: 13.2 % (ref 11.5–14.5)
GLUCOSE SERPL-MCNC: 111 MG/DL (ref 70–105)
GLUCOSE SERPL-MCNC: 122 MG/DL (ref 74–106)
GLUCOSE SERPL-MCNC: 146 MG/DL (ref 70–105)
GLUCOSE SERPL-MCNC: 172 MG/DL (ref 70–105)
GLUCOSE SERPL-MCNC: 240 MG/DL (ref 70–105)
HCT VFR BLD AUTO: 32.6 % (ref 38–47)
HGB BLD-MCNC: 10 G/DL (ref 12–16)
IMM GRANULOCYTES # BLD AUTO: 0.11 K/UL (ref 0–0.04)
IMM GRANULOCYTES NFR BLD: 1.5 % (ref 0–0.4)
LYMPHOCYTES # BLD AUTO: 2.2 K/UL (ref 1–4.8)
LYMPHOCYTES NFR BLD AUTO: 29.6 % (ref 27–41)
MAGNESIUM SERPL-MCNC: 1.9 MG/DL (ref 1.7–2.3)
MCH RBC QN AUTO: 27.5 PG (ref 27–31)
MCHC RBC AUTO-ENTMCNC: 30.7 G/DL (ref 32–36)
MCV RBC AUTO: 89.6 FL (ref 80–96)
MONOCYTES # BLD AUTO: 0.64 K/UL (ref 0–0.8)
MONOCYTES NFR BLD AUTO: 8.6 % (ref 2–6)
MPC BLD CALC-MCNC: 10.4 FL (ref 9.4–12.4)
NEUTROPHILS # BLD AUTO: 4.21 K/UL (ref 1.8–7.7)
NEUTROPHILS NFR BLD AUTO: 56.6 % (ref 53–65)
NRBC # BLD AUTO: 0 X10E3/UL
NRBC, AUTO (.00): 0 %
PLATELET # BLD AUTO: 241 K/UL (ref 150–400)
POTASSIUM SERPL-SCNC: 5.2 MMOL/L (ref 3.5–5.1)
RBC # BLD AUTO: 3.64 M/UL (ref 4.2–5.4)
SODIUM SERPL-SCNC: 141 MMOL/L (ref 136–145)
WBC # BLD AUTO: 7.44 K/UL (ref 4.5–11)

## 2022-05-21 PROCEDURE — 63600175 PHARM REV CODE 636 W HCPCS: Performed by: INTERNAL MEDICINE

## 2022-05-21 PROCEDURE — 63600175 PHARM REV CODE 636 W HCPCS: Performed by: STUDENT IN AN ORGANIZED HEALTH CARE EDUCATION/TRAINING PROGRAM

## 2022-05-21 PROCEDURE — 25000003 PHARM REV CODE 250: Performed by: INTERNAL MEDICINE

## 2022-05-21 PROCEDURE — 25000003 PHARM REV CODE 250: Performed by: STUDENT IN AN ORGANIZED HEALTH CARE EDUCATION/TRAINING PROGRAM

## 2022-05-21 PROCEDURE — 99232 PR SUBSEQUENT HOSPITAL CARE,LEVL II: ICD-10-PCS | Mod: GC,,, | Performed by: INTERNAL MEDICINE

## 2022-05-21 PROCEDURE — 94761 N-INVAS EAR/PLS OXIMETRY MLT: CPT

## 2022-05-21 PROCEDURE — 85025 COMPLETE CBC W/AUTO DIFF WBC: CPT

## 2022-05-21 PROCEDURE — 80048 BASIC METABOLIC PNL TOTAL CA: CPT

## 2022-05-21 PROCEDURE — C9399 UNCLASSIFIED DRUGS OR BIOLOG: HCPCS | Performed by: INTERNAL MEDICINE

## 2022-05-21 PROCEDURE — 36415 COLL VENOUS BLD VENIPUNCTURE: CPT

## 2022-05-21 PROCEDURE — 83735 ASSAY OF MAGNESIUM: CPT

## 2022-05-21 PROCEDURE — 82962 GLUCOSE BLOOD TEST: CPT

## 2022-05-21 PROCEDURE — 99232 SBSQ HOSP IP/OBS MODERATE 35: CPT | Mod: GC,,, | Performed by: INTERNAL MEDICINE

## 2022-05-21 PROCEDURE — 11000001 HC ACUTE MED/SURG PRIVATE ROOM

## 2022-05-21 RX ADMIN — PANTOPRAZOLE SODIUM 40 MG: 40 TABLET, DELAYED RELEASE ORAL at 10:05

## 2022-05-21 RX ADMIN — CYCLOBENZAPRINE 10 MG: 10 TABLET, FILM COATED ORAL at 08:05

## 2022-05-21 RX ADMIN — OXYCODONE HYDROCHLORIDE AND ACETAMINOPHEN 1 TABLET: 10; 325 TABLET ORAL at 01:05

## 2022-05-21 RX ADMIN — AMPICILLIN SODIUM AND SULBACTAM SODIUM 3 G: 2; 1 INJECTION, POWDER, FOR SOLUTION INTRAMUSCULAR; INTRAVENOUS at 10:05

## 2022-05-21 RX ADMIN — ENOXAPARIN SODIUM 40 MG: 40 INJECTION SUBCUTANEOUS at 05:05

## 2022-05-21 RX ADMIN — NYSTATIN 500000 UNITS: 500000 SUSPENSION ORAL at 10:05

## 2022-05-21 RX ADMIN — HYDROXYCHLOROQUINE SULFATE 200 MG: 200 TABLET, FILM COATED ORAL at 08:05

## 2022-05-21 RX ADMIN — HYDROMORPHONE HYDROCHLORIDE 0.5 MG: 2 INJECTION INTRAMUSCULAR; INTRAVENOUS; SUBCUTANEOUS at 06:05

## 2022-05-21 RX ADMIN — LEFLUNOMIDE 20 MG: 20 TABLET ORAL at 10:05

## 2022-05-21 RX ADMIN — GABAPENTIN 600 MG: 300 CAPSULE ORAL at 03:05

## 2022-05-21 RX ADMIN — PREDNISONE 5 MG: 5 TABLET ORAL at 10:05

## 2022-05-21 RX ADMIN — NYSTATIN 500000 UNITS: 500000 SUSPENSION ORAL at 01:05

## 2022-05-21 RX ADMIN — DOXYCYCLINE HYCLATE 100 MG: 100 CAPSULE ORAL at 09:05

## 2022-05-21 RX ADMIN — SODIUM CHLORIDE: 9 INJECTION, SOLUTION INTRAVENOUS at 04:05

## 2022-05-21 RX ADMIN — SODIUM CHLORIDE: 9 INJECTION, SOLUTION INTRAVENOUS at 09:05

## 2022-05-21 RX ADMIN — TRIAMTERENE AND HYDROCHLOROTHIAZIDE 1 TABLET: 37.5; 25 TABLET ORAL at 10:05

## 2022-05-21 RX ADMIN — NYSTATIN 500000 UNITS: 500000 SUSPENSION ORAL at 08:05

## 2022-05-21 RX ADMIN — ONDANSETRON 4 MG: 2 INJECTION INTRAMUSCULAR; INTRAVENOUS at 12:05

## 2022-05-21 RX ADMIN — AMPICILLIN SODIUM AND SULBACTAM SODIUM 3 G: 2; 1 INJECTION, POWDER, FOR SOLUTION INTRAMUSCULAR; INTRAVENOUS at 03:05

## 2022-05-21 RX ADMIN — HYDROXYCHLOROQUINE SULFATE 200 MG: 200 TABLET, FILM COATED ORAL at 10:05

## 2022-05-21 RX ADMIN — HYDROMORPHONE HYDROCHLORIDE 0.5 MG: 2 INJECTION INTRAMUSCULAR; INTRAVENOUS; SUBCUTANEOUS at 04:05

## 2022-05-21 RX ADMIN — OXYCODONE HYDROCHLORIDE AND ACETAMINOPHEN 1000 MG: 500 TABLET ORAL at 10:05

## 2022-05-21 RX ADMIN — AMPICILLIN SODIUM AND SULBACTAM SODIUM 3 G: 2; 1 INJECTION, POWDER, FOR SOLUTION INTRAMUSCULAR; INTRAVENOUS at 02:05

## 2022-05-21 RX ADMIN — INSULIN ASPART 14 UNITS: 100 INJECTION, SOLUTION INTRAVENOUS; SUBCUTANEOUS at 01:05

## 2022-05-21 RX ADMIN — FERROUS SULFATE TAB 325 MG (65 MG ELEMENTAL FE) 1 EACH: 325 (65 FE) TAB at 10:05

## 2022-05-21 RX ADMIN — AMPICILLIN SODIUM AND SULBACTAM SODIUM 3 G: 2; 1 INJECTION, POWDER, FOR SOLUTION INTRAMUSCULAR; INTRAVENOUS at 08:05

## 2022-05-21 RX ADMIN — HYDROMORPHONE HYDROCHLORIDE 0.5 MG: 2 INJECTION INTRAMUSCULAR; INTRAVENOUS; SUBCUTANEOUS at 08:05

## 2022-05-21 RX ADMIN — INSULIN DETEMIR 40 UNITS: 100 INJECTION, SOLUTION SUBCUTANEOUS at 10:05

## 2022-05-21 RX ADMIN — NYSTATIN 500000 UNITS: 500000 SUSPENSION ORAL at 05:05

## 2022-05-21 RX ADMIN — GABAPENTIN 600 MG: 300 CAPSULE ORAL at 10:05

## 2022-05-21 RX ADMIN — HYDROMORPHONE HYDROCHLORIDE 0.5 MG: 2 INJECTION INTRAMUSCULAR; INTRAVENOUS; SUBCUTANEOUS at 10:05

## 2022-05-21 RX ADMIN — HYDROMORPHONE HYDROCHLORIDE 0.5 MG: 2 INJECTION INTRAMUSCULAR; INTRAVENOUS; SUBCUTANEOUS at 02:05

## 2022-05-21 RX ADMIN — GABAPENTIN 600 MG: 300 CAPSULE ORAL at 08:05

## 2022-05-21 NOTE — NURSING
Pt called out for pain medication - after drawing up dose, patient is resting upon arrival to room, requests to hold off until next dose. Wasted medication in med room.

## 2022-05-21 NOTE — ASSESSMENT & PLAN NOTE
- hold [id number]-hctz for now, keep close eye on blood pressure  - monitor BMP  - K is 5.2 today  - Mg pending

## 2022-05-21 NOTE — SUBJECTIVE & OBJECTIVE
Interval History: Patient resting comfortably in bed with no acute complaints. No overnight events reported. Patient still has right hand pain today, but says she can move her fingers on that hand a little better. Orthopedic surgeon is to see her this morning to assess her tenosynovitis seen on CT. Potassium a little elevated so will watch that.     Review of Systems   Constitutional:  Negative for appetite change, chills and fever.   HENT:  Negative for congestion, rhinorrhea and sore throat.    Eyes:  Negative for visual disturbance.   Respiratory:  Negative for cough, shortness of breath and wheezing.    Cardiovascular:  Negative for chest pain, palpitations and leg swelling.   Gastrointestinal:  Negative for abdominal pain, constipation, diarrhea, nausea and vomiting.   Genitourinary:  Negative for dysuria, frequency and hematuria.   Musculoskeletal:  Positive for arthralgias and myalgias.   Skin:  Positive for wound.   Neurological:  Negative for syncope, weakness and headaches.   Psychiatric/Behavioral:  Negative for dysphoric mood and sleep disturbance. The patient is not nervous/anxious.    Objective:     Vital Signs (Most Recent):  Temp: 98.3 °F (36.8 °C) (05/21/22 0400)  Pulse: 80 (05/21/22 0400)  Resp: 18 (05/21/22 0642)  BP: (!) 142/73 (05/21/22 0400)  SpO2: 96 % (05/21/22 0400)   Vital Signs (24h Range):  Temp:  [98.1 °F (36.7 °C)-98.3 °F (36.8 °C)] 98.3 °F (36.8 °C)  Pulse:  [80-89] 80  Resp:  [16-18] 18  SpO2:  [95 %-97 %] 96 %  BP: (142-144)/(62-73) 142/73     Weight: 90.7 kg (199 lb 15.3 oz)  Body mass index is 28.69 kg/m².  No intake or output data in the 24 hours ending 05/21/22 0711   Physical Exam  Vitals and nursing note reviewed.   Constitutional:       General: She is not in acute distress.     Appearance: Normal appearance.   HENT:      Head: Normocephalic.   Cardiovascular:      Rate and Rhythm: Normal rate.   Pulmonary:      Effort: Pulmonary effort is normal. No respiratory distress.    Abdominal:      General: There is no distension.      Tenderness: There is no abdominal tenderness.   Musculoskeletal:         General: Normal range of motion.      Comments: Right hand with slight improvement in edema, still cellulitis present; some purulent drainage coming from packing on incision closed the wrist; no purulent drainage from other incisions; left hand cellulitis/edema improving   Skin:     General: Skin is warm.      Coloration: Skin is not jaundiced.   Neurological:      General: No focal deficit present.      Mental Status: She is alert and oriented to person, place, and time.      Cranial Nerves: No cranial nerve deficit.       Significant Labs: All pertinent labs within the past 24 hours have been reviewed.    Significant Imaging: I have reviewed all pertinent imaging results/findings within the past 24 hours.

## 2022-05-21 NOTE — PROGRESS NOTES
[facility] Orthopedic  [facility] Medicine  Progress Note    Patient Name: Silvaan Sarah  MRN: [id number]  Patient Class: IP- Inpatient   Admission Date: 5/11/2022  Length of Stay: 9 days  Attending Physician: Citlaly Landers,*  Primary Care Provider: Sera Byrd NP        Subjective:     Principal Problem:Wound cellulitis        HPI:  62 yo female with PMH of T1DM, RA and HTN who presents to ED with c/o progressively worsening swelling and redness of bilateral hands. Patient was bitten and scratched multiple times by cat on May 9, 2022. She came to ED on 5/10/22 and received IV Clindamycin and Doxycycline PO. She was also prescribed Flagyl PO outpatient. Xray of right hand and wrist were positive for soft tissue swelling. Leukocytosis of 18K which has improved to 15K. Blood cultures were obtained on 5/10/22 and remain pending. Upon examination, bilateral hands (R >L) and right forearm are erythematous and edematous. There are multiple puncture sites and scratch marks noted to bilateral hands and forearms. Wounds appear clean and dry.      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 Her rheumatologist is Dr. Kamari Almazan and she sees Pain Management for chronic pain treatment. Patient has gastric stimulator to assist with DM gastroparesis. She will be admitted to [facility] at this time for further evaluation and management.            Overview/Hospital Course:  05/15/2022 - patient seen this morning laying in bed with eyes open.  Dressing on had clean and dry  with minimal bleeding noted.  Patient states that she is in a lot of pain and would liek to talk to doctor raad about this.  I see she has oxy 10 Q8Prn and she is not always taking it.  She also has dilaudid on q3 which she has been getting regularly.  Patient was advised that the oral meds are to handle baseline pain and IV was for breakthrough.  Patient was advised to ask for her PRN  meds regularly and resort to iv only in ow[place] baker of 10 breakthrough.  I advised her that I may can increase oral dosage if she is getting to much breakthrough but that oral pain control will be preferred.  Will monitor this closely and follow up with patient.  All questions answered and all complaints addressed.  Plan discussed with Silvana Sarah who voices understanding and agreement.      Interval History: Patient resting comfortably in bed with no acute complaints. No overnight events reported. Patient still has right hand pain today, but says she can move her fingers on that hand a little better. Orthopedic surgeon is to see her this morning to assess her tenosynovitis seen on CT. Potassium a little elevated so will watch that.     Review of Systems   Constitutional:  Negative for appetite change, chills and fever.   HENT:  Negative for congestion, rhinorrhea and sore throat.    Eyes:  Negative for visual disturbance.   Respiratory:  Negative for cough, shortness of breath and wheezing.    Cardiovascular:  Negative for chest pain, palpitations and leg swelling.   Gastrointestinal:  Negative for abdominal pain, constipation, diarrhea, nausea and vomiting.   Genitourinary:  Negative for dysuria, frequency and hematuria.   Musculoskeletal:  Positive for arthralgias and myalgias.   Skin:  Positive for wound.   Neurological:  Negative for syncope, weakness and headaches.   Psychiatric/Behavioral:  Negative for dysphoric mood and sleep disturbance. The patient is not nervous/anxious.    Objective:     Vital Signs (Most Recent):  Temp: 98.3 °F (36.8 °C) (05/21/22 0400)  Pulse: 80 (05/21/22 0400)  Resp: 18 (05/21/22 0642)  BP: (!) 142/73 (05/21/22 0400)  SpO2: 96 % (05/21/22 0400)   Vital Signs (24h Range):  Temp:  [98.1 °F (36.7 °C)-98.3 °F (36.8 °C)] 98.3 °F (36.8 °C)  Pulse:  [80-89] 80  Resp:  [16-18] 18  SpO2:  [95 %-97 %] 96 %  BP: (142-144)/(62-73) 142/73     Weight: 90.7 kg (199 lb 15.3 oz)  Body mass index  is 28.69 kg/m².  No intake or output data in the 24 hours ending 05/21/22 0711   Physical Exam  Vitals and nursing note reviewed.   Constitutional:       General: She is not in acute distress.     Appearance: Normal appearance.   HENT:      Head: Normocephalic.   Cardiovascular:      Rate and Rhythm: Normal rate.   Pulmonary:      Effort: Pulmonary effort is normal. No respiratory distress.   Abdominal:      General: There is no distension.      Tenderness: There is no abdominal tenderness.   Musculoskeletal:         General: Normal range of motion.      Comments: Right hand with slight improvement in edema, still cellulitis present; some purulent drainage coming from packing on incision closed the wrist; no purulent drainage from other incisions; left hand cellulitis/edema improving   Skin:     General: Skin is warm.      Coloration: Skin is not jaundiced.   Neurological:      General: No focal deficit present.      Mental Status: She is alert and oriented to person, place, and time.      Cranial Nerves: No cranial nerve deficit.       Significant Labs: All pertinent labs within the past 24 hours have been reviewed.    Significant Imaging: I have reviewed all pertinent imaging results/findings within the past 24 hours.      Assessment/Plan:      * Wound cellulitis  - 2/2 cat bites on R hand  - Surgery consulted  - 5/12 I+D, 05/17 Debridement  - Wound cultures NGTD   - Blood cultures NGTD x 5 days x 2 and NGTD x 24 Hrs x 2  - CT R hand: CT R hand showed soft tissue swelling and thickening of extensor tendons overlying carpal bones consistent with tenosynovitis  - Ortho to see patient today regarding tenosynovitis   - Continue Unasyn and doxycycline      Hyperkalemia    - monitor BMP for now  - K is 5.2 today  - Mg pending      Tenosynovitis of hand  - See plan for cellulitis  - Ortho to assess today       Hypertension  - Continue home medications  - Hydralazine prn with parameters      Type 1 diabetes  mellitus  Complicated with neuropathy and gastroparesis. Patient has gastric stimulator.   - Diabetic Diet  - Levemir 40 U qhs  - Moderate SSI    Rheumatoid arthritis  - CRP and ESR trending  - Continue home hydroxychloroquine, leflunimide and prednisone  - Monitor CBC      Oral candidiasis  -nystatin swish 4 times daily      VTE Risk Mitigation (From admission, onward)         Ordered     enoxaparin injection 40 mg  Daily         05/11/22 2102     IP VTE HIGH RISK PATIENT  Once         05/11/22 2102     Place FRANCISCA hose  Until discontinued         05/11/22 2102                Discharge Planning   DANE:      Code Status: Full Code   Is the patient medically ready for discharge?:     Reason for patient still in hospital (select all that apply): Treatment                     Noy Gomez MD  Department of Hospital Medicine   [facility] [place] - Orthopedic

## 2022-05-21 NOTE — CONSULTS
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 Patient has approximately a 20 year history of rheumatoid arthritis and is on multiple immune suppressing agents.  She has also been an insulin requiring diabetic for over 34 years.  She has undergone 2 incision and drainages on May 12th and May 17th by [facility] for a dorsal hand abscess.  Patient has been slow to improve.  She was initially treated with clindamycin and Flagyl.  Current way she is on Unasyn and doxycycline.  The intraoperative cultures and blood cultures have shown no growth to date.  She underwent a CT scan yesterday which showed no fluid collection but she had thickening of the extensor tendons and soft tissue swelling.    She also normally has some swelling around her MPs.    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  Past surgical history significant for  section, gastric stimulator, hysterectomy    Allergies to influenza and penicillins  Medicine list was reviewed.  Twelve point Review of systems reviewed.  She denies smoking and does not drink.    Alert oriented  Right upper extremity exam shows 2 open wounds.  One is centered over her wrist and the other over the dorsal ulnar aspect of her hand.  The patient has mild-to-moderate edema and mild erythema which improves with elevation.  She has moderate loss of flexion in mild loss of extension secondary to the underlying edema.  Sensation is intact to 1st dorsal webspace, small finger, ring finger appeared she has a palpable radial pulse.  Capillary refill is less 2 seconds.  She showed me a picture of the initial infection.  Compared to that picture, her right upper extremity and hand are significantly improved.  The patient has no wrist effusion.   There is no evidence of fluctuance over the dorsal part of her hand.    Radiographs hand and wrist show articular calcifications.  Joint space appears to be relatively well maintained.    CT scan shows skin defects consistent with her prior incision and drainage wounds.  The patient has swelling and thickening of her extensor tendons.    Impression:  Cat bite cellulitis and dorsal abscess.   Chronic immune suppression for her rheumatoid arthritis   Probable microvascular disease secondary to   Diabetes    Plan:  The patient had a significant cat bite cellulitis and soft tissue infection.  She seems to be improving.  I suspect the patient's delay in response is secondary to chronic immunosuppression from the multiple agents that she is on as well as some underlying microvascular disease from her diabetes.  I do not see any indication for an additional procedure.  Continue broad-spectrum antibiotics to include coverage of pasteurella multocida.  Consider decreasing the dose of prednisone and possibly holding Arava.      Recommend updating the patient's tetanus booster.  She has not received one.     Reconsult with problems.

## 2022-05-21 NOTE — ASSESSMENT & PLAN NOTE
- 2/2 cat bites on R hand  - Surgery consulted  - 5/12 I+D, 05/17 Debridement  - Wound cultures NGTD   - Blood cultures NGTD x 5 days x 2 and NGTD x 24 Hrs x 2  - CT R hand: CT R hand showed soft tissue swelling and thickening of extensor tendons overlying carpal bones consistent with tenosynovitis  - Ortho to see patient today regarding tenosynovitis   - Continue Unasyn and doxycycline

## 2022-05-22 PROBLEM — N28.9 RENAL INSUFFICIENCY: Status: ACTIVE | Noted: 2022-05-22

## 2022-05-22 LAB
ANION GAP SERPL CALCULATED.3IONS-SCNC: 12 MMOL/L (ref 7–16)
BASOPHILS # BLD AUTO: 0.05 K/UL (ref 0–0.2)
BASOPHILS NFR BLD AUTO: 0.7 % (ref 0–1)
BUN SERPL-MCNC: 13 MG/DL (ref 7–18)
BUN/CREAT SERPL: 10 (ref 6–20)
CALCIUM SERPL-MCNC: 7.4 MG/DL (ref 8.5–10.1)
CHLORIDE SERPL-SCNC: 109 MMOL/L (ref 98–107)
CO2 SERPL-SCNC: 25 MMOL/L (ref 21–32)
CREAT SERPL-MCNC: 1.31 MG/DL (ref 0.55–1.02)
DIFFERENTIAL METHOD BLD: ABNORMAL
EOSINOPHIL # BLD AUTO: 0.19 K/UL (ref 0–0.5)
EOSINOPHIL NFR BLD AUTO: 2.5 % (ref 1–4)
ERYTHROCYTE [DISTWIDTH] IN BLOOD BY AUTOMATED COUNT: 13.1 % (ref 11.5–14.5)
GLUCOSE SERPL-MCNC: 120 MG/DL (ref 70–105)
GLUCOSE SERPL-MCNC: 134 MG/DL (ref 74–106)
GLUCOSE SERPL-MCNC: 146 MG/DL (ref 70–105)
GLUCOSE SERPL-MCNC: 203 MG/DL (ref 70–105)
GLUCOSE SERPL-MCNC: 205 MG/DL (ref 70–105)
HCT VFR BLD AUTO: 30.9 % (ref 38–47)
HGB BLD-MCNC: 9.5 G/DL (ref 12–16)
IMM GRANULOCYTES # BLD AUTO: 0.1 K/UL (ref 0–0.04)
IMM GRANULOCYTES NFR BLD: 1.3 % (ref 0–0.4)
LYMPHOCYTES # BLD AUTO: 2.23 K/UL (ref 1–4.8)
LYMPHOCYTES NFR BLD AUTO: 29 % (ref 27–41)
MCH RBC QN AUTO: 27.5 PG (ref 27–31)
MCHC RBC AUTO-ENTMCNC: 30.7 G/DL (ref 32–36)
MCV RBC AUTO: 89.3 FL (ref 80–96)
MONOCYTES # BLD AUTO: 0.67 K/UL (ref 0–0.8)
MONOCYTES NFR BLD AUTO: 8.7 % (ref 2–6)
MPC BLD CALC-MCNC: 10.6 FL (ref 9.4–12.4)
NEUTROPHILS # BLD AUTO: 4.44 K/UL (ref 1.8–7.7)
NEUTROPHILS NFR BLD AUTO: 57.8 % (ref 53–65)
NRBC # BLD AUTO: 0 X10E3/UL
NRBC, AUTO (.00): 0 %
PLATELET # BLD AUTO: 250 K/UL (ref 150–400)
POTASSIUM SERPL-SCNC: 4.2 MMOL/L (ref 3.5–5.1)
RBC # BLD AUTO: 3.46 M/UL (ref 4.2–5.4)
SODIUM SERPL-SCNC: 142 MMOL/L (ref 136–145)
WBC # BLD AUTO: 7.68 K/UL (ref 4.5–11)

## 2022-05-22 PROCEDURE — 25000003 PHARM REV CODE 250: Performed by: STUDENT IN AN ORGANIZED HEALTH CARE EDUCATION/TRAINING PROGRAM

## 2022-05-22 PROCEDURE — 99232 SBSQ HOSP IP/OBS MODERATE 35: CPT | Mod: GC,,, | Performed by: INTERNAL MEDICINE

## 2022-05-22 PROCEDURE — 94761 N-INVAS EAR/PLS OXIMETRY MLT: CPT

## 2022-05-22 PROCEDURE — 63600175 PHARM REV CODE 636 W HCPCS: Performed by: INTERNAL MEDICINE

## 2022-05-22 PROCEDURE — C9399 UNCLASSIFIED DRUGS OR BIOLOG: HCPCS | Performed by: INTERNAL MEDICINE

## 2022-05-22 PROCEDURE — 82962 GLUCOSE BLOOD TEST: CPT

## 2022-05-22 PROCEDURE — 99232 PR SUBSEQUENT HOSPITAL CARE,LEVL II: ICD-10-PCS | Mod: GC,,, | Performed by: INTERNAL MEDICINE

## 2022-05-22 PROCEDURE — 36415 COLL VENOUS BLD VENIPUNCTURE: CPT

## 2022-05-22 PROCEDURE — 85025 COMPLETE CBC W/AUTO DIFF WBC: CPT

## 2022-05-22 PROCEDURE — 11000001 HC ACUTE MED/SURG PRIVATE ROOM

## 2022-05-22 PROCEDURE — 63600175 PHARM REV CODE 636 W HCPCS: Performed by: STUDENT IN AN ORGANIZED HEALTH CARE EDUCATION/TRAINING PROGRAM

## 2022-05-22 PROCEDURE — 80048 BASIC METABOLIC PNL TOTAL CA: CPT

## 2022-05-22 PROCEDURE — 25000003 PHARM REV CODE 250: Performed by: INTERNAL MEDICINE

## 2022-05-22 RX ORDER — SODIUM CHLORIDE, SODIUM LACTATE, POTASSIUM CHLORIDE, CALCIUM CHLORIDE 600; 310; 30; 20 MG/100ML; MG/100ML; MG/100ML; MG/100ML
INJECTION, SOLUTION INTRAVENOUS CONTINUOUS
Status: DISCONTINUED | OUTPATIENT
Start: 2022-05-22 | End: 2022-05-23 | Stop reason: HOSPADM

## 2022-05-22 RX ORDER — NAPROXEN 250 MG/1
250 TABLET ORAL EVERY 12 HOURS PRN
Status: DISCONTINUED | OUTPATIENT
Start: 2022-05-22 | End: 2022-05-23 | Stop reason: HOSPADM

## 2022-05-22 RX ADMIN — AMPICILLIN SODIUM AND SULBACTAM SODIUM 3 G: 2; 1 INJECTION, POWDER, FOR SOLUTION INTRAMUSCULAR; INTRAVENOUS at 09:05

## 2022-05-22 RX ADMIN — OXYCODONE HYDROCHLORIDE AND ACETAMINOPHEN 1000 MG: 500 TABLET ORAL at 08:05

## 2022-05-22 RX ADMIN — PANTOPRAZOLE SODIUM 40 MG: 40 TABLET, DELAYED RELEASE ORAL at 08:05

## 2022-05-22 RX ADMIN — AMPICILLIN SODIUM AND SULBACTAM SODIUM 3 G: 2; 1 INJECTION, POWDER, FOR SOLUTION INTRAMUSCULAR; INTRAVENOUS at 03:05

## 2022-05-22 RX ADMIN — NYSTATIN 500000 UNITS: 500000 SUSPENSION ORAL at 05:05

## 2022-05-22 RX ADMIN — AMPICILLIN SODIUM AND SULBACTAM SODIUM 3 G: 2; 1 INJECTION, POWDER, FOR SOLUTION INTRAMUSCULAR; INTRAVENOUS at 08:05

## 2022-05-22 RX ADMIN — AMPICILLIN SODIUM AND SULBACTAM SODIUM 3 G: 2; 1 INJECTION, POWDER, FOR SOLUTION INTRAMUSCULAR; INTRAVENOUS at 01:05

## 2022-05-22 RX ADMIN — SODIUM CHLORIDE, POTASSIUM CHLORIDE, SODIUM LACTATE AND CALCIUM CHLORIDE: 600; 310; 30; 20 INJECTION, SOLUTION INTRAVENOUS at 10:05

## 2022-05-22 RX ADMIN — HYDROMORPHONE HYDROCHLORIDE 0.5 MG: 2 INJECTION INTRAMUSCULAR; INTRAVENOUS; SUBCUTANEOUS at 05:05

## 2022-05-22 RX ADMIN — NYSTATIN 500000 UNITS: 500000 SUSPENSION ORAL at 01:05

## 2022-05-22 RX ADMIN — GABAPENTIN 600 MG: 300 CAPSULE ORAL at 08:05

## 2022-05-22 RX ADMIN — HYDROMORPHONE HYDROCHLORIDE 0.5 MG: 2 INJECTION INTRAMUSCULAR; INTRAVENOUS; SUBCUTANEOUS at 03:05

## 2022-05-22 RX ADMIN — DOXYCYCLINE HYCLATE 100 MG: 100 CAPSULE ORAL at 09:05

## 2022-05-22 RX ADMIN — LEFLUNOMIDE 20 MG: 20 TABLET ORAL at 08:05

## 2022-05-22 RX ADMIN — GABAPENTIN 600 MG: 300 CAPSULE ORAL at 09:05

## 2022-05-22 RX ADMIN — NAPROXEN 250 MG: 250 TABLET ORAL at 09:05

## 2022-05-22 RX ADMIN — OXYCODONE HYDROCHLORIDE AND ACETAMINOPHEN 1 TABLET: 10; 325 TABLET ORAL at 08:05

## 2022-05-22 RX ADMIN — OXYCODONE HYDROCHLORIDE AND ACETAMINOPHEN 1 TABLET: 10; 325 TABLET ORAL at 05:05

## 2022-05-22 RX ADMIN — NYSTATIN 500000 UNITS: 500000 SUSPENSION ORAL at 09:05

## 2022-05-22 RX ADMIN — FERROUS SULFATE TAB 325 MG (65 MG ELEMENTAL FE) 1 EACH: 325 (65 FE) TAB at 08:05

## 2022-05-22 RX ADMIN — HYDROXYCHLOROQUINE SULFATE 200 MG: 200 TABLET, FILM COATED ORAL at 08:05

## 2022-05-22 RX ADMIN — DOXYCYCLINE HYCLATE 100 MG: 100 CAPSULE ORAL at 08:05

## 2022-05-22 RX ADMIN — POLYETHYLENE GLYCOL 3350 17 G: 17 POWDER, FOR SOLUTION ORAL at 08:05

## 2022-05-22 RX ADMIN — ENOXAPARIN SODIUM 40 MG: 40 INJECTION SUBCUTANEOUS at 05:05

## 2022-05-22 RX ADMIN — NYSTATIN 500000 UNITS: 500000 SUSPENSION ORAL at 08:05

## 2022-05-22 RX ADMIN — GABAPENTIN 600 MG: 300 CAPSULE ORAL at 05:05

## 2022-05-22 RX ADMIN — TRIAMTERENE AND HYDROCHLOROTHIAZIDE 1 TABLET: 37.5; 25 TABLET ORAL at 08:05

## 2022-05-22 RX ADMIN — HYDROXYCHLOROQUINE SULFATE 200 MG: 200 TABLET, FILM COATED ORAL at 09:05

## 2022-05-22 RX ADMIN — INSULIN DETEMIR 45 UNITS: 100 INJECTION, SOLUTION SUBCUTANEOUS at 08:05

## 2022-05-22 RX ADMIN — PREDNISONE 5 MG: 5 TABLET ORAL at 08:05

## 2022-05-22 RX ADMIN — SODIUM CHLORIDE, POTASSIUM CHLORIDE, SODIUM LACTATE AND CALCIUM CHLORIDE: 600; 310; 30; 20 INJECTION, SOLUTION INTRAVENOUS at 09:05

## 2022-05-22 RX ADMIN — CYCLOBENZAPRINE 10 MG: 10 TABLET, FILM COATED ORAL at 09:05

## 2022-05-22 NOTE — SUBJECTIVE & OBJECTIVE
Interval History: No overnight events. Reports increased pain from RA but not from wound. Would like to receive some Ibuprofen for the arthritic pain. Reports that she is trying to deescalate to only PO medications for pain control. She would like to go home as soon as possible.     Review of Systems   Constitutional:  Negative for appetite change, chills and fever.   HENT:  Negative for congestion, rhinorrhea and sore throat.    Eyes:  Negative for visual disturbance.   Respiratory:  Negative for cough, shortness of breath and wheezing.    Cardiovascular:  Negative for chest pain, palpitations and leg swelling.   Gastrointestinal:  Negative for abdominal pain, constipation, diarrhea, nausea and vomiting.   Genitourinary:  Negative for dysuria, frequency and hematuria.   Musculoskeletal:  Positive for arthralgias and myalgias.   Skin:  Positive for wound.   Neurological:  Negative for syncope, weakness and headaches.   Psychiatric/Behavioral:  Negative for dysphoric mood and sleep disturbance. The patient is not nervous/anxious.    Objective:     Vital Signs (Most Recent):  Temp: 98.5 °F (36.9 °C) (05/22/22 1200)  Pulse: 83 (05/22/22 1200)  Resp: 20 (05/22/22 1200)  BP: (!) 144/63 (05/22/22 1200)  SpO2: 96 % (05/22/22 1200)   Vital Signs (24h Range):  Temp:  [97.1 °F (36.2 °C)-98.9 °F (37.2 °C)] 98.5 °F (36.9 °C)  Pulse:  [74-93] 83  Resp:  [16-20] 20  SpO2:  [95 %-98 %] 96 %  BP: (139-159)/(63-84) 144/63     Weight: 90.7 kg (199 lb 15.3 oz)  Body mass index is 28.69 kg/m².    Intake/Output Summary (Last 24 hours) at 5/22/2022 1239  Last data filed at 5/22/2022 0500  Gross per 24 hour   Intake 540 ml   Output --   Net 540 ml      Physical Exam  Vitals and nursing note reviewed.   Constitutional:       General: She is not in acute distress.     Appearance: Normal appearance. She is not ill-appearing, toxic-appearing or diaphoretic.   HENT:      Head: Normocephalic.      Right Ear: External ear normal.      Left Ear:  External ear normal.      Mouth/Throat:      Mouth: Mucous membranes are moist.      Pharynx: Oropharynx is clear.   Eyes:      Extraocular Movements: Extraocular movements intact.      Conjunctiva/sclera: Conjunctivae normal.      Pupils: Pupils are equal, round, and reactive to light.   Neck:      Vascular: No carotid bruit.   Cardiovascular:      Rate and Rhythm: Normal rate and regular rhythm.      Pulses: Normal pulses.      Heart sounds: Normal heart sounds. No murmur heard.  Pulmonary:      Effort: Pulmonary effort is normal. No respiratory distress.      Breath sounds: No wheezing, rhonchi or rales.   Abdominal:      General: Bowel sounds are normal. There is no distension.      Palpations: Abdomen is soft.      Tenderness: There is no abdominal tenderness.   Musculoskeletal:         General: Normal range of motion.      Cervical back: No tenderness.      Right lower leg: No edema.      Left lower leg: No edema.      Comments: Right hand with improvement in edema. Increased ROM of R hand. Dressing is dry and clean.    Lymphadenopathy:      Cervical: No cervical adenopathy.   Skin:     General: Skin is warm.      Coloration: Skin is not jaundiced.   Neurological:      General: No focal deficit present.      Mental Status: She is alert and oriented to person, place, and time.      Cranial Nerves: No cranial nerve deficit.   Psychiatric:         Mood and Affect: Mood normal.       Significant Labs: All pertinent labs within the past 24 hours have been reviewed.    Significant Imaging: I have reviewed all pertinent imaging results/findings within the past 24 hours.

## 2022-05-22 NOTE — PROGRESS NOTES
[facility] Orthopedic  [facility] Medicine  Progress Note    Patient Name: Silvana Sarah  MRN: [id number]  Patient Class: IP- Inpatient   Admission Date: 5/11/2022  Length of Stay: 10 days  Attending Physician: Citlaly Landers,*  Primary Care Provider: Sera Byrd NP        Subjective:     Principal Problem:Wound cellulitis        HPI:  60 yo female with PMH of T1DM, RA and HTN who presents to ED with c/o progressively worsening swelling and redness of bilateral hands. Patient was bitten and scratched multiple times by cat on May 9, 2022. She came to ED on 5/10/22 and received IV Clindamycin and Doxycycline PO. She was also prescribed Flagyl PO outpatient. Xray of right hand and wrist were positive for soft tissue swelling. Leukocytosis of 18K which has improved to 15K. Blood cultures were obtained on 5/10/22 and remain pending. Upon examination, bilateral hands (R >L) and right forearm are erythematous and edematous. There are multiple puncture sites and scratch marks noted to bilateral hands and forearms. Wounds appear clean and dry.      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 Her rheumatologist is Dr. Kamari Almazan and she sees Pain Management for chronic pain treatment. Patient has gastric stimulator to assist with DM gastroparesis. She will be admitted to [facility] at this time for further evaluation and management.            Overview/Hospital Course:  05/15/2022 - patient seen this morning laying in bed with eyes open.  Dressing on had clean and dry  with minimal bleeding noted.  Patient states that she is in a lot of pain and would liek to talk to doctor raad about this.  I see she has oxy 10 Q8Prn and she is not always taking it.  She also has dilaudid on q3 which she has been getting regularly.  Patient was advised that the oral meds are to handle baseline pain and IV was for breakthrough.  Patient was advised to ask for her PRN  meds regularly and resort to iv only in owens baker of 10 breakthrough.  I advised her that I may can increase oral dosage if she is getting to much breakthrough but that oral pain control will be preferred.  Will monitor this closely and follow up with patient.  All questions answered and all complaints addressed.  Plan discussed with Silvana Sarah who voices understanding and agreement.      Interval History: No overnight events. Reports increased pain from RA but not from wound. Would like to receive some Ibuprofen for the arthritic pain. Reports that she is trying to deescalate to only PO medications for pain control. She would like to go home as soon as possible.     Review of Systems   Constitutional:  Negative for appetite change, chills and fever.   HENT:  Negative for congestion, rhinorrhea and sore throat.    Eyes:  Negative for visual disturbance.   Respiratory:  Negative for cough, shortness of breath and wheezing.    Cardiovascular:  Negative for chest pain, palpitations and leg swelling.   Gastrointestinal:  Negative for abdominal pain, constipation, diarrhea, nausea and vomiting.   Genitourinary:  Negative for dysuria, frequency and hematuria.   Musculoskeletal:  Positive for arthralgias and myalgias.   Skin:  Positive for wound.   Neurological:  Negative for syncope, weakness and headaches.   Psychiatric/Behavioral:  Negative for dysphoric mood and sleep disturbance. The patient is not nervous/anxious.    Objective:     Vital Signs (Most Recent):  Temp: 98.5 °F (36.9 °C) (05/22/22 1200)  Pulse: 83 (05/22/22 1200)  Resp: 20 (05/22/22 1200)  BP: (!) 144/63 (05/22/22 1200)  SpO2: 96 % (05/22/22 1200)   Vital Signs (24h Range):  Temp:  [97.1 °F (36.2 °C)-98.9 °F (37.2 °C)] 98.5 °F (36.9 °C)  Pulse:  [74-93] 83  Resp:  [16-20] 20  SpO2:  [95 %-98 %] 96 %  BP: (139-159)/(63-84) 144/63     Weight: 90.7 kg (199 lb 15.3 oz)  Body mass index is 28.69 kg/m².    Intake/Output Summary (Last 24 hours) at 5/22/2022  1239  Last data filed at 5/22/2022 0500  Gross per 24 hour   Intake 540 ml   Output --   Net 540 ml      Physical Exam  Vitals and nursing note reviewed.   Constitutional:       General: She is not in acute distress.     Appearance: Normal appearance. She is not ill-appearing, toxic-appearing or diaphoretic.   HENT:      Head: Normocephalic.      Right Ear: External ear normal.      Left Ear: External ear normal.      Mouth/Throat:      Mouth: Mucous membranes are moist.      Pharynx: Oropharynx is clear.   Eyes:      Extraocular Movements: Extraocular movements intact.      Conjunctiva/sclera: Conjunctivae normal.      Pupils: Pupils are equal, round, and reactive to light.   Neck:      Vascular: No carotid bruit.   Cardiovascular:      Rate and Rhythm: Normal rate and regular rhythm.      Pulses: Normal pulses.      Heart sounds: Normal heart sounds. No murmur heard.  Pulmonary:      Effort: Pulmonary effort is normal. No respiratory distress.      Breath sounds: No wheezing, rhonchi or rales.   Abdominal:      General: Bowel sounds are normal. There is no distension.      Palpations: Abdomen is soft.      Tenderness: There is no abdominal tenderness.   Musculoskeletal:         General: Normal range of motion.      Cervical back: No tenderness.      Right lower leg: No edema.      Left lower leg: No edema.      Comments: Right hand with improvement in edema. Increased ROM of R hand. Dressing is dry and clean.    Lymphadenopathy:      Cervical: No cervical adenopathy.   Skin:     General: Skin is warm.      Coloration: Skin is not jaundiced.   Neurological:      General: No focal deficit present.      Mental Status: She is alert and oriented to person, place, and time.      Cranial Nerves: No cranial nerve deficit.   Psychiatric:         Mood and Affect: Mood normal.       Significant Labs: All pertinent labs within the past 24 hours have been reviewed.    Significant Imaging: I have reviewed all pertinent imaging  results/findings within the past 24 hours.      Assessment/Plan:      * Wound cellulitis  - 2/2 cat bites on R hand. Likely Pasturella multocida as pt had cat bites in addition to scratches. Rash is also diffuse vs nodular in Bartonella.   - Surgery consulted  - 5/12 I+D, 05/17 Debridement  - Wound cultures NGTD   - Blood cultures NGTD x 5 days x 2 and NGTD x 24 Hrs x 2  - CT R hand: CT R hand showed soft tissue swelling and thickening of extensor tendons overlying carpal bones consistent with tenosynovitis  - Ortho, Dr. Loredo: No more procedures needed. Tetanus shot needed. Abx coverage for Pasturella.   - Continue Unasyn and doxycycline. Discharge with Augmentin 850 mg BID x 4 weeks and Doxy 100 mg BID.       Tenosynovitis of hand  - See plan for cellulitis        Type 1 diabetes mellitus  Complicated with neuropathy and gastroparesis. Patient has gastric stimulator.   - Diabetic Diet  - Increased Levemir 40 to 45 units QHS  - Moderate SSI    Oral candidiasis  -nystatin swish 4 times daily      Rheumatoid arthritis  - CRP and ESR trending  - Continue home hydroxychloroquine, leflunimide and prednisone  - Monitor CBC  - Added Naproxen prn      Hypertension  - Continue home medications  - Hydralazine prn with parameters      Renal insufficiency  - Cr on admission 1.12. Baseline unknown. Cr today 1.31.  - IVF  - Counseled on importance of limiting NSAIDs.   - Monitor      Hyperkalemia    - monitor BMP for now  - Resolved        VTE Risk Mitigation (From admission, onward)         Ordered     enoxaparin injection 40 mg  Daily         05/11/22 2102     IP VTE HIGH RISK PATIENT  Once         05/11/22 2102     Place FRANCISCA hose  Until discontinued         05/11/22 2102                Discharge Planning   DANE:      Code Status: Full Code   Is the patient medically ready for discharge?:     Reason for patient still in hospital (select all that apply): Treatment and Consult recommendations                     Sara Guardado,  DO  Department of Hospital Medicine   [facility] - Orthopedic

## 2022-05-22 NOTE — ASSESSMENT & PLAN NOTE
- Cr on admission 1.12. Baseline unknown. Cr today 1.31.  - IVF  - Counseled on importance of limiting NSAIDs.   - Monitor

## 2022-05-22 NOTE — PLAN OF CARE
Problem: Adult Inpatient Plan of Care  Goal: Plan of Care Review  Outcome: Ongoing, Progressing  Flowsheets (Taken 5/22/2022 0152)  Plan of Care Reviewed With: patient  Goal: Patient-Specific Goal (Individualized)  Outcome: Ongoing, Progressing  Goal: Absence of Hospital-Acquired Illness or Injury  Outcome: Ongoing, Progressing  Intervention: Identify and Manage Fall Risk  Flowsheets (Taken 5/22/2022 0152)  Safety Promotion/Fall Prevention: assistive device/personal item within reach  Intervention: Prevent Skin Injury  Flowsheets (Taken 5/22/2022 0152)  Body Position: position changed independently  Skin Protection: adhesive use limited  Goal: Optimal Comfort and Wellbeing  Outcome: Ongoing, Progressing  Goal: Readiness for Transition of Care  Outcome: Ongoing, Progressing     Problem: Impaired Wound Healing  Goal: Optimal Wound Healing  Outcome: Ongoing, Progressing  Intervention: Promote Wound Healing  Flowsheets (Taken 5/22/2022 0152)  Sleep/Rest Enhancement: awakenings minimized  Pain Management Interventions:   around-the-clock dosing utilized   care clustered   diversional activity provided

## 2022-05-22 NOTE — ASSESSMENT & PLAN NOTE
- CRP and ESR trending  - Continue home hydroxychloroquine, leflunimide and prednisone  - Monitor CBC  - Added Naproxen prn

## 2022-05-22 NOTE — ASSESSMENT & PLAN NOTE
Complicated with neuropathy and gastroparesis. Patient has gastric stimulator.   - Diabetic Diet  - Increased Levemir 40 to 45 units QHS  - Moderate SSI

## 2022-05-22 NOTE — ASSESSMENT & PLAN NOTE
- 2/2 cat bites on R hand. Likely Pasturella multocida as pt had cat bites in addition to scratches. Rash is also diffuse vs nodular in Bartonella.   - Surgery consulted  - 5/12 I+D, 05/17 Debridement  - Wound cultures NGTD   - Blood cultures NGTD x 5 days x 2 and NGTD x 24 Hrs x 2  - CT R hand: CT R hand showed soft tissue swelling and thickening of extensor tendons overlying carpal bones consistent with tenosynovitis  - Ortho, Dr. Loredo: No more procedures needed. Tetanus shot needed. Abx coverage for Pasturella.   - Continue Unasyn and doxycycline. Discharge with Augmentin 850 mg BID x 4 weeks and Doxy 100 mg BID.

## 2022-05-23 VITALS
SYSTOLIC BLOOD PRESSURE: 146 MMHG | OXYGEN SATURATION: 100 % | WEIGHT: 199.94 LBS | BODY MASS INDEX: 28.62 KG/M2 | TEMPERATURE: 99 F | RESPIRATION RATE: 18 BRPM | HEART RATE: 80 BPM | HEIGHT: 70 IN | DIASTOLIC BLOOD PRESSURE: 65 MMHG

## 2022-05-23 PROBLEM — B37.0 ORAL CANDIDIASIS: Chronic | Status: RESOLVED | Noted: 2021-07-24 | Resolved: 2022-05-23

## 2022-05-23 PROBLEM — E87.5 HYPERKALEMIA: Status: RESOLVED | Noted: 2022-05-21 | Resolved: 2022-05-23

## 2022-05-23 LAB
ANION GAP SERPL CALCULATED.3IONS-SCNC: 10 MMOL/L (ref 7–16)
BUN SERPL-MCNC: 14 MG/DL (ref 7–18)
BUN/CREAT SERPL: 10 (ref 6–20)
CALCIUM SERPL-MCNC: 8.6 MG/DL (ref 8.5–10.1)
CHLORIDE SERPL-SCNC: 109 MMOL/L (ref 98–107)
CO2 SERPL-SCNC: 28 MMOL/L (ref 21–32)
CREAT SERPL-MCNC: 1.35 MG/DL (ref 0.55–1.02)
CRP SERPL-MCNC: 0.38 MG/DL (ref 0–0.8)
ERYTHROCYTE [SEDIMENTATION RATE] IN BLOOD BY WESTERGREN METHOD: 25 MM/HR (ref 0–30)
GLUCOSE SERPL-MCNC: 113 MG/DL (ref 70–105)
GLUCOSE SERPL-MCNC: 95 MG/DL (ref 74–106)
POTASSIUM SERPL-SCNC: 4.2 MMOL/L (ref 3.5–5.1)
SODIUM SERPL-SCNC: 143 MMOL/L (ref 136–145)

## 2022-05-23 PROCEDURE — 99239 PR HOSPITAL DISCHARGE DAY,>30 MIN: ICD-10-PCS | Mod: ,,, | Performed by: HOSPITALIST

## 2022-05-23 PROCEDURE — 25000003 PHARM REV CODE 250: Performed by: STUDENT IN AN ORGANIZED HEALTH CARE EDUCATION/TRAINING PROGRAM

## 2022-05-23 PROCEDURE — 82962 GLUCOSE BLOOD TEST: CPT

## 2022-05-23 PROCEDURE — 80048 BASIC METABOLIC PNL TOTAL CA: CPT | Performed by: INTERNAL MEDICINE

## 2022-05-23 PROCEDURE — 36415 COLL VENOUS BLD VENIPUNCTURE: CPT | Performed by: STUDENT IN AN ORGANIZED HEALTH CARE EDUCATION/TRAINING PROGRAM

## 2022-05-23 PROCEDURE — 25000003 PHARM REV CODE 250

## 2022-05-23 PROCEDURE — 99499 UNLISTED E&M SERVICE: CPT | Mod: GC,,, | Performed by: HOSPITALIST

## 2022-05-23 PROCEDURE — 90471 IMMUNIZATION ADMIN: CPT | Performed by: NURSE PRACTITIONER

## 2022-05-23 PROCEDURE — 63600175 PHARM REV CODE 636 W HCPCS: Performed by: STUDENT IN AN ORGANIZED HEALTH CARE EDUCATION/TRAINING PROGRAM

## 2022-05-23 PROCEDURE — C9399 UNCLASSIFIED DRUGS OR BIOLOG: HCPCS | Performed by: INTERNAL MEDICINE

## 2022-05-23 PROCEDURE — 99239 HOSP IP/OBS DSCHRG MGMT >30: CPT | Mod: ,,, | Performed by: HOSPITALIST

## 2022-05-23 PROCEDURE — 63600175 PHARM REV CODE 636 W HCPCS: Performed by: NURSE PRACTITIONER

## 2022-05-23 PROCEDURE — 90715 TDAP VACCINE 7 YRS/> IM: CPT | Performed by: NURSE PRACTITIONER

## 2022-05-23 PROCEDURE — 99499 NO LOS: ICD-10-PCS | Mod: GC,,, | Performed by: HOSPITALIST

## 2022-05-23 PROCEDURE — 85651 RBC SED RATE NONAUTOMATED: CPT | Performed by: STUDENT IN AN ORGANIZED HEALTH CARE EDUCATION/TRAINING PROGRAM

## 2022-05-23 PROCEDURE — 25000003 PHARM REV CODE 250: Performed by: INTERNAL MEDICINE

## 2022-05-23 PROCEDURE — 94761 N-INVAS EAR/PLS OXIMETRY MLT: CPT

## 2022-05-23 PROCEDURE — 63600175 PHARM REV CODE 636 W HCPCS: Performed by: INTERNAL MEDICINE

## 2022-05-23 PROCEDURE — 86140 C-REACTIVE PROTEIN: CPT | Performed by: STUDENT IN AN ORGANIZED HEALTH CARE EDUCATION/TRAINING PROGRAM

## 2022-05-23 RX ORDER — AMOXICILLIN AND CLAVULANATE POTASSIUM 875; 125 MG/1; MG/1
1 TABLET, FILM COATED ORAL 2 TIMES DAILY
Qty: 56 TABLET | Refills: 0 | Status: SHIPPED | OUTPATIENT
Start: 2022-05-23 | End: 2022-06-20

## 2022-05-23 RX ORDER — AMLODIPINE BESYLATE 5 MG/1
5 TABLET ORAL DAILY
Status: DISCONTINUED | OUTPATIENT
Start: 2022-05-23 | End: 2022-05-23

## 2022-05-23 RX ORDER — HYDROCHLOROTHIAZIDE 25 MG/1
25 TABLET ORAL DAILY
Status: DISCONTINUED | OUTPATIENT
Start: 2022-05-23 | End: 2022-05-23 | Stop reason: HOSPADM

## 2022-05-23 RX ADMIN — TETANUS TOXOID, REDUCED DIPHTHERIA TOXOID AND ACELLULAR PERTUSSIS VACCINE, ADSORBED 0.5 ML: 5; 2.5; 8; 8; 2.5 SUSPENSION INTRAMUSCULAR at 12:05

## 2022-05-23 RX ADMIN — OXYCODONE HYDROCHLORIDE AND ACETAMINOPHEN 1 TABLET: 10; 325 TABLET ORAL at 04:05

## 2022-05-23 RX ADMIN — HYDROCHLOROTHIAZIDE 25 MG: 25 TABLET ORAL at 08:05

## 2022-05-23 RX ADMIN — PANTOPRAZOLE SODIUM 40 MG: 40 TABLET, DELAYED RELEASE ORAL at 08:05

## 2022-05-23 RX ADMIN — INSULIN DETEMIR 45 UNITS: 100 INJECTION, SOLUTION SUBCUTANEOUS at 08:05

## 2022-05-23 RX ADMIN — HYDROXYCHLOROQUINE SULFATE 200 MG: 200 TABLET, FILM COATED ORAL at 08:05

## 2022-05-23 RX ADMIN — AMPICILLIN SODIUM AND SULBACTAM SODIUM 3 G: 2; 1 INJECTION, POWDER, FOR SOLUTION INTRAMUSCULAR; INTRAVENOUS at 03:05

## 2022-05-23 RX ADMIN — SODIUM CHLORIDE, POTASSIUM CHLORIDE, SODIUM LACTATE AND CALCIUM CHLORIDE: 600; 310; 30; 20 INJECTION, SOLUTION INTRAVENOUS at 06:05

## 2022-05-23 RX ADMIN — NYSTATIN 500000 UNITS: 500000 SUSPENSION ORAL at 08:05

## 2022-05-23 RX ADMIN — OXYCODONE HYDROCHLORIDE AND ACETAMINOPHEN 1000 MG: 500 TABLET ORAL at 08:05

## 2022-05-23 RX ADMIN — PREDNISONE 5 MG: 5 TABLET ORAL at 08:05

## 2022-05-23 RX ADMIN — AMPICILLIN SODIUM AND SULBACTAM SODIUM 3 G: 2; 1 INJECTION, POWDER, FOR SOLUTION INTRAMUSCULAR; INTRAVENOUS at 08:05

## 2022-05-23 RX ADMIN — FERROUS SULFATE TAB 325 MG (65 MG ELEMENTAL FE) 1 EACH: 325 (65 FE) TAB at 08:05

## 2022-05-23 RX ADMIN — DOXYCYCLINE HYCLATE 100 MG: 100 CAPSULE ORAL at 09:05

## 2022-05-23 RX ADMIN — GABAPENTIN 600 MG: 300 CAPSULE ORAL at 08:05

## 2022-05-23 NOTE — DISCHARGE INSTRUCTIONS
Follow up with Sera Byrd to monitor kidney function and to adjust blood pressure medications as needed. Follow up with Dr. Sandoval, surgeon, in 2 weeks. Take Bactrim as prescribed for 4 weeks.    *Clean wound with vashe solution and wound cleanser daily and wrap with 4x4 ands paper tape.

## 2022-05-23 NOTE — DISCHARGE SUMMARY
[facility]  [facility] Medicine  Discharge Summary      Patient Name: Silvana Sarah  MRN: [id number]  Patient Class: IP- Inpatient  Admission Date: 5/11/2022  Hospital Length of Stay: 11 days  Discharge Date and Time:  05/23/2022 9:12 AM  Attending Physician: Mary Snider MD   Discharging Provider: Sang Celestin MD  Primary Care Provider: Sera Byrd NP      HPI:   60 yo female with PMH of T1DM, RA and HTN who presents to ED with c/o progressively worsening swelling and redness of bilateral hands. Patient was bitten and scratched multiple times by cat on May 9, 2022. She came to ED on 5/10/22 and received IV Clindamycin and Doxycycline PO. She was also prescribed Flagyl PO outpatient. Xray of right hand and wrist were positive for soft tissue swelling. Leukocytosis of 18K which has improved to 15K. Blood cultures were obtained on 5/10/22 and remain pending. Upon examination, bilateral hands (R >L) and right forearm are erythematous and edematous. There are multiple puncture sites and scratch marks noted to bilateral hands and forearms. Wounds appear clean and dry.      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 Her rheumatologist is Dr. Kamari Almazan and she sees Pain Management for chronic pain treatment. Patient has gastric stimulator to assist with DM gastroparesis. She will be admitted to [facility] at this time for further evaluation and management.            Procedure(s) (LRB):  IRRIGATION AND DEBRIDEMENT, UPPER EXTREMITY (Right)      Hospital Course:   5/12 - Patient complaining of worsening right arm pain today. Her arm looks very tense, red and swollen, so soft tissue US ordered and possible consult for I+D will be placed to surgery. UA grew e.coli, but patient already getting broad spectrum antibiotic coverage for cellulitis. She did also have a fever this morning with a temperature of 101.1 F.     5/13 -  Patient still  complaining of right forearm pain this morning. Still has significant swelling and redness in that area and through the right hand. US showed a fluid abscess, patient will get an I+D today. Patient will also get PICC line place for easier venous access for Abx administration.     5/14 - Patient resting comfortably in bed. No acute complaints. Right hand/forearm is much less swollen and shiny/tight appearing on physical examination. Patient is on empiric antibiotics as she is a diabetic with cellulitis, bcx are negative so far and wound cultures taken during surgery are pending    5/15 -  Dressing on had clean and dry  with minimal bleeding noted.  Patient states that she is in a lot of pain and would liek to talk to doctor shankar about this.  I see she has oxy 10 Q8Prn and she is not always taking it.  She also has dilaudid on q3 which she has been getting regularly.  Patient was advised that the oral meds are to handle baseline pain and IV was for breakthrough.  Patient was advised to ask for her PRN meds regularly and resort to iv only in owens baker of 10 breakthrough.  I advised her that I may can increase oral dosage if she is getting to much breakthrough but that oral pain control will be preferred.  Will monitor this closely and follow up with patient.  All questions answered and all complaints addressed.  Plan discussed with Silvana Sarah who voices understanding and agreement.    5/16 - no acute complaints. No overnight events reported. Patient is getting another I+D today of her right hand by Dr. Sandoval to create two more ports for fluid drainage to continue to treat her cellulitis. Her BG was high this morning, so will increase nightly insulin dose. Patient also noted to have some oral candida on examination this morning.     5/17 - Patient complaining of severe right hand pain this morning and asking for morphine. No overnight events reported. Had second I+D of right hand done by Dr. Sandoval yesterday.  Repeat wound cultures pending, no growth so far. Fasting glucose so high so will further increase night levemir dose.     5/18 - pt noted pus draining from her right hand this morning when wound care was changing the bandage. Chest xray was ordered and showed mild infiltrates. UA was done which showed no evidence of UTI. Blood cultures and wound cultures show no growth so far, will stop vancomycin but continue sulbactam and doxycycline for now. Patient may need another I+D or procedure in light of remaining severe right hand pain and purulent discharge noted from previous surgical site on hand.     5/19 - No acute complaints. No overnight events reported. MRI of the right hand was ordered to check for osteomyelitis but patient cannot get due to presence of pacemaker. Blood and wound cx no growth to date. General surgery following.     5/20 - No overnight events. Pt afebrile last 24 hrs. She is without complaints and wants to go home. CT R hand showed soft tissue swelling and thickening of extensor tendons overlying carpal bones consistent with tenosynovitis.    5/21 - no acute complaints. No overnight events reported. Patient still has right hand pain today, but says she can move her fingers on that hand a little better. Orthopedic surgeon is to see her this morning to assess her tenosynovitis seen on CT. Potassium a little elevated so will watch that.     5/22 -  No overnight events. Reports increased pain from RA but not from wound. Would like to receive some Ibuprofen for the arthritic pain. Reports that she is trying to deescalate to only PO medications for pain control. She would like to go home as soon as possible.     5/23 - Pt has no acute complaints, no overnight events reported. Pt states that she is ready to go home. Although this is my first day seeing her, Dr. Guardado and Dr. Sandoval confirm that hand continues to improve. Pt has med maximum inpatient benefit at this time and can be transitioned to PO  antibiotics at this time. Patient will begin Augmentin 850mg PO BID for 4 weeks. Pt should follow up with PCP within 1 week for BP medication management and repeat lab work for mildly worsening Cr and eGFR, and should follow up with Dr. Sandoval and Dr. Almazan within 2 weeks. Patient instructed to call Dr. Almazan to discuss resuming biologic therapy for RA, She states that she has not taken in approximately 3 weeks.        Goals of Care Treatment Preferences:  Code Status: Full Code      Consults:   Consults (From admission, onward)        Status Ordering Provider     Inpatient consult to Orthopedic Surgery  Once        Provider:  Delmar Loredo MD    Completed CASSIDY ALDRICH          Hypertension  - Continue home medications  - BP remains mildly elevated 140s/70s  - Follow up with PCP within 1 week for medication adjustments and repeat lab work      Type 1 diabetes mellitus  Complicated with neuropathy and gastroparesis. Patient has gastric stimulator.   - Resume Home Insulin therapy    Rheumatoid arthritis  - Continue home hydroxychloroquine, leflunimide and prednisone  - Follow up with Dr. Almazan within 2 weeks - Instructed patient to call with questions regarding biologic medications.        Final Active Diagnoses:    Diagnosis Date Noted POA    PRINCIPAL PROBLEM:  Wound cellulitis [L03.90]  Yes    Renal insufficiency [N28.9] 05/22/2022 No    Tenosynovitis of hand [M65.9] 05/20/2022 Yes    Hypertension [I10] 10/25/2021 Yes    Rheumatoid arthritis [M06.9] 09/20/2019 Yes    Type 1 diabetes mellitus [E10.9] 09/20/2019 Yes      Problems Resolved During this Admission:    Diagnosis Date Noted Date Resolved POA    Hyperkalemia [E87.5] 05/21/2022 05/23/2022 No    Cat bite of hand [S61.459A, W55.01XA] 05/11/2022 05/12/2022 Yes    Oral candidiasis [B37.0] 07/24/2021 05/23/2022 Yes     Chronic       Discharged Condition: good    Disposition:     Follow Up:   Follow-up Information     Sera Byrd,  NP Follow up in 1 week(s).    Specialty: Family Medicine  Contact information:  [address]  [address]  [place] [address]  [phone]             Kamari Shirley,  Follow up in 2 week(s).    Specialties: General Surgery, Surgery  Contact information:  [address] [facility] [address] [facility]  [place] [address]  [phone]             Kamari Almazan MD Follow up in 2 week(s).    Specialty: Rheumatology  Contact information:  [address]  [place] [address]  [phone]                       Patient Instructions:   No discharge procedures on file.    Significant Diagnostic Studies: Labs:   BMP:   Recent Labs   Lab 05/21/22  1044 05/22/22  0414 05/23/22  0405   GLU  --  134* 95   NA  --  142 143   K  --  4.2 4.2   CL  --  109* 109*   CO2  --  25 28   BUN  --  13 14   CREATININE  --  1.31* 1.35*   CALCIUM  --  7.4* 8.6   MG 1.9  --   --     and CBC   Recent Labs   Lab 05/21/22  1044 05/22/22  0414   WBC 7.44 7.68   HGB 10.0* 9.5*   HCT 32.6* 30.9*    250       Pending Diagnostic Studies:     Procedure Component Value Units Date/Time    EXTRA TUBES [[id number]] Collected: 05/19/22 0547    Order Status: Sent Lab Status: In process Updated: 05/19/22 0547    Specimen: Blood, Venous     Narrative:      The following orders were created for panel order EXTRA TUBES.  Procedure                               Abnormality         Status                     ---------                               -----------         ------                     Gold Top Hold[[id number]]                                    In process                   Please view results for these tests on the individual orders.    EXTRA TUBES [[id number]] Collected: 05/16/22 0836    Order Status: Sent Lab Status: In process Updated: 05/16/22 0836    Specimen: Blood, Venous     Narrative:      The following orders were created for panel order EXTRA TUBES.  Procedure                               Abnormality          Status                     ---------                               -----------         ------                     Light Green Top Hold[[id number]]                             In process                   Please view results for these tests on the individual orders.    EXTRA TUBES [[id number]] Collected: 05/16/22 0445    Order Status: Sent Lab Status: In process Updated: 05/16/22 0445    Specimen: Blood, Venous     Narrative:      The following orders were created for panel order EXTRA TUBES.  Procedure                               Abnormality         Status                     ---------                               -----------         ------                     Lavender Top Hold[[id number]]                                In process                   Please view results for these tests on the individual orders.         Medications:  Reconciled Home Medications:      Medication List      START taking these medications    amoxicillin-clavulanate 875-125mg 875-125 mg per tablet  Commonly known as: AUGMENTIN  Take 1 tablet by mouth 2 (two) times daily.        CONTINUE taking these medications    ascorbic acid (vitamin C) 1000 MG tablet  Commonly known as: VITAMIN C  Take 1,000 mg by mouth once daily.     b complex vitamins capsule  Take 1 capsule by mouth once daily.     cyanocobalamin 1,000 mcg/mL injection  Inject 1,000 mcg into the muscle every 30 days.     cyclobenzaprine 10 MG tablet  Commonly known as: FLEXERIL  Take 10 mg by mouth every evening.     EnbreL Mini 50 mg/mL (1 mL) Crtg  Generic drug: etanercept  1 mL by abdominal subcutaneous route every Sunday.     esomeprazole 20 MG capsule  Commonly known as: NEXIUM  Take 20 mg by mouth once daily.     ferrous sulfate Tab tablet  Commonly known as: FEOSOL  Take 1 tablet by mouth once daily.     gabapentin 600 MG tablet  Commonly known as: NEURONTIN  Take 2 tablets (1,200 mg total) by mouth 3 (three) times daily.     hydrOXYchloroQUINE 200 mg tablet  Commonly known  as: PLAQUENIL  Take 200 mg by mouth 2 (two) times daily.     insulin regular 100 unit/mL injection  3 (three) times daily before meals.     ketorolac 10 mg tablet  Commonly known as: TORADOL  Take 10 mg by mouth every 8 (eight) hours as needed.     leflunomide 20 MG Tab  Commonly known as: ARAVA  Take 1 tablet by mouth once daily. Monday, Wednesday, Friday     LEVEMIR U-100 INSULIN 100 unit/mL injection  Generic drug: insulin detemir U-100  SMARTSI Unit(s) SUB-Q Daily     lysine 500 mg Tab  Commonly known as: L-LYSINE  Take 500 mg by mouth 2 (two) times a day.     MIRALAX 17 gram/dose powder  Generic drug: polyethylene glycol  Take by mouth.     nystatin 100,000 unit/mL suspension  Commonly known as: MYCOSTATIN  Take 6 mLs by mouth 3 (three) times daily before meals.     oxyCODONE 10 mg Tab immediate release tablet  Commonly known as: ROXICODONE  Take 10 mg by mouth every 8 (eight) hours as needed.     oxyCODONE-acetaminophen  mg per tablet  Commonly known as: PERCOCET  Take 1 tablet by mouth every 8 (eight) hours as needed for Pain.     predniSONE 5 MG tablet  Commonly known as: DELTASONE  Take 5 mg by mouth once daily.     promethazine 25 MG tablet  Commonly known as: PHENERGAN  Take 25 mg by mouth every 6 (six) hours as needed for Nausea.     sucralfate 1 gram tablet  Commonly known as: CARAFATE  Take 1 g by mouth 4 (four) times daily.     triamterene-hydrochlorothiazide 37.5-25 mg 37.5-25 mg per capsule  Commonly known as: DYAZIDE  Take 1 capsule by mouth once daily.     zinc gluconate 50 mg tablet  Take 50 mg by mouth once daily.        STOP taking these medications    cefUROXime 500 MG tablet  Commonly known as: CEFTIN     doxycycline 100 MG Cap  Commonly known as: VIBRAMYCIN     metroNIDAZOLE 500 MG tablet  Commonly known as: FLAGYL            Indwelling Lines/Drains at time of discharge:   Lines/Drains/Airways     None                 Time spent on the discharge of patient: 40 minutes         Sang  MD Fawad  Department of Hospital Medicine  [facility] - Orthopedic

## 2022-05-23 NOTE — ASSESSMENT & PLAN NOTE
Complicated with neuropathy and gastroparesis. Patient has gastric stimulator.   - Resume Home Insulin therapy

## 2022-05-23 NOTE — ASSESSMENT & PLAN NOTE
- Continue home medications  - BP remains mildly elevated 140s/70s  - Follow up with PCP within 1 week for medication adjustments and repeat lab work

## 2022-05-23 NOTE — PROGRESS NOTES
Nutrition Note: CBW is 90.7kg. Intake is % of a diabetic diet. Weight stable with no current nutritional concerns. Continue with poc.

## 2022-05-23 NOTE — ASSESSMENT & PLAN NOTE
- Continue home hydroxychloroquine, leflunimide and prednisone  - Follow up with Dr. Almazan within 2 weeks - Instructed patient to call with questions regarding biologic medications.

## 2022-05-23 NOTE — NURSING
Discharge instructions reviewed with patient and copy given to patient. Patient voiced understanding for dressing changes, medication and follow up appts.will follow up with patient tomorrow

## 2022-05-23 NOTE — PROGRESS NOTES
General Surgery    Tetanus shot prior to discharge today  Redress daily with gauze  abx per ID recs   F/u with surgery 2 weeks

## 2022-05-24 ENCOUNTER — TELEPHONE (OUTPATIENT)
Dept: ORTHOPEDICS | Facility: HOSPITAL | Age: 62
End: 2022-05-24
Payer: COMMERCIAL

## 2022-05-24 LAB
BACTERIA BLD CULT: NORMAL
BACTERIA BLD CULT: NORMAL

## 2022-05-26 ENCOUNTER — TELEPHONE (OUTPATIENT)
Dept: ORTHOPEDICS | Facility: HOSPITAL | Age: 62
End: 2022-05-26
Payer: COMMERCIAL

## 2022-05-30 LAB
BACTERIA SPEC ANAEROBE CULT: NORMAL
MICROORGANISM SPEC CULT: NORMAL

## 2022-06-14 ENCOUNTER — OFFICE VISIT (OUTPATIENT)
Dept: SURGERY | Facility: CLINIC | Age: 62
End: 2022-06-14
Payer: COMMERCIAL

## 2022-06-14 DIAGNOSIS — S61.451S CAT BITE OF HAND, RIGHT, SEQUELA: Primary | ICD-10-CM
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
## 2022-06-14 PROCEDURE — 99024 PR POST-OP FOLLOW-UP VISIT: ICD-10-PCS | Mod: ,,, | Performed by: STUDENT IN AN ORGANIZED HEALTH CARE EDUCATION/TRAINING PROGRAM

## 2022-06-14 PROCEDURE — 99214 OFFICE O/P EST MOD 30 MIN: CPT | Mod: PBBFAC | Performed by: STUDENT IN AN ORGANIZED HEALTH CARE EDUCATION/TRAINING PROGRAM

## 2022-06-14 PROCEDURE — 99024 POSTOP FOLLOW-UP VISIT: CPT | Mod: ,,, | Performed by: STUDENT IN AN ORGANIZED HEALTH CARE EDUCATION/TRAINING PROGRAM

## 2022-06-14 NOTE — PROGRESS NOTES
Subjective:       Patient ID: Silvana Sarah is a 61 y.o. female.    Chief Complaint: Follow-up (Cat bite to hand)    61-year-old  female presents the clinic for wound re-evaluation from a right hand cat bite.  Patient was hospitalized for little over a week and received IV antibiotics.  Patient still taking her oral antibiotics as directed by Infectious Disease.  Still has some slight swelling in bilateral hands, but attributes this to rheumatoid arthritis.  Patient states she has a follow-up with her rheumatologist next week.  Patient states the swelling in her hands have dry medically improved and her wounds have almost completely healed; 2 spots have completely healed 1 area has closed up and is almost completely sealed.  Denies any chest pain/shortness of breath, nausea/vomiting/diarrhea, fever/chills.    Review of Systems   Constitutional: Negative.    HENT: Negative.    Eyes: Negative.    Respiratory: Negative for shortness of breath.    Cardiovascular: Negative for chest pain.   Gastrointestinal: Negative for abdominal distention, abdominal pain, blood in stool, change in bowel habit and change in bowel habit.   Genitourinary: Negative.  Negative for hematuria.   Musculoskeletal: Negative for myalgias.   Neurological: Negative for dizziness and weakness.   Psychiatric/Behavioral: Negative.          Objective:      Physical Exam  Constitutional:       General: She is not in acute distress.     Appearance: Normal appearance.   HENT:      Head: Normocephalic.   Cardiovascular:      Rate and Rhythm: Normal rate.   Pulmonary:      Effort: Pulmonary effort is normal. No respiratory distress.   Abdominal:      General: There is no distension.      Tenderness: There is no abdominal tenderness.   Musculoskeletal:         General: Normal range of motion.        Arms:       Comments: Two incisions completely healed over; small incision near the 5th metacarpophalangeal joint healing with granulation tissue and  small opening measuring approximately 1 cm; no signs of purulent drainage or infection   Skin:     General: Skin is warm.      Coloration: Skin is not jaundiced.   Neurological:      General: No focal deficit present.      Mental Status: She is alert and oriented to person, place, and time.      Cranial Nerves: No cranial nerve deficit.         Assessment:       Problem List Items Addressed This Visit    None     Visit Diagnoses     Cat bite of hand, right, sequela    -  Primary          Plan:       Complete antibiotics.  Keep follow-up appointment with Rheumatology.  No need for further follow-up unless there are any problems

## 2024-08-06 ENCOUNTER — HOSPITAL ENCOUNTER (EMERGENCY)
Facility: HOSPITAL | Age: 64
Discharge: HOME | End: 2024-08-06
Payer: COMMERCIAL

## 2024-08-06 VITALS
HEART RATE: 89 BPM | BODY MASS INDEX: 28.49 KG/M2 | RESPIRATION RATE: 16 BRPM | OXYGEN SATURATION: 98 % | SYSTOLIC BLOOD PRESSURE: 120 MMHG | HEIGHT: 70 IN | TEMPERATURE: 98 F | WEIGHT: 199 LBS | DIASTOLIC BLOOD PRESSURE: 74 MMHG

## 2024-08-06 DIAGNOSIS — L08.9 DIABETIC FOOT INFECTION: ICD-10-CM

## 2024-08-06 DIAGNOSIS — E11.628 DIABETIC FOOT INFECTION: ICD-10-CM

## 2024-08-06 LAB
ALBUMIN SERPL BCP-MCNC: 2.9 G/DL (ref 3.5–5)
ALBUMIN/GLOB SERPL: 0.8 {RATIO}
ALP SERPL-CCNC: 135 U/L (ref 50–130)
ALT SERPL W P-5'-P-CCNC: 17 U/L (ref 13–56)
ANION GAP SERPL CALCULATED.3IONS-SCNC: 9 MMOL/L (ref 7–16)
AST SERPL W P-5'-P-CCNC: 11 U/L (ref 15–37)
BASOPHILS # BLD AUTO: 0.08 K/UL (ref 0–0.2)
BASOPHILS NFR BLD AUTO: 0.8 % (ref 0–1)
BILIRUB SERPL-MCNC: 0.4 MG/DL (ref ?–1.2)
BILIRUB UR QL STRIP: NEGATIVE
BUN SERPL-MCNC: 30 MG/DL (ref 7–18)
BUN/CREAT SERPL: 21 (ref 6–20)
CALCIUM SERPL-MCNC: 8.8 MG/DL (ref 8.5–10.1)
CHLORIDE SERPL-SCNC: 102 MMOL/L (ref 98–107)
CLARITY UR: CLEAR
CO2 SERPL-SCNC: 30 MMOL/L (ref 21–32)
COLOR UR: YELLOW
CREAT SERPL-MCNC: 1.45 MG/DL (ref 0.55–1.02)
DIFFERENTIAL METHOD BLD: ABNORMAL
EGFR (NO RACE VARIABLE) (RUSH/TITUS): 41 ML/MIN/1.73M2
EOSINOPHIL # BLD AUTO: 0.15 K/UL (ref 0–0.5)
EOSINOPHIL NFR BLD AUTO: 1.5 % (ref 1–4)
ERYTHROCYTE [DISTWIDTH] IN BLOOD BY AUTOMATED COUNT: 13.1 % (ref 11.5–14.5)
GLOBULIN SER-MCNC: 3.5 G/DL (ref 2–4)
GLUCOSE SERPL-MCNC: 175 MG/DL (ref 74–106)
GLUCOSE UR STRIP-MCNC: NORMAL MG/DL
HCT VFR BLD AUTO: 36.9 % (ref 38–47)
HGB BLD-MCNC: 11.2 G/DL (ref 12–16)
HYALINE CASTS #/AREA URNS LPF: ABNORMAL /LPF
IMM GRANULOCYTES # BLD AUTO: 0.22 K/UL (ref 0–0.04)
IMM GRANULOCYTES NFR BLD: 2.2 % (ref 0–0.4)
KETONES UR STRIP-SCNC: NEGATIVE MG/DL
LACTATE SERPL-SCNC: 0.7 MMOL/L (ref 0.4–2)
LEUKOCYTE ESTERASE UR QL STRIP: 75
LYMPHOCYTES # BLD AUTO: 2.84 K/UL (ref 1–4.8)
LYMPHOCYTES NFR BLD AUTO: 27.9 % (ref 27–41)
MCH RBC QN AUTO: 27.7 PG (ref 27–31)
MCHC RBC AUTO-ENTMCNC: 30.4 G/DL (ref 32–36)
MCV RBC AUTO: 91.3 FL (ref 80–96)
MONOCYTES # BLD AUTO: 0.92 K/UL (ref 0–0.8)
MONOCYTES NFR BLD AUTO: 9 % (ref 2–6)
MPC BLD CALC-MCNC: 10.9 FL (ref 9.4–12.4)
MUCOUS, UA: ABNORMAL /LPF
NEUTROPHILS # BLD AUTO: 5.96 K/UL (ref 1.8–7.7)
NEUTROPHILS NFR BLD AUTO: 58.6 % (ref 53–65)
NITRITE UR QL STRIP: NEGATIVE
NRBC # BLD AUTO: 0 X10E3/UL
NRBC, AUTO (.00): 0 %
PH UR STRIP: 6 PH UNITS
PLATELET # BLD AUTO: 265 K/UL (ref 150–400)
POTASSIUM SERPL-SCNC: 4 MMOL/L (ref 3.5–5.1)
PROT SERPL-MCNC: 6.4 G/DL (ref 6.4–8.2)
PROT UR QL STRIP: 30
RBC # BLD AUTO: 4.04 M/UL (ref 4.2–5.4)
RBC # UR STRIP: NEGATIVE /UL
RBC #/AREA URNS HPF: 2 /HPF
SODIUM SERPL-SCNC: 137 MMOL/L (ref 136–145)
SP GR UR STRIP: 1.02
SQUAMOUS #/AREA URNS LPF: ABNORMAL /HPF
UROBILINOGEN UR STRIP-ACNC: NORMAL MG/DL
WBC # BLD AUTO: 10.17 K/UL (ref 4.5–11)
WBC #/AREA URNS HPF: 3 /HPF

## 2024-08-06 PROCEDURE — 85025 COMPLETE CBC W/AUTO DIFF WBC: CPT | Performed by: NURSE PRACTITIONER

## 2024-08-06 PROCEDURE — 99284 EMERGENCY DEPT VISIT MOD MDM: CPT | Mod: 25

## 2024-08-06 PROCEDURE — 87040 BLOOD CULTURE FOR BACTERIA: CPT | Performed by: NURSE PRACTITIONER

## 2024-08-06 PROCEDURE — 25000003 PHARM REV CODE 250: Performed by: NURSE PRACTITIONER

## 2024-08-06 PROCEDURE — 81003 URINALYSIS AUTO W/O SCOPE: CPT | Performed by: NURSE PRACTITIONER

## 2024-08-06 PROCEDURE — 96365 THER/PROPH/DIAG IV INF INIT: CPT

## 2024-08-06 PROCEDURE — 83605 ASSAY OF LACTIC ACID: CPT | Performed by: NURSE PRACTITIONER

## 2024-08-06 PROCEDURE — 96361 HYDRATE IV INFUSION ADD-ON: CPT

## 2024-08-06 PROCEDURE — 63600175 PHARM REV CODE 636 W HCPCS: Mod: JZ,JG | Performed by: NURSE PRACTITIONER

## 2024-08-06 PROCEDURE — 36415 COLL VENOUS BLD VENIPUNCTURE: CPT | Performed by: NURSE PRACTITIONER

## 2024-08-06 PROCEDURE — 80053 COMPREHEN METABOLIC PANEL: CPT | Performed by: NURSE PRACTITIONER

## 2024-08-06 PROCEDURE — 81001 URINALYSIS AUTO W/SCOPE: CPT | Performed by: NURSE PRACTITIONER

## 2024-08-06 RX ORDER — CLINDAMYCIN PHOSPHATE 600 MG/50ML
600 INJECTION, SOLUTION INTRAVENOUS
Status: COMPLETED | OUTPATIENT
Start: 2024-08-06 | End: 2024-08-06

## 2024-08-06 RX ORDER — CLINDAMYCIN HYDROCHLORIDE 150 MG/1
300 CAPSULE ORAL 4 TIMES DAILY
Qty: 56 CAPSULE | Refills: 0 | Status: SHIPPED | OUTPATIENT
Start: 2024-08-06 | End: 2024-08-13

## 2024-08-06 RX ADMIN — SODIUM CHLORIDE 1000 ML: 9 INJECTION, SOLUTION INTRAVENOUS at 03:08

## 2024-08-06 RX ADMIN — CLINDAMYCIN PHOSPHATE 600 MG: 600 INJECTION, SOLUTION INTRAVENOUS at 04:08

## 2024-08-06 NOTE — ED PROVIDER NOTES
Encounter Date: 2024       History     Chief Complaint   Patient presents with    Wound Check     Pt presents to ed with c/o having left foot wound     Patient presents to Er with complaint of diabetic foot wound.  Patient reports being referred to ER by her PCP for admission and IV antibiotics.  Patient reports wound has been ongoing for several weeks but she noticed blistering started 2 days ago.  She was seen by her PCP yesterday and was told her WBC was elevated and she needed to be evaluated in the ER.  Patient denies known injury or accident.  She has history ot DM, gastroparesis and rheumatoid arthritis.  Patient reports she was initially thought the pain was related to a rheumatoid flare.  Patient reports she was takes her prescribed Percocet 3 times daily but has not gotten relief of the pain.  Patient was drove herself to the ER today.    The history is provided by the patient. No  was used.     Review of patient's allergies indicates:   Allergen Reactions    Influenza virus vaccines     Penicillins      Past Medical History:   Diagnosis Date    Anxiety and depression 2021    Diabetes mellitus     Dry eye syndrome, bilateral 10/06/2017    Essential (primary) hypertension     Gastroparesis due to DM     Generalized osteoarthritis 10/25/2021    Other mechanical complication of implanted electronic neurostimulator, generator, sequela 10/25/2021    Peripheral autonomic neuropathy due to DM     Rheumatoid arthritis      Past Surgical History:   Procedure Laterality Date     SECTION      GASTRIC STIMULATOR IMPLANT SURGERY      HYSTERECTOMY      INCISION AND DRAINAGE OF ABSCESS Right 2022    Procedure: INCISION AND DRAINAGE, ABSCESS;  Surgeon: Kamari Sandoval DO;  Location: [facility];  Service: General;  Laterality: Right;  right hand dr mavis severino    IRRIGATION AND DEBRIDEMENT OF UPPER EXTREMITY Right 2022    Procedure: IRRIGATION AND DEBRIDEMENT, UPPER  EXTREMITY;  Surgeon: Kamari Sandoval DO;  Location: [facility];  Service: General;  Laterality: Right;  right hand I/D dr sandoval today     Family History   Problem Relation Name Age of Onset    Heart disease Mother      Cancer Father      Diabetes Son      Diabetes Son       Social History     Tobacco Use    Smoking status: Never    Smokeless tobacco: Never   Substance Use Topics    Alcohol use: Never     Review of Systems   Constitutional:  Positive for activity change.   Skin:  Positive for wound (wound with blistering left posterior ankle and dorsal left foot).   All other systems reviewed and are negative.      Physical Exam     Initial Vitals [08/06/24 1345]   BP Pulse Resp Temp SpO2   112/65 92 16 98.1 °F (36.7 °C) 98 %      MAP       --         Physical Exam    Nursing note and vitals reviewed.  Constitutional: She appears well-developed and well-nourished.   HENT:   Head: Normocephalic.   Nose: Nose normal.   Mouth/Throat: Oropharynx is clear and moist.   Eyes: Conjunctivae and EOM are normal.   Neck: Neck supple.   Normal range of motion.  Cardiovascular:  Normal rate, normal heart sounds and intact distal pulses.           Pulmonary/Chest: Breath sounds normal.   Abdominal: Abdomen is soft. Bowel sounds are normal.   Musculoskeletal:         General: Edema present. Normal range of motion.      Cervical back: Normal range of motion and neck supple.     Neurological: She is alert and oriented to person, place, and time. She has normal strength. GCS score is 15. GCS eye subscore is 4. GCS verbal subscore is 5. GCS motor subscore is 6.   Skin: Skin is warm and dry. Capillary refill takes less than 2 seconds. There is erythema.   Redness and blister type wound noted to left posterior/ lateral foot and dorsal foot.  No drainage noted.           Medical Screening Exam   See Full Note    ED Course   Procedures  Labs Reviewed   COMPREHENSIVE METABOLIC PANEL - Abnormal       Result Value    Sodium 137       Potassium 4.0      Chloride 102      CO2 30      Anion Gap 9      Glucose 175 (*)     BUN 30 (*)     Creatinine 1.45 (*)     BUN/Creatinine Ratio 21 (*)     Calcium 8.8      Total Protein 6.4      Albumin 2.9 (*)     Globulin 3.5      A/G Ratio 0.8      Bilirubin, Total 0.4      Alk Phos 135 (*)     ALT 17      AST 11 (*)     eGFR 41 (*)    URINALYSIS, REFLEX TO URINE CULTURE - Abnormal    Color, UA Yellow      Clarity, UA Clear      pH, UA 6.0      Leukocytes, UA 75      Nitrites, UA Negative      Protein, UA 30 (*)     Glucose, UA Normal      Ketones, UA Negative      Urobilinogen, UA Normal      Bilirubin, UA Negative      Blood, UA Negative      Specific Gravity, UA 1.023     CBC WITH DIFFERENTIAL - Abnormal    WBC 10.17      RBC 4.04 (*)     Hemoglobin 11.2 (*)     Hematocrit 36.9 (*)     MCV 91.3      MCH 27.7      MCHC 30.4 (*)     RDW 13.1      Platelet Count 265      MPV 10.9      Neutrophils % 58.6      Lymphocytes % 27.9      Monocytes % 9.0 (*)     Eosinophils % 1.5      Basophils % 0.8      Immature Granulocytes % 2.2 (*)     nRBC, Auto 0.0      Neutrophils, Abs 5.96      Lymphocytes, Absolute 2.84      Monocytes, Absolute 0.92 (*)     Eosinophils, Absolute 0.15      Basophils, Absolute 0.08      Immature Granulocytes, Absolute 0.22 (*)     nRBC, Absolute 0.00      Diff Type Auto     URINALYSIS, MICROSCOPIC - Abnormal    WBC, UA 3      RBC, UA 2      Squamous Epithelial Cells, UA Occasional (*)     Hyaline Casts, UA 0-2 (*)     Mucous Occasional (*)    LACTIC ACID, PLASMA - Normal    Lactic Acid 0.7     CULTURE, BLOOD   CULTURE, BLOOD   CBC W/ AUTO DIFFERENTIAL    Narrative:     The following orders were created for panel order CBC auto differential.  Procedure                               Abnormality         Status                     ---------                               -----------         ------                     CBC with Differential[[id number]]       Abnormal            Final result                  Please view results for these tests on the individual orders.          Imaging Results              X-Ray Foot Complete Left (Final result)  Result time 08/06/24 16:22:22   Procedure changed from X-Ray Foot Complete Right     Final result by Wilder Zheng II, MD (08/06/24 16:22:22)                   Impression:      No findings of osteomyelitis.      Electronically signed by: Wilder Zheng  Date:    08/06/2024  Time:    16:22               Narrative:    EXAMINATION:  XR FOOT COMPLETE 3 VIEW LEFT    CLINICAL HISTORY:  diabetic foot;diabetic foot; Type 2 diabetes mellitus with other skin complications    COMPARISON:  None available    TECHNIQUE:  XR FOOT 3 VIEW LEFT    FINDINGS:  No evidence of fracture seen.  The alignment of the joints appears normal.  Moderate 2nd metatarsophalangeal joint mild remaining joint degenerative change is present.  No soft tissue abnormality is seen.                                       Medications   sodium chloride 0.9% bolus 1,000 mL 1,000 mL (0 mLs Intravenous Stopped 8/6/24 1740)   clindamycin in D5W 600 mg/50 mL IVPB 600 mg (0 mg Intravenous Stopped 8/6/24 1708)     Medical Decision Making  Patient presents to Er with complaint of diabetic foot wound.  Patient reports being referred to ER by her PCP for admission and IV antibiotics.  Patient reports wound has been ongoing for several weeks but she noticed blistering started 2 days ago.  She was seen by her PCP yesterday and was told her WBC was elevated and she needed to be evaluated in the ER.  Patient denies known injury or accident.  She has history ot DM, gastroparesis and rheumatoid arthritis.  Patient reports she was initially thought the pain was related to a rheumatoid flare.  Patient reports she was takes her prescribed Percocet 3 times daily but has not gotten relief of the pain.  Patient was drove herself to the ER today.      Amount and/or Complexity of Data Reviewed  Labs: ordered.  Radiology:  ordered.  Discussion of management or test interpretation with external provider(s): Initiate IV, collect lab work, collect lactic  1 L normal saline bolus  Clindamycin 600 mg IV piggyback to treat left foot wound.    Patient was discharged home with diagnosis of diabetic foot wound left foot.  She was given a prescription for clindamycin to take as prescribed.  She was told to continue her current prescribed pain medication.  She was given referral to follow up with wound care clinic and it was instructed to call that office in a.m. to schedule appointment.  Patient verbalizes understanding and agrees with plan of care.    Risk  Prescription drug management.                                      Clinical Impression:   Final diagnoses:  [E11.628, L08.9] Diabetic foot infection        ED Disposition Condition    Discharge Stable          ED Prescriptions       Medication Sig Dispense Start Date End Date Auth. Provider    clindamycin (CLEOCIN) 150 MG capsule Take 2 capsules (300 mg total) by mouth 4 (four) times daily. for 7 days 56 capsule 8/6/2024 8/13/2024 Ester Montes FNP          Follow-up Information       Follow up With Specialties Details Why Contact Info    Marybeth Orellana FNP General Surgery, Wound Care Schedule an appointment as soon as possible for a visit in 2 days  [address] [facility] [place] [address]  [phone]               Ester Montes FNP  08/06/24 1819       Ester Montes FNP  08/06/24 1819

## 2024-08-06 NOTE — DISCHARGE INSTRUCTIONS
Call wound care center and schedule follow up appointment in wound care clinic.  Take medications as prescribed.  Wash wound daily with Vashe/ antibacterial soap and water.  Follow up with primary care provider in 2 days if symptoms do not improve with treatment and he was not been seen by wound care.  Return to the ER with new or worsening symptoms.

## 2024-08-10 LAB
BACTERIA BLD CULT: NORMAL
BACTERIA BLD CULT: NORMAL

## 2024-08-12 LAB
BACTERIA BLD CULT: NORMAL
BACTERIA BLD CULT: NORMAL

## 2024-08-15 ENCOUNTER — OFFICE VISIT (OUTPATIENT)
Dept: WOUND CARE | Facility: CLINIC | Age: 64
End: 2024-08-15
Attending: FAMILY MEDICINE
Payer: COMMERCIAL

## 2024-08-15 VITALS
TEMPERATURE: 98 F | DIASTOLIC BLOOD PRESSURE: 88 MMHG | SYSTOLIC BLOOD PRESSURE: 142 MMHG | HEART RATE: 88 BPM | RESPIRATION RATE: 18 BRPM

## 2024-08-15 DIAGNOSIS — E11.621 DIABETIC ULCER OF LEFT HEEL ASSOCIATED WITH TYPE 2 DIABETES MELLITUS, WITH FAT LAYER EXPOSED: Primary | ICD-10-CM

## 2024-08-15 DIAGNOSIS — E11.628 DIABETIC FOOT INFECTION: ICD-10-CM

## 2024-08-15 DIAGNOSIS — L97.511 DIABETIC ULCER OF TOE OF RIGHT FOOT ASSOCIATED WITH TYPE 2 DIABETES MELLITUS, LIMITED TO BREAKDOWN OF SKIN: ICD-10-CM

## 2024-08-15 DIAGNOSIS — E11.621 DIABETIC ULCER OF TOE OF RIGHT FOOT ASSOCIATED WITH TYPE 2 DIABETES MELLITUS, LIMITED TO BREAKDOWN OF SKIN: ICD-10-CM

## 2024-08-15 DIAGNOSIS — L97.422 DIABETIC ULCER OF LEFT HEEL ASSOCIATED WITH TYPE 2 DIABETES MELLITUS, WITH FAT LAYER EXPOSED: Primary | ICD-10-CM

## 2024-08-15 DIAGNOSIS — L08.9 DIABETIC FOOT INFECTION: ICD-10-CM

## 2024-08-15 PROCEDURE — 99999 PR PBB SHADOW E&M-EST. PATIENT-LVL V: CPT | Mod: PBBFAC,,, | Performed by: NURSE PRACTITIONER

## 2024-08-15 PROCEDURE — 99499 UNLISTED E&M SERVICE: CPT | Mod: S$PBB,,, | Performed by: NURSE PRACTITIONER

## 2024-08-15 PROCEDURE — 99215 OFFICE O/P EST HI 40 MIN: CPT | Mod: PBBFAC | Performed by: NURSE PRACTITIONER

## 2024-08-15 PROCEDURE — 11042 DBRDMT SUBQ TIS 1ST 20SQCM/<: CPT | Mod: PBBFAC | Performed by: NURSE PRACTITIONER

## 2024-08-15 RX ORDER — COLLAGENASE SANTYL 250 [ARB'U]/G
OINTMENT TOPICAL DAILY
Qty: 90 G | Refills: 1 | Status: SHIPPED | OUTPATIENT
Start: 2024-08-15

## 2024-08-15 RX ORDER — LISINOPRIL 2.5 MG/1
2.5 TABLET ORAL NIGHTLY
COMMUNITY

## 2024-08-15 RX ORDER — CEFUROXIME AXETIL 500 MG/1
500 TABLET ORAL EVERY 12 HOURS
Qty: 60 TABLET | Refills: 0 | Status: SHIPPED | OUTPATIENT
Start: 2024-08-15 | End: 2024-09-14

## 2024-08-15 NOTE — PROGRESS NOTES
Debridement Performed for Assessment: Wound# 2  Performed By: Provider: Marybeth Vazquez NP  Assistant: PUJA Santiago    Debridement: Surgical    Photo taken post procedure:    Time-Out Taken: Yes  Level: Skin/Subcutaneous Tissue  Post Debridement Measurements  Length: (cm) 1  Width: (cm) 0.1  Depth: (cm)       Area: (cm²) 0.1  Percent Debrided: 100%  Total Area Debrided: (sq cm) 0.1    Tissue and other material debrided:  Adipose, Dermis, Epidermis, Subcutaneous  Devitalized Tissue Debrided:Biofilm, Slough, Fibrin  Instrument: Curette  Bleeding: Moderate  Hemostasis Achieved: Pressure  Procedural Pain: Insensate  Post Procedural Pain: Insensate  Response to Treatment: Procedure was tolerated well    Devitalized materials/tissue Removed  the following was removed during debridement  subcutaneous      Post Debridement Diagnosis  Post debridement diagnosis  Same as Pre-operative debridement diagnosis, No Complications noted.      Grafts or implants applied  Was a graft or implant applied?  No      Procedure assistant  Procedure assisted by:  Assistant is the same as nurse listed above      Complications related to procedure  Did any complication occure during procedure?  No complications noted during or after procedure.      Specimen  Specimen collect during procedure?  No specimen collected      Anaesthesia:  Anesthesia used  None      Blood Loss:  Blood loss during procedure  less than 5 cc

## 2024-08-15 NOTE — PROCEDURES
"Debridement    Date/Time: 8/15/2024 9:00 AM    Performed by: Marybeth Orellana FNP  Authorized by: Marybeth Orellana FNP    Time out: Immediately prior to procedure a "time out" was called to verify the correct patient, procedure, equipment, support staff and site/side marked as required.    Consent Done?:  Yes (Written)    Wound Details:    Location:  Left foot    Location:  Left Heel    Type of Debridement:  Excisional       Length (cm):  1.5       Area (sq cm):  1.5       Width (cm):  1       Percent Debrided (%):  100       Depth (cm):  0.5       Total Area Debrided (sq cm):  1.5    Depth of debridement:  Subcutaneous tissue    Tissue debrided:  Adipose, Dermis, Epidermis and Subcutaneous    Devitalized tissue debrided:  Biofilm, Clots, Exudate, Necrotic/Eschar, Fibrin and Slough    Instruments:  Scissors  Bleeding:  Moderate  Hemostasis Achieved: Yes  Method Used:  Pressure    Additional wounds:  1    2nd Wound Details:     Location:  Right foot    Location:  Right 1st Toe    Location:  Right 1st Toe    Type of Debridement:  Excisional       Length (cm):  0.1       Area (sq cm):  0.01       Width (cm):  0.1       Percent Debrided (%):  100       Depth (cm):  0.2       Total Area Debrided (sq cm):  0.01    Depth of debridement:  Subcutaneous tissue    Tissue debrided:  Adipose, Dermis, Epidermis and Subcutaneous    Devitalized tissue debrided:  Biofilm, Callus, Clots and Exudate    Instruments:  Curette  Bleeding:  Moderate  Hemostasis Achieved: Yes    Method Used:  Pressure  Patient tolerance:  Patient tolerated the procedure well with no immediate complications  1st Wound Pain Assessment: 0  2nd Wound Pain Assessment: 0     Assistant MEIR King RN    "

## 2024-08-15 NOTE — PROGRESS NOTES
CARISA Ayala   [facility]  [address] [facility] [address]  [phone]      PATIENT NAME: Silvana Sarah  : 1960  DATE: 8/15/24  MRN: [id number]      Billing Provider: CARISA Ayala  Level of Service:   Patient PCP Information       Provider PCP Type    CARISA Cárdenas General            Reason for Visit / Chief Complaint: Diabetic Foot Ulcer and Wound Check (Left heel, right great toe)       History of Present Illness / Problem Focused Workflow     Silvana Sarah is a 62 yo female presents with complaints of ulcer to left heel and right great toe. Wound on left heel with with necrotic tissue and slough, bedside debridement today. Left foot is edematous and tender. Recent cultures reviewed. Ceftin to pharmacy. Santyl and vashe moistened drawtex to wound bed. Supplies ordered from [employer]. Wound on right great toe will moderate serous drainage. Aquacel ag applied. Pertinent PMH includes diabetes, hypertension, peripheral neuropathy, gastroparesis, and rheumatoid arthritis. Last HgA1C 7.2 2023, diabetes managed by PCP. Wound healing is complicated by multiple co-morbidities, poor vascular supply, immunosuppression, diabetes, edema, heavy drainage, decreased granulation tissue, necrosis, large surface area, large volume, advanced age, fragile skin, and infection.             Review of Systems     Review of Systems   Constitutional:  Positive for activity change. Negative for chills and fever.   Respiratory:  Negative for chest tightness and shortness of breath.    Cardiovascular:  Positive for leg swelling. Negative for chest pain and palpitations.   Musculoskeletal:  Positive for arthralgias and joint swelling.   Skin:  Positive for color change and wound.        wound   Neurological:  Positive for weakness.   Psychiatric/Behavioral:  Negative for agitation, behavioral problems, confusion and self-injury.         Medical / Social / Family History     Past Medical History:   Diagnosis Date    Anxiety and depression 2021    Diabetes mellitus     Dry eye syndrome, bilateral 10/06/2017    Essential (primary) hypertension     Gastroparesis due to DM     Generalized osteoarthritis 10/25/2021    Other mechanical complication of implanted electronic neurostimulator, generator, sequela 10/25/2021    Peripheral autonomic neuropathy due to DM     Rheumatoid arthritis        Past Surgical History:   Procedure Laterality Date     SECTION      GASTRIC STIMULATOR IMPLANT SURGERY  2010    HYSTERECTOMY      INCISION AND DRAINAGE OF ABSCESS Right 2022    Procedure: INCISION AND DRAINAGE, ABSCESS;  Surgeon: Kamari Gamble DO;  Location: [facility] OR;  Service: General;  Laterality: Right;  right hand dr mavis severino    IRRIGATION AND DEBRIDEMENT OF UPPER EXTREMITY Right 2022    Procedure: IRRIGATION AND DEBRIDEMENT, UPPER EXTREMITY;  Surgeon: Kamari Gamble DO;  Location: [facility] OR;  Service: General;  Laterality: Right;  right hand I/D dr gamble today       Social History  Ms. Silvana Sarah  reports that she has never smoked. She has never used smokeless tobacco. She reports that she does not drink alcohol.    Family History  Ms.'nikolas Sarah family history includes Cancer in her father; Diabetes in her son and son; Heart disease in her mother.    Medications and Allergies     Medications  Outpatient Medications Marked as Taking for the 8/15/24 encounter (Office Visit) with Marybeth Orellana FNP   Medication Sig Dispense Refill    b complex vitamins capsule Take 1 capsule by mouth once daily.      cyanocobalamin 1,000 mcg/mL injection Inject 1,000 mcg into the muscle every 30 days.      esomeprazole (NEXIUM) 20 MG capsule Take 20 mg by mouth once daily.      etanercept (ENBREL MINI) 50 mg/mL (1 mL) Crtg 1 mL by abdominal subcutaneous route every .       ferrous sulfate  (FEOSOL) Tab tablet Take 1 tablet by mouth once daily.      gabapentin (NEURONTIN) 600 MG tablet Take 2 tablets (1,200 mg total) by mouth 3 (three) times daily. 540 tablet 1    hydrOXYchloroQUINE (PLAQUENIL) 200 mg tablet Take 200 mg by mouth 2 (two) times daily.       insulin regular 100 unit/mL Inj injection 3 (three) times daily before meals.      LEVEMIR U-100 INSULIN 100 unit/mL injection SMARTSI Unit(s) SUB-Q Daily      lisinopriL (PRINIVIL,ZESTRIL) 2.5 MG tablet Take 2.5 mg by mouth every evening.      lysine (L-LYSINE) 500 mg Tab Take 500 mg by mouth 2 (two) times a day.      nystatin (MYCOSTATIN) 100,000 unit/mL suspension Take 6 mLs by mouth 3 (three) times daily before meals.       oxyCODONE-acetaminophen (PERCOCET)  mg per tablet Take 1 tablet by mouth every 8 (eight) hours as needed for Pain.       predniSONE (DELTASONE) 5 MG tablet Take 5 mg by mouth once daily.       promethazine (PHENERGAN) 25 MG tablet Take 25 mg by mouth every 6 (six) hours as needed for Nausea.       triamterene-hydrochlorothiazide 37.5-25 mg (DYAZIDE) 37.5-25 mg per capsule Take 1 capsule by mouth once daily.       zinc gluconate 50 mg tablet Take 50 mg by mouth once daily.         Allergies  Review of patient's allergies indicates:   Allergen Reactions    Influenza virus vaccines     Penicillins        Physical Examination     Vitals:    08/15/24 0831   BP: (!) 142/88   Pulse: 88   Resp: 18   Temp: 97.5 °F (36.4 °C)     Physical Exam  Vitals and nursing note reviewed.   Constitutional:       Appearance: Normal appearance.   HENT:      Head: Normocephalic.   Cardiovascular:      Rate and Rhythm: Normal rate and regular rhythm.      Pulses: Normal pulses.      Heart sounds: Normal heart sounds.   Pulmonary:      Effort: Pulmonary effort is normal. No respiratory distress.   Chest:      Chest wall: No tenderness.   Musculoskeletal:         General: Swelling and tenderness present.      Right lower leg: Edema  present.      Left lower leg: Edema present.   Skin:     General: Skin is warm and dry.      Findings: Erythema present.      Comments: See LDA for photos/measurements   Neurological:      General: No focal deficit present.      Mental Status: She is alert and oriented to person, place, and time. Mental status is at baseline.   Psychiatric:         Mood and Affect: Mood normal.         Behavior: Behavior normal.         Thought Content: Thought content normal.         Judgment: Judgment normal.       Assessment and Plan             Wound 08/15/24 Diabetic Ulcer Right posterior Toe, first #2 (Active)   08/15/24    Present on Original Admission: Y   Primary Wound Type: Diabetic ulc   Side: Right   Orientation: posterior   Location: Toe, first   Wound Approximate Age at First Assessment (Weeks): 5 weeks   Wound Number: #2   Is this injury device related?:    Incision Type:    Closure Method:    Wound Description (Comments):    Type:    Additional Comments:    Ankle-Brachial Index:    Pulses:    Removal Indication and Assessment:    Wound Outcome:    Wound Image   08/15/24 0937   Dressing Appearance Open to air;No dressing 08/15/24 0840   Drainage Amount Small 08/15/24 0840   Drainage Characteristics/Odor Clear 08/15/24 0840   Appearance Red;Granulating 08/15/24 0840   Tissue loss description Full thickness 08/15/24 0840   Black (%), Wound Tissue Color 0 % 08/15/24 0840   Red (%), Wound Tissue Color 100 % 08/15/24 0840   Yellow (%), Wound Tissue Color 0 % 08/15/24 0840   Periwound Area Intact;Dry 08/15/24 0840   Wound Edges Callused 08/15/24 0840   Paniagua Classification (diabetic foot ulcers only) Grade 1 08/15/24 0840   Wound Length (cm) 0.1 cm 08/15/24 0840   Wound Width (cm) 0.2 cm 08/15/24 0840   Wound Depth (cm) 0.2 cm 08/15/24 0840   Wound Volume (cm^3) 0.004 cm^3 08/15/24 0840   Wound Surface Area (cm^2) 0.02 cm^2 08/15/24 0840   Care Cleansed with:;Antimicrobial agent 08/15/24 0840   Dressing Applied;Calcium  alginate;Gauze;Rolled gauze 08/15/24 0840            Wound 08/15/24 Diabetic Ulcer Left posterior Heel #1 (Active)   08/15/24    Present on Original Admission: Y   Primary Wound Type: Diabetic ulc   Side: Left   Orientation: posterior   Location: Heel   Wound Approximate Age at First Assessment (Weeks): 9 weeks   Wound Number: #1   Is this injury device related?:    Incision Type:    Closure Method:    Wound Description (Comments):    Type:    Additional Comments:    Ankle-Brachial Index:    Pulses:    Removal Indication and Assessment:    Wound Outcome:    Wound Image   08/15/24 0936   Dressing Appearance Intact;Moist drainage 08/15/24 0838   Drainage Amount Small 08/15/24 0838   Drainage Characteristics/Odor Yellow 08/15/24 0838   Appearance Black;Yellow;Necrotic;Slough;Moist 08/15/24 0838   Tissue loss description Full thickness 08/15/24 0838   Black (%), Wound Tissue Color 10 % 08/15/24 0838   Red (%), Wound Tissue Color 90 % 08/15/24 0838   Yellow (%), Wound Tissue Color 0 % 08/15/24 0838   Periwound Area Intact;Dry 08/15/24 0838   Wound Edges Defined 08/15/24 0838   Paniagua Classification (diabetic foot ulcers only) Grade 1 08/15/24 0838   Wound Length (cm) 1.5 cm 08/15/24 0838   Wound Width (cm) 1 cm 08/15/24 0838   Wound Depth (cm) 0.1 cm 08/15/24 0838   Wound Volume (cm^3) 0.15 cm^3 08/15/24 0838   Wound Surface Area (cm^2) 1.5 cm^2 08/15/24 0838   Care Cleansed with:;Antimicrobial agent 08/15/24 0838   Dressing Applied;Hydrofiber;Gauze;Rolled gauze 08/15/24 0838     Problem List Items Addressed This Visit          Endocrine    Diabetic ulcer of left heel associated with type 2 diabetes mellitus, with fat layer exposed - Primary    Overview                    Current Assessment & Plan     Clean wounds with baby shampoo and water.    Left heel:  Apply Calmoseptine to the periwound (outer edges)   Apply a nickel thick layer of Santyl ointment to wound bed (OK to use Vashe wet to dry until Santyl is  available)  Cover with Vashe moisten drawtex, dry gauze, wrap with belen or kerlix.   Change daily and as needed for soilage.    Right great toe:  Apply Aquacel Ag to wound bed  Cover with dry gauze, wrap lightly with belen.  Change every 2- 3 days and as needed for soilage.     Diabetes:  Monitor glucose closely. Check fasting glucose and 2 hours after meals. HgA1C goal <7, fasting glucose , and 2 hours after meals <180  Hypertension:  Check blood pressure twice daily, goal <120/80  Diet:   Increase protein intake, avoid fried, fatty foods and foods high in simple carbs.   Vitamins:  Take vitamin C 1000 mg, zinc 50mg, vitamin d 5000 units, and a daily multivitamin. Lucho is a good source of protein and nutrients to aid in wound healing.   Monitor closely for s/s of infection including fever, chills, increase in pain, odor from wound, and increased redness from foot. Go to ER if any complications develop.   Keep leg elevated and avoid pressure on wound.          Other Visit Diagnoses       Diabetic foot infection        Relevant Orders    US Lower Extrem Arteries Bilat with VALENTIN (xpd)            Future Appointments   Date Time Provider Department Center   8/29/2024  9:00 AM Marybeth Orellana FNP [facility]    8/30/2024  1:00 PM [address]            Signature:  CARISA Ayala  [facility]  [address] [facility] [address]  [phone]    Date of encounter: 8/15/24

## 2024-08-15 NOTE — PROGRESS NOTES
Debridement Performed for Assessment: Wound# 1  Performed By: Provider: Marybeth Vazquez NP  Assistant: PUJA Santiago, RN    Debridement: Surgical    Photo taken post procedure:    Time-Out Taken: Yes  Level: Skin/Subcutaneous Tissue  Post Debridement Measurements  Length: (cm) 1.5  Width: (cm) 1  Depth: (cm)       Area: (cm²) 1.5  Percent Debrided: 100%  Total Area Debrided: (sq cm) 1.5    Tissue and other material debrided:  Adipose, Dermis, Epidermis, Subcutaneous  Devitalized Tissue Debrided:Biofilm, Slough, Fibrin  Instrument: Curette  Bleeding: Moderate  Hemostasis Achieved: Pressure  Procedural Pain: Insensate  Post Procedural Pain: Insensate  Response to Treatment: Procedure was tolerated well    Devitalized materials/tissue Removed  the following was removed during debridement  subcutaneous      Post Debridement Diagnosis  Post debridement diagnosis  Same as Pre-operative debridement diagnosis, No Complications noted.      Grafts or implants applied  Was a graft or implant applied?  No      Procedure assistant  Procedure assisted by:  Assistant is the same as nurse listed above      Complications related to procedure  Did any complication occure during procedure?  No complications noted during or after procedure.      Specimen  Specimen collect during procedure?  No specimen collected      Anaesthesia:  Anesthesia used  None      Blood Loss:  Blood loss during procedure  less than 5 cc

## 2024-08-15 NOTE — ASSESSMENT & PLAN NOTE
Clean wounds with baby shampoo and water.    Left heel:  Apply Calmoseptine to the periwound (outer edges)   Apply a nickel thick layer of Santyl ointment to wound bed (OK to use Vashe wet to dry until Santyl is available)  Cover with Vashe moisten drawtex, dry gauze, wrap with belen or kerlix.   Change daily and as needed for soilage.    Right great toe:  Apply Aquacel Ag to wound bed  Cover with dry gauze, wrap lightly with belen.  Change every 2- 3 days and as needed for soilage.     Diabetes:  Monitor glucose closely. Check fasting glucose and 2 hours after meals. HgA1C goal <7, fasting glucose , and 2 hours after meals <180  Hypertension:  Check blood pressure twice daily, goal <120/80  Diet:   Increase protein intake, avoid fried, fatty foods and foods high in simple carbs.   Vitamins:  Take vitamin C 1000 mg, zinc 50mg, vitamin d 5000 units, and a daily multivitamin. Lucho is a good source of protein and nutrients to aid in wound healing.   Monitor closely for s/s of infection including fever, chills, increase in pain, odor from wound, and increased redness from foot. Go to ER if any complications develop.   Keep leg elevated and avoid pressure on wound.

## 2024-08-22 NOTE — PATIENT INSTRUCTIONS
Clean wounds with baby shampoo and water.    Left heel:  Apply Calmoseptine to the periwound (outer edges)   Apply a nickel thick layer of Santyl ointment to wound bed (OK to use Vashe wet to dry until Santyl is available)  Cover with Vashe moisten drawtex, dry gauze, wrap with belen or kerlix.   Change daily and as needed for soilage.  Apply silvadene cream daily until santyl is availble  Diabetes:  Monitor glucose closely. Check fasting glucose and 2 hours after meals. HgA1C goal <7, fasting glucose , and 2 hours after meals <180  Hypertension:  Check blood pressure twice daily, goal <120/80  Diet:   Increase protein intake, avoid fried, fatty foods and foods high in simple carbs.   Vitamins:  Take vitamin C 1000 mg, zinc 50mg, vitamin d 5000 units, and a daily multivitamin. Lucho is a good source of protein and nutrients to aid in wound healing.   Monitor closely for s/s of infection including fever, chills, increase in pain, odor from wound, and increased redness from foot. Go to ER if any complications develop.   Keep leg elevated and avoid pressure on wound.

## 2024-08-22 NOTE — PROGRESS NOTES
CARISA Ayala   [facility]  [address] [facility] [address]  [phone]      PATIENT NAME: Silvana Sarah  : 1960  DATE: 24  MRN: [id number]      Billing Provider: CARISA Ayala  Level of Service:   Patient PCP Information       Provider PCP Type    CARISA Cárdenas General            Reason for Visit / Chief Complaint: Non-healing Wound Follow Up (Right heel)       History of Present Illness / Problem Focused Workflow     Silvana Sarah is a 62 yo female presents for follow up on chronic non healing wound to left heel. Right great toe has healed since last visit. Left heel continues to have slough, bedside debridement today. Reports she has not gotten santyl or heard from pharmacy. Silvadene to heel today. Will f/u on santyl. Arterial doppler scheduled for Friday.         8/15/24  62 yo female presents with complaints of ulcer to left heel and right great toe. Wound on left heel with with necrotic tissue and slough, bedside debridement today. Left foot is edematous and tender. Recent cultures reviewed. Ceftin to pharmacy. Santyl and vashe moistened drawtex to wound bed. Supplies ordered from [employer]. Wound on right great toe will moderate serous drainage. Aquacel ag applied. Pertinent PMH includes diabetes, hypertension, peripheral neuropathy, gastroparesis, and rheumatoid arthritis. Last HgA1C 7.2 2023, diabetes managed by PCP. Wound healing is complicated by multiple co-morbidities, poor vascular supply, immunosuppression, diabetes, edema, heavy drainage, decreased granulation tissue, necrosis, large surface area, large volume, advanced age, fragile skin, and infection.             Review of Systems     Review of Systems   Constitutional:  Positive for activity change. Negative for chills and fever.   Respiratory:  Negative for chest tightness and shortness of breath.    Cardiovascular:  Positive for leg  swelling. Negative for chest pain and palpitations.   Musculoskeletal:  Positive for arthralgias and joint swelling.   Skin:  Positive for color change and wound.        wound   Neurological:  Positive for weakness.   Psychiatric/Behavioral:  Negative for agitation, behavioral problems, confusion and self-injury.        Medical / Social / Family History     Past Medical History:   Diagnosis Date    Anxiety and depression 2021    Diabetes mellitus     Dry eye syndrome, bilateral 10/06/2017    Essential (primary) hypertension     Gastroparesis due to DM     Generalized osteoarthritis 10/25/2021    Other mechanical complication of implanted electronic neurostimulator, generator, sequela 10/25/2021    Peripheral autonomic neuropathy due to DM     Rheumatoid arthritis        Past Surgical History:   Procedure Laterality Date     SECTION      GASTRIC STIMULATOR IMPLANT SURGERY  2010    HYSTERECTOMY      INCISION AND DRAINAGE OF ABSCESS Right 2022    Procedure: INCISION AND DRAINAGE, ABSCESS;  Surgeon: Kamari Sandoval DO;  Location: [facility] OR;  Service: General;  Laterality: Right;  right hand dr sandoval am    IRRIGATION AND DEBRIDEMENT OF UPPER EXTREMITY Right 2022    Procedure: IRRIGATION AND DEBRIDEMENT, UPPER EXTREMITY;  Surgeon: Kamari Sandoval DO;  Location: [facility] OR;  Service: General;  Laterality: Right;  right hand I/D dr sandoval today       Social History  Ms. Silvana Sarah  reports that she has never smoked. She has never used smokeless tobacco. She reports that she does not drink alcohol.    Family History  Ms.'s Silvana Sarah family history includes Cancer in her father; Diabetes in her son and son; Heart disease in her mother.    Medications and Allergies     Medications  Outpatient Medications Marked as Taking for the 24 encounter (Office Visit) with Marybeth Orellana FNP   Medication Sig Dispense Refill    b complex vitamins capsule Take 1 capsule by  mouth once daily.      cefUROXime (CEFTIN) 500 MG tablet Take 1 tablet (500 mg total) by mouth every 12 (twelve) hours. 60 tablet 0    cyanocobalamin 1,000 mcg/mL injection Inject 1,000 mcg into the muscle every 30 days.      esomeprazole (NEXIUM) 20 MG capsule Take 20 mg by mouth once daily.      etanercept (ENBREL MINI) 50 mg/mL (1 mL) Crtg 1 mL by abdominal subcutaneous route every .       ferrous sulfate (FEOSOL) Tab tablet Take 1 tablet by mouth once daily.      hydrOXYchloroQUINE (PLAQUENIL) 200 mg tablet Take 200 mg by mouth 2 (two) times daily.       insulin regular 100 unit/mL Inj injection 3 (three) times daily before meals.      LEVEMIR U-100 INSULIN 100 unit/mL injection SMARTSI Unit(s) SUB-Q Daily      lisinopriL (PRINIVIL,ZESTRIL) 2.5 MG tablet Take 2.5 mg by mouth every evening.      lysine (L-LYSINE) 500 mg Tab Take 500 mg by mouth 2 (two) times a day.      nystatin (MYCOSTATIN) 100,000 unit/mL suspension Take 6 mLs by mouth 3 (three) times daily before meals.       oxyCODONE-acetaminophen (PERCOCET)  mg per tablet Take 1 tablet by mouth every 8 (eight) hours as needed for Pain.       predniSONE (DELTASONE) 5 MG tablet Take 5 mg by mouth once daily.       promethazine (PHENERGAN) 25 MG tablet Take 25 mg by mouth every 6 (six) hours as needed for Nausea.       silver sulfADIAZINE 1% (SILVADENE) 1 % cream Apply topically once daily. 400 g 1    triamterene-hydrochlorothiazide 37.5-25 mg (DYAZIDE) 37.5-25 mg per capsule Take 1 capsule by mouth once daily.       zinc gluconate 50 mg tablet Take 50 mg by mouth once daily.         Allergies  Review of patient's allergies indicates:   Allergen Reactions    Influenza virus vaccines     Penicillins        Physical Examination     Vitals:    24 0915   Pulse: 80   Resp: 20   Temp: 97.4 °F (36.3 °C)       Physical Exam  Vitals and nursing note reviewed.   Constitutional:       Appearance: Normal appearance.   HENT:       Head: Normocephalic.   Cardiovascular:      Rate and Rhythm: Normal rate and regular rhythm.      Pulses: Normal pulses.      Heart sounds: Normal heart sounds.   Pulmonary:      Effort: Pulmonary effort is normal. No respiratory distress.   Chest:      Chest wall: No tenderness.   Musculoskeletal:         General: Swelling and tenderness present.      Right lower leg: Edema present.      Left lower leg: Edema present.   Skin:     General: Skin is warm and dry.      Findings: Erythema present.      Comments: See LDA for photos/measurements   Neurological:      General: No focal deficit present.      Mental Status: She is alert and oriented to person, place, and time. Mental status is at baseline.   Psychiatric:         Mood and Affect: Mood normal.         Behavior: Behavior normal.         Thought Content: Thought content normal.         Judgment: Judgment normal.       Assessment and Plan             Wound 08/15/24 Diabetic Ulcer Right posterior Toe, first #2 (Active)   08/15/24    Present on Original Admission: Y   Primary Wound Type: Diabetic ulc   Side: Right   Orientation: posterior   Location: Toe, first   Wound Approximate Age at First Assessment (Weeks): 5 weeks   Wound Number: #2   Is this injury device related?:    Incision Type:    Closure Method:    Wound Description (Comments):    Type:    Additional Comments:    Ankle-Brachial Index:    Pulses:    Removal Indication and Assessment:    Wound Outcome:    Wound Image    08/26/24 0923   Dressing Appearance Open to air 08/26/24 0923   Drainage Amount None 08/26/24 0923   Appearance Pink;Intact 08/26/24 0923   Tissue loss description Not applicable 08/26/24 0923   Black (%), Wound Tissue Color 0 % 08/26/24 0923   Red (%), Wound Tissue Color 0 % 08/26/24 0923   Yellow (%), Wound Tissue Color 0 % 08/26/24 0923   Periwound Area Intact;Dry 08/26/24 0923   Wound Edges Callused 08/26/24 0923   Wound Length (cm) 0.1 cm 08/26/24 0923   Wound Width (cm) 0.1 cm  08/26/24 0923   Wound Depth (cm) 0.1 cm 08/26/24 0923   Wound Volume (cm^3) 0.001 cm^3 08/26/24 0923   Wound Surface Area (cm^2) 0.01 cm^2 08/26/24 0923   Care Cleansed with:;Antimicrobial agent 08/26/24 0923   Dressing Applied 08/26/24 0923   Periwound Care Moisturizer applied 08/26/24 0923   Dressing Change Due 08/27/24 08/26/24 0923            Wound 08/15/24 Diabetic Ulcer Left posterior Heel #1 (Active)   08/15/24    Present on Original Admission: Y   Primary Wound Type: Diabetic ulc   Side: Left   Orientation: posterior   Location: Heel   Wound Approximate Age at First Assessment (Weeks): 9 weeks   Wound Number: #1   Is this injury device related?:    Incision Type:    Closure Method:    Wound Description (Comments):    Type:    Additional Comments:    Ankle-Brachial Index:    Pulses:    Removal Indication and Assessment:    Wound Outcome:    Wound Image     08/26/24 0920   Dressing Appearance Moist drainage 08/26/24 0920   Drainage Amount Moderate 08/26/24 0920   Drainage Characteristics/Odor Serosanguineous;Tan 08/26/24 0920   Appearance Lopez;Moist;Fibrin 08/26/24 0920   Tissue loss description Full thickness 08/26/24 0920   Black (%), Wound Tissue Color 0 % 08/26/24 0920   Red (%), Wound Tissue Color 0 % 08/26/24 0920   Yellow (%), Wound Tissue Color 100 % 08/26/24 0920   Periwound Area Intact;Moist 08/26/24 0920   Wound Edges Open 08/26/24 0920   Wound Length (cm) 0.5 cm 08/26/24 0920   Wound Width (cm) 1.2 cm 08/26/24 0920   Wound Depth (cm) 0.5 cm 08/26/24 0920   Wound Volume (cm^3) 0.3 cm^3 08/26/24 0920   Wound Surface Area (cm^2) 0.6 cm^2 08/26/24 0920   Care Cleansed with:;Antimicrobial agent 08/26/24 0920   Dressing Applied;Gauze;Rolled gauze 08/26/24 0920   Periwound Care Moisture barrier applied 08/26/24 0920   Dressing Change Due 08/27/24 08/26/24 0920       Problem List Items Addressed This Visit          Endocrine    Diabetic ulcer of left heel associated with type 2 diabetes mellitus, with fat  layer exposed - Primary    Overview                        Current Assessment & Plan     Clean wounds with baby shampoo and water.    Left heel:  Apply Calmoseptine to the periwound (outer edges)   Apply a nickel thick layer of Santyl ointment to wound bed (OK to use Vashe wet to dry until Santyl is available)  Cover with Vashe moisten drawtex, dry gauze, wrap with belen or kerlix.   Change daily and as needed for soilage.  Apply silvadene cream daily until santyl is availble  Diabetes:  Monitor glucose closely. Check fasting glucose and 2 hours after meals. HgA1C goal <7, fasting glucose , and 2 hours after meals <180  Hypertension:  Check blood pressure twice daily, goal <120/80  Diet:   Increase protein intake, avoid fried, fatty foods and foods high in simple carbs.   Vitamins:  Take vitamin C 1000 mg, zinc 50mg, vitamin d 5000 units, and a daily multivitamin. Lucho is a good source of protein and nutrients to aid in wound healing.   Monitor closely for s/s of infection including fever, chills, increase in pain, odor from wound, and increased redness from foot. Go to ER if any complications develop.   Keep leg elevated and avoid pressure on wound.         Diabetic ulcer of toe of right foot associated with type 2 diabetes mellitus, limited to breakdown of skin    Overview                Current Assessment & Plan     Clean wounds with baby shampoo and water.    Left heel:  Apply Calmoseptine to the periwound (outer edges)   Apply a nickel thick layer of Santyl ointment to wound bed (OK to use Vashe wet to dry until Santyl is available)  Cover with Vashe moisten drawtex, dry gauze, wrap with belen or kerlix.   Change daily and as needed for soilage.  Apply silvadene cream daily until santyl is availble  Diabetes:  Monitor glucose closely. Check fasting glucose and 2 hours after meals. HgA1C goal <7, fasting glucose , and 2 hours after meals <180  Hypertension:  Check blood pressure twice daily, goal  <120/80  Diet:   Increase protein intake, avoid fried, fatty foods and foods high in simple carbs.   Vitamins:  Take vitamin C 1000 mg, zinc 50mg, vitamin d 5000 units, and a daily multivitamin. Lucho is a good source of protein and nutrients to aid in wound healing.   Monitor closely for s/s of infection including fever, chills, increase in pain, odor from wound, and increased redness from foot. Go to ER if any complications develop.   Keep leg elevated and avoid pressure on wound.            Future Appointments   Date Time Provider Department Center   8/30/2024  1:00 PM RUSJAMIE PERDOMO[address] [place]    9/12/2024 10:00 AM Marybeth Orellana FNP [facility]            Signature:  CARISA Ayala  [facility]  [address] [facility] [place] [address]  [phone]    Date of encounter: 8/26/24

## 2024-08-26 ENCOUNTER — OFFICE VISIT (OUTPATIENT)
Dept: WOUND CARE | Facility: CLINIC | Age: 64
End: 2024-08-26
Attending: FAMILY MEDICINE
Payer: COMMERCIAL

## 2024-08-26 VITALS — HEART RATE: 80 BPM | RESPIRATION RATE: 20 BRPM | TEMPERATURE: 97 F

## 2024-08-26 DIAGNOSIS — E11.621 DIABETIC ULCER OF LEFT HEEL ASSOCIATED WITH TYPE 2 DIABETES MELLITUS, WITH FAT LAYER EXPOSED: Primary | ICD-10-CM

## 2024-08-26 DIAGNOSIS — E11.621 DIABETIC ULCER OF TOE OF RIGHT FOOT ASSOCIATED WITH TYPE 2 DIABETES MELLITUS, LIMITED TO BREAKDOWN OF SKIN: ICD-10-CM

## 2024-08-26 DIAGNOSIS — L97.511 DIABETIC ULCER OF TOE OF RIGHT FOOT ASSOCIATED WITH TYPE 2 DIABETES MELLITUS, LIMITED TO BREAKDOWN OF SKIN: ICD-10-CM

## 2024-08-26 DIAGNOSIS — L97.422 DIABETIC ULCER OF LEFT HEEL ASSOCIATED WITH TYPE 2 DIABETES MELLITUS, WITH FAT LAYER EXPOSED: Primary | ICD-10-CM

## 2024-08-26 PROCEDURE — 99499 UNLISTED E&M SERVICE: CPT | Mod: S$PBB,,, | Performed by: NURSE PRACTITIONER

## 2024-08-26 PROCEDURE — 99215 OFFICE O/P EST HI 40 MIN: CPT | Mod: PBBFAC,25 | Performed by: NURSE PRACTITIONER

## 2024-08-26 PROCEDURE — 11043 DBRDMT MUSC&/FSCA 1ST 20/<: CPT | Mod: PBBFAC | Performed by: NURSE PRACTITIONER

## 2024-08-26 PROCEDURE — 99999 PR PBB SHADOW E&M-EST. PATIENT-LVL V: CPT | Mod: PBBFAC,,, | Performed by: NURSE PRACTITIONER

## 2024-08-26 RX ORDER — SILVER SULFADIAZINE 10 G/1000G
CREAM TOPICAL DAILY
Qty: 400 G | Refills: 1 | Status: SHIPPED | OUTPATIENT
Start: 2024-08-26

## 2024-08-26 NOTE — PROGRESS NOTES
Debridement Performed for Assessment: Wound# Left heel  Performed By: Provider: Marybeth Vazquez NP  Assistant:  Liset Gunn LPN    Debridement: Surgical    Photo taken post procedure:    Time-Out Taken: Yes  Level: Skin/Subcutaneous Tissue/ Muscle  Post Debridement Measurements  Length: (cm) 1.0  Width: (cm) 1.4  Depth: (cm) 0.6      Area: (cm²) 1.4  Percent Debrided: 100%  Total Area Debrided: (sq cm)     Tissue and other material debrided:  Adipose, Dermis, Epidermis, Subcutaneous, Muscle  Devitalized Tissue Debrided:Biofilm, Slough  Instrument: Curette  Bleeding: Moderate  Hemostasis Achieved: Pressure  Procedural Pain: Insensate  Post Procedural Pain: Insensate  Response to Treatment: Procedure was tolerated well    Devitalized materials/tissue Removed  the following was removed during debridement  subcutaneous, muscle      Post Debridement Diagnosis  chronic left heel diabetic ulcer  Post debridement diagnosis  Same as Pre-operative debridement diagnosis, No Complications noted.      Grafts or implants applied  Was a graft or implant applied?  No      Procedure assistant  Procedure assisted by:  Assistant is the same as nurse listed above      Complications related to procedure  Did any complication occure during procedure?  No complications noted during or after procedure.      Specimen  Specimen collect during procedure?  No specimen collected      Anaesthesia:  Anesthesia used  None      Blood Loss:  Blood loss during procedure  less than 5 cc

## 2024-08-26 NOTE — PROCEDURES
"Debridement    Date/Time: 8/26/2024 9:00 AM    Performed by: Marybeth Orellana FNP  Authorized by: Marybeth Orellana FNP    Time out: Immediately prior to procedure a "time out" was called to verify the correct patient, procedure, equipment, support staff and site/side marked as required.    Consent Done?:  Yes (Written)    Wound Details:    Location:  Left foot    Location:  Left Heel    Type of Debridement:  Excisional       Length (cm):  1       Area (sq cm):  1.4       Width (cm):  1.4       Percent Debrided (%):  100       Depth (cm):  0.6       Total Area Debrided (sq cm):  1.4    Depth of debridement:  Muscle/fascia/tendon    Tissue debrided:  Adipose, Dermis, Epidermis, Subcutaneous and Muscle    Devitalized tissue debrided:  Biofilm, Callus, Clots, Exudate, Slough and Fibrin    Instruments:  Scissors  Bleeding:  Moderate  Hemostasis Achieved: Yes  Method Used:  Pressure  Patient tolerance:  Patient tolerated the procedure well with no immediate complications  1st Wound Pain Assessment: 0     Assistant REKHA Gunn LPN    "

## 2024-08-30 ENCOUNTER — HOSPITAL ENCOUNTER (OUTPATIENT)
Dept: RADIOLOGY | Facility: HOSPITAL | Age: 64
Discharge: HOME | End: 2024-08-30
Attending: NURSE PRACTITIONER
Payer: COMMERCIAL

## 2024-08-30 DIAGNOSIS — E11.628 DIABETIC FOOT INFECTION: ICD-10-CM

## 2024-08-30 DIAGNOSIS — L08.9 DIABETIC FOOT INFECTION: ICD-10-CM

## 2024-08-30 PROCEDURE — 93922 UPR/L XTREMITY ART 2 LEVELS: CPT | Mod: 26,,, | Performed by: RADIOLOGY

## 2024-08-30 PROCEDURE — 93922 UPR/L XTREMITY ART 2 LEVELS: CPT | Mod: TC

## 2024-08-30 PROCEDURE — 93925 LOWER EXTREMITY STUDY: CPT | Mod: TC

## 2024-08-30 PROCEDURE — 93925 LOWER EXTREMITY STUDY: CPT | Mod: 26,,, | Performed by: RADIOLOGY

## 2024-09-03 NOTE — PATIENT INSTRUCTIONS
Clean wounds with baby shampoo and water.    Left heel:  Apply Calmoseptine to the periwound (outer edges)   Pack wound with dakins moistened packing strip.  Cover with dry gauze, wrap with belen or kerlix, and secure with paper tape.  Change daily and as needed for soilage.    Left ankle:  Apply Aaquacel Ag to wound bed, cover with dry gauze, wrap with belen or kerlic, secure with paper tape. Change daily and as needed for soilage.    Diabetes:  Monitor glucose closely. Check fasting glucose and 2 hours after meals. HgA1C goal <7, fasting glucose , and 2 hours after meals <180  Hypertension:  Check blood pressure twice daily, goal <120/80  Diet:   Increase protein intake, avoid fried, fatty foods and foods high in simple carbs.   Vitamins:  Take vitamin C 1000 mg, zinc 50mg, vitamin d 5000 units, and a daily multivitamin. Lucho is a good source of protein and nutrients to aid in wound healing.   Monitor closely for s/s of infection including fever, chills, increase in pain, odor from wound, and increased redness from foot. Go to ER if any complications develop.   Keep leg elevated and avoid pressure on wound.

## 2024-09-03 NOTE — PROGRESS NOTES
CARISA Ayala   [facility]  [address] [facility] [address]  [phone]      PATIENT NAME: Silvana Sarah  : 1960  DATE: 24  MRN: [id number]      Billing Provider: CARISA Ayala  Level of Service:   Patient PCP Information       Provider PCP Type    CARISA Cárdenas General            Reason for Visit / Chief Complaint: Diabetic Foot Ulcer and Wound Check (Left heel)       History of Present Illness / Problem Focused Workflow     Silvana Sarah is a 62 yo female presents for follow up on chronic non healing wound to left heel. Left heel is worse today with slough and eschar, bedside debridement today. D/c silvadene. Pack with dakins. Continue to apply drawtex to wick drainage. Rocephin today, levaquin to pharmacy. Cultures pending.         8/15/24  62 yo female presents with complaints of ulcer to left heel and right great toe. Wound on left heel with with necrotic tissue and slough, bedside debridement today. Left foot is edematous and tender. Recent cultures reviewed. Ceftin to pharmacy. Santyl and vashe moistened drawtex to wound bed. Supplies ordered from [employer]. Wound on right great toe will moderate serous drainage. Aquacel ag applied. Pertinent PMH includes diabetes, hypertension, peripheral neuropathy, gastroparesis, and rheumatoid arthritis. Last HgA1C 7.2 2023, diabetes managed by PCP. Wound healing is complicated by multiple co-morbidities, poor vascular supply, immunosuppression, diabetes, edema, heavy drainage, decreased granulation tissue, necrosis, large surface area, large volume, advanced age, fragile skin, and infection.             Review of Systems     Review of Systems   Constitutional:  Positive for activity change. Negative for chills and fever.   Respiratory:  Negative for chest tightness and shortness of breath.    Cardiovascular:  Positive for leg swelling. Negative for chest pain and  palpitations.   Musculoskeletal:  Positive for arthralgias and joint swelling.   Skin:  Positive for color change and wound.        wound   Neurological:  Positive for weakness.   Psychiatric/Behavioral:  Negative for agitation, behavioral problems, confusion and self-injury.        Medical / Social / Family History     Past Medical History:   Diagnosis Date    Anxiety and depression 2021    Diabetes mellitus     Dry eye syndrome, bilateral 10/06/2017    Essential (primary) hypertension     Gastroparesis due to DM     Generalized osteoarthritis 10/25/2021    Other mechanical complication of implanted electronic neurostimulator, generator, sequela 10/25/2021    Peripheral autonomic neuropathy due to DM     Rheumatoid arthritis        Past Surgical History:   Procedure Laterality Date     SECTION      GASTRIC STIMULATOR IMPLANT SURGERY  2010    HYSTERECTOMY      INCISION AND DRAINAGE OF ABSCESS Right 2022    Procedure: INCISION AND DRAINAGE, ABSCESS;  Surgeon: Kaamri Sandoval DO;  Location: [facility];  Service: General;  Laterality: Right;  right hand dr sandoval am    IRRIGATION AND DEBRIDEMENT OF UPPER EXTREMITY Right 2022    Procedure: IRRIGATION AND DEBRIDEMENT, UPPER EXTREMITY;  Surgeon: Kamari Sandoval DO;  Location: [facility] OR;  Service: General;  Laterality: Right;  right hand I/D dr sandoval today       Social History  Ms. Silvana Sarah  reports that she has never smoked. She has never used smokeless tobacco. She reports that she does not drink alcohol.    Family History  Ms.'s Silvana Sarah family history includes Cancer in her father; Diabetes in her son and son; Heart disease in her mother.    Medications and Allergies     Medications  Outpatient Medications Marked as Taking for the 24 encounter (Office Visit) with Marybeth Orellana FNP   Medication Sig Dispense Refill    b complex vitamins capsule Take 1 capsule by mouth once daily.      cefUROXime  (CEFTIN) 500 MG tablet Take 1 tablet (500 mg total) by mouth every 12 (twelve) hours. 60 tablet 0    collagenase (SANTYL) ointment Apply topically once daily. 90 g 1    cyanocobalamin 1,000 mcg/mL injection Inject 1,000 mcg into the muscle every 30 days.      esomeprazole (NEXIUM) 20 MG capsule Take 20 mg by mouth once daily.      etanercept (ENBREL MINI) 50 mg/mL (1 mL) Crtg 1 mL by abdominal subcutaneous route every .       ferrous sulfate (FEOSOL) Tab tablet Take 1 tablet by mouth once daily.      gabapentin (NEURONTIN) 600 MG tablet Take 2 tablets (1,200 mg total) by mouth 3 (three) times daily. 540 tablet 1    hydrOXYchloroQUINE (PLAQUENIL) 200 mg tablet Take 200 mg by mouth 2 (two) times daily.       insulin regular 100 unit/mL Inj injection 3 (three) times daily before meals.      LEVEMIR U-100 INSULIN 100 unit/mL injection SMARTSI Unit(s) SUB-Q Daily      lisinopriL (PRINIVIL,ZESTRIL) 2.5 MG tablet Take 2.5 mg by mouth every evening.      lysine (L-LYSINE) 500 mg Tab Take 500 mg by mouth 2 (two) times a day.      nystatin (MYCOSTATIN) 100,000 unit/mL suspension Take 6 mLs by mouth 3 (three) times daily before meals.       oxyCODONE-acetaminophen (PERCOCET)  mg per tablet Take 1 tablet by mouth every 8 (eight) hours as needed for Pain.       predniSONE (DELTASONE) 5 MG tablet Take 5 mg by mouth once daily.       promethazine (PHENERGAN) 25 MG tablet Take 25 mg by mouth every 6 (six) hours as needed for Nausea.       silver sulfADIAZINE 1% (SILVADENE) 1 % cream Apply topically once daily. 400 g 1    triamterene-hydrochlorothiazide 37.5-25 mg (DYAZIDE) 37.5-25 mg per capsule Take 1 capsule by mouth once daily.       zinc gluconate 50 mg tablet Take 50 mg by mouth once daily.       Current Facility-Administered Medications for the 24 encounter (Office Visit) with Marybeth Orellana FNP   Medication Dose Route Frequency Provider Last Rate Last Admin    [COMPLETED]  cefTRIAXone injection 1 g  1 g Intramuscular Once Marybeth Orellana FNLUIS   1 g at 09/05/24 1122    [DISCONTINUED] cefTRIAXone injection 1 g  1 g Intramuscular Q24H Marybeth Orellana FNLUIS   1 g at 09/05/24 1050       Allergies  Review of patient's allergies indicates:   Allergen Reactions    Influenza virus vaccines     Penicillins        Physical Examination     Vitals:    09/05/24 1014   BP: (!) 165/83   Pulse: 91   Resp: 19   Temp: 98.2 °F (36.8 °C)         Physical Exam  Vitals and nursing note reviewed.   Constitutional:       Appearance: Normal appearance.   HENT:      Head: Normocephalic.   Cardiovascular:      Rate and Rhythm: Normal rate and regular rhythm.      Pulses: Normal pulses.      Heart sounds: Normal heart sounds.   Pulmonary:      Effort: Pulmonary effort is normal. No respiratory distress.   Chest:      Chest wall: No tenderness.   Musculoskeletal:         General: Swelling and tenderness present.      Right lower leg: Edema present.      Left lower leg: Edema present.   Skin:     General: Skin is warm and dry.      Findings: Erythema present.      Comments: See LDA for photos/measurements   Neurological:      General: No focal deficit present.      Mental Status: She is alert and oriented to person, place, and time. Mental status is at baseline.   Psychiatric:         Mood and Affect: Mood normal.         Behavior: Behavior normal.         Thought Content: Thought content normal.         Judgment: Judgment normal.       Assessment and Plan             Wound 08/15/24 Diabetic Ulcer Right posterior Toe, first #2 (Active)   08/15/24    Present on Original Admission: Y   Primary Wound Type: Diabetic ulc   Side: Right   Orientation: posterior   Location: Toe, first   Wound Approximate Age at First Assessment (Weeks): 5 weeks   Wound Number: #2   Is this injury device related?:    Incision Type:    Closure Method:    Wound Description (Comments):    Type:    Additional Comments:     Ankle-Brachial Index:    Pulses:    Removal Indication and Assessment:    Wound Outcome:    Wound Image   09/05/24 1002   Dressing Appearance Open to air;No dressing 09/05/24 1002   Drainage Amount None 09/05/24 1002   Appearance Intact;Lopez;Dry 09/05/24 1002   Tissue loss description Full thickness 09/05/24 1002   Black (%), Wound Tissue Color 0 % 09/05/24 1002   Red (%), Wound Tissue Color 0 % 09/05/24 1002   Yellow (%), Wound Tissue Color 0 % 09/05/24 1002   Periwound Area Intact;Dry 09/05/24 1002   Wound Edges Defined 09/05/24 1002   Wound Length (cm) 0.1 cm 09/05/24 1002   Wound Width (cm) 0.1 cm 09/05/24 1002   Wound Depth (cm) 0.1 cm 09/05/24 1002   Wound Volume (cm^3) 0.001 cm^3 09/05/24 1002   Wound Surface Area (cm^2) 0.01 cm^2 09/05/24 1002   Care Cleansed with:;Soap and water 09/05/24 1002            Wound 08/15/24 Diabetic Ulcer Left posterior Heel #1 (Active)   08/15/24    Present on Original Admission: Y   Primary Wound Type: Diabetic ulc   Side: Left   Orientation: posterior   Location: Heel   Wound Approximate Age at First Assessment (Weeks): 9 weeks   Wound Number: #1   Is this injury device related?:    Incision Type:    Closure Method:    Wound Description (Comments):    Type:    Additional Comments:    Ankle-Brachial Index:    Pulses:    Removal Indication and Assessment:    Wound Outcome:    Wound Image    09/05/24 0951   Dressing Appearance Intact;Moist drainage 09/05/24 0951   Drainage Amount Moderate 09/05/24 0951   Drainage Characteristics/Odor Serosanguineous 09/05/24 0951   Appearance Red;Yellow;Moist;Granulating;Slough 09/05/24 0951   Tissue loss description Full thickness 09/05/24 0951   Black (%), Wound Tissue Color 0 % 09/05/24 0951   Red (%), Wound Tissue Color 10 % 09/05/24 0951   Yellow (%), Wound Tissue Color 90 % 09/05/24 0951   Periwound Area Intact;Moist;Redness 09/05/24 0951   Wound Edges Defined 09/05/24 0951   [place] Classification (diabetic foot ulcers only) Grade 1  09/05/24 0951   Wound Length (cm) 1.6 cm 09/05/24 0951   Wound Width (cm) 0.7 cm 09/05/24 0951   Wound Depth (cm) 0.3 cm 09/05/24 0951   Wound Volume (cm^3) 0.336 cm^3 09/05/24 0951   Wound Surface Area (cm^2) 1.12 cm^2 09/05/24 0951   Care Cleansed with:;Antimicrobial agent 09/05/24 0951   Dressing Applied;Gauze;Rolled gauze 09/05/24 0951   Packing packed with;strip gauze 09/05/24 0951            Wound 09/05/24 1000 Diabetic Ulcer Left lateral Ankle (Active)   09/05/24 1000   Present on Original Admission: Y   Primary Wound Type: Diabetic ulc   Side: Left   Orientation: lateral   Location: Ankle   Wound Approximate Age at First Assessment (Weeks): 1 weeks   Wound Number:    Is this injury device related?:    Incision Type:    Closure Method:    Wound Description (Comments):    Type:    Additional Comments:    Ankle-Brachial Index:    Pulses:    Removal Indication and Assessment:    Wound Outcome:    Wound Image    09/05/24 1000   Dressing Appearance Intact;Moist drainage 09/05/24 1000   Drainage Amount Scant 09/05/24 1000   Drainage Characteristics/Odor Serous 09/05/24 1000   Appearance Yellow 09/05/24 1000   Tissue loss description Full thickness 09/05/24 1000   Black (%), Wound Tissue Color 0 % 09/05/24 1000   Red (%), Wound Tissue Color 0 % 09/05/24 1000   Yellow (%), Wound Tissue Color 100 % 09/05/24 1000   Periwound Area Intact;Redness 09/05/24 1000   Wound Edges Irregular 09/05/24 1000   [place] Classification (diabetic foot ulcers only) Grade 1 09/05/24 1000   Wound Length (cm) 2.6 cm 09/05/24 1000   Wound Width (cm) 1.5 cm 09/05/24 1000   Wound Depth (cm) 0.1 cm 09/05/24 1000   Wound Volume (cm^3) 0.39 cm^3 09/05/24 1000   Wound Surface Area (cm^2) 3.9 cm^2 09/05/24 1000   Care Cleansed with:;Antimicrobial agent 09/05/24 1000   Dressing Applied;Hydrofiber;Gauze;Rolled gauze 09/05/24 1000         Problem List Items Addressed This Visit          Endocrine    Diabetic ulcer of left heel associated with type 2  diabetes mellitus, with fat layer exposed - Primary    Overview                          Current Assessment & Plan     Clean wounds with baby shampoo and water.    Left heel:  Apply Calmoseptine to the periwound (outer edges)   Pack wound with dakins moistened packing strip.  Cover with dry gauze, wrap with belen or kerlix, and secure with paper tape.  Change daily and as needed for soilage.    Left ankle:  Apply Aaquacel Ag to wound bed, cover with dry gauze, wrap with belen or kerlic, secure with paper tape. Change daily and as needed for soilage.    Diabetes:  Monitor glucose closely. Check fasting glucose and 2 hours after meals. HgA1C goal <7, fasting glucose , and 2 hours after meals <180  Hypertension:  Check blood pressure twice daily, goal <120/80  Diet:   Increase protein intake, avoid fried, fatty foods and foods high in simple carbs.   Vitamins:  Take vitamin C 1000 mg, zinc 50mg, vitamin d 5000 units, and a daily multivitamin. Lucho is a good source of protein and nutrients to aid in wound healing.   Monitor closely for s/s of infection including fever, chills, increase in pain, odor from wound, and increased redness from foot. Go to ER if any complications develop.   Keep leg elevated and avoid pressure on wound.         Relevant Medications    cefTRIAXone injection 1 g (Completed)    Other Relevant Orders    Culture, Anaerobe    Culture, Wound    Diabetic ulcer of toe of right foot associated with type 2 diabetes mellitus, limited to breakdown of skin    Overview                   Future Appointments   Date Time Provider Department Center   9/19/2024 10:00 AM Marybeth Orellana FNP [facility]            Signature:  CARISA Ayala  [facility]  [address] [facility] [address]  [phone]    Date of encounter: 9/5/24

## 2024-09-05 ENCOUNTER — OFFICE VISIT (OUTPATIENT)
Dept: WOUND CARE | Facility: CLINIC | Age: 64
End: 2024-09-05
Attending: FAMILY MEDICINE
Payer: COMMERCIAL

## 2024-09-05 VITALS
DIASTOLIC BLOOD PRESSURE: 83 MMHG | RESPIRATION RATE: 19 BRPM | SYSTOLIC BLOOD PRESSURE: 165 MMHG | TEMPERATURE: 98 F | HEART RATE: 91 BPM

## 2024-09-05 DIAGNOSIS — L97.511 DIABETIC ULCER OF TOE OF RIGHT FOOT ASSOCIATED WITH TYPE 2 DIABETES MELLITUS, LIMITED TO BREAKDOWN OF SKIN: ICD-10-CM

## 2024-09-05 DIAGNOSIS — L97.422 DIABETIC ULCER OF LEFT HEEL ASSOCIATED WITH TYPE 2 DIABETES MELLITUS, WITH FAT LAYER EXPOSED: Primary | ICD-10-CM

## 2024-09-05 DIAGNOSIS — E11.621 DIABETIC ULCER OF LEFT HEEL ASSOCIATED WITH TYPE 2 DIABETES MELLITUS, WITH FAT LAYER EXPOSED: Primary | ICD-10-CM

## 2024-09-05 DIAGNOSIS — E11.621 DIABETIC ULCER OF TOE OF RIGHT FOOT ASSOCIATED WITH TYPE 2 DIABETES MELLITUS, LIMITED TO BREAKDOWN OF SKIN: ICD-10-CM

## 2024-09-05 PROCEDURE — 99499 UNLISTED E&M SERVICE: CPT | Mod: S$PBB,,, | Performed by: NURSE PRACTITIONER

## 2024-09-05 PROCEDURE — 99215 OFFICE O/P EST HI 40 MIN: CPT | Mod: PBBFAC | Performed by: NURSE PRACTITIONER

## 2024-09-05 PROCEDURE — 99999 PR PBB SHADOW E&M-EST. PATIENT-LVL V: CPT | Mod: PBBFAC,,, | Performed by: NURSE PRACTITIONER

## 2024-09-05 PROCEDURE — 99999PBSHW PR PBB SHADOW TECHNICAL ONLY FILED TO HB: Mod: PBBFAC,,,

## 2024-09-05 PROCEDURE — 11042 DBRDMT SUBQ TIS 1ST 20SQCM/<: CPT | Mod: PBBFAC | Performed by: NURSE PRACTITIONER

## 2024-09-05 PROCEDURE — 96372 THER/PROPH/DIAG INJ SC/IM: CPT | Mod: PBBFAC | Performed by: NURSE PRACTITIONER

## 2024-09-05 PROCEDURE — 87070 CULTURE OTHR SPECIMN AEROBIC: CPT | Mod: ,,, | Performed by: CLINICAL MEDICAL LABORATORY

## 2024-09-05 RX ORDER — CEFTRIAXONE 1 G/1
1 INJECTION, POWDER, FOR SOLUTION INTRAMUSCULAR; INTRAVENOUS
Status: DISCONTINUED | OUTPATIENT
Start: 2024-09-05 | End: 2024-09-05

## 2024-09-05 RX ORDER — LEVOFLOXACIN 750 MG/1
750 TABLET ORAL DAILY
Qty: 10 TABLET | Refills: 0 | Status: SHIPPED | OUTPATIENT
Start: 2024-09-05 | End: 2024-09-15

## 2024-09-05 RX ORDER — CEFTRIAXONE 1 G/1
1 INJECTION, POWDER, FOR SOLUTION INTRAMUSCULAR; INTRAVENOUS ONCE
Status: COMPLETED | OUTPATIENT
Start: 2024-09-05 | End: 2024-09-05

## 2024-09-05 RX ADMIN — CEFTRIAXONE SODIUM 1 G: 1 INJECTION, POWDER, FOR SOLUTION INTRAMUSCULAR; INTRAVENOUS at 11:09

## 2024-09-05 RX ADMIN — CEFTRIAXONE SODIUM 1 G: 1 INJECTION, POWDER, FOR SOLUTION INTRAMUSCULAR; INTRAVENOUS at 10:09

## 2024-09-05 NOTE — PROGRESS NOTES
CARISA Ayala   [facility]  [address]  [facility] [place] [address]  [phone]      PATIENT NAME: Silvana Sarah  : 1960  DATE: 24  MRN: [id number]      Billing Provider: CARISA Ayala  Level of Service:   Patient PCP Information       Provider PCP Type    CARISA Cárdenas General            Reason for Visit / Chief Complaint: Diabetic Foot Ulcer and Wound Check (Left heel)       History of Present Illness / Problem Focused Workflow     Silvana Sarah is a 62 yo female presents for follow up on chronic non healing wound to left heel. Erythema and edema have improved since last visit. Reports flagyl caused diarrhea and she held it today. Wound is larger with increased slough. She has new open wound below ankle that tracts to heel ulcer. Referred to Dr. Aguilera to evaluate for debridement in the OR. Appointment with Dr. Aguilera today at 3:15.         8/15/24  62 yo female presents with complaints of ulcer to left heel and right great toe. Wound on left heel with with necrotic tissue and slough, bedside debridement today. Left foot is edematous and tender. Recent cultures reviewed. Ceftin to pharmacy. Santyl and vashe moistened drawtex to wound bed. Supplies ordered from [employer]. Wound on right great toe will moderate serous drainage. Aquacel ag applied. Pertinent PMH includes diabetes, hypertension, peripheral neuropathy, gastroparesis, and rheumatoid arthritis. Last HgA1C 7.2 2023, diabetes managed by PCP. Wound healing is complicated by multiple co-morbidities, poor vascular supply, immunosuppression, diabetes, edema, heavy drainage, decreased granulation tissue, necrosis, large surface area, large volume, advanced age, fragile skin, and infection.             Review of Systems     Review of Systems   Constitutional:  Positive for activity change. Negative for chills and fever.   Respiratory:  Negative for chest tightness and  shortness of breath.    Cardiovascular:  Positive for leg swelling. Negative for chest pain and palpitations.   Musculoskeletal:  Positive for arthralgias and joint swelling.   Skin:  Positive for color change and wound.        wound   Neurological:  Positive for weakness.   Psychiatric/Behavioral:  Negative for agitation, behavioral problems, confusion and self-injury.        Medical / Social / Family History     Past Medical History:   Diagnosis Date    Anxiety and depression 2021    Diabetes mellitus     Dry eye syndrome, bilateral 10/06/2017    Essential (primary) hypertension     Gastroparesis due to DM     Generalized osteoarthritis 10/25/2021    Other mechanical complication of implanted electronic neurostimulator, generator, sequela 10/25/2021    Peripheral autonomic neuropathy due to DM     Rheumatoid arthritis        Past Surgical History:   Procedure Laterality Date     SECTION      GASTRIC STIMULATOR IMPLANT SURGERY      HYSTERECTOMY      INCISION AND DRAINAGE OF ABSCESS Right 2022    Procedure: INCISION AND DRAINAGE, ABSCESS;  Surgeon: Kamari Sandoval DO;  Location: [facility] OR;  Service: General;  Laterality: Right;  right hand dr sandoval am    IRRIGATION AND DEBRIDEMENT OF UPPER EXTREMITY Right 2022    Procedure: IRRIGATION AND DEBRIDEMENT, UPPER EXTREMITY;  Surgeon: Kamari Sandoval DO;  Location: [facility] OR;  Service: General;  Laterality: Right;  right hand I/D dr sandoval today       Social History  Ms. Silvana Sarah  reports that she has never smoked. She has never used smokeless tobacco. She reports that she does not drink alcohol.    Family History  Ms.'s Silvana Sarah family history includes Cancer in her father; Diabetes in her son and son; Heart disease in her mother.    Medications and Allergies     Medications  Outpatient Medications Marked as Taking for the 24 encounter (Office Visit) with Marybeth Orellana FNP   Medication Sig  Dispense Refill    b complex vitamins capsule Take 1 capsule by mouth once daily.      collagenase (SANTYL) ointment Apply topically once daily. 90 g 1    cyanocobalamin 1,000 mcg/mL injection Inject 1,000 mcg into the muscle every 30 days.      esomeprazole (NEXIUM) 20 MG capsule Take 20 mg by mouth once daily.      etanercept (ENBREL MINI) 50 mg/mL (1 mL) Crtg 1 mL by abdominal subcutaneous route every .       ferrous sulfate (FEOSOL) Tab tablet Take 1 tablet by mouth once daily.      gabapentin (NEURONTIN) 600 MG tablet Take 2 tablets (1,200 mg total) by mouth 3 (three) times daily. 540 tablet 1    hydrOXYchloroQUINE (PLAQUENIL) 200 mg tablet Take 200 mg by mouth 2 (two) times daily.       insulin regular 100 unit/mL Inj injection 3 (three) times daily before meals.      LEVEMIR U-100 INSULIN 100 unit/mL injection SMARTSI Unit(s) SUB-Q Daily      lisinopriL (PRINIVIL,ZESTRIL) 2.5 MG tablet Take 2.5 mg by mouth every evening.      lysine (L-LYSINE) 500 mg Tab Take 500 mg by mouth 2 (two) times a day.      metroNIDAZOLE (FLAGYL) 500 MG tablet Take 1 tablet (500 mg total) by mouth every 12 (twelve) hours. for 14 days 28 tablet 0    nystatin (MYCOSTATIN) 100,000 unit/mL suspension Take 6 mLs by mouth 3 (three) times daily before meals.       oxyCODONE-acetaminophen (PERCOCET)  mg per tablet Take 1 tablet by mouth every 8 (eight) hours as needed for Pain.       predniSONE (DELTASONE) 5 MG tablet Take 5 mg by mouth once daily.       promethazine (PHENERGAN) 25 MG tablet Take 25 mg by mouth every 6 (six) hours as needed for Nausea.       silver sulfADIAZINE 1% (SILVADENE) 1 % cream Apply topically once daily. 400 g 1    triamterene-hydrochlorothiazide 37.5-25 mg (DYAZIDE) 37.5-25 mg per capsule Take 1 capsule by mouth once daily.       zinc gluconate 50 mg tablet Take 50 mg by mouth once daily.         Allergies  Review of patient's allergies indicates:   Allergen Reactions     Influenza virus vaccines     Penicillins        Physical Examination     Vitals:    09/19/24 1058   BP: 113/66   Pulse: 97   Resp: 18   Temp: 97.7 °F (36.5 °C)         Physical Exam  Vitals and nursing note reviewed.   Constitutional:       Appearance: Normal appearance.   HENT:      Head: Normocephalic.   Cardiovascular:      Rate and Rhythm: Normal rate and regular rhythm.      Pulses: Normal pulses.      Heart sounds: Normal heart sounds.   Pulmonary:      Effort: Pulmonary effort is normal. No respiratory distress.   Chest:      Chest wall: No tenderness.   Musculoskeletal:         General: Swelling and tenderness present.      Right lower leg: Edema present.      Left lower leg: Edema present.   Skin:     General: Skin is warm and dry.      Findings: Erythema present.      Comments: See LDA for photos/measurements   Neurological:      General: No focal deficit present.      Mental Status: She is alert and oriented to person, place, and time. Mental status is at baseline.   Psychiatric:         Mood and Affect: Mood normal.         Behavior: Behavior normal.         Thought Content: Thought content normal.         Judgment: Judgment normal.       Assessment and Plan             Wound 08/15/24 Diabetic Ulcer Right posterior Toe, first #2 (Active)   08/15/24    Present on Original Admission: Y   Primary Wound Type: Diabetic ulc   Side: Right   Orientation: posterior   Location: Toe, first   Wound Approximate Age at First Assessment (Weeks): 5 weeks   Wound Number: #2   Is this injury device related?:    Incision Type:    Closure Method:    Wound Description (Comments):    Type:    Additional Comments:    Ankle-Brachial Index:    Pulses:    Removal Indication and Assessment:    Wound Outcome:    Wound Image   09/19/24 1052   Dressing Appearance Open to air;No dressing 09/19/24 1052   Drainage Amount None 09/19/24 1052   Appearance Intact;Lopez;Dry 09/19/24 1052   Black (%), Wound Tissue Color 0 % 09/19/24 1052   Red  (%), Wound Tissue Color 0 % 09/19/24 1052   Yellow (%), Wound Tissue Color 0 % 09/19/24 1052   Periwound Area Intact 09/19/24 1052   Wound Edges Approximated 09/19/24 1052   Wound Length (cm) 0.1 cm 09/19/24 1052   Wound Width (cm) 0.1 cm 09/19/24 1052   Wound Depth (cm) 0 cm 09/19/24 1052   Wound Volume (cm^3) 0 cm^3 09/19/24 1052   Wound Surface Area (cm^2) 0.01 cm^2 09/19/24 1052            Wound 08/15/24 Diabetic Ulcer Left posterior Heel #1 (Active)   08/15/24    Present on Original Admission: Y   Primary Wound Type: Diabetic ulc   Side: Left   Orientation: posterior   Location: Heel   Wound Approximate Age at First Assessment (Weeks): 9 weeks   Wound Number: #1   Is this injury device related?:    Incision Type:    Closure Method:    Wound Description (Comments):    Type:    Additional Comments:    Ankle-Brachial Index:    Pulses:    Removal Indication and Assessment:    Wound Outcome:    Wound Image   09/19/24 1047   Dressing Appearance Intact;Moist drainage 09/19/24 1047   Drainage Amount Moderate 09/19/24 1047   Drainage Characteristics/Odor Yellow 09/19/24 1047   Appearance Yellow;Slough;Moist 09/19/24 1047   Tissue loss description Full thickness 09/19/24 1047   Black (%), Wound Tissue Color 0 % 09/19/24 1047   Red (%), Wound Tissue Color 0 % 09/19/24 1047   Yellow (%), Wound Tissue Color 100 % 09/19/24 1047   Periwound Area Intact 09/19/24 1047   Wound Edges Defined 09/19/24 1047   Wound Length (cm) 2.8 cm 09/19/24 1047   Wound Width (cm) 1.9 cm 09/19/24 1047   Wound Depth (cm) 0.8 cm 09/19/24 1047   Wound Volume (cm^3) 4.256 cm^3 09/19/24 1047   Wound Surface Area (cm^2) 5.32 cm^2 09/19/24 1047   Tunneling (depth (cm)/location) 0.8/9 09/19/24 1047   Care Cleansed with:;Antimicrobial agent 09/19/24 1047   Dressing Applied;Gauze;Rolled gauze 09/19/24 1047            Wound 09/05/24 1000 Diabetic Ulcer Left lateral Ankle (Active)   09/05/24 1000   Present on Original Admission: Y   Primary Wound Type:  Diabetic ulc   Side: Left   Orientation: lateral   Location: Ankle   Wound Approximate Age at First Assessment (Weeks): 1 weeks   Wound Number:    Is this injury device related?:    Incision Type:    Closure Method:    Wound Description (Comments):    Type:    Additional Comments:    Ankle-Brachial Index:    Pulses:    Removal Indication and Assessment:    Wound Outcome:    Wound Image   09/19/24 1050   Dressing Appearance Moist drainage;Intact 09/19/24 1050   Drainage Amount Moderate 09/19/24 1050   Drainage Characteristics/Odor Yellow 09/19/24 1050   Appearance Yellow;Moist;Slough 09/19/24 1050   Tissue loss description Full thickness 09/19/24 1050   Black (%), Wound Tissue Color 0 % 09/19/24 1050   Red (%), Wound Tissue Color 0 % 09/19/24 1050   Yellow (%), Wound Tissue Color 100 % 09/19/24 1050   Periwound Area Intact 09/19/24 1050   Wound Edges Defined 09/19/24 1050   [place] Classification (diabetic foot ulcers only) Grade 1 09/19/24 1050   Wound Length (cm) 0.8 cm 09/19/24 1050   Wound Width (cm) 0.9 cm 09/19/24 1050   Wound Depth (cm) 0.5 cm 09/19/24 1050   Wound Volume (cm^3) 0.36 cm^3 09/19/24 1050   Wound Surface Area (cm^2) 0.72 cm^2 09/19/24 1050   Care Cleansed with:;Antimicrobial agent 09/19/24 1050   Dressing Applied;Gauze;Rolled gauze 09/19/24 1050         Problem List Items Addressed This Visit          Endocrine    Diabetic ulcer of left heel associated with type 2 diabetes mellitus, with fat layer exposed - Primary    Overview                            Current Assessment & Plan     Clean wounds with baby shampoo and water.    Left heel:  Apply Calmoseptine to the periwound (outer edges)   Pack wound with dakins moistened packing strip.  Cover with dry gauze, wrap with belen or kerlix, and secure with paper tape.  Change daily and as needed for soilage.    Left ankle:  Apply Aaquacel Ag to wound bed, cover with dry gauze, wrap with belen or kerlic, secure with paper tape. Change daily and as  needed for soilage.    Diabetes:  Monitor glucose closely. Check fasting glucose and 2 hours after meals. HgA1C goal <7, fasting glucose , and 2 hours after meals <180  Hypertension:  Check blood pressure twice daily, goal <120/80  Diet:   Increase protein intake, avoid fried, fatty foods and foods high in simple carbs.   Vitamins:  Take vitamin C 1000 mg, zinc 50mg, vitamin d 5000 units, and a daily multivitamin. Lucho is a good source of protein and nutrients to aid in wound healing.   Monitor closely for s/s of infection including fever, chills, increase in pain, odor from wound, and increased redness from foot. Go to ER if any complications develop.   Keep leg elevated and avoid pressure on wound.    Appointment today at 3:15 with Dr. Aguilera to follow up on wound         RESOLVED: Diabetic ulcer of toe of right foot associated with type 2 diabetes mellitus, limited to breakdown of skin    Overview                   Future Appointments   Date Time Provider Department Center   9/19/2024  3:15 PM David Aguilera MD [facility]   10/3/2024  9:00 AM [facility]rellana F[facility]            Signature:  CARISA Ayala  [facility]  [address]  [place] [address]  [phone]    Date of encounter: 9/19/24

## 2024-09-05 NOTE — PROCEDURES
"Debridement    Date/Time: 9/5/2024 9:00 AM    Performed by: Marybeth Orellana FNP  Authorized by: Marybeth Orellana FNP    Time out: Immediately prior to procedure a "time out" was called to verify the correct patient, procedure, equipment, support staff and site/side marked as required.    Consent Done?:  Yes (Written)    Wound Details:    Location:  Left foot    Location:  Left Heel    Type of Debridement:  Excisional       Length (cm):  2.7       Area (sq cm):  4.86       Width (cm):  1.8       Percent Debrided (%):  100       Depth (cm):  0.4       Total Area Debrided (sq cm):  4.86    Depth of debridement:  Subcutaneous tissue    Tissue debrided:  Adipose, Dermis, Epidermis and Subcutaneous    Devitalized tissue debrided:  Callus, Biofilm, Clots, Exudate, Fibrin and Slough    Instruments:  Scissors and Curette  Bleeding:  Moderate  Hemostasis Achieved: Yes  Method Used:  Pressure  Patient tolerance:  Patient tolerated the procedure well with no immediate complications  1st Wound Pain Assessment: 0     Assistant Donya RN    "

## 2024-09-05 NOTE — PATIENT INSTRUCTIONS
Clean wounds with baby shampoo and water.    Left heel:  Apply Calmoseptine to the periwound (outer edges)   Pack wound with dakins moistened packing strip.  Cover with dry gauze, wrap with belen or kerlix, and secure with paper tape.  Change daily and as needed for soilage.    Left ankle:  Apply Aaquacel Ag to wound bed, cover with dry gauze, wrap with belen or kerlic, secure with paper tape. Change daily and as needed for soilage.    Diabetes:  Monitor glucose closely. Check fasting glucose and 2 hours after meals. HgA1C goal <7, fasting glucose , and 2 hours after meals <180  Hypertension:  Check blood pressure twice daily, goal <120/80  Diet:   Increase protein intake, avoid fried, fatty foods and foods high in simple carbs.   Vitamins:  Take vitamin C 1000 mg, zinc 50mg, vitamin d 5000 units, and a daily multivitamin. Lucho is a good source of protein and nutrients to aid in wound healing.   Monitor closely for s/s of infection including fever, chills, increase in pain, odor from wound, and increased redness from foot. Go to ER if any complications develop.   Keep leg elevated and avoid pressure on wound.    Appointment today at 3:15 with Dr. Aguilera to follow up on wound

## 2024-09-07 LAB — MICROORGANISM SPEC CULT: NORMAL

## 2024-09-10 ENCOUNTER — TELEPHONE (OUTPATIENT)
Dept: WOUND CARE | Facility: CLINIC | Age: 64
End: 2024-09-10
Payer: COMMERCIAL

## 2024-09-10 RX ORDER — METRONIDAZOLE 500 MG/1
500 TABLET ORAL EVERY 12 HOURS
Qty: 28 TABLET | Refills: 0 | Status: SHIPPED | OUTPATIENT
Start: 2024-09-10 | End: 2024-09-24

## 2024-09-10 NOTE — TELEPHONE ENCOUNTER
Via phone call notified patient of culture results and new order for flagyl, she verbalizes understanding

## 2024-09-19 ENCOUNTER — OFFICE VISIT (OUTPATIENT)
Dept: SURGERY | Facility: CLINIC | Age: 64
End: 2024-09-19
Attending: SURGERY
Payer: COMMERCIAL

## 2024-09-19 ENCOUNTER — CLINICAL SUPPORT (OUTPATIENT)
Dept: CARDIOLOGY | Facility: CLINIC | Age: 64
End: 2024-09-19
Attending: SURGERY
Payer: COMMERCIAL

## 2024-09-19 ENCOUNTER — OFFICE VISIT (OUTPATIENT)
Dept: WOUND CARE | Facility: CLINIC | Age: 64
End: 2024-09-19
Attending: FAMILY MEDICINE
Payer: COMMERCIAL

## 2024-09-19 VITALS
TEMPERATURE: 98 F | DIASTOLIC BLOOD PRESSURE: 66 MMHG | HEART RATE: 97 BPM | SYSTOLIC BLOOD PRESSURE: 113 MMHG | RESPIRATION RATE: 18 BRPM

## 2024-09-19 DIAGNOSIS — E11.621 DIABETIC ULCER OF TOE OF RIGHT FOOT ASSOCIATED WITH TYPE 2 DIABETES MELLITUS, LIMITED TO BREAKDOWN OF SKIN: ICD-10-CM

## 2024-09-19 DIAGNOSIS — L97.511 DIABETIC ULCER OF TOE OF RIGHT FOOT ASSOCIATED WITH TYPE 2 DIABETES MELLITUS, LIMITED TO BREAKDOWN OF SKIN: ICD-10-CM

## 2024-09-19 DIAGNOSIS — E11.621 DIABETIC ULCER OF LEFT HEEL ASSOCIATED WITH TYPE 2 DIABETES MELLITUS, WITH FAT LAYER EXPOSED: ICD-10-CM

## 2024-09-19 DIAGNOSIS — L97.422 DIABETIC ULCER OF LEFT HEEL ASSOCIATED WITH TYPE 2 DIABETES MELLITUS, WITH FAT LAYER EXPOSED: Primary | ICD-10-CM

## 2024-09-19 DIAGNOSIS — L97.422 DIABETIC ULCER OF LEFT HEEL ASSOCIATED WITH TYPE 2 DIABETES MELLITUS, WITH FAT LAYER EXPOSED: ICD-10-CM

## 2024-09-19 DIAGNOSIS — E11.621 DIABETIC ULCER OF LEFT HEEL ASSOCIATED WITH TYPE 2 DIABETES MELLITUS, WITH FAT LAYER EXPOSED: Primary | ICD-10-CM

## 2024-09-19 PROCEDURE — 99999 PR PBB SHADOW E&M-EST. PATIENT-LVL II: CPT | Mod: PBBFAC,,,

## 2024-09-19 PROCEDURE — 99212 OFFICE O/P EST SF 10 MIN: CPT | Mod: PBBFAC

## 2024-09-19 PROCEDURE — 93010 ELECTROCARDIOGRAM REPORT: CPT | Mod: S$PBB,,, | Performed by: STUDENT IN AN ORGANIZED HEALTH CARE EDUCATION/TRAINING PROGRAM

## 2024-09-19 PROCEDURE — 99213 OFFICE O/P EST LOW 20 MIN: CPT | Mod: S$PBB,,, | Performed by: NURSE PRACTITIONER

## 2024-09-19 PROCEDURE — 93005 ELECTROCARDIOGRAM TRACING: CPT | Mod: PBBFAC | Performed by: STUDENT IN AN ORGANIZED HEALTH CARE EDUCATION/TRAINING PROGRAM

## 2024-09-19 PROCEDURE — 99215 OFFICE O/P EST HI 40 MIN: CPT | Mod: PBBFAC | Performed by: NURSE PRACTITIONER

## 2024-09-19 PROCEDURE — 99203 OFFICE O/P NEW LOW 30 MIN: CPT | Mod: S$PBB,,, | Performed by: SURGERY

## 2024-09-19 PROCEDURE — 99999 PR PBB SHADOW E&M-EST. PATIENT-LVL V: CPT | Mod: PBBFAC,,, | Performed by: NURSE PRACTITIONER

## 2024-09-19 PROCEDURE — 99999 PR PBB SHADOW E&M-EST. PATIENT-LVL V: CPT | Mod: PBBFAC,,, | Performed by: SURGERY

## 2024-09-19 PROCEDURE — 99215 OFFICE O/P EST HI 40 MIN: CPT | Mod: PBBFAC | Performed by: SURGERY

## 2024-09-19 NOTE — PATIENT INSTRUCTIONS
[facility]      Your surgery is scheduled for Friday September 27 th 2024 at [facility] on the ground floor of the Ambulatory building. You should arrive at 7 am at the Ambulatory Care Center located at [address].                                                                                                                    IF YOU RECEIVE A TEXT MESSAGE ABOUT SURGERY TIME THAT DOES NOT COME DIRECTLY FROM THE DOCTORS OFFICE, PLEASE DISREGARD IT.  IF SURGERY TIME CHANGES WE WILL CALL YOU DIRECTLY.  IF YOU HAVE ANY QUESTIONS DO NOT HESITATE TO CALL THE OFFICE                                                                                                                                                                                                                                                                                                                                                                  Day of Surgery Instructions      Bring a list of all your medications with you the day of your surgery. You can also give the list to your doctor or nurse during your final clinic appointment before surgery.      Do not eat any solid foods or drink any liquids after 12:00 AM (midnight). This includes gum, hard candy, mints, and chewing tobacco.  Medications: Take any medications specified with a small sip of water the morning of your surgery.  Brush your teeth: You may brush your teeth and rinse your mouth. Do not swallow any water or toothpaste.  Clothing: A button front shirt and loose-fitting clothes are the most comfortable before and after surgery. We also recommend low-heeled shoes.  Hair: Avoid buns, ponytails, or hairpieces at the back of the head. Remove or avoid any clips, pins or bands that bind hair. Do not use hairspray. Before going to surgery, you will need to remove any wigs or hairpieces.  We will cover your hair during surgery. Your privacy regarding personal  appearance will be respected.  Fingernails: Please be sure to remove all nail polish before you arrive for surgery. We understand that tips, wraps, gels, etc., are expensive; however, we ask these products to be removed from at least one finger on each hand. Your fingertips are used to accurately monitor your oxygen level during surgery by a device called an oximeter.  Glasses and Contact Lenses: Wear glasses when possible. If contact lenses must be worn, bring a lens case and solution. If glasses are worn, bring a case for them.  Hearing Aids: If you rely on a hearing aid, wear it to the hospital on the day of surgery. This will ensure you can hear and understand everything we need to communicate with you.  Valuables: Jewelry, including body piercings, Dentures, money, and credit cards should be left at home. [facility] is not responsible for valuables that are not secured in our surgery center.  Makeup, Perfume, Creams, Lotions and Deodorants: Do not use any of these products on the day of surgery. Remove false eyelashes prior to surgery.  Implanted Medical Devices: If you have an implanted device, such as a pacemaker or AICD, bring the device information card (if you have it) with you.  Medical Equipment: If you have been fitted for a brace to wear after surgery or you have been given crutches, bring those with you to the surgery center.  Shower: Take a shower with Hibiclens® (chlorhexidine) (available over the counter). This reduces the chance of infection. PLEASE USE CHLORHEXIDINE WASH THE NIGHT BEFORE SURGERY AND THE MORNING OF SURGERY.      Medication instructions:  You may take blood pressure medication with a small drink of water the morning of surgery.      IF YOU ARE ON ANY OF THESE BLOOD THINNERS, MAKE SURE YOUR PHYSICIAN IS AWARE.  Eliquis/Apixaban            Wafarin/Coumadin,Jantoven  Xarelto/Rivaroxaban      Pletal/Cilostazol  Plavix/Clopidogrel          Pradaxa/Dibigatran      If you are  diabetic      Follow the diabetic medicine instructions you received during your pre-operative visit.  DO NOT take your insulin or diabetic medications the morning of surgery.  When you arrive at the surgical center, be sure to tell the nurse you are diabetic.    The following blood sugar medications have to be stopped prior to surgery:    Hold 24 hours prior to surgery:    Libraglutide - Saxenda, Victoza  Lixisenatide --Adlxyin  Exenatide  --  Byetta  Empaglifozin--Jardiance  Sitaglitin--Januvia    Hold 1 week prior to surgery:    Semaglutide - Ozempic, Wegouy, Rybelsus  Dulaglutide - Trulicity  Tirzepatide - Mounjaro  Exenatide (extended release inj)-- Bydureon BCise      Hold 48 hours prior to surgery:    Metformin, Glucovance, Metaglip, Fortamet, Glucophage, Riomet, Avandamet, Glimepiride            Other Items to bring with you and know    Insurance card  Identification card such as 's license, passport, or other picture ID  Copy of your advance directives  List of medications and allergies, if not already provided  Name and phone number of person to contact if your condition changes significantly. YOU CANNOT DRIVE YOURSELF HOME FROM THE HOSPITAL THE DAY OF SURGERY.  PLEASE UNDERSTAND THAT OUR OFFICE DOES NOT GIVE PATHOLOGY RESULTS OR TEST RESULTS OVER THE PHONE. THIS WILL BE DISCUSSED WITH YOU ON YOUR FOLLOW UP APPOINTMENT.  IF YOU SUBMIT FMLA FORMS, IT WILL TAKE 3-7 DAYS TO COMPLETE THESE          Alcohol and Surgery  We want to help you prepare for and recover from surgery as quickly and safely as possible. Be open and honest with your provider about how many drinks you have per day. Excessive alcohol use is defined as drinking more than three drinks per day. It can affect the outcome of your surgery. Binge drinking (consuming large amounts of alcohol infrequently, such as on weekends) can also affect the outcome of your surgery.  Alcohol withdrawal  If you drink more than three drinks a day, you could  have a complication, called alcohol withdrawal, after surgery.  Alcohol withdrawal is a set of symptoms that people have when they suddenly stop drinking after using alcohol  for a long time. During withdrawal, a person's central nervous system overreacts. This can cause mild symptoms such as shakiness, sweating or hallucinating. It can also cause other more serious side effects. If not treated properly, alcohol withdrawal can cause potentially life-threatening complications after surgery. This can include tremors, seizures, hallucinations, delirium tremors, and even death. Untreated alcohol withdrawal often leads to a longer stay in the hospital, potentially in the Intensive Care Unit.  Chronic heavy drinking also can interfere with several organ systems and biochemical processes in the body.  This interference can cause serious, even life-threatening complications.  Your care team can offer alcohol withdrawal treatment to help:  Decrease the risk of seizures and delirium tremors after surgery  Decrease the risk we will need to restrain you for your own safety or the safety of others  Decrease your risk of falling after surgery  Reduce the use of potent sedative medications  Reduce the time you stay in the hospital after surgery  Reduce the time you might spend on a mechanical ventilator to help you breathe  Lower incidence of organ failure and biochemical complications  Talk to a member of your care team or your primary care physician about your alcohol use if you feel you may be at risk of any of these complications.        Smoking and Surgery  Quitting smoking is extremely important for a successful surgery and recovery. Cigarette smoking compromises your immune system. This increases your risk of an infection after surgery. Quitting the habit before surgery will decrease the surgical risks associated with smoking.

## 2024-09-19 NOTE — ASSESSMENT & PLAN NOTE
Clean wounds with baby shampoo and water.    Left heel:  Apply Calmoseptine to the periwound (outer edges)   Pack wound with dakins moistened packing strip.  Cover with dry gauze, wrap with belen or kerlix, and secure with paper tape.  Change daily and as needed for soilage.    Left ankle:  Apply Aaquacel Ag to wound bed, cover with dry gauze, wrap with belen or kerlic, secure with paper tape. Change daily and as needed for soilage.    Diabetes:  Monitor glucose closely. Check fasting glucose and 2 hours after meals. HgA1C goal <7, fasting glucose , and 2 hours after meals <180  Hypertension:  Check blood pressure twice daily, goal <120/80  Diet:   Increase protein intake, avoid fried, fatty foods and foods high in simple carbs.   Vitamins:  Take vitamin C 1000 mg, zinc 50mg, vitamin d 5000 units, and a daily multivitamin. Lucho is a good source of protein and nutrients to aid in wound healing.   Monitor closely for s/s of infection including fever, chills, increase in pain, odor from wound, and increased redness from foot. Go to ER if any complications develop.   Keep leg elevated and avoid pressure on wound.    Appointment today at 3:15 with Dr. Aguilera to follow up on wound   Debridement Text: The wound edges were debrided prior to proceeding with the closure to facilitate wound healing.

## 2024-09-20 RX ORDER — CLINDAMYCIN PHOSPHATE 900 MG/50ML
900 INJECTION, SOLUTION INTRAVENOUS
OUTPATIENT
Start: 2024-09-20

## 2024-09-20 RX ORDER — SODIUM CHLORIDE 9 MG/ML
INJECTION, SOLUTION INTRAVENOUS CONTINUOUS
OUTPATIENT
Start: 2024-09-20

## 2024-09-20 NOTE — PROGRESS NOTES
General Surgery History and Physical      Patient ID: Silvana Sarah is patient a 63 y.o. female.    Chief Complaint: Wound Check      HPI:  Familiar to the service she is a diabetic female who comes in with a few month history of a left heel/foot wound.  She has been seen by wound care and they have been trying to debrided however she has not tolerated the bedside procedures.  She has been having some tunneling as well recently and some mild drainage.  She is no fever no chills no spike in her sugars.    Review of Systems   Constitutional:  Negative for activity change, appetite change, fatigue and fever.   HENT:  Negative for trouble swallowing.    Respiratory:  Negative for cough and shortness of breath.    Cardiovascular:  Negative for chest pain and palpitations.   Gastrointestinal:  Negative for abdominal distention, abdominal pain, blood in stool, constipation and diarrhea.   Genitourinary:  Negative for flank pain.   Musculoskeletal:  Negative for neck pain and neck stiffness.   Skin:  Positive for wound.   Neurological:  Negative for weakness.       Current Outpatient Medications   Medication Sig Dispense Refill    b complex vitamins capsule Take 1 capsule by mouth once daily.      collagenase (SANTYL) ointment Apply topically once daily. 90 g 1    cyanocobalamin 1,000 mcg/mL injection Inject 1,000 mcg into the muscle every 30 days.      esomeprazole (NEXIUM) 20 MG capsule Take 20 mg by mouth once daily.      etanercept (ENBREL MINI) 50 mg/mL (1 mL) Crtg 1 mL by abdominal subcutaneous route every Sunday.       ferrous sulfate (FEOSOL) Tab tablet Take 1 tablet by mouth once daily.      gabapentin (NEURONTIN) 600 MG tablet Take 2 tablets (1,200 mg total) by mouth 3 (three) times daily. 540 tablet 1    hydrOXYchloroQUINE (PLAQUENIL) 200 mg tablet Take 200 mg by mouth 2 (two) times daily.       insulin regular 100  unit/mL Inj injection 3 (three) times daily before meals.      LEVEMIR U-100 INSULIN 100 unit/mL injection SMARTSI Unit(s) SUB-Q Daily      lisinopriL (PRINIVIL,ZESTRIL) 2.5 MG tablet Take 2.5 mg by mouth every evening.      lysine (L-LYSINE) 500 mg Tab Take 500 mg by mouth 2 (two) times a day.      metroNIDAZOLE (FLAGYL) 500 MG tablet Take 1 tablet (500 mg total) by mouth every 12 (twelve) hours. for 14 days 28 tablet 0    nystatin (MYCOSTATIN) 100,000 unit/mL suspension Take 6 mLs by mouth 3 (three) times daily before meals.       oxyCODONE (ROXICODONE) 10 mg Tab immediate release tablet Take 10 mg by mouth every 8 (eight) hours as needed. (Patient not taking: Reported on 8/15/2024)      oxyCODONE-acetaminophen (PERCOCET)  mg per tablet Take 1 tablet by mouth every 8 (eight) hours as needed for Pain.       predniSONE (DELTASONE) 5 MG tablet Take 5 mg by mouth once daily.       promethazine (PHENERGAN) 25 MG tablet Take 25 mg by mouth every 6 (six) hours as needed for Nausea.       silver sulfADIAZINE 1% (SILVADENE) 1 % cream Apply topically once daily. 400 g 1    triamterene-hydrochlorothiazide 37.5-25 mg (DYAZIDE) 37.5-25 mg per capsule Take 1 capsule by mouth once daily.       zinc gluconate 50 mg tablet Take 50 mg by mouth once daily.       No current facility-administered medications for this visit.       Review of patient's allergies indicates:   Allergen Reactions    Influenza virus vaccines     Penicillins        Past Medical History:   Diagnosis Date    Anxiety and depression 2021    Diabetes mellitus     Dry eye syndrome, bilateral 10/06/2017    Essential (primary) hypertension     Gastroparesis due to DM     Generalized osteoarthritis 10/25/2021    Other mechanical complication of implanted electronic neurostimulator, generator, sequela 10/25/2021    Peripheral autonomic neuropathy due to DM     Rheumatoid arthritis        Past Surgical History:   Procedure Laterality Date      SECTION      GASTRIC STIMULATOR IMPLANT SURGERY  2010    HYSTERECTOMY      INCISION AND DRAINAGE OF ABSCESS Right 5/13/2022    Procedure: INCISION AND DRAINAGE, ABSCESS;  Surgeon: Kamari Sandoval DO;  Location: [facility] OR;  Service: General;  Laterality: Right;  right hand dr sandoval am    IRRIGATION AND DEBRIDEMENT OF UPPER EXTREMITY Right 5/16/2022    Procedure: IRRIGATION AND DEBRIDEMENT, UPPER EXTREMITY;  Surgeon: Kamari Sandoval DO;  Location: [facility] OR;  Service: General;  Laterality: Right;  right hand I/D dr sandoval today       Family History   Problem Relation Name Age of Onset    Heart disease Mother      Cancer Father      Diabetes Son      Diabetes Son         Social History     Socioeconomic History    Marital status:    Tobacco Use    Smoking status: Never    Smokeless tobacco: Never   Substance and Sexual Activity    Alcohol use: Never       There were no vitals filed for this visit.    Physical Exam  Constitutional:       General: She is not in acute distress.  HENT:      Head: Normocephalic.   Cardiovascular:      Rate and Rhythm: Normal rate and regular rhythm.      Pulses: Normal pulses.   Pulmonary:      Effort: Pulmonary effort is normal. No respiratory distress.      Breath sounds: Normal breath sounds.   Abdominal:      General: Abdomen is flat. There is no distension.      Palpations: Abdomen is soft.      Tenderness: There is no abdominal tenderness.   Musculoskeletal:         General: Normal range of motion.   Skin:     General: Skin is warm.      Findings: Lesion (Left heel area has a 1 x 1 cm ulceration and just inferior on the heel part of the 3 x 2 cm area with some necrotic fibrinous material.) present.   Neurological:      General: No focal deficit present.      Mental Status: She is oriented to person, place, and time.         Assessment & Plan:    Diabetic ulcer of left heel associated with type 2 diabetes mellitus, with fat layer exposed  -     Basic Metabolic Panel;  Future; Expected date: 09/19/2024  -     CBC Auto Differential; Future; Expected date: 09/19/2024  -     EKG 12-lead; Future        Patient will need to go to the OR next week Friday for surgical excision of this necrotic ulcer and debridement of the tissues to allow for wound healing.  Risks and benefits explained including risk of bleeding, infection, recurrence, need for multiple operations.  All questions were answered

## 2024-09-20 NOTE — H&P (VIEW-ONLY)
General Surgery History and Physical      Patient ID: Silvana Sarah is patient a 63 y.o. female.    Chief Complaint: Wound Check      HPI:  Familiar to the service she is a diabetic female who comes in with a few month history of a left heel/foot wound.  She has been seen by wound care and they have been trying to debrided however she has not tolerated the bedside procedures.  She has been having some tunneling as well recently and some mild drainage.  She is no fever no chills no spike in her sugars.    Review of Systems   Constitutional:  Negative for activity change, appetite change, fatigue and fever.   HENT:  Negative for trouble swallowing.    Respiratory:  Negative for cough and shortness of breath.    Cardiovascular:  Negative for chest pain and palpitations.   Gastrointestinal:  Negative for abdominal distention, abdominal pain, blood in stool, constipation and diarrhea.   Genitourinary:  Negative for flank pain.   Musculoskeletal:  Negative for neck pain and neck stiffness.   Skin:  Positive for wound.   Neurological:  Negative for weakness.       Current Outpatient Medications   Medication Sig Dispense Refill    b complex vitamins capsule Take 1 capsule by mouth once daily.      collagenase (SANTYL) ointment Apply topically once daily. 90 g 1    cyanocobalamin 1,000 mcg/mL injection Inject 1,000 mcg into the muscle every 30 days.      esomeprazole (NEXIUM) 20 MG capsule Take 20 mg by mouth once daily.      etanercept (ENBREL MINI) 50 mg/mL (1 mL) Crtg 1 mL by abdominal subcutaneous route every Sunday.       ferrous sulfate (FEOSOL) Tab tablet Take 1 tablet by mouth once daily.      gabapentin (NEURONTIN) 600 MG tablet Take 2 tablets (1,200 mg total) by mouth 3 (three) times daily. 540 tablet 1    hydrOXYchloroQUINE (PLAQUENIL) 200 mg tablet Take 200 mg by mouth 2 (two) times daily.       insulin regular 100  unit/mL Inj injection 3 (three) times daily before meals.      LEVEMIR U-100 INSULIN 100 unit/mL injection SMARTSI Unit(s) SUB-Q Daily      lisinopriL (PRINIVIL,ZESTRIL) 2.5 MG tablet Take 2.5 mg by mouth every evening.      lysine (L-LYSINE) 500 mg Tab Take 500 mg by mouth 2 (two) times a day.      metroNIDAZOLE (FLAGYL) 500 MG tablet Take 1 tablet (500 mg total) by mouth every 12 (twelve) hours. for 14 days 28 tablet 0    nystatin (MYCOSTATIN) 100,000 unit/mL suspension Take 6 mLs by mouth 3 (three) times daily before meals.       oxyCODONE (ROXICODONE) 10 mg Tab immediate release tablet Take 10 mg by mouth every 8 (eight) hours as needed. (Patient not taking: Reported on 8/15/2024)      oxyCODONE-acetaminophen (PERCOCET)  mg per tablet Take 1 tablet by mouth every 8 (eight) hours as needed for Pain.       predniSONE (DELTASONE) 5 MG tablet Take 5 mg by mouth once daily.       promethazine (PHENERGAN) 25 MG tablet Take 25 mg by mouth every 6 (six) hours as needed for Nausea.       silver sulfADIAZINE 1% (SILVADENE) 1 % cream Apply topically once daily. 400 g 1    triamterene-hydrochlorothiazide 37.5-25 mg (DYAZIDE) 37.5-25 mg per capsule Take 1 capsule by mouth once daily.       zinc gluconate 50 mg tablet Take 50 mg by mouth once daily.       No current facility-administered medications for this visit.       Review of patient's allergies indicates:   Allergen Reactions    Influenza virus vaccines     Penicillins        Past Medical History:   Diagnosis Date    Anxiety and depression 2021    Diabetes mellitus     Dry eye syndrome, bilateral 10/06/2017    Essential (primary) hypertension     Gastroparesis due to DM     Generalized osteoarthritis 10/25/2021    Other mechanical complication of implanted electronic neurostimulator, generator, sequela 10/25/2021    Peripheral autonomic neuropathy due to DM     Rheumatoid arthritis        Past Surgical History:   Procedure Laterality Date      SECTION      GASTRIC STIMULATOR IMPLANT SURGERY  2010    HYSTERECTOMY      INCISION AND DRAINAGE OF ABSCESS Right 5/13/2022    Procedure: INCISION AND DRAINAGE, ABSCESS;  Surgeon: Kamari Sandoval DO;  Location: [facility] OR;  Service: General;  Laterality: Right;  right hand dr sandoval am    IRRIGATION AND DEBRIDEMENT OF UPPER EXTREMITY Right 5/16/2022    Procedure: IRRIGATION AND DEBRIDEMENT, UPPER EXTREMITY;  Surgeon: Kamari Sandoval DO;  Location: [facility] OR;  Service: General;  Laterality: Right;  right hand I/D dr sandoval today       Family History   Problem Relation Name Age of Onset    Heart disease Mother      Cancer Father      Diabetes Son      Diabetes Son         Social History     Socioeconomic History    Marital status:    Tobacco Use    Smoking status: Never    Smokeless tobacco: Never   Substance and Sexual Activity    Alcohol use: Never       There were no vitals filed for this visit.    Physical Exam  Constitutional:       General: She is not in acute distress.  HENT:      Head: Normocephalic.   Cardiovascular:      Rate and Rhythm: Normal rate and regular rhythm.      Pulses: Normal pulses.   Pulmonary:      Effort: Pulmonary effort is normal. No respiratory distress.      Breath sounds: Normal breath sounds.   Abdominal:      General: Abdomen is flat. There is no distension.      Palpations: Abdomen is soft.      Tenderness: There is no abdominal tenderness.   Musculoskeletal:         General: Normal range of motion.   Skin:     General: Skin is warm.      Findings: Lesion (Left heel area has a 1 x 1 cm ulceration and just inferior on the heel part of the 3 x 2 cm area with some necrotic fibrinous material.) present.   Neurological:      General: No focal deficit present.      Mental Status: She is oriented to person, place, and time.         Assessment & Plan:    Diabetic ulcer of left heel associated with type 2 diabetes mellitus, with fat layer exposed  -     Basic Metabolic Panel;  Future; Expected date: 09/19/2024  -     CBC Auto Differential; Future; Expected date: 09/19/2024  -     EKG 12-lead; Future        Patient will need to go to the OR next week Friday for surgical excision of this necrotic ulcer and debridement of the tissues to allow for wound healing.  Risks and benefits explained including risk of bleeding, infection, recurrence, need for multiple operations.  All questions were answered

## 2024-09-23 NOTE — PROGRESS NOTES
CARISA Ayala   [facility]  [address] [facility] [place] [address]  [phone]      PATIENT NAME: Silvana Sarah  : 1960  DATE: 10/3/24  MRN: [id number]      Billing Provider: CARISA Ayala  Level of Service:   Patient PCP Information       Provider PCP Type    CARISA Cárdenas General            Reason for Visit / Chief Complaint: Diabetic Foot Ulcer and Wound Check (Left foot)       History of Present Illness / Problem Focused Workflow     Silvana Sarah is a 64 yo female presents for follow up on chronic non healing wound to left heel. She is status post debridement per Dr. Aguilera on 24. Wound bed continues to have slough with islands of granulation tissue. Reports left foot remains tender. Allie-wound is erythematous and warm to palpation. Wound cultures today. Rocephin given IM in clinic today. Flagyl and rocephin to pharmacy. Recommended using santyl to remove slough and necrotic tissue. Santyl to [facility] pharmacy. Discussed HBOT to enhance wound healing. Will await cultures and precert for hyberbarics.Prescription for wheelchair given to patient today.          8/15/24  64 yo female presents with complaints of ulcer to left heel and right great toe. Wound on left heel with with necrotic tissue and slough, bedside debridement today. Left foot is edematous and tender. Recent cultures reviewed. Ceftin to pharmacy. Santyl and vashe moistened drawtex to wound bed. Supplies ordered from [employer]. Wound on right great toe will moderate serous drainage. Aquacel ag applied. Pertinent PMH includes diabetes, hypertension, peripheral neuropathy, gastroparesis, and rheumatoid arthritis. Last HgA1C 7.2 2023, diabetes managed by PCP. Wound healing is complicated by multiple co-morbidities, poor vascular supply, immunosuppression, diabetes, edema, heavy drainage, decreased granulation tissue, necrosis, large surface area, large  volume, advanced age, fragile skin, and infection.             Review of Systems     Review of Systems   Constitutional:  Positive for activity change. Negative for chills and fever.   Respiratory:  Negative for chest tightness and shortness of breath.    Cardiovascular:  Positive for leg swelling. Negative for chest pain and palpitations.   Musculoskeletal:  Positive for arthralgias and joint swelling.   Skin:  Positive for color change and wound.        wound   Neurological:  Positive for weakness.   Psychiatric/Behavioral:  Negative for agitation, behavioral problems, confusion and self-injury.        Medical / Social / Family History     Past Medical History:   Diagnosis Date    Anxiety and depression 2021    Diabetes mellitus     Dry eye syndrome, bilateral 10/06/2017    Essential (primary) hypertension     Gastroparesis due to DM     Generalized osteoarthritis 10/25/2021    Other mechanical complication of implanted electronic neurostimulator, generator, sequela 10/25/2021    Peripheral autonomic neuropathy due to DM     Rheumatoid arthritis        Past Surgical History:   Procedure Laterality Date     SECTION      DEBRIDEMENT OF LOWER EXTREMITY Left 2024    Procedure: DEBRIDEMENT, LOWER EXTREMITY;  Surgeon: David Aguilera MD;  Location: [facility] OR;  Service: General;  Laterality: Left;  Left foot    GASTRIC STIMULATOR IMPLANT SURGERY      HYSTERECTOMY      INCISION AND DRAINAGE OF ABSCESS Right 2022    Procedure: INCISION AND DRAINAGE, ABSCESS;  Surgeon: Kamari Sandoval DO;  Location: [facility] OR;  Service: General;  Laterality: Right;  right hand dr sandoval am    IRRIGATION AND DEBRIDEMENT OF UPPER EXTREMITY Right 2022    Procedure: IRRIGATION AND DEBRIDEMENT, UPPER EXTREMITY;  Surgeon: Kamari Sandoval DO;  Location: [facility] OR;  Service: General;  Laterality: Right;  right hand I/D dr sandoval today       Social History  Ms. Silvana Sarah  reports  that she has never smoked. She has never used smokeless tobacco. She reports that she does not drink alcohol.    Family History  Ms.'s Silvana Sarah family history includes Cancer in her father; Diabetes in her son and son; Heart disease in her mother.    Medications and Allergies     Medications  Outpatient Medications Marked as Taking for the 10/3/24 encounter (Office Visit) with Marybeth Orellana FNP   Medication Sig Dispense Refill    b complex vitamins capsule Take 1 capsule by mouth once daily.      cyanocobalamin 1,000 mcg/mL injection Inject 1,000 mcg into the muscle every 30 days.      esomeprazole (NEXIUM) 20 MG capsule Take 20 mg by mouth once daily.      etanercept (ENBREL MINI) 50 mg/mL (1 mL) Crtg 1 mL by abdominal subcutaneous route every .       ferrous sulfate (FEOSOL) Tab tablet Take 1 tablet by mouth once daily.      gabapentin (NEURONTIN) 600 MG tablet Take 2 tablets 3 times a day by oral route for 30 days.      hydrOXYchloroQUINE (PLAQUENIL) 200 mg tablet Take 200 mg by mouth 2 (two) times daily.       insulin regular 100 unit/mL Inj injection 3 (three) times daily before meals.      LEVEMIR U-100 INSULIN 100 unit/mL injection SMARTSI Unit(s) SUB-Q Daily      lisinopriL (PRINIVIL,ZESTRIL) 2.5 MG tablet Take 2.5 mg by mouth every evening.      lysine (L-LYSINE) 500 mg Tab Take 500 mg by mouth 2 (two) times a day.      metroNIDAZOLE (FLAGYL) 500 MG tablet Take 1 tablet (500 mg total) by mouth every 12 (twelve) hours. for 10 days 20 tablet 0    nystatin (MYCOSTATIN) 100,000 unit/mL suspension Take 6 mLs by mouth 3 (three) times daily before meals.       oxyCODONE-acetaminophen (PERCOCET)  mg per tablet Take 1 tablet by mouth every 8 (eight) hours as needed for Pain.       predniSONE (DELTASONE) 5 MG tablet Take 5 mg by mouth once daily.       promethazine (PHENERGAN) 25 MG tablet Take 25 mg by mouth every 6 (six) hours as needed for Nausea.       silver  sulfADIAZINE 1% (SILVADENE) 1 % cream Apply topically once daily. 400 g 1    triamterene-hydrochlorothiazide 37.5-25 mg (DYAZIDE) 37.5-25 mg per capsule Take 1 capsule by mouth once daily.       zinc gluconate 50 mg tablet Take 50 mg by mouth once daily.      [DISCONTINUED] collagenase (SANTYL) ointment Apply topically once daily. 90 g 1     Current Facility-Administered Medications for the 10/3/24 encounter (Office Visit) with Marybeth Orellana FNP   Medication Dose Route Frequency Provider Last Rate Last Admin    [COMPLETED] cefTRIAXone injection 1 g  1 g Intramuscular Once Marybeth Orellana FNP   1 g at 10/03/24 1035       Allergies  Review of patient's allergies indicates:   Allergen Reactions    Influenza virus vaccines     Penicillins        Physical Examination     Vitals:    10/03/24 0952   BP: 115/81   Pulse: 101   Resp: 20   Temp: 97.1 °F (36.2 °C)           Physical Exam  Vitals and nursing note reviewed.   Constitutional:       Appearance: Normal appearance.   HENT:      Head: Normocephalic.   Cardiovascular:      Rate and Rhythm: Normal rate and regular rhythm.      Pulses: Normal pulses.      Heart sounds: Normal heart sounds.   Pulmonary:      Effort: Pulmonary effort is normal. No respiratory distress.   Chest:      Chest wall: No tenderness.   Musculoskeletal:         General: Swelling and tenderness present.      Right lower leg: Edema present.      Left lower leg: Edema present.   Skin:     General: Skin is warm and dry.      Findings: Erythema present.      Comments: See LDA for photos/measurements   Neurological:      General: No focal deficit present.      Mental Status: She is alert and oriented to person, place, and time. Mental status is at baseline.   Psychiatric:         Mood and Affect: Mood normal.         Behavior: Behavior normal.         Thought Content: Thought content normal.         Judgment: Judgment normal.       Assessment and Plan             Wound 09/05/24 1000  Diabetic Ulcer Left lateral Ankle (Active)   09/05/24 1000   Present on Original Admission: Y   Primary Wound Type: Diabetic ulc   Side: Left   Orientation: lateral   Location: Ankle   Wound Approximate Age at First Assessment (Weeks): 1 weeks   Wound Number:    Is this injury device related?:    Incision Type:    Closure Method:    Wound Description (Comments):    Type:    Additional Comments:    Ankle-Brachial Index:    Pulses:    Removal Indication and Assessment:    Wound Outcome:    Wound Image    10/03/24 0949   Dressing Appearance Intact;Moist drainage 10/03/24 0949   Drainage Amount Moderate 10/03/24 0949   Drainage Characteristics/Odor Green 10/03/24 0949   Appearance Red;Slough;Yellow;Moist;Granulating 10/03/24 0949   Tissue loss description Full thickness 10/03/24 0949   Black (%), Wound Tissue Color 0 % 10/03/24 0949   Red (%), Wound Tissue Color 10 % 10/03/24 0949   Yellow (%), Wound Tissue Color 90 % 10/03/24 0949   Periwound Area Intact;Dry 10/03/24 0949   Wound Edges Defined 10/03/24 0949   P[place] Classification (diabetic foot ulcers only) Grade 2 10/03/24 0949   Wound Length (cm) 5.8 cm 10/03/24 0949   Wound Width (cm) 3 cm 10/03/24 0949   Wound Depth (cm) 1.3 cm 10/03/24 0949   Wound Volume (cm^3) 22.62 cm^3 10/03/24 0949   Wound Surface Area (cm^2) 17.4 cm^2 10/03/24 0949   Care Cleansed with:;Antimicrobial agent 10/03/24 0949   Dressing Applied;Absorptive Pad;Rolled gauze;Gauze;Island/border 10/03/24 0949           Problem List Items Addressed This Visit          Endocrine    Diabetic ulcer of left heel associated with type 2 diabetes mellitus, with fat layer exposed - Primary    Overview                              Current Assessment & Plan     Clean wounds with baby shampoo and water.    Left heel:  Apply Calmoseptine to the periwound (outer edges)   Apply santyl iva thick ness and vashe moistened 4x4 to wound bed  Cover and secure [id number], belen, and paper tape  Change daily and PRN for  soilage  Prescription for wheel chair given today  Start Rocephin 1 gram daily and Flagyl twice daily  Elevate legs when sitting  Avoid pressure on wound. Wear off-loading shoe  Diabetes:  Monitor glucose closely. Check fasting glucose and 2 hours after meals. HgA1C goal <7, fasting glucose , and 2 hours after meals <180  Hypertension:  Check blood pressure twice daily, goal <120/80  Diet:   Increase protein intake, avoid fried, fatty foods and foods high in simple carbs.   Vitamins:  Take vitamin C 1000 mg, zinc 50mg, vitamin d 5000 units, and a daily multivitamin. Lucho is a good source of protein and nutrients to aid in wound healing.   Monitor closely for s/s of infection including fever, chills, increase in pain, odor from wound, and increased redness from foot. Go to ER if any complications develop.   Keep leg elevated and avoid pressure on wound.         Relevant Orders    WHEELCHAIR FOR HOME USE    Culture, Wound    Culture, Anaerobe     Other Visit Diagnoses       Wound infection                Future Appointments   Date Time Provider Department Center   10/10/2024  1:00 PM Marybeth Orellana FNP [facility]            Signature:  CARISA Ayala  [facility]  [address] [facility] [place] [address]  [phone]    Date of encounter: 10/3/24

## 2024-09-23 NOTE — PATIENT INSTRUCTIONS
Clean wounds with baby shampoo and water.    Left heel:  Apply Calmoseptine to the periwound (outer edges)   Apply santyl iva thick ness and vashe moistened 4x4 to wound bed  Cover and secure [id number], belen, and paper tape  Change daily and PRN for soilage  Prescription for wheel chair given today  Start Rocephin 1 gram daily and Flagyl twice daily  Elevate legs when sitting  Avoid pressure on wound. Wear off-loading shoe  Diabetes:  Monitor glucose closely. Check fasting glucose and 2 hours after meals. HgA1C goal <7, fasting glucose , and 2 hours after meals <180  Hypertension:  Check blood pressure twice daily, goal <120/80  Diet:   Increase protein intake, avoid fried, fatty foods and foods high in simple carbs.   Vitamins:  Take vitamin C 1000 mg, zinc 50mg, vitamin d 5000 units, and a daily multivitamin. Lucho is a good source of protein and nutrients to aid in wound healing.   Monitor closely for s/s of infection including fever, chills, increase in pain, odor from wound, and increased redness from foot. Go to ER if any complications develop.   Keep leg elevated and avoid pressure on wound.

## 2024-09-24 LAB
OHS QRS DURATION: 96 MS
OHS QTC CALCULATION: 421 MS

## 2024-09-27 ENCOUNTER — HOSPITAL ENCOUNTER (OUTPATIENT)
Facility: HOSPITAL | Age: 64
Discharge: HOME | End: 2024-09-27
Attending: SURGERY | Admitting: SURGERY
Payer: COMMERCIAL

## 2024-09-27 ENCOUNTER — ANESTHESIA (OUTPATIENT)
Dept: SURGERY | Facility: HOSPITAL | Age: 64
End: 2024-09-27
Payer: COMMERCIAL

## 2024-09-27 ENCOUNTER — ANESTHESIA EVENT (OUTPATIENT)
Dept: SURGERY | Facility: HOSPITAL | Age: 64
End: 2024-09-27
Payer: COMMERCIAL

## 2024-09-27 VITALS
HEIGHT: 69 IN | HEART RATE: 76 BPM | DIASTOLIC BLOOD PRESSURE: 55 MMHG | BODY MASS INDEX: 28.14 KG/M2 | TEMPERATURE: 98 F | RESPIRATION RATE: 11 BRPM | SYSTOLIC BLOOD PRESSURE: 106 MMHG | WEIGHT: 190 LBS | OXYGEN SATURATION: 94 %

## 2024-09-27 DIAGNOSIS — L97.422 DIABETIC ULCER OF LEFT HEEL ASSOCIATED WITH TYPE 2 DIABETES MELLITUS, WITH FAT LAYER EXPOSED: Primary | ICD-10-CM

## 2024-09-27 DIAGNOSIS — E11.621 DIABETIC ULCER OF LEFT HEEL ASSOCIATED WITH TYPE 2 DIABETES MELLITUS, WITH FAT LAYER EXPOSED: Primary | ICD-10-CM

## 2024-09-27 LAB
ANION GAP SERPL CALCULATED.3IONS-SCNC: 6 MMOL/L (ref 7–16)
BUN SERPL-MCNC: 27 MG/DL (ref 7–18)
BUN/CREAT SERPL: 21 (ref 6–20)
CALCIUM SERPL-MCNC: 8.4 MG/DL (ref 8.5–10.1)
CHLORIDE SERPL-SCNC: 109 MMOL/L (ref 98–107)
CO2 SERPL-SCNC: 31 MMOL/L (ref 21–32)
CREAT SERPL-MCNC: 1.3 MG/DL (ref 0.55–1.02)
EGFR (NO RACE VARIABLE) (RUSH/TITUS): 47 ML/MIN/1.73M2
GLUCOSE SERPL-MCNC: 101 MG/DL (ref 74–106)
GLUCOSE SERPL-MCNC: 102 MG/DL (ref 70–105)
GLUCOSE SERPL-MCNC: 112 MG/DL (ref 70–105)
POTASSIUM SERPL-SCNC: 4.5 MMOL/L (ref 3.5–5.1)
SODIUM SERPL-SCNC: 141 MMOL/L (ref 136–145)

## 2024-09-27 PROCEDURE — 71000016 HC POSTOP RECOV ADDL HR: Performed by: SURGERY

## 2024-09-27 PROCEDURE — 25000003 PHARM REV CODE 250: Performed by: SURGERY

## 2024-09-27 PROCEDURE — 71000033 HC RECOVERY, INTIAL HOUR: Performed by: SURGERY

## 2024-09-27 PROCEDURE — 63600175 PHARM REV CODE 636 W HCPCS: Performed by: ANESTHESIOLOGY

## 2024-09-27 PROCEDURE — 27000716 HC OXISENSOR PROBE, ANY SIZE: Performed by: ANESTHESIOLOGY

## 2024-09-27 PROCEDURE — 88304 TISSUE EXAM BY PATHOLOGIST: CPT | Mod: TC,SUR | Performed by: SURGERY

## 2024-09-27 PROCEDURE — 82962 GLUCOSE BLOOD TEST: CPT

## 2024-09-27 PROCEDURE — 63600175 PHARM REV CODE 636 W HCPCS: Mod: JZ,JG | Performed by: SURGERY

## 2024-09-27 PROCEDURE — 27000177 HC AIRWAY, LARYNGEAL MASK: Performed by: ANESTHESIOLOGY

## 2024-09-27 PROCEDURE — 80048 BASIC METABOLIC PNL TOTAL CA: CPT | Performed by: SURGERY

## 2024-09-27 PROCEDURE — 37000008 HC ANESTHESIA 1ST 15 MINUTES: Performed by: SURGERY

## 2024-09-27 PROCEDURE — 71000015 HC POSTOP RECOV 1ST HR: Performed by: SURGERY

## 2024-09-27 PROCEDURE — 25000003 PHARM REV CODE 250

## 2024-09-27 PROCEDURE — 36415 COLL VENOUS BLD VENIPUNCTURE: CPT | Performed by: SURGERY

## 2024-09-27 PROCEDURE — 88304 TISSUE EXAM BY PATHOLOGIST: CPT | Mod: 26,,, | Performed by: PATHOLOGY

## 2024-09-27 PROCEDURE — 36000707: Performed by: SURGERY

## 2024-09-27 PROCEDURE — 36000706: Performed by: SURGERY

## 2024-09-27 PROCEDURE — 27000655: Performed by: ANESTHESIOLOGY

## 2024-09-27 PROCEDURE — 11043 DBRDMT MUSC&/FSCA 1ST 20/<: CPT | Mod: ,,, | Performed by: SURGERY

## 2024-09-27 PROCEDURE — 27201423 OPTIME MED/SURG SUP & DEVICES STERILE SUPPLY: Performed by: SURGERY

## 2024-09-27 PROCEDURE — 63600175 PHARM REV CODE 636 W HCPCS

## 2024-09-27 PROCEDURE — 37000009 HC ANESTHESIA EA ADD 15 MINS: Performed by: SURGERY

## 2024-09-27 RX ORDER — MEPERIDINE HYDROCHLORIDE 25 MG/ML
25 INJECTION INTRAMUSCULAR; INTRAVENOUS; SUBCUTANEOUS EVERY 10 MIN PRN
Status: DISCONTINUED | OUTPATIENT
Start: 2024-09-27 | End: 2024-09-27 | Stop reason: HOSPADM

## 2024-09-27 RX ORDER — GABAPENTIN 600 MG/1
TABLET ORAL
COMMUNITY
Start: 2024-08-26

## 2024-09-27 RX ORDER — FENTANYL CITRATE 50 UG/ML
INJECTION, SOLUTION INTRAMUSCULAR; INTRAVENOUS
Status: DISCONTINUED | OUTPATIENT
Start: 2024-09-27 | End: 2024-09-27

## 2024-09-27 RX ORDER — ONDANSETRON HYDROCHLORIDE 2 MG/ML
4 INJECTION, SOLUTION INTRAVENOUS DAILY PRN
Status: DISCONTINUED | OUTPATIENT
Start: 2024-09-27 | End: 2024-09-27 | Stop reason: HOSPADM

## 2024-09-27 RX ORDER — PROPOFOL 10 MG/ML
VIAL (ML) INTRAVENOUS
Status: DISCONTINUED | OUTPATIENT
Start: 2024-09-27 | End: 2024-09-27

## 2024-09-27 RX ORDER — LIDOCAINE HYDROCHLORIDE 20 MG/ML
INJECTION INTRAVENOUS
Status: DISCONTINUED | OUTPATIENT
Start: 2024-09-27 | End: 2024-09-27

## 2024-09-27 RX ORDER — CLINDAMYCIN PHOSPHATE 900 MG/50ML
900 INJECTION, SOLUTION INTRAVENOUS
Status: COMPLETED | OUTPATIENT
Start: 2024-09-27 | End: 2024-09-27

## 2024-09-27 RX ORDER — BUPIVACAINE HYDROCHLORIDE 2.5 MG/ML
INJECTION, SOLUTION EPIDURAL; INFILTRATION; INTRACAUDAL
Status: DISCONTINUED | OUTPATIENT
Start: 2024-09-27 | End: 2024-09-27 | Stop reason: HOSPADM

## 2024-09-27 RX ORDER — HYDROMORPHONE HYDROCHLORIDE 2 MG/ML
0.5 INJECTION, SOLUTION INTRAMUSCULAR; INTRAVENOUS; SUBCUTANEOUS EVERY 5 MIN PRN
Status: DISCONTINUED | OUTPATIENT
Start: 2024-09-27 | End: 2024-09-27 | Stop reason: HOSPADM

## 2024-09-27 RX ORDER — PHENYLEPHRINE HYDROCHLORIDE 10 MG/ML
INJECTION INTRAVENOUS
Status: DISCONTINUED | OUTPATIENT
Start: 2024-09-27 | End: 2024-09-27

## 2024-09-27 RX ORDER — MIDAZOLAM HYDROCHLORIDE 1 MG/ML
INJECTION INTRAMUSCULAR; INTRAVENOUS
Status: DISCONTINUED | OUTPATIENT
Start: 2024-09-27 | End: 2024-09-27

## 2024-09-27 RX ORDER — SODIUM CHLORIDE 9 MG/ML
INJECTION, SOLUTION INTRAVENOUS CONTINUOUS
Status: DISCONTINUED | OUTPATIENT
Start: 2024-09-27 | End: 2024-09-27 | Stop reason: HOSPADM

## 2024-09-27 RX ORDER — OXYCODONE AND ACETAMINOPHEN 10; 325 MG/1; MG/1
1 TABLET ORAL ONCE
Status: COMPLETED | OUTPATIENT
Start: 2024-09-27 | End: 2024-09-27

## 2024-09-27 RX ORDER — GLUCAGON 1 MG
1 KIT INJECTION
Status: DISCONTINUED | OUTPATIENT
Start: 2024-09-27 | End: 2024-09-27 | Stop reason: HOSPADM

## 2024-09-27 RX ORDER — ONDANSETRON HYDROCHLORIDE 2 MG/ML
INJECTION, SOLUTION INTRAVENOUS
Status: DISCONTINUED | OUTPATIENT
Start: 2024-09-27 | End: 2024-09-27

## 2024-09-27 RX ORDER — DIPHENHYDRAMINE HYDROCHLORIDE 50 MG/ML
25 INJECTION INTRAMUSCULAR; INTRAVENOUS EVERY 6 HOURS PRN
Status: DISCONTINUED | OUTPATIENT
Start: 2024-09-27 | End: 2024-09-27 | Stop reason: HOSPADM

## 2024-09-27 RX ORDER — MORPHINE SULFATE 10 MG/ML
4 INJECTION INTRAMUSCULAR; INTRAVENOUS; SUBCUTANEOUS EVERY 5 MIN PRN
Status: DISCONTINUED | OUTPATIENT
Start: 2024-09-27 | End: 2024-09-27 | Stop reason: HOSPADM

## 2024-09-27 RX ADMIN — CLINDAMYCIN IN 5 PERCENT DEXTROSE 900 MG: 18 INJECTION, SOLUTION INTRAVENOUS at 08:09

## 2024-09-27 RX ADMIN — MORPHINE SULFATE 4 MG: 10 INJECTION, SOLUTION INTRAMUSCULAR; INTRAVENOUS at 09:09

## 2024-09-27 RX ADMIN — PHENYLEPHRINE HYDROCHLORIDE 100 MCG: 10 INJECTION INTRAVENOUS at 08:09

## 2024-09-27 RX ADMIN — PHENYLEPHRINE HYDROCHLORIDE 100 MCG: 10 INJECTION INTRAVENOUS at 09:09

## 2024-09-27 RX ADMIN — SODIUM CHLORIDE: 9 INJECTION, SOLUTION INTRAVENOUS at 07:09

## 2024-09-27 RX ADMIN — LIDOCAINE HYDROCHLORIDE 80 MG: 20 INJECTION, SOLUTION INTRAVENOUS at 08:09

## 2024-09-27 RX ADMIN — MIDAZOLAM HYDROCHLORIDE 2 MG: 1 INJECTION, SOLUTION INTRAMUSCULAR; INTRAVENOUS at 08:09

## 2024-09-27 RX ADMIN — FENTANYL CITRATE 25 MCG: 50 INJECTION, SOLUTION INTRAMUSCULAR; INTRAVENOUS at 09:09

## 2024-09-27 RX ADMIN — OXYCODONE AND ACETAMINOPHEN 1 TABLET: 10; 325 TABLET ORAL at 10:09

## 2024-09-27 RX ADMIN — FENTANYL CITRATE 25 MCG: 50 INJECTION, SOLUTION INTRAMUSCULAR; INTRAVENOUS at 08:09

## 2024-09-27 RX ADMIN — PROMETHAZINE HYDROCHLORIDE 25 MG: 25 INJECTION INTRAMUSCULAR; INTRAVENOUS at 08:09

## 2024-09-27 RX ADMIN — ONDANSETRON 4 MG: 2 INJECTION INTRAMUSCULAR; INTRAVENOUS at 09:09

## 2024-09-27 RX ADMIN — PHENYLEPHRINE HYDROCHLORIDE 200 MCG: 10 INJECTION INTRAVENOUS at 08:09

## 2024-09-27 RX ADMIN — PROPOFOL 150 MG: 10 INJECTION, EMULSION INTRAVENOUS at 08:09

## 2024-09-27 RX ADMIN — ONDANSETRON 4 MG: 2 INJECTION INTRAMUSCULAR; INTRAVENOUS at 08:09

## 2024-09-27 NOTE — ANESTHESIA PREPROCEDURE EVALUATION
09/27/2024  Silvana Sarah is a 63 y.o., female.      Pre-op Assessment    I have reviewed the Patient Summary Reports.    I have reviewed the NPO Status.   I have reviewed the Medications.     Review of Systems         Anesthesia Plan  Type of Anesthesia, risks & benefits discussed:    Anesthesia Type: Gen Supraglottic Airway  Intra-op Monitoring Plan: Standard ASA Monitors  Post Op Pain Control Plan: IV/PO Opioids PRN  Induction:  IV  Informed Consent: Informed consent signed with the Patient and all parties understand the risks and agree with anesthesia plan.  All questions answered.   ASA Score: 3    Ready For Surgery From Anesthesia Perspective.     .  NPO greater than 8 hours  PONV  Allergies      Influenza Virus Vaccines  Not Specified  10/25/2021      Penicillins         Hct 42  K 6.4  Cr 1.4  9/19/24 EKG: Normal sinus rhythm; 87 bpm   Incomplete right bundle branch block   Possible Inferior infarct ,age undetermined   Anterolateral infarct ,age undetermined     IDDM . . . Has a sensor and an insulin pump  Gastroparesis . . . Has a gastric stimulator  R.A.  Anxiety   Depression  Hyperkalemia    MP III; dentures; adequate ROM at neck

## 2024-09-27 NOTE — TRANSFER OF CARE
"Anesthesia Transfer of Care Note    Patient: Silvana Sarah    Procedure(s) Performed: Procedure(s) (LRB):  DEBRIDEMENT, LOWER EXTREMITY (Left)    Patient location: PACU    Anesthesia Type: general    Transport from OR: Transported from OR on 6-10 L/min O2 by face mask with adequate spontaneous ventilation    Post pain: adequate analgesia    Post assessment: no apparent anesthetic complications    Post vital signs: stable    Level of consciousness: responds to stimulation    Nausea/Vomiting: no nausea/vomiting    Complications: none    Transfer of care protocol was followed      Last vitals: Visit Vitals  /61 (BP Location: Right arm, Patient Position: Lying)   Pulse 78   Temp 36.6 °C (97.8 °F) (Oral)   Resp 11   Ht 5' 9" (1.753 m)   Wt 86.2 kg (190 lb)   SpO2 100%   Breastfeeding No   BMI 28.06 kg/m²     "

## 2024-09-27 NOTE — ANESTHESIA POSTPROCEDURE EVALUATION
Anesthesia Post Evaluation    Patient: Silvana Sarah    Procedure(s) Performed: Procedure(s) (LRB):  DEBRIDEMENT, LOWER EXTREMITY (Left)    Final Anesthesia Type: general      Patient location during evaluation: PACU  Post-procedure vital signs: reviewed and stable  Pain management: adequate  Airway patency: patent    PONV status at discharge: No PONV  Anesthetic complications: no      Cardiovascular status: hemodynamically stable  Respiratory status: unassisted  Hydration status: euvolemic  Follow-up not needed.              Vitals Value Taken Time   /55 09/27/24 1116   Temp 36.6 °C (97.8 °F) 09/27/24 0928   Pulse 87 09/27/24 1123   Resp 15 09/27/24 1123   SpO2 96 % 09/27/24 1123   Vitals shown include unfiled device data.      Event Time   Out of Recovery 10:10:00         Pain/Anabela Score: Pain Rating Prior to Med Admin: 8 (9/27/2024 10:48 AM)  Pain Rating Post Med Admin: 6 (9/27/2024  9:53 AM)  Anabela Score: 9 (9/27/2024  9:53 AM)

## 2024-09-27 NOTE — OP NOTE
[facility]  Surgery Department  Operative Note    SUMMARY     Date of Procedure: 9/27/2024     Procedure: Procedure(s) (LRB):  DEBRIDEMENT, LOWER EXTREMITY (Left)     Surgeons and Role:     * David Aguilera MD - Primary    Assisting Surgeon: None    Pre-Operative Diagnosis: Diabetic ulcer of left heel associated with type 2 diabetes mellitus, with fat layer exposed [E11.621, L97.422]    Post-Operative Diagnosis: Post-Op Diagnosis Codes:     * Diabetic ulcer of left heel associated with type 2 diabetes mellitus, with fat layer exposed [E11.621, L97.422]    Anesthesia: General    Procedures Performed:  Debridement of left heel wound    Significant Findings of the Procedure:  2 openings in the lateral aspect of the patient's left heel that connected.  Underlying necrotic full-thickness muscle, skin, tendons almost down to the calcaneus.  Tracked superiorly towards the lateral malleolus    Procedure in Detail:  After informed consent patient brought to the OR prepped and draped in the usual sterile fashion.  Two small openings in the lateral aspect of the heel were probed and they connected underneath the skin with some underlying necrotic tissue and a cavity.  We surgically excise and cut out a 5 x 4 x 1-1/2 deep tissues including the skin, fat, muscles, and some tendons.  This was all cleaned up to healthy bleeding tissues.  It did tracked towards the lateral malleolus and likely the posterior tibial artery was involved which we had to over-sew.  After that we ensured hemostasis placed a Surgicel in the cavity bed and wet-to-dry on top.  Patient tolerated the procedure well    Complications: No    Estimated Blood Loss (EBL): 25 mL           Implants: * No implants in log *    Specimens:   Specimen (24h ago, onward)       Start     Ordered    09/27/24 0909  Surgical Pathology  RELEASE UPON ORDERING         09/27/24 0909                            Condition: Good    Disposition: PACU -  hemodynamically stable.    Attestation: I was present and scrubbed for the entire procedure.

## 2024-09-27 NOTE — OR NURSING
0923 received pt from OR. Pt resting with eyes closed,. Oral airway in place    0930 oral airway removed    0938 pt resting quietly. Co pain. Left leg repositioned for comfort    0953 pt resting quietly. Still co pain to left foot.  Repositioned leg. Unable to give additional narcotics at this time... o2 sat drop to 90 when sleeping. Dr. Reyes aware    1010 pt resting quietly. Pt to room via stretcher    1015 pt released to conrado Guzmán lpn  Family in room  105/66, 77, 16, 94%(room air)

## 2024-09-27 NOTE — ANESTHESIA PROCEDURE NOTES
LMA    Date/Time: 9/27/2024 8:44 AM    Performed by: Roberto Carlos Goss II, CRNA  Authorized by: Elijah Reyes MD    Intubation:     Induction:  Intravenous    Intubated:  Postinduction    Mask Ventilation:  Easy mask    Attempts:  1    Attempted By:  CRNA    Difficult Airway Encountered?: No      Complications:  None    Airway Device:  Supraglottic airway/LMA    Airway Device Size:  4.0    Style/Cuff Inflation:  Cuffed (inflated to minimal occlusive pressure)    Secured at:  The lips    Placement Verified By:  Capnometry    Complicating Factors:  None    Findings Post-Intubation:  BS equal bilateral and atraumatic/condition of teeth unchanged

## 2024-09-30 NOTE — DISCHARGE SUMMARY
[facility]  Discharge Note  Short Stay    Procedure(s) (LRB):  DEBRIDEMENT, LOWER EXTREMITY (Left)      OUTCOME: Patient tolerated treatment/procedure well without complication and is now ready for discharge.    DISPOSITION: Home or Self Care    FINAL DIAGNOSIS: diabetic foot ulcer      FOLLOWUP: In clinic    DISCHARGE INSTRUCTIONS:    Discharge Procedure Orders   Diet Adult Regular     Remove dressing in 24 hours     Notify your health care provider if you experience any of the following:  temperature >100.4     Notify your health care provider if you experience any of the following:  persistent nausea and vomiting or diarrhea     Notify your health care provider if you experience any of the following:  severe uncontrolled pain     Notify your health care provider if you experience any of the following:  redness, tenderness, or signs of infection (pain, swelling, redness, odor or green/yellow discharge around incision site)     Notify your health care provider if you experience any of the following:  difficulty breathing or increased cough     Notify your health care provider if you experience any of the following:  severe persistent headache     Notify your health care provider if you experience any of the following:  worsening rash     Notify your health care provider if you experience any of the following:  persistent dizziness, light-headedness, or visual disturbances     Notify your health care provider if you experience any of the following:  increased confusion or weakness     Notify your health care provider if you experience any of the following:     Shower on day dressing removed (No bath)   Order Comments: Wet to dry dressing daily to the leg wound. Patient already of pain medication strong        TIME SPENT ON DISCHARGE: 5 minutes

## 2024-10-01 LAB
ESTROGEN SERPL-MCNC: NORMAL PG/ML
INSULIN SERPL-ACNC: NORMAL U[IU]/ML
LAB AP GROSS DESCRIPTION: NORMAL
LAB AP LABORATORY NOTES: NORMAL
T3RU NFR SERPL: NORMAL %

## 2024-10-03 ENCOUNTER — OFFICE VISIT (OUTPATIENT)
Dept: WOUND CARE | Facility: CLINIC | Age: 64
End: 2024-10-03
Attending: FAMILY MEDICINE
Payer: COMMERCIAL

## 2024-10-03 VITALS
SYSTOLIC BLOOD PRESSURE: 115 MMHG | RESPIRATION RATE: 20 BRPM | HEART RATE: 101 BPM | DIASTOLIC BLOOD PRESSURE: 81 MMHG | TEMPERATURE: 97 F

## 2024-10-03 DIAGNOSIS — E11.621 DIABETIC ULCER OF LEFT HEEL ASSOCIATED WITH TYPE 2 DIABETES MELLITUS, WITH FAT LAYER EXPOSED: Primary | ICD-10-CM

## 2024-10-03 DIAGNOSIS — L97.422 DIABETIC ULCER OF LEFT HEEL ASSOCIATED WITH TYPE 2 DIABETES MELLITUS, WITH FAT LAYER EXPOSED: Primary | ICD-10-CM

## 2024-10-03 DIAGNOSIS — T14.8XXA WOUND INFECTION: ICD-10-CM

## 2024-10-03 DIAGNOSIS — L08.9 WOUND INFECTION: ICD-10-CM

## 2024-10-03 PROCEDURE — 99999PBSHW PR PBB SHADOW TECHNICAL ONLY FILED TO HB: Mod: PBBFAC,,,

## 2024-10-03 PROCEDURE — 99215 OFFICE O/P EST HI 40 MIN: CPT | Mod: PBBFAC | Performed by: NURSE PRACTITIONER

## 2024-10-03 PROCEDURE — 99999 PR PBB SHADOW E&M-EST. PATIENT-LVL V: CPT | Mod: PBBFAC,,, | Performed by: NURSE PRACTITIONER

## 2024-10-03 PROCEDURE — 96372 THER/PROPH/DIAG INJ SC/IM: CPT | Mod: PBBFAC | Performed by: NURSE PRACTITIONER

## 2024-10-03 PROCEDURE — 87077 CULTURE AEROBIC IDENTIFY: CPT | Mod: ,,, | Performed by: CLINICAL MEDICAL LABORATORY

## 2024-10-03 PROCEDURE — 87186 SC STD MICRODIL/AGAR DIL: CPT | Mod: ,,, | Performed by: CLINICAL MEDICAL LABORATORY

## 2024-10-03 PROCEDURE — 87070 CULTURE OTHR SPECIMN AEROBIC: CPT | Mod: ,,, | Performed by: CLINICAL MEDICAL LABORATORY

## 2024-10-03 PROCEDURE — 99499 UNLISTED E&M SERVICE: CPT | Mod: S$PBB,,, | Performed by: NURSE PRACTITIONER

## 2024-10-03 PROCEDURE — 11042 DBRDMT SUBQ TIS 1ST 20SQCM/<: CPT | Mod: PBBFAC | Performed by: NURSE PRACTITIONER

## 2024-10-03 RX ORDER — METRONIDAZOLE 500 MG/1
500 TABLET ORAL EVERY 12 HOURS
Qty: 20 TABLET | Refills: 0 | Status: SHIPPED | OUTPATIENT
Start: 2024-10-03 | End: 2024-10-13

## 2024-10-03 RX ORDER — CEFTRIAXONE 1 G/1
1 INJECTION, POWDER, FOR SOLUTION INTRAMUSCULAR; INTRAVENOUS DAILY
Qty: 10 G | Refills: 0 | Status: SHIPPED | OUTPATIENT
Start: 2024-10-03 | End: 2024-10-13

## 2024-10-03 RX ORDER — COLLAGENASE SANTYL 250 [ARB'U]/G
OINTMENT TOPICAL DAILY
Qty: 30 G | Refills: 1 | Status: SHIPPED | OUTPATIENT
Start: 2024-10-03 | End: 2024-10-03 | Stop reason: SDUPTHER

## 2024-10-03 RX ORDER — LIDOCAINE HYDROCHLORIDE 10 MG/ML
2 INJECTION, SOLUTION EPIDURAL; INFILTRATION; INTRACAUDAL; PERINEURAL DAILY
Qty: 20 ML | Refills: 0 | Status: SHIPPED | OUTPATIENT
Start: 2024-10-03 | End: 2024-10-13

## 2024-10-03 RX ORDER — COLLAGENASE SANTYL 250 [ARB'U]/G
OINTMENT TOPICAL DAILY
Qty: 30 G | Refills: 1 | Status: SHIPPED | OUTPATIENT
Start: 2024-10-03

## 2024-10-03 RX ORDER — CEFTRIAXONE 1 G/1
1 INJECTION, POWDER, FOR SOLUTION INTRAMUSCULAR; INTRAVENOUS ONCE
Status: COMPLETED | OUTPATIENT
Start: 2024-10-03 | End: 2024-10-03

## 2024-10-03 RX ADMIN — CEFTRIAXONE SODIUM 1 G: 1 INJECTION, POWDER, FOR SOLUTION INTRAMUSCULAR; INTRAVENOUS at 10:10

## 2024-10-03 NOTE — ASSESSMENT & PLAN NOTE
Clean wounds with baby shampoo and water.    Left heel:  Apply Calmoseptine to the periwound (outer edges)   Apply santyl iva thick ness and vashe moistened 4x4 to wound bed  Cover and secure [id number], belen, and paper tape  Change daily and PRN for soilage  Prescription for wheel chair given today  Start Rocephin 1 gram daily and Flagyl twice daily  Elevate legs when sitting  Avoid pressure on wound. Wear off-loading shoe  Diabetes:  Monitor glucose closely. Check fasting glucose and 2 hours after meals. HgA1C goal <7, fasting glucose , and 2 hours after meals <180  Hypertension:  Check blood pressure twice daily, goal <120/80  Diet:   Increase protein intake, avoid fried, fatty foods and foods high in simple carbs.   Vitamins:  Take vitamin C 1000 mg, zinc 50mg, vitamin d 5000 units, and a daily multivitamin. Lucho is a good source of protein and nutrients to aid in wound healing.   Monitor closely for s/s of infection including fever, chills, increase in pain, odor from wound, and increased redness from foot. Go to ER if any complications develop.   Keep leg elevated and avoid pressure on wound.

## 2024-10-03 NOTE — PROCEDURES
"Debridement    Date/Time: 10/3/2024 9:00 AM    Performed by: Marybeth Orellana FNP  Authorized by: Marybeth Orellana FNP    Time out: Immediately prior to procedure a "time out" was called to verify the correct patient, procedure, equipment, support staff and site/side marked as required.    Consent Done?:  Yes (Written)    Type of Debridement:  Excisional       Length (cm):  5.9       Area (sq cm):  18.29       Width (cm):  3.1       Percent Debrided (%):  100       Depth (cm):  0.4       Total Area Debrided (sq cm):  18.29    Depth of debridement:  Subcutaneous tissue    Tissue debrided:  Adipose, Dermis, Epidermis and Subcutaneous    Devitalized tissue debrided:  Biofilm, Exudate, Callus, Clots, Fibrin and Slough    Instruments:  Curette  Bleeding:  Minimal  Hemostasis Achieved: Yes  Method Used:  Pressure  Patient tolerance:  Patient tolerated the procedure well with no immediate complications  1st Wound Pain Assessment: 0     Assistant MEIR King RN    "

## 2024-10-03 NOTE — PROGRESS NOTES
Debridement Performed for Assessment: Wound# left lateral ankle  Performed By: Provider: Marybeth Vazquez NP  Assistant: PUJA Santiago, RN    Debridement: Surgical    Photo taken post procedure:    Time-Out Taken: Yes  Level: Skin/Subcutaneous Tissue  Post Debridement Measurements  Length: (cm) 5.9  Width: (cm) 3.1  Depth: (cm)       Area: (cm²) 18.29  Percent Debrided: 100%  Total Area Debrided: (sq cm) 18.29    Tissue and other material debrided:  Adipose, Dermis, Epidermis, Subcutaneous  Devitalized Tissue Debrided:Biofilm, Slough, Fibrin  Instrument: Curette  Bleeding: Minimal  Hemostasis Achieved: Pressure  Procedural Pain: Insensate  Post Procedural Pain: Insensate  Response to Treatment: Procedure was tolerated well    Devitalized materials/tissue Removed  the following was removed during debridement  subcutaneous      Post Debridement Diagnosis  Post debridement diagnosis  Same as Pre-operative debridement diagnosis, No Complications noted.      Grafts or implants applied  Was a graft or implant applied?  No      Procedure assistant  Procedure assisted by:  Assistant is the same as nurse listed above      Complications related to procedure  Did any complication occure during procedure?  No complications noted during or after procedure.      Specimen  Specimen collect during procedure?  No specimen collected      Anaesthesia:  Anesthesia used  None      Blood Loss:  Blood loss during procedure  less than 5 cc

## 2024-10-05 LAB
MICROORGANISM SPEC CULT: ABNORMAL
MICROORGANISM SPEC CULT: ABNORMAL

## 2024-10-09 RX ORDER — CIPROFLOXACIN 500 MG/1
500 TABLET ORAL EVERY 12 HOURS
Qty: 20 TABLET | Refills: 0 | Status: SHIPPED | OUTPATIENT
Start: 2024-10-09 | End: 2024-10-19

## 2024-10-10 ENCOUNTER — OFFICE VISIT (OUTPATIENT)
Dept: WOUND CARE | Facility: CLINIC | Age: 64
End: 2024-10-10
Attending: FAMILY MEDICINE
Payer: COMMERCIAL

## 2024-10-10 VITALS
DIASTOLIC BLOOD PRESSURE: 69 MMHG | SYSTOLIC BLOOD PRESSURE: 134 MMHG | RESPIRATION RATE: 20 BRPM | HEART RATE: 82 BPM | TEMPERATURE: 98 F

## 2024-10-10 DIAGNOSIS — L97.422 DIABETIC ULCER OF LEFT HEEL ASSOCIATED WITH TYPE 2 DIABETES MELLITUS, WITH FAT LAYER EXPOSED: Primary | ICD-10-CM

## 2024-10-10 DIAGNOSIS — E11.621 DIABETIC ULCER OF LEFT HEEL ASSOCIATED WITH TYPE 2 DIABETES MELLITUS, WITH FAT LAYER EXPOSED: Primary | ICD-10-CM

## 2024-10-10 PROCEDURE — 99213 OFFICE O/P EST LOW 20 MIN: CPT | Mod: S$PBB,,, | Performed by: NURSE PRACTITIONER

## 2024-10-10 PROCEDURE — 99215 OFFICE O/P EST HI 40 MIN: CPT | Mod: PBBFAC | Performed by: NURSE PRACTITIONER

## 2024-10-10 PROCEDURE — 99999 PR PBB SHADOW E&M-EST. PATIENT-LVL V: CPT | Mod: PBBFAC,,, | Performed by: NURSE PRACTITIONER

## 2024-10-10 RX ORDER — COLLAGENASE SANTYL 250 [ARB'U]/G
OINTMENT TOPICAL DAILY
Qty: 30 G | Refills: 1 | Status: SHIPPED | OUTPATIENT
Start: 2024-10-10

## 2024-10-10 NOTE — PATIENT INSTRUCTIONS
Clean wounds with Acetic Acid or Vashe.    Left heel:  Apply Calmoseptine to the periwound (outer edges)   Apply santyl iva thick ness and vashe moistened 4x4 to wound bed  Cover and secure [id number], belen, and paper tape  Wrap with ACE from toes to knee  Change daily and PRN for soilage  Continue Rocephin 1 gram daily and Flagyl twice daily  Elevate legs when sitting  Avoid pressure on wound. Wear off-loading shoe  Diabetes:  Monitor glucose closely. Check fasting glucose and 2 hours after meals. HgA1C goal <7, fasting glucose , and 2 hours after meals <180  Hypertension:  Check blood pressure twice daily, goal <120/80  Diet:   Increase protein intake, avoid fried, fatty foods and foods high in simple carbs.   Vitamins:  Take vitamin C 1000 mg, zinc 50mg, vitamin d 5000 units, and a daily multivitamin. Lucho is a good source of protein and nutrients to aid in wound healing.   Monitor closely for s/s of infection including fever, chills, increase in pain, odor from wound, and increased redness from foot. Go to ER if any complications develop.   Keep leg elevated and avoid pressure on wound.  Rocephin IM given today to left glut pt brought from home

## 2024-10-11 NOTE — PROGRESS NOTES
CARISA Ayala   [facility]  [address] [facility] [place] [address]  [phone]      PATIENT NAME: Silvana Sarah  : 1960  DATE: 10/10/24  MRN: [id number]      Billing Provider: CARISA Ayala  Level of Service:   Patient PCP Information       Provider PCP Type    CARISA Cárdenas General            Reason for Visit / Chief Complaint: Diabetic Foot Ulcer (Left heel)       History of Present Illness / Problem Focused Workflow     Silvana Sarah is a 62 yo female presents for follow up on chronic non healing wound to left heel. Increased granulation tissue to wound bed, continues to have some slough. She still has not gotten santyl as prescribed. Requested prescription be sent to [facility] in [place]. Manufacture coupon given to patient today. Patient would benefit from enzymatic debridement to remove slough. Erythema and edema have improved. Reports left foot remains tender. Referral to Dr. Aguilera to evaluate for additional debridement vs enzymatic debridement. Continue antibiotics as prescribed.       8/15/24  62 yo female presents with complaints of ulcer to left heel and right great toe. Wound on left heel with with necrotic tissue and slough, bedside debridement today. Left foot is edematous and tender. Recent cultures reviewed. Ceftin to pharmacy. Santyl and vashe moistened drawtex to wound bed. Supplies ordered from [employer]. Wound on right great toe will moderate serous drainage. Aquacel ag applied. Pertinent PMH includes diabetes, hypertension, peripheral neuropathy, gastroparesis, and rheumatoid arthritis. Last HgA1C 7.2 2023, diabetes managed by PCP. Wound healing is complicated by multiple co-morbidities, poor vascular supply, immunosuppression, diabetes, edema, heavy drainage, decreased granulation tissue, necrosis, large surface area, large volume, advanced age, fragile skin, and infection.             Review of Systems      Review of Systems   Constitutional:  Positive for activity change. Negative for chills and fever.   Respiratory:  Negative for chest tightness and shortness of breath.    Cardiovascular:  Positive for leg swelling. Negative for chest pain and palpitations.   Musculoskeletal:  Positive for arthralgias and joint swelling.   Skin:  Positive for color change and wound.        wound   Neurological:  Positive for weakness.   Psychiatric/Behavioral:  Negative for agitation, behavioral problems, confusion and self-injury.      Medical / Social / Family History     Past Medical History:   Diagnosis Date    Anxiety and depression 2021    Diabetes mellitus     Dry eye syndrome, bilateral 10/06/2017    Essential (primary) hypertension     Gastroparesis due to DM     Generalized osteoarthritis 10/25/2021    Other mechanical complication of implanted electronic neurostimulator, generator, sequela 10/25/2021    Peripheral autonomic neuropathy due to DM     Rheumatoid arthritis        Past Surgical History:   Procedure Laterality Date     SECTION      DEBRIDEMENT OF LOWER EXTREMITY Left 2024    Procedure: DEBRIDEMENT, LOWER EXTREMITY;  Surgeon: David Aguilera MD;  Location: [facility];  Service: General;  Laterality: Left;  Left foot    GASTRIC STIMULATOR IMPLANT SURGERY      HYSTERECTOMY      INCISION AND DRAINAGE OF ABSCESS Right 2022    Procedure: INCISION AND DRAINAGE, ABSCESS;  Surgeon: Kamari Sandoval DO;  Location: [facility] OR;  Service: General;  Laterality: Right;  right hand dr sandoval am    IRRIGATION AND DEBRIDEMENT OF UPPER EXTREMITY Right 2022    Procedure: IRRIGATION AND DEBRIDEMENT, UPPER EXTREMITY;  Surgeon: Kamari Sandoval DO;  Location: [facility];  Service: General;  Laterality: Right;  right hand I/D dr sandoval today       Social History  Ms. Silvana Sarah  reports that she has never smoked. She has never used smokeless tobacco. She reports that she  does not drink alcohol.    Family History  Ms.'s Silvana Sarah family history includes Cancer in her father; Diabetes in her son and son; Heart disease in her mother.    Medications and Allergies     Medications  No outpatient medications have been marked as taking for the 10/10/24 encounter (Office Visit) with Marybeth Orellana FNP.       Allergies  Review of patient's allergies indicates:   Allergen Reactions    Influenza virus vaccines     Penicillins        Physical Examination     Vitals:    10/10/24 1316   BP: 134/69   Pulse: 82   Resp: 20   Temp: 97.8 °F (36.6 °C)     Physical Exam  Vitals and nursing note reviewed.   Constitutional:       Appearance: Normal appearance.   HENT:      Head: Normocephalic.   Cardiovascular:      Rate and Rhythm: Normal rate and regular rhythm.      Pulses: Normal pulses.      Heart sounds: Normal heart sounds.   Pulmonary:      Effort: Pulmonary effort is normal. No respiratory distress.   Chest:      Chest wall: No tenderness.   Musculoskeletal:         General: Swelling and tenderness present.      Right lower leg: Edema present.      Left lower leg: Edema present.   Skin:     General: Skin is warm and dry.      Findings: Erythema present.      Comments: See LDA for photos/measurements   Neurological:      General: No focal deficit present.      Mental Status: She is alert and oriented to person, place, and time. Mental status is at baseline.   Psychiatric:         Mood and Affect: Mood normal.         Behavior: Behavior normal.         Thought Content: Thought content normal.         Judgment: Judgment normal.     Assessment and Plan             Wound 08/15/24 Diabetic Ulcer Left posterior Heel #1 (Active)   08/15/24    Present on Original Admission: Y   Primary Wound Type: Diabetic ulc   Side: Left   Orientation: posterior   Location: Heel   Wound Approximate Age at First Assessment (Weeks): 9 weeks   Wound Number: #1   Is this injury device related?:    Incision Type:     Closure Method:    Wound Description (Comments):    Type:    Additional Comments:    Ankle-Brachial Index:    Pulses:    Removal Indication and Assessment:    Wound Outcome:    Wound Image   10/10/24 1326   Drainage Amount Moderate 10/10/24 1326   Drainage Characteristics/Odor Yellow;Green 10/10/24 1326   Appearance Pink;Red;Yellow;Slough;Fibrin;Moist;Granulating;Adipose;Muscle 10/10/24 1326   Black (%), Wound Tissue Color 0 % 10/10/24 1326   Red (%), Wound Tissue Color 80 % 10/10/24 1326   Yellow (%), Wound Tissue Color 20 % 10/10/24 1326   Periwound Area Moist;[place];Swelling 10/10/24 1326   Wound Edges Open 10/10/24 1326   Wound Length (cm) 5.8 cm 10/10/24 1326   Wound Width (cm) 3.3 cm 10/10/24 1326   Wound Depth (cm) 0.6 cm 10/10/24 1326   Wound Volume (cm^3) 11.484 cm^3 10/10/24 1326   Wound Surface Area (cm^2) 19.14 cm^2 10/10/24 1326   Care Cleansed with:;Antimicrobial agent 10/10/24 1326   Dressing Applied;Gauze, wet to dry;Gauze;Rolled gauze 10/10/24 1326   Periwound Care Moisture barrier applied 10/10/24 1326     Problem List Items Addressed This Visit          Endocrine    Diabetic ulcer of left heel associated with type 2 diabetes mellitus, with fat layer exposed - Primary    Overview                                 Future Appointments   Date Time Provider Department Center   10/17/2024  2:00 PM David Aguilera MD [facility]   10/31/2024  1:00 PM Marybeth [facility]rellana FNP [facility]             Signature:  CARISA Ayala  [facility]  [address]  [facility] [place] [address]  [phone]    Date of encounter: 10/10/24

## 2024-10-16 NOTE — PATIENT INSTRUCTIONS
Apply Acetic Acid moistened gauze to wound bed for 5 minutes then clean with vashe or normal saline    Left heel:  Apply Calmoseptine to the periwound (outer edges)   Apply santyl (nickel thickness) to wound bed and cover with drawtex.   Cover and secure [id number], belen, and paper tape  Wrap with ACE from toes to knee  Change daily and PRN for soilage    Elevate legs when sitting  Avoid pressure on wound. Wear off-loading shoe  Diabetes:  Monitor glucose closely. Check fasting glucose and 2 hours after meals. HgA1C goal <7, fasting glucose , and 2 hours after meals <180  Hypertension:  Check blood pressure twice daily, goal <120/80  Diet:   Increase protein intake, avoid fried, fatty foods and foods high in simple carbs.   Vitamins:  Take vitamin C 1000 mg, zinc 50mg, vitamin d 5000 units, and a daily multivitamin. Lucho is a good source of protein and nutrients to aid in wound healing.   Monitor closely for s/s of infection including fever, chills, increase in pain, odor from wound, and increased redness from foot. Go to ER if any complications develop.

## 2024-10-16 NOTE — PROGRESS NOTES
CARISA Ayala   [facility]  [address] [facility] [place] [address]  [phone]      PATIENT NAME: Silvana Sarah  : 1960  DATE: 10/31/24  MRN: [id number]      Billing Provider: CARISA Ayala  Level of Service:   Patient PCP Information       Provider PCP Type    CARISA Cárdenas General            Reason for Visit / Chief Complaint: Diabetic Foot Ulcer and Wound Check (Left heel)       History of Present Illness / Problem Focused Workflow     Silvana Sarah is a 62 yo female presents for follow up on chronic non healing wound to left heel. Increased granulation tissue to wound bed, continues to have some slough. Bedside debridement today. She is using santyl as prescribed. She was seen by Dr. Aguilera on 10/17 and wound was debrided at bedside. Continue santyl and drawtex to wound bed.       8/15/24  62 yo female presents with complaints of ulcer to left heel and right great toe. Wound on left heel with with necrotic tissue and slough, bedside debridement today. Left foot is edematous and tender. Recent cultures reviewed. Ceftin to pharmacy. Santyl and vashe moistened drawtex to wound bed. Supplies ordered from [employer]. Wound on right great toe will moderate serous drainage. Aquacel ag applied. Pertinent PMH includes diabetes, hypertension, peripheral neuropathy, gastroparesis, and rheumatoid arthritis. Last HgA1C 7.2 2023, diabetes managed by PCP. Wound healing is complicated by multiple co-morbidities, poor vascular supply, immunosuppression, diabetes, edema, heavy drainage, decreased granulation tissue, necrosis, large surface area, large volume, advanced age, fragile skin, and infection.             Review of Systems     Review of Systems   Constitutional:  Positive for activity change. Negative for chills and fever.   Respiratory:  Negative for chest tightness and shortness of breath.    Cardiovascular:  Positive for leg  swelling. Negative for chest pain and palpitations.   Musculoskeletal:  Positive for arthralgias and joint swelling.   Skin:  Positive for color change and wound.        wound   Neurological:  Positive for weakness.   Psychiatric/Behavioral:  Negative for agitation, behavioral problems, confusion and self-injury.        Medical / Social / Family History     Past Medical History:   Diagnosis Date    Anxiety and depression 2021    Diabetes mellitus     Dry eye syndrome, bilateral 10/06/2017    Essential (primary) hypertension     Gastroparesis due to DM     Generalized osteoarthritis 10/25/2021    Other mechanical complication of implanted electronic neurostimulator, generator, sequela 10/25/2021    Peripheral autonomic neuropathy due to DM     Rheumatoid arthritis        Past Surgical History:   Procedure Laterality Date     SECTION      DEBRIDEMENT OF LOWER EXTREMITY Left 2024    Procedure: DEBRIDEMENT, LOWER EXTREMITY;  Surgeon: David Aguilera MD;  Location: [facility];  Service: General;  Laterality: Left;  Left foot    GASTRIC STIMULATOR IMPLANT SURGERY      HYSTERECTOMY      INCISION AND DRAINAGE OF ABSCESS Right 2022    Procedure: INCISION AND DRAINAGE, ABSCESS;  Surgeon: Kamari Gamble DO;  Location: [facility];  Service: General;  Laterality: Right;  right hand dr mavis severino    IRRIGATION AND DEBRIDEMENT OF UPPER EXTREMITY Right 2022    Procedure: IRRIGATION AND DEBRIDEMENT, UPPER EXTREMITY;  Surgeon: Kamari Gamble DO;  Location: [facility];  Service: General;  Laterality: Right;  right hand I/D dr gamble today       Social History  Ms. Silvana Sarah  reports that she has never smoked. She has never used smokeless tobacco. She reports that she does not drink alcohol.    Family History  Ms.'s Silvana Sarah family history includes Cancer in her father; Diabetes in her son and son; Heart disease in her mother.    Medications and Allergies      Medications  Outpatient Medications Marked as Taking for the 10/31/24 encounter (Office Visit) with Marybeth Orellana FNP   Medication Sig Dispense Refill    b complex vitamins capsule Take 1 capsule by mouth once daily.      collagenase (SANTYL) ointment Apply topically once daily. 30 g 1    cyanocobalamin 1,000 mcg/mL injection Inject 1,000 mcg into the muscle every 30 days.      esomeprazole (NEXIUM) 20 MG capsule Take 20 mg by mouth once daily.      etanercept (ENBREL MINI) 50 mg/mL (1 mL) Crtg 1 mL by abdominal subcutaneous route every .       ferrous sulfate (FEOSOL) Tab tablet Take 1 tablet by mouth once daily.      gabapentin (NEURONTIN) 600 MG tablet Take 2 tablets 3 times a day by oral route for 30 days.      hydrOXYchloroQUINE (PLAQUENIL) 200 mg tablet Take 200 mg by mouth 2 (two) times daily.       insulin regular 100 unit/mL Inj injection 3 (three) times daily before meals.      LEVEMIR U-100 INSULIN 100 unit/mL injection SMARTSI Unit(s) SUB-Q Daily      lisinopriL (PRINIVIL,ZESTRIL) 2.5 MG tablet Take 2.5 mg by mouth every evening.      lysine (L-LYSINE) 500 mg Tab Take 500 mg by mouth 2 (two) times a day.      nystatin (MYCOSTATIN) 100,000 unit/mL suspension Take 6 mLs by mouth 3 (three) times daily before meals.       oxyCODONE-acetaminophen (PERCOCET)  mg per tablet Take 1 tablet by mouth 4 (four) times daily.      predniSONE (DELTASONE) 5 MG tablet Take 5 mg by mouth once daily.       promethazine (PHENERGAN) 25 MG tablet Take 25 mg by mouth every 6 (six) hours as needed for Nausea.       triamterene-hydrochlorothiazide 37.5-25 mg (DYAZIDE) 37.5-25 mg per capsule Take 1 capsule by mouth once daily.       zinc gluconate 50 mg tablet Take 50 mg by mouth once daily.         Allergies  Review of patient's allergies indicates:   Allergen Reactions    Influenza virus vaccines     Penicillins        Physical Examination     Vitals:    10/31/24 1351   BP: 139/78    Pulse: 80   Resp: 18   Temp: 98 °F (36.7 °C)       Physical Exam  Vitals and nursing note reviewed.   Constitutional:       Appearance: Normal appearance.   HENT:      Head: Normocephalic.   Cardiovascular:      Rate and Rhythm: Normal rate and regular rhythm.      Pulses: Normal pulses.      Heart sounds: Normal heart sounds.   Pulmonary:      Effort: Pulmonary effort is normal. No respiratory distress.   Chest:      Chest wall: No tenderness.   Musculoskeletal:         General: Swelling and tenderness present.      Right lower leg: Edema present.      Left lower leg: Edema present.   Skin:     General: Skin is warm and dry.      Findings: Erythema present.      Comments: See LDA for photos/measurements   Neurological:      General: No focal deficit present.      Mental Status: She is alert and oriented to person, place, and time. Mental status is at baseline.   Psychiatric:         Mood and Affect: Mood normal.         Behavior: Behavior normal.         Thought Content: Thought content normal.         Judgment: Judgment normal.     Assessment and Plan             Wound 09/05/24 1000 Diabetic Ulcer Left lateral Ankle (Active)   09/05/24 1000 Ankle   Present on Original Admission: Y   Primary Wound Type: Diabetic ulc   Side: Left   Orientation: lateral   Wound Approximate Age at First Assessment (Weeks): 1 weeks   Wound Number:    Is this injury device related?:    Incision Type:    Closure Method:    Wound Description (Comments):    Type:    Additional Comments:    Ankle-Brachial Index:    Pulses:    Removal Indication and Assessment:    Wound Outcome:    Wound Image   10/31/24 1430   Dressing Appearance Intact;Moist drainage 10/31/24 1352   Drainage Amount Small 10/31/24 1352   Drainage Characteristics/Odor Yellow 10/31/24 1352   Appearance Red;Yellow;Slough;Moist;Granulating 10/31/24 1352   Tissue loss description Full thickness 10/31/24 1352   Black (%), Wound Tissue Color 0 % 10/31/24 1352   Red (%), Wound  Tissue Color 50 % 10/31/24 1352   Yellow (%), Wound Tissue Color 50 % 10/31/24 1352   Periwound Area Intact;Moist 10/31/24 1352   Wound Edges Defined 10/31/24 1352   [place] Classification (diabetic foot ulcers only) Grade 1 10/31/24 1352   Wound Length (cm) 5.5 cm 10/31/24 1352   Wound Width (cm) 3 cm 10/31/24 1352   Wound Depth (cm) 1 cm 10/31/24 1352   Wound Volume (cm^3) 16.5 cm^3 10/31/24 1352   Wound Surface Area (cm^2) 16.5 cm^2 10/31/24 1352   Care Cleansed with:;Antimicrobial agent 10/31/24 1352   Dressing Applied;Hydrofiber;Gauze;Foam;Rolled gauze 10/31/24 1352       Problem List Items Addressed This Visit          Endocrine    Diabetic ulcer of left heel associated with type 2 diabetes mellitus, with fat layer exposed - Primary    Overview                                Current Assessment & Plan     Apply Acetic Acid moistened gauze to wound bed for 5 minutes then clean with vashe or normal saline    Left heel:  Apply Calmoseptine to the periwound (outer edges)   Apply santyl (nickel thickness) to wound bed and cover with drawtex.   Cover and secure [id number], belen, and paper tape  Wrap with ACE from toes to knee  Change daily and PRN for soilage    Elevate legs when sitting  Avoid pressure on wound. Wear off-loading shoe  Diabetes:  Monitor glucose closely. Check fasting glucose and 2 hours after meals. HgA1C goal <7, fasting glucose , and 2 hours after meals <180  Hypertension:  Check blood pressure twice daily, goal <120/80  Diet:   Increase protein intake, avoid fried, fatty foods and foods high in simple carbs.   Vitamins:  Take vitamin C 1000 mg, zinc 50mg, vitamin d 5000 units, and a daily multivitamin. Lucho is a good source of protein and nutrients to aid in wound healing.   Monitor closely for s/s of infection including fever, chills, increase in pain, odor from wound, and increased redness from foot. Go to ER if any complications develop.             Future Appointments   Date Time  Provider Department Center   11/14/2024  1:00 PM Marybeth Orellana FNP [facility]            Signature:  CARISA Ayala  [facility]  [address] [facility] [place] [address]  [phone]    Date of encounter: 10/31/24

## 2024-10-17 ENCOUNTER — OFFICE VISIT (OUTPATIENT)
Dept: SURGERY | Facility: CLINIC | Age: 64
End: 2024-10-17
Attending: SURGERY
Payer: COMMERCIAL

## 2024-10-17 DIAGNOSIS — L97.422 DIABETIC ULCER OF LEFT HEEL ASSOCIATED WITH TYPE 2 DIABETES MELLITUS, WITH FAT LAYER EXPOSED: Primary | ICD-10-CM

## 2024-10-17 DIAGNOSIS — E11.621 DIABETIC ULCER OF LEFT HEEL ASSOCIATED WITH TYPE 2 DIABETES MELLITUS, WITH FAT LAYER EXPOSED: Primary | ICD-10-CM

## 2024-10-17 PROCEDURE — 11043 DBRDMT MUSC&/FSCA 1ST 20/<: CPT | Mod: PBBFAC | Performed by: SURGERY

## 2024-10-17 PROCEDURE — 99212 OFFICE O/P EST SF 10 MIN: CPT | Mod: PBBFAC | Performed by: SURGERY

## 2024-10-17 PROCEDURE — 99999 PR PBB SHADOW E&M-EST. PATIENT-LVL II: CPT | Mod: PBBFAC,,, | Performed by: SURGERY

## 2024-10-17 NOTE — PROCEDURES
"Debridement    Date/Time: 10/17/2024 2:00 PM    Performed by: David Aguilera MD  Authorized by: David Aguilera MD    Time out: Immediately prior to procedure a "time out" was called to verify the correct patient, procedure, equipment, support staff and site/side marked as required.    Consent Done?:  Yes (Verbal)    Preparation: Patient was prepped and draped in usual sterile fashion    Local anesthesia used?: No      Wound Details:    Location:  Right foot    Location:  Right Heel       Length (cm):  6       Area (sq cm):  24       Width (cm):  4       Percent Debrided (%):  50       Depth (cm):  1       Total Area Debrided (sq cm):  12    Depth of debridement:  Muscle/fascia/tendon    Instruments:  Scissors and Curette    "

## 2024-10-31 ENCOUNTER — OFFICE VISIT (OUTPATIENT)
Dept: WOUND CARE | Facility: CLINIC | Age: 64
End: 2024-10-31
Attending: FAMILY MEDICINE
Payer: COMMERCIAL

## 2024-10-31 VITALS
DIASTOLIC BLOOD PRESSURE: 78 MMHG | SYSTOLIC BLOOD PRESSURE: 139 MMHG | HEART RATE: 80 BPM | RESPIRATION RATE: 18 BRPM | TEMPERATURE: 98 F

## 2024-10-31 DIAGNOSIS — E11.621 DIABETIC ULCER OF LEFT HEEL ASSOCIATED WITH TYPE 2 DIABETES MELLITUS, WITH FAT LAYER EXPOSED: Primary | ICD-10-CM

## 2024-10-31 DIAGNOSIS — L97.422 DIABETIC ULCER OF LEFT HEEL ASSOCIATED WITH TYPE 2 DIABETES MELLITUS, WITH FAT LAYER EXPOSED: Primary | ICD-10-CM

## 2024-10-31 PROCEDURE — 11042 DBRDMT SUBQ TIS 1ST 20SQCM/<: CPT | Mod: PBBFAC | Performed by: NURSE PRACTITIONER

## 2024-10-31 PROCEDURE — 99215 OFFICE O/P EST HI 40 MIN: CPT | Mod: PBBFAC | Performed by: NURSE PRACTITIONER

## 2024-10-31 PROCEDURE — 99499 UNLISTED E&M SERVICE: CPT | Mod: S$PBB,,, | Performed by: NURSE PRACTITIONER

## 2024-10-31 PROCEDURE — 99999 PR PBB SHADOW E&M-EST. PATIENT-LVL V: CPT | Mod: PBBFAC,,, | Performed by: NURSE PRACTITIONER

## 2024-10-31 NOTE — PROGRESS NOTES
Debridement Performed for Assessment: Wound# left lateral ankle  Performed By: Provider: Marybeth Vazquez NP  Assistant:  PUJA Santiago, RN    Debridement: Surgical    Photo taken post procedure:    Time-Out Taken: Yes  Level: Skin/Subcutaneous Tissue  Post Debridement Measurements  Length: (cm) 5.6  Width: (cm) 3.1  Depth: (cm)       Area: (cm²) 17.36  Percent Debrided: 100%  Total Area Debrided: (sq cm) 17.36    Tissue and other material debrided:  Adipose, Dermis, Epidermis, Subcutaneous  Devitalized Tissue Debrided:Biofilm, Slough, Fibrin  Instrument: Curette  Bleeding: Moderate  Hemostasis Achieved: Pressure  Procedural Pain: Insensate  Post Procedural Pain: Insensate  Response to Treatment: Procedure was tolerated well    Devitalized materials/tissue Removed  the following was removed during debridement  subcutaneous      Post Debridement Diagnosis  Post debridement diagnosis  Same as Pre-operative debridement diagnosis, No Complications noted.      Grafts or implants applied  Was a graft or implant applied?  No      Procedure assistant  Procedure assisted by:  Assistant is the same as nurse listed above      Complications related to procedure  Did any complication occure during procedure?  No complications noted during or after procedure.      Specimen  Specimen collect during procedure?  No specimen collected      Anaesthesia:  Anesthesia used  None      Blood Loss:  Blood loss during procedure  less than 5 cc

## 2024-11-05 NOTE — ASSESSMENT & PLAN NOTE
Apply Acetic Acid moistened gauze to wound bed for 5 minutes then clean with vashe or normal saline    Left heel:  Apply Calmoseptine to the periwound (outer edges)   Apply santyl (nickel thickness) to wound bed and cover with drawtex.   Cover and secure [id number], belen, and paper tape  Wrap with ACE from toes to knee  Change daily and PRN for soilage    Elevate legs when sitting  Avoid pressure on wound. Wear off-loading shoe  Diabetes:  Monitor glucose closely. Check fasting glucose and 2 hours after meals. HgA1C goal <7, fasting glucose , and 2 hours after meals <180  Hypertension:  Check blood pressure twice daily, goal <120/80  Diet:   Increase protein intake, avoid fried, fatty foods and foods high in simple carbs.   Vitamins:  Take vitamin C 1000 mg, zinc 50mg, vitamin d 5000 units, and a daily multivitamin. Lucho is a good source of protein and nutrients to aid in wound healing.   Monitor closely for s/s of infection including fever, chills, increase in pain, odor from wound, and increased redness from foot. Go to ER if any complications develop.

## 2024-11-05 NOTE — PROCEDURES
"Debridement    Date/Time: 10/31/2024 1:00 PM    Performed by: Marybeth Orellana FNP  Authorized by: Marybeth Orellana FNP    Time out: Immediately prior to procedure a "time out" was called to verify the correct patient, procedure, equipment, support staff and site/side marked as required.    Consent Done?:  Yes (Written)    Wound Details:    Location:  Left foot    Location:  Left Heel    Type of Debridement:  Excisional       Length (cm):  5.6       Area (sq cm):  17.36       Width (cm):  3.1       Percent Debrided (%):  100       Depth (cm):  1       Total Area Debrided (sq cm):  17.36    Depth of debridement:  Subcutaneous tissue    Tissue debrided:  Adipose, Dermis and Epidermis    Devitalized tissue debrided:  Biofilm, Callus, Clots, Exudate, Fibrin and Slough    Instruments:  Curette  Bleeding:  Moderate  Hemostasis Achieved: Yes  Method Used:  Pressure  Patient tolerance:  Patient tolerated the procedure well with no immediate complications  1st Wound Pain Assessment: 0     Assistant MEIR King RN    "

## 2024-11-06 NOTE — PROGRESS NOTES
CARISA Ayala   [facility]  [address] [facility] [address]  [phone]      PATIENT NAME: Silvana Sarah  : 1960  DATE: 24  MRN: [id number]      Billing Provider: CARISA Ayala  Level of Service:   Patient PCP Information       Provider PCP Type    CARISA Cárdenas General            Reason for Visit / Chief Complaint: Diabetic Foot Ulcer (Left heel)       History of Present Illness / Problem Focused Workflow     Silvana Sarah is a 63 yo female presents for follow up on chronic non healing wound to left heel. Wound bed with biofilm and slough, bedside debridement today.  Granulation tissue post debridement. Continue with santyl and drawtex. Antibiotics to pharmacy. Continue to keep pressure off wound. Bedrest with bathroom privileges.     8/15/24  62 yo female presents with complaints of ulcer to left heel and right great toe. Wound on left heel with with necrotic tissue and slough, bedside debridement today. Left foot is edematous and tender. Recent cultures reviewed. Ceftin to pharmacy. Santyl and vashe moistened drawtex to wound bed. Supplies ordered from [employer]. Wound on right great toe will moderate serous drainage. Aquacel ag applied. Pertinent PMH includes diabetes, hypertension, peripheral neuropathy, gastroparesis, and rheumatoid arthritis. Last HgA1C 7.2 2023, diabetes managed by PCP. Wound healing is complicated by multiple co-morbidities, poor vascular supply, immunosuppression, diabetes, edema, heavy drainage, decreased granulation tissue, necrosis, large surface area, large volume, advanced age, fragile skin, and infection.               Review of Systems     Review of Systems   Constitutional:  Positive for activity change. Negative for chills and fever.   Respiratory:  Negative for chest tightness and shortness of breath.    Cardiovascular:  Positive for leg swelling. Negative for chest pain  and palpitations.   Musculoskeletal:  Positive for arthralgias and joint swelling.   Skin:  Positive for color change and wound.        wound   Neurological:  Positive for weakness.   Psychiatric/Behavioral:  Negative for agitation, behavioral problems, confusion and self-injury.        Medical / Social / Family History     Past Medical History:   Diagnosis Date    Anxiety and depression 2021    Diabetes mellitus     Dry eye syndrome, bilateral 10/06/2017    Essential (primary) hypertension     Gastroparesis due to DM     Generalized osteoarthritis 10/25/2021    Other mechanical complication of implanted electronic neurostimulator, generator, sequela 10/25/2021    Peripheral autonomic neuropathy due to DM     Rheumatoid arthritis        Past Surgical History:   Procedure Laterality Date     SECTION      DEBRIDEMENT OF LOWER EXTREMITY Left 2024    Procedure: DEBRIDEMENT, LOWER EXTREMITY;  Surgeon: David Aguilera MD;  Location: [facility];  Service: General;  Laterality: Left;  Left foot    GASTRIC STIMULATOR IMPLANT SURGERY      HYSTERECTOMY      INCISION AND DRAINAGE OF ABSCESS Right 2022    Procedure: INCISION AND DRAINAGE, ABSCESS;  Surgeon: Kamari Sandoval DO;  Location: [facility];  Service: General;  Laterality: Right;  right hand dr sandoval am    IRRIGATION AND DEBRIDEMENT OF UPPER EXTREMITY Right 2022    Procedure: IRRIGATION AND DEBRIDEMENT, UPPER EXTREMITY;  Surgeon: Kamari Sandoval DO;  Location: [facility];  Service: General;  Laterality: Right;  right hand I/D dr sandoval today       Social History  Ms. Silvana Sarah  reports that she has never smoked. She has never used smokeless tobacco. She reports that she does not drink alcohol.    Family History  Ms.'s Silvana Sarah family history includes Cancer in her father; Diabetes in her son and son; Heart disease in her mother.    Medications and Allergies     Medications  Outpatient Medications Marked as Taking  for the 24 encounter (Office Visit) with Marybeth Orellana FNP   Medication Sig Dispense Refill    b complex vitamins capsule Take 1 capsule by mouth once daily.      collagenase (SANTYL) ointment Apply topically once daily. 30 g 1    cyanocobalamin 1,000 mcg/mL injection Inject 1,000 mcg into the muscle every 30 days.      esomeprazole (NEXIUM) 20 MG capsule Take 20 mg by mouth once daily.      etanercept (ENBREL MINI) 50 mg/mL (1 mL) Crtg 1 mL by abdominal subcutaneous route every .       ferrous sulfate (FEOSOL) Tab tablet Take 1 tablet by mouth once daily.      gabapentin (NEURONTIN) 600 MG tablet Take 2 tablets 3 times a day by oral route for 30 days.      hydrOXYchloroQUINE (PLAQUENIL) 200 mg tablet Take 200 mg by mouth 2 (two) times daily.       insulin regular 100 unit/mL Inj injection 3 (three) times daily before meals.      LEVEMIR U-100 INSULIN 100 unit/mL injection SMARTSI Unit(s) SUB-Q Daily      lisinopriL (PRINIVIL,ZESTRIL) 2.5 MG tablet Take 2.5 mg by mouth every evening.      lysine (L-LYSINE) 500 mg Tab Take 500 mg by mouth 2 (two) times a day.      nystatin (MYCOSTATIN) 100,000 unit/mL suspension Take 6 mLs by mouth 3 (three) times daily before meals.       oxyCODONE-acetaminophen (PERCOCET)  mg per tablet Take 1 tablet by mouth 4 (four) times daily.      predniSONE (DELTASONE) 5 MG tablet Take 5 mg by mouth once daily.       promethazine (PHENERGAN) 25 MG tablet Take 25 mg by mouth every 6 (six) hours as needed for Nausea.       triamterene-hydrochlorothiazide 37.5-25 mg (DYAZIDE) 37.5-25 mg per capsule Take 1 capsule by mouth once daily.       zinc gluconate 50 mg tablet Take 50 mg by mouth once daily.         Allergies  Review of patient's allergies indicates:   Allergen Reactions    Influenza virus vaccines     Penicillins        Physical Examination     Vitals:    24 0947   BP: 138/60   Pulse: 76   Resp: 18   Temp: 97.6 °F (36.4 °C)       Physical Exam  Vitals  and nursing note reviewed.   Constitutional:       Appearance: Normal appearance.   HENT:      Head: Normocephalic.   Cardiovascular:      Rate and Rhythm: Normal rate and regular rhythm.      Pulses: Normal pulses.      Heart sounds: Normal heart sounds.   Pulmonary:      Effort: Pulmonary effort is normal. No respiratory distress.   Chest:      Chest wall: No tenderness.   Musculoskeletal:         General: Swelling and tenderness present.      Right lower leg: Edema present.      Left lower leg: Edema present.   Skin:     General: Skin is warm and dry.      Findings: Erythema present.      Comments: See LDA for photos/measurements   Neurological:      General: No focal deficit present.      Mental Status: She is alert and oriented to person, place, and time. Mental status is at baseline.   Psychiatric:         Mood and Affect: Mood normal.         Behavior: Behavior normal.         Thought Content: Thought content normal.         Judgment: Judgment normal.       Assessment and Plan             Wound 08/15/24 Diabetic Ulcer Left posterior Heel #1 (Active)   08/15/24  Heel   Present on Original Admission: Y   Primary Wound Type: Diabetic ulc   Side: Left   Orientation: posterior   Wound Approximate Age at First Assessment (Weeks): 9 weeks   Wound Number: #1   Is this injury device related?:    Incision Type:    Closure Method:    Wound Description (Comments):    Type:    Additional Comments:    Ankle-Brachial Index:    Pulses:    Removal Indication and Assessment:    Wound Outcome:    Wound Image   11/11/24 1006            Wound 09/05/24 1000 Diabetic Ulcer Left lateral Ankle (Active)   09/05/24 1000 Ankle   Present on Original Admission: Y   Primary Wound Type: Diabetic ulc   Side: Left   Orientation: lateral   Wound Approximate Age at First Assessment (Weeks): 1 weeks   Wound Number:    Is this injury device related?:    Incision Type:    Closure Method:    Wound Description (Comments):    Type:    Additional  Comments:    Ankle-Brachial Index:    Pulses:    Removal Indication and Assessment:    Wound Outcome:    Wound Image   11/11/24 0944   Dressing Appearance Open to air 11/11/24 0944   Drainage Amount None 11/11/24 0944   Appearance Pink;Epithelialization 11/11/24 0944   Tissue loss description Full thickness 11/11/24 0944   Black (%), Wound Tissue Color 0 % 11/11/24 0944   Red (%), Wound Tissue Color 0 % 11/11/24 0944   Yellow (%), Wound Tissue Color 0 % 11/11/24 0944   Periwound Area Dry;[place] 11/11/24 0944   Wound Edges Approximated 11/11/24 0944   Wound Length (cm) 0 cm 11/11/24 0944   Wound Width (cm) 0 cm 11/11/24 0944   Wound Depth (cm) 0 cm 11/11/24 0944   Wound Volume (cm^3) 0 cm^3 11/11/24 0944   Wound Surface Area (cm^2) 0 cm^2 11/11/24 0944   Care Cleansed with:;Antimicrobial agent 11/11/24 0944            Wound 09/27/24 Incision Left Heel (Active)   09/27/24  Heel   Present on Original Admission: Y   Primary Wound Type: Incision   Side: Left   Orientation:    Wound Approximate Age at First Assessment (Weeks):    Wound Number:    Is this injury device related?:    Incision Type:    Closure Method: Sutures   Wound Description (Comments):    Type:    Additional Comments:    Ankle-Brachial Index:    Pulses:    Removal Indication and Assessment:    Wound Outcome:    Wound Image    11/11/24 1006   Dressing Appearance Intact;Moist drainage 11/11/24 0944   Drainage Amount Moderate 11/11/24 0944   Drainage Characteristics/Odor Yellow 11/11/24 0944   Appearance Pink;Red;Moist;Granulating;Yellow;Slough;Fibrin;Adipose;Muscle 11/11/24 0944   Periwound Area Moist;[place] 11/11/24 0944   Care Cleansed with:;Antimicrobial agent 11/11/24 0944   Dressing Applied;Gauze, wet to moist;Gauze;Rolled gauze;Compression wrap 11/11/24 0944       Problem List Items Addressed This Visit          Endocrine    Diabetic ulcer of left heel associated with type 2 diabetes mellitus, with fat layer exposed - Primary    Overview                                   Current Assessment & Plan     Apply Acetic Acid moistened gauze to wound bed for 5 minutes then clean with vashe or normal saline    Left heel:  Apply Calmoseptine to the periwound (outer edges)   Apply santyl (nickel thickness) to wound bed and cover with drawtex.   Cover and secure [id number], belen, and paper tape  Wrap with ACE from toes to knee  Change daily and PRN for soilage    Elevate legs when sitting  Avoid pressure on wound. Wear off-loading shoe  Diabetes:  Monitor glucose closely. Check fasting glucose and 2 hours after meals. HgA1C goal <7, fasting glucose , and 2 hours after meals <180  Hypertension:  Check blood pressure twice daily, goal <120/80  Diet:   Increase protein intake, avoid fried, fatty foods and foods high in simple carbs.   Vitamins:  Take vitamin C 1000 mg, zinc 50mg, vitamin d 5000 units, and a daily multivitamin. Lucho is a good source of protein and nutrients to aid in wound healing.   Monitor closely for s/s of infection including fever, chills, increase in pain, odor from wound, and increased redness from foot. Go to ER if any complications develop.             Future Appointments   Date Time Provider Department Center   11/25/2024  9:00 AM Marybeth Orellana FNP [facility]            Signature:  CARISA Ayala  [facility]  [address] [facility] [place] [address]  [phone]    Date of encounter: 11/11/24

## 2024-11-06 NOTE — PATIENT INSTRUCTIONS
Apply Acetic Acid moistened gauze to wound bed for 5 minutes then clean with vashe or normal saline    Left heel:  Apply Calmoseptine to the periwound (outer edges)   Apply santyl (nickel thickness) to wound bed and cover with drawtex.   Cover and secure [id number], belen, and paper tape  Wrap with ACE from toes to knee  Change daily and PRN for soilage    Elevate legs when sitting  Avoid pressure on wound. Wear off-loading shoe  Diabetes:  Monitor glucose closely. Check fasting glucose and 2 hours after meals. HgA1C goal <7, fasting glucose , and 2 hours after meals <180  Hypertension:  Check blood pressure twice daily, goal <120/80  Diet:   Increase protein intake, avoid fried, fatty foods and foods high in simple carbs.   Vitamins:  Take vitamin C 1000 mg, zinc 50mg, vitamin d 5000 units, and a daily multivitamin. Lucho is a good source of protein and nutrients to aid in wound healing.   Monitor closely for s/s of infection including fever, chills, increase in pain, odor from wound, and increased redness from foot. Go to ER if any complications develop.

## 2024-11-11 ENCOUNTER — OFFICE VISIT (OUTPATIENT)
Dept: WOUND CARE | Facility: CLINIC | Age: 64
End: 2024-11-11
Attending: FAMILY MEDICINE
Payer: COMMERCIAL

## 2024-11-11 VITALS
SYSTOLIC BLOOD PRESSURE: 138 MMHG | TEMPERATURE: 98 F | RESPIRATION RATE: 18 BRPM | DIASTOLIC BLOOD PRESSURE: 60 MMHG | HEART RATE: 76 BPM

## 2024-11-11 DIAGNOSIS — L97.422 DIABETIC ULCER OF LEFT HEEL ASSOCIATED WITH TYPE 2 DIABETES MELLITUS, WITH FAT LAYER EXPOSED: Primary | ICD-10-CM

## 2024-11-11 DIAGNOSIS — E11.621 DIABETIC ULCER OF LEFT HEEL ASSOCIATED WITH TYPE 2 DIABETES MELLITUS, WITH FAT LAYER EXPOSED: Primary | ICD-10-CM

## 2024-11-11 PROCEDURE — 99499 UNLISTED E&M SERVICE: CPT | Mod: S$PBB,,, | Performed by: NURSE PRACTITIONER

## 2024-11-11 PROCEDURE — 11042 DBRDMT SUBQ TIS 1ST 20SQCM/<: CPT | Mod: PBBFAC | Performed by: NURSE PRACTITIONER

## 2024-11-11 PROCEDURE — 99215 OFFICE O/P EST HI 40 MIN: CPT | Mod: PBBFAC | Performed by: NURSE PRACTITIONER

## 2024-11-11 PROCEDURE — 99999 PR PBB SHADOW E&M-EST. PATIENT-LVL V: CPT | Mod: PBBFAC,,, | Performed by: NURSE PRACTITIONER

## 2024-11-11 RX ORDER — CEFDINIR 300 MG/1
300 CAPSULE ORAL 2 TIMES DAILY
Qty: 28 CAPSULE | Refills: 0 | Status: SHIPPED | OUTPATIENT
Start: 2024-11-11 | End: 2024-11-25

## 2024-11-11 RX ORDER — METRONIDAZOLE 500 MG/1
500 TABLET ORAL EVERY 12 HOURS
Qty: 28 TABLET | Refills: 0 | Status: SHIPPED | OUTPATIENT
Start: 2024-11-11 | End: 2024-11-25

## 2024-11-11 NOTE — PROCEDURES
"Debridement    Date/Time: 11/11/2024 9:00 AM    Performed by: Marybeth Orellana FNP  Authorized by: Marybeth Orellana FNP    Time out: Immediately prior to procedure a "time out" was called to verify the correct patient, procedure, equipment, support staff and site/side marked as required.    Consent Done?:  Yes (Written)    Wound Details:    Location:  Left foot    Location:  Left Heel    Type of Debridement:  Excisional       Length (cm):  5.7       Area (sq cm):  19.95       Width (cm):  3.5       Percent Debrided (%):  100       Depth (cm):  0.5       Total Area Debrided (sq cm):  19.95    Depth of debridement:  Subcutaneous tissue    Tissue debrided:  Adipose, Dermis, Epidermis and Subcutaneous    Devitalized tissue debrided:  Callus, Biofilm, Clots, Exudate, Fibrin and Slough    Instruments:  Scissors  Bleeding:  Moderate  Hemostasis Achieved: Yes  Method Used:  Pressure  Patient tolerance:  Patient tolerated the procedure well with no immediate complications  1st Wound Pain Assessment: 0     Assistant LEILA Coelho RN    "

## 2024-11-11 NOTE — ASSESSMENT & PLAN NOTE
Apply Acetic Acid moistened gauze to wound bed for 5 minutes then clean with vashe or normal saline    Left heel:  Apply Calmoseptine to the periwound (outer edges)   Apply santyl (nickel thickness) to wound bed and cover with drawtex.   Cover and secure [id number], belen, and paper tape  Wrap with ACE from toes to knee  Change daily and PRN for soilage    Elevate legs when sitting  Avoid pressure on wound. Wear off-loading shoe  Diabetes:  Monitor glucose closely. Check fasting glucose and 2 hours after meals. HgA1C goal <7, fasting glucose , and 2 hours after meals <180  Hypertension:  Check blood pressure twice daily, goal <120/80  Diet:   Increase protein intake, avoid fried, fatty foods and foods high in simple carbs.   Vitamins:  Take vitamin C 1000 mg, zinc 50mg, vitamin d 5000 units, and a daily multivitamin. Lucho is a good source of protein and nutrients to aid in wound healing.   Monitor closely for s/s of infection including fever, chills, increase in pain, odor from wound, and increased redness from foot. Go to ER if any complications develop.

## 2024-11-14 NOTE — PROGRESS NOTES
CARISA Ayala   [facility]  [address] [facility] [place] [address]  [phone]      PATIENT NAME: Silvana Sarah  : 1960  DATE: 24  MRN: [id number]      Billing Provider: CARISA Ayala  Level of Service:   Patient PCP Information       Provider PCP Type    CARISA Cárdenas General            Reason for Visit / Chief Complaint: Diabetic Foot Ulcer and Wound Check (Left heel)       History of Present Illness / Problem Focused Workflow     Silvana Sarah is a 63 yo female presents for follow up on chronic non healing wound to left heel. Wound continues to have biofilm and slough, bedside debridement today. Undermining to inferior aspect of wound. Aquacel ag and two layer compression applied today. NPWT ordered from [facility], NeurOp to apply when available.     8/15/24  62 yo female presents with complaints of ulcer to left heel and right great toe. Wound on left heel with with necrotic tissue and slough, bedside debridement today. Left foot is edematous and tender. Recent cultures reviewed. Ceftin to pharmacy. Santyl and vashe moistened drawtex to wound bed. Supplies ordered from [employer] Wound on right great toe will moderate serous drainage. Aquacel ag applied. Pertinent PMH includes diabetes, hypertension, peripheral neuropathy, gastroparesis, and rheumatoid arthritis. Last HgA1C 7.2 2023, diabetes managed by PCP. Wound healing is complicated by multiple co-morbidities, poor vascular supply, immunosuppression, diabetes, edema, heavy drainage, decreased granulation tissue, necrosis, large surface area, large volume, advanced age, fragile skin, and infection.             Review of Systems     Review of Systems   Constitutional:  Positive for activity change. Negative for chills and fever.   Respiratory:  Negative for chest tightness and shortness of breath.    Cardiovascular:  Positive for leg swelling. Negative for  chest pain and palpitations.   Musculoskeletal:  Positive for arthralgias and joint swelling.   Skin:  Positive for color change and wound.        wound   Neurological:  Positive for weakness.   Psychiatric/Behavioral:  Negative for agitation, behavioral problems, confusion and self-injury.        Medical / Social / Family History     Past Medical History:   Diagnosis Date    Anxiety and depression 2021    Diabetes mellitus     Dry eye syndrome, bilateral 10/06/2017    Essential (primary) hypertension     Gastroparesis due to DM     Generalized osteoarthritis 10/25/2021    Other mechanical complication of implanted electronic neurostimulator, generator, sequela 10/25/2021    Peripheral autonomic neuropathy due to DM     Rheumatoid arthritis        Past Surgical History:   Procedure Laterality Date     SECTION      DEBRIDEMENT OF LOWER EXTREMITY Left 2024    Procedure: DEBRIDEMENT, LOWER EXTREMITY;  Surgeon: David Aguilera MD;  Location: [facility];  Service: General;  Laterality: Left;  Left foot    GASTRIC STIMULATOR IMPLANT SURGERY      HYSTERECTOMY      INCISION AND DRAINAGE OF ABSCESS Right 2022    Procedure: INCISION AND DRAINAGE, ABSCESS;  Surgeon: Kamari Sandoval DO;  Location: [facility];  Service: General;  Laterality: Right;  right hand dr sandoval am    IRRIGATION AND DEBRIDEMENT OF UPPER EXTREMITY Right 2022    Procedure: IRRIGATION AND DEBRIDEMENT, UPPER EXTREMITY;  Surgeon: Kamari Sandoval DO;  Location: [facility];  Service: General;  Laterality: Right;  right hand I/D dr sandoval today       Social History  Ms. Silvana Sarah  reports that she has never smoked. She has never used smokeless tobacco. She reports that she does not drink alcohol.    Family History  Ms.'s Silvana Sarah family history includes Cancer in her father; Diabetes in her son and son; Heart disease in her mother.    Medications and Allergies     Medications  Outpatient  Medications Marked as Taking for the 24 encounter (Office Visit) with Marybeth Orellana FNP   Medication Sig Dispense Refill    b complex vitamins capsule Take 1 capsule by mouth once daily.      cefdinir (OMNICEF) 300 MG capsule Take 1 capsule (300 mg total) by mouth 2 (two) times daily. for 14 days 28 capsule 0    collagenase (SANTYL) ointment Apply topically once daily. 30 g 1    cyanocobalamin 1,000 mcg/mL injection Inject 1,000 mcg into the muscle every 30 days.      esomeprazole (NEXIUM) 20 MG capsule Take 20 mg by mouth once daily.      etanercept (ENBREL MINI) 50 mg/mL (1 mL) Crtg 1 mL by abdominal subcutaneous route every .       ferrous sulfate (FEOSOL) Tab tablet Take 1 tablet by mouth once daily.      gabapentin (NEURONTIN) 600 MG tablet Take 2 tablets 3 times a day by oral route for 30 days.      hydrOXYchloroQUINE (PLAQUENIL) 200 mg tablet Take 200 mg by mouth 2 (two) times daily.       insulin regular 100 unit/mL Inj injection 3 (three) times daily before meals.      LEVEMIR U-100 INSULIN 100 unit/mL injection SMARTSI Unit(s) SUB-Q Daily      lisinopriL (PRINIVIL,ZESTRIL) 2.5 MG tablet Take 2.5 mg by mouth every evening.      lysine (L-LYSINE) 500 mg Tab Take 500 mg by mouth 2 (two) times a day.      metroNIDAZOLE (FLAGYL) 500 MG tablet Take 1 tablet (500 mg total) by mouth every 12 (twelve) hours. for 14 days 28 tablet 0    nystatin (MYCOSTATIN) 100,000 unit/mL suspension Take 6 mLs by mouth 3 (three) times daily before meals.       oxyCODONE-acetaminophen (PERCOCET)  mg per tablet Take 1 tablet by mouth 4 (four) times daily.      predniSONE (DELTASONE) 5 MG tablet Take 5 mg by mouth once daily.       promethazine (PHENERGAN) 25 MG tablet Take 25 mg by mouth every 6 (six) hours as needed for Nausea.       triamterene-hydrochlorothiazide 37.5-25 mg (DYAZIDE) 37.5-25 mg per capsule Take 1 capsule by mouth once daily.       zinc gluconate 50 mg tablet Take 50  mg by mouth once daily.         Allergies  Review of patient's allergies indicates:   Allergen Reactions    Influenza virus vaccines     Penicillins        Physical Examination     Vitals:    11/25/24 0912   BP: 104/63   Pulse: 102   Resp: 19   Temp: 98.2 °F (36.8 °C)       Physical Exam  Vitals and nursing note reviewed.   Constitutional:       Appearance: Normal appearance.   HENT:      Head: Normocephalic.   Cardiovascular:      Rate and Rhythm: Normal rate and regular rhythm.      Pulses: Normal pulses.      Heart sounds: Normal heart sounds.   Pulmonary:      Effort: Pulmonary effort is normal. No respiratory distress.   Chest:      Chest wall: No tenderness.   Musculoskeletal:         General: Swelling and tenderness present.      Right lower leg: Edema present.      Left lower leg: Edema present.   Skin:     General: Skin is warm and dry.      Findings: Erythema present.      Comments: See LDA for photos/measurements   Neurological:      General: No focal deficit present.      Mental Status: She is alert and oriented to person, place, and time. Mental status is at baseline.   Psychiatric:         Mood and Affect: Mood normal.         Behavior: Behavior normal.         Thought Content: Thought content normal.         Judgment: Judgment normal.     Assessment and Plan             Wound 09/05/24 1000 Diabetic Ulcer Left lateral Ankle (Active)   09/05/24 1000 Ankle   Present on Original Admission: Y   Primary Wound Type: Diabetic ulc   Side: Left   Orientation: lateral   Wound Approximate Age at First Assessment (Weeks): 1 weeks   Wound Number:    Is this injury device related?:    Incision Type:    Closure Method:    Wound Description (Comments):    Type:    Additional Comments:    Ankle-Brachial Index:    Pulses:    Removal Indication and Assessment:    Wound Outcome:    Wound Image   11/25/24 0959   Dressing Appearance Intact;Moist drainage 11/25/24 0921   Drainage Amount Moderate 11/25/24 0921   Drainage  Characteristics/Odor Green;Yellow 11/25/24 0921   Appearance Red;Yellow;Slough;Moist;Granulating 11/25/24 0921   Tissue loss description Full thickness 11/25/24 0921   Black (%), Wound Tissue Color 0 % 11/25/24 0921   Red (%), Wound Tissue Color 20 % 11/25/24 0921   Yellow (%), Wound Tissue Color 80 % 11/25/24 0921   Periwound Area Intact;Moist 11/25/24 0921   Wound Edges Irregular 11/25/24 0921   Paniagua Classification (diabetic foot ulcers only) Grade 1 11/25/24 0921   Wound Length (cm) 4.5 cm 11/25/24 0921   Wound Width (cm) 1.5 cm 11/25/24 0921   Wound Depth (cm) 1.2 cm 11/25/24 0921   Wound Volume (cm^3) 8.1 cm^3 11/25/24 0921   Wound Surface Area (cm^2) 6.75 cm^2 11/25/24 0921   Tunneling (depth (cm)/location) 1cm/7 oclock 11/25/24 0921   Care Cleansed with:;Antimicrobial agent 11/25/24 0921   Dressing Applied;Silver;Absorptive Pad;Compression wrap 11/25/24 0921   Compression Two layer compression 11/25/24 0921       Problem List Items Addressed This Visit          Endocrine    Diabetic ulcer of left heel associated with type 2 diabetes mellitus, with fat layer exposed - Primary    Overview                                    Current Assessment & Plan     Clean wound with Vashe and pat dry.   Wound care until wound vac arrives and is applied: Apply Aquacel Ag to wound bed, cover with 4x4 gauze and rolled gauze. Wrap with 2 layer compression from toes to knee. Change twice per week and PRN for soilage. Home health to perform wound care.    Home health to apply wound vac at change twice per week. Apply wound vac at 125mmHg.        Elevate legs when sitting  Avoid pressure on wound.   Diabetes:  Monitor glucose closely. Check fasting glucose and 2 hours after meals. HgA1C goal <7, fasting glucose , and 2 hours after meals <180  Hypertension:  Check blood pressure twice daily, goal <120/80  Diet:   Increase protein intake, avoid fried, fatty foods and foods high in simple carbs.   Vitamins:  Take vitamin C  1000 mg, zinc 50mg, vitamin d 5000 units, and a daily multivitamin. Lucho is a good source of protein and nutrients to aid in wound healing.   Monitor closely for s/s of infection including fever, chills, increase in pain, odor from wound, and increased redness from foot. Go to ER if any complications develop.             Future Appointments   Date Time Provider Department Center   12/9/2024 10:00 AM Marybeth Orellana FNP [facility]            Signature:  CARISA Ayala  [facility]  [address] [facility] [place] [address]  [phone]    Date of encounter: 11/25/24

## 2024-11-14 NOTE — PATIENT INSTRUCTIONS
Clean wound with Vashe and pat dry.   Wound care until wound vac arrives and is applied: Apply Aquacel Ag to wound bed, cover with 4x4 gauze and rolled gauze. Wrap with 2 layer compression from toes to knee. Change twice per week and PRN for soilage. Home health to perform wound care.    Home health to apply wound vac at change twice per week. Apply wound vac at 125mmHg.        Elevate legs when sitting  Avoid pressure on wound.   Diabetes:  Monitor glucose closely. Check fasting glucose and 2 hours after meals. HgA1C goal <7, fasting glucose , and 2 hours after meals <180  Hypertension:  Check blood pressure twice daily, goal <120/80  Diet:   Increase protein intake, avoid fried, fatty foods and foods high in simple carbs.   Vitamins:  Take vitamin C 1000 mg, zinc 50mg, vitamin d 5000 units, and a daily multivitamin. Lucho is a good source of protein and nutrients to aid in wound healing.   Monitor closely for s/s of infection including fever, chills, increase in pain, odor from wound, and increased redness from foot. Go to ER if any complications develop.

## 2024-11-25 ENCOUNTER — OFFICE VISIT (OUTPATIENT)
Dept: WOUND CARE | Facility: CLINIC | Age: 64
End: 2024-11-25
Attending: FAMILY MEDICINE
Payer: COMMERCIAL

## 2024-11-25 VITALS
HEART RATE: 102 BPM | DIASTOLIC BLOOD PRESSURE: 63 MMHG | SYSTOLIC BLOOD PRESSURE: 104 MMHG | TEMPERATURE: 98 F | RESPIRATION RATE: 19 BRPM

## 2024-11-25 DIAGNOSIS — L97.422 DIABETIC ULCER OF LEFT HEEL ASSOCIATED WITH TYPE 2 DIABETES MELLITUS, WITH FAT LAYER EXPOSED: Primary | ICD-10-CM

## 2024-11-25 DIAGNOSIS — E11.621 DIABETIC ULCER OF LEFT HEEL ASSOCIATED WITH TYPE 2 DIABETES MELLITUS, WITH FAT LAYER EXPOSED: Primary | ICD-10-CM

## 2024-11-25 PROCEDURE — 99215 OFFICE O/P EST HI 40 MIN: CPT | Mod: PBBFAC | Performed by: NURSE PRACTITIONER

## 2024-11-25 PROCEDURE — 99999 PR PBB SHADOW E&M-EST. PATIENT-LVL V: CPT | Mod: PBBFAC,,, | Performed by: NURSE PRACTITIONER

## 2024-11-25 PROCEDURE — 11042 DBRDMT SUBQ TIS 1ST 20SQCM/<: CPT | Mod: PBBFAC | Performed by: NURSE PRACTITIONER

## 2024-11-25 PROCEDURE — 99499 UNLISTED E&M SERVICE: CPT | Mod: S$PBB,,, | Performed by: NURSE PRACTITIONER

## 2024-11-25 NOTE — PROGRESS NOTES
Debridement Performed for Assessment: Wound# left lateral ankle  Performed By: Provider: Marybeth Vazquez NP  Assistant: PUJA Santiago, RN    Debridement: Surgical    Photo taken post procedure:    Time-Out Taken: Yes  Level: Skin/Subcutaneous Tissue  Post Debridement Measurements  Length: (cm) 4.6  Width: (cm) 1.6  Depth: (cm)       Area: (cm²) 7.36  Percent Debrided: 100%  Total Area Debrided: (sq cm) 7.36    Tissue and other material debrided:  Adipose, Dermis, Epidermis, Subcutaneous  Devitalized Tissue Debrided:Biofilm, Slough, Fibrin  Instrument: Curette  Bleeding: Minimal  Hemostasis Achieved: Pressure  Procedural Pain: Insensate  Post Procedural Pain: Insensate  Response to Treatment: Procedure was tolerated well    Devitalized materials/tissue Removed  the following was removed during debridement  subcutaneous      Post Debridement Diagnosis  Post debridement diagnosis  Same as Pre-operative debridement diagnosis, No Complications noted.      Grafts or implants applied  Was a graft or implant applied?  No      Procedure assistant  Procedure assisted by:  Assistant is the same as nurse listed above      Complications related to procedure  Did any complication occure during procedure?  No complications noted during or after procedure.      Specimen  Specimen collect during procedure?  No specimen collected      Anaesthesia:  Anesthesia used  None      Blood Loss:  Blood loss during procedure  less than 5 cc

## 2024-11-26 ENCOUNTER — TELEPHONE (OUTPATIENT)
Dept: WOUND CARE | Facility: CLINIC | Age: 64
End: 2024-11-26
Payer: COMMERCIAL

## 2024-11-26 NOTE — TELEPHONE ENCOUNTER
Patient called r/t 2 layer compression was causing pain and she removed it and applied an ace wrap from toes up to below knee. Pt inquiring if she needs to have 2 layer reapplied or will ace wrap be good. Marybeth Orellana FNP states pt is ok wear ace wrap until home health can reapply the 2 layer compression. Pt notified and verbalizes understanding.

## 2024-12-05 NOTE — PROGRESS NOTES
CARISA Ayala   [facility]  [address] [facility] [place] [address]  [phone]      PATIENT NAME: Silvana Sarah  : 1960  DATE: 24  MRN: [id number]      Billing Provider: CARISA Ayala  Level of Service:   Patient PCP Information       Provider PCP Type    CARISA Cárdenas General            Reason for Visit / Chief Complaint: Diabetic Foot Ulcer and Wound Check (Left heel)       History of Present Illness / Problem Focused Workflow     Silvana Sarah is a 63 yo female presents for follow up on chronic non healing wound to left heel. Wound has improved since starting NPWT. Continue with current plan of care.     8/15/24  64 yo female presents with complaints of ulcer to left heel and right great toe. Wound on left heel with with necrotic tissue and slough, bedside debridement today. Left foot is edematous and tender. Recent cultures reviewed. Ceftin to pharmacy. Santyl and vashe moistened drawtex to wound bed. Supplies ordered from [facility]. Wound on right great toe will moderate serous drainage. Aquacel ag applied. Pertinent PMH includes diabetes, hypertension, peripheral neuropathy, gastroparesis, and rheumatoid arthritis. Last HgA1C 7.2 2023, diabetes managed by PCP. Wound healing is complicated by multiple co-morbidities, poor vascular supply, immunosuppression, diabetes, edema, heavy drainage, decreased granulation tissue, necrosis, large surface area, large volume, advanced age, fragile skin, and infection.             Review of Systems     Review of Systems   Constitutional:  Positive for activity change. Negative for chills and fever.   Respiratory:  Negative for chest tightness and shortness of breath.    Cardiovascular:  Positive for leg swelling. Negative for chest pain and palpitations.   Musculoskeletal:  Positive for arthralgias and joint swelling.   Skin:  Positive for color change and wound.        wound    Neurological:  Positive for weakness.   Psychiatric/Behavioral:  Negative for agitation, behavioral problems, confusion and self-injury.        Medical / Social / Family History     Past Medical History:   Diagnosis Date    Anxiety and depression 2021    Diabetes mellitus     Dry eye syndrome, bilateral 10/06/2017    Essential (primary) hypertension     Gastroparesis due to DM     Generalized osteoarthritis 10/25/2021    Other mechanical complication of implanted electronic neurostimulator, generator, sequela 10/25/2021    Peripheral autonomic neuropathy due to DM     Rheumatoid arthritis        Past Surgical History:   Procedure Laterality Date     SECTION      DEBRIDEMENT OF LOWER EXTREMITY Left 2024    Procedure: DEBRIDEMENT, LOWER EXTREMITY;  Surgeon: David Aguilera MD;  Location: [facility] OR;  Service: General;  Laterality: Left;  Left foot    GASTRIC STIMULATOR IMPLANT SURGERY      HYSTERECTOMY      INCISION AND DRAINAGE OF ABSCESS Right 2022    Procedure: INCISION AND DRAINAGE, ABSCESS;  Surgeon: Kamari Gamble DO;  Location: [facility] OR;  Service: General;  Laterality: Right;  right hand dr gamble am    IRRIGATION AND DEBRIDEMENT OF UPPER EXTREMITY Right 2022    Procedure: IRRIGATION AND DEBRIDEMENT, UPPER EXTREMITY;  Surgeon: Kamari Gamble DO;  Location: [facility] OR;  Service: General;  Laterality: Right;  right hand I/D dr gamble today       Social History  Ms. Silvana Sarah  reports that she has never smoked. She has never used smokeless tobacco. She reports that she does not drink alcohol.    Family History  Ms.'s Silvana Sarah family history includes Cancer in her father; Diabetes in her son and son; Heart disease in her mother.    Medications and Allergies     Medications  Outpatient Medications Marked as Taking for the 24 encounter (Office Visit) with Marybeth Orellana FNP   Medication Sig Dispense Refill    b complex vitamins  capsule Take 1 capsule by mouth once daily.      cyanocobalamin 1,000 mcg/mL injection Inject 1,000 mcg into the muscle every 30 days.      esomeprazole (NEXIUM) 20 MG capsule Take 20 mg by mouth once daily.      etanercept (ENBREL MINI) 50 mg/mL (1 mL) Crtg 1 mL by abdominal subcutaneous route every .       ferrous sulfate (FEOSOL) Tab tablet Take 1 tablet by mouth once daily.      gabapentin (NEURONTIN) 600 MG tablet Take 2 tablets 3 times a day by oral route for 30 days.      hydrOXYchloroQUINE (PLAQUENIL) 200 mg tablet Take 200 mg by mouth 2 (two) times daily.       insulin regular 100 unit/mL Inj injection 3 (three) times daily before meals.      LEVEMIR U-100 INSULIN 100 unit/mL injection SMARTSI Unit(s) SUB-Q Daily      lisinopriL (PRINIVIL,ZESTRIL) 2.5 MG tablet Take 2.5 mg by mouth every evening.      lysine (L-LYSINE) 500 mg Tab Take 500 mg by mouth 2 (two) times a day.      nystatin (MYCOSTATIN) 100,000 unit/mL suspension Take 6 mLs by mouth 3 (three) times daily before meals.       oxyCODONE-acetaminophen (PERCOCET)  mg per tablet Take 1 tablet by mouth 4 (four) times daily.      predniSONE (DELTASONE) 5 MG tablet Take 5 mg by mouth once daily.       promethazine (PHENERGAN) 25 MG tablet Take 25 mg by mouth every 6 (six) hours as needed for Nausea.       triamterene-hydrochlorothiazide 37.5-25 mg (DYAZIDE) 37.5-25 mg per capsule Take 1 capsule by mouth once daily.       zinc gluconate 50 mg tablet Take 50 mg by mouth once daily.         Allergies  Review of patient's allergies indicates:   Allergen Reactions    Influenza virus vaccines     Penicillins        Physical Examination     Vitals:    24 1004   BP: 127/85   Pulse: 90   Resp: 19   Temp: 98 °F (36.7 °C)         Physical Exam  Vitals and nursing note reviewed.   Constitutional:       Appearance: Normal appearance.   HENT:      Head: Normocephalic.   Cardiovascular:      Rate and Rhythm: Normal rate and  regular rhythm.      Pulses: Normal pulses.      Heart sounds: Normal heart sounds.   Pulmonary:      Effort: Pulmonary effort is normal. No respiratory distress.   Chest:      Chest wall: No tenderness.   Musculoskeletal:         General: Swelling and tenderness present.      Right lower leg: Edema present.      Left lower leg: Edema present.   Skin:     General: Skin is warm and dry.      Findings: Erythema present.      Comments: See LDA for photos/measurements   Neurological:      General: No focal deficit present.      Mental Status: She is alert and oriented to person, place, and time. Mental status is at baseline.   Psychiatric:         Mood and Affect: Mood normal.         Behavior: Behavior normal.         Thought Content: Thought content normal.         Judgment: Judgment normal.     Assessment and Plan             Wound 09/05/24 1000 Diabetic Ulcer Left lateral Ankle (Active)   09/05/24 1000 Ankle   Present on Original Admission: Y   Primary Wound Type: Diabetic ulc   Side: Left   Orientation: lateral   Wound Approximate Age at First Assessment (Weeks): 1 weeks   Wound Number:    Is this injury device related?:    Incision Type:    Closure Method:    Wound Description (Comments):    Type:    Additional Comments:    Ankle-Brachial Index:    Pulses:    Removal Indication and Assessment:    Wound Outcome:    Wound Image    12/09/24 1027   Dressing Appearance Intact;Moist drainage 12/09/24 1027   Drainage Amount Moderate 12/09/24 1027   Drainage Characteristics/Odor Serosanguineous 12/09/24 1027   Appearance Red;Yellow;Slough;Moist;Granulating 12/09/24 1027   Tissue loss description Full thickness 12/09/24 1027   Black (%), Wound Tissue Color 0 % 12/09/24 1027   Red (%), Wound Tissue Color 95 % 12/09/24 1027   Yellow (%), Wound Tissue Color 5 % 12/09/24 1027   Periwound Area Intact;Moist 12/09/24 1027   Wound Edges Defined 12/09/24 1027   Paniagua Classification (diabetic foot ulcers only) Grade 1 12/09/24 1027    Wound Length (cm) 2.1 cm 12/09/24 1027   Wound Width (cm) 3 cm 12/09/24 1027   Wound Depth (cm) 0.5 cm 12/09/24 1027   Wound Volume (cm^3) 3.15 cm^3 12/09/24 1027   Wound Surface Area (cm^2) 6.3 cm^2 12/09/24 1027   Care Cleansed with:;Antimicrobial agent 12/09/24 1027   Dressing Applied;Other (comment) 12/09/24 1027         Problem List Items Addressed This Visit          Endocrine    Diabetic ulcer of left heel associated with type 2 diabetes mellitus, with fat layer exposed - Primary    Overview                                      Current Assessment & Plan     Clean wound with Vashe and pat dry. Apply Easkins ring to periwound. Apply wound vac at 125mmHg. Home health to change wound vac twice per week.     Elevate legs when sitting  Avoid pressure on wound.   Diabetes:  Monitor glucose closely. Check fasting glucose and 2 hours after meals. HgA1C goal <7, fasting glucose , and 2 hours after meals <180  Hypertension:  Check blood pressure twice daily, goal <120/80  Diet:   Increase protein intake, avoid fried, fatty foods and foods high in simple carbs.   Vitamins:  Take vitamin C 1000 mg, zinc 50mg, vitamin d 5000 units, and a daily multivitamin. Lucho is a good source of protein and nutrients to aid in wound healing.   Monitor closely for s/s of infection including fever, chills, increase in pain, odor from wound, and increased redness from foot. Go to ER if any complications develop.             Future Appointments   Date Time Provider Department Center   12/23/2024 10:00 AM Marybeth Orellana FNP [facility]            Signature:  CARISA Ayala  [facility]  [address] [facility] [place] [address]  [phone]    Date of encounter: 12/9/24

## 2024-12-05 NOTE — PATIENT INSTRUCTIONS
Clean wound with Vashe and pat dry. Apply Easkins ring to periwound. Apply wound vac at 125mmHg. Home health to change wound vac twice per week.     Elevate legs when sitting  Avoid pressure on wound.   Diabetes:  Monitor glucose closely. Check fasting glucose and 2 hours after meals. HgA1C goal <7, fasting glucose , and 2 hours after meals <180  Hypertension:  Check blood pressure twice daily, goal <120/80  Diet:   Increase protein intake, avoid fried, fatty foods and foods high in simple carbs.   Vitamins:  Take vitamin C 1000 mg, zinc 50mg, vitamin d 5000 units, and a daily multivitamin. Lucho is a good source of protein and nutrients to aid in wound healing.   Monitor closely for s/s of infection including fever, chills, increase in pain, odor from wound, and increased redness from foot. Go to ER if any complications develop.

## 2024-12-09 ENCOUNTER — OFFICE VISIT (OUTPATIENT)
Dept: WOUND CARE | Facility: CLINIC | Age: 64
End: 2024-12-09
Attending: FAMILY MEDICINE
Payer: COMMERCIAL

## 2024-12-09 VITALS
HEART RATE: 90 BPM | RESPIRATION RATE: 19 BRPM | SYSTOLIC BLOOD PRESSURE: 127 MMHG | TEMPERATURE: 98 F | DIASTOLIC BLOOD PRESSURE: 85 MMHG

## 2024-12-09 DIAGNOSIS — L97.422 DIABETIC ULCER OF LEFT HEEL ASSOCIATED WITH TYPE 2 DIABETES MELLITUS, WITH FAT LAYER EXPOSED: Primary | ICD-10-CM

## 2024-12-09 DIAGNOSIS — E11.621 DIABETIC ULCER OF LEFT HEEL ASSOCIATED WITH TYPE 2 DIABETES MELLITUS, WITH FAT LAYER EXPOSED: Primary | ICD-10-CM

## 2024-12-09 PROCEDURE — 99999 PR PBB SHADOW E&M-EST. PATIENT-LVL V: CPT | Mod: PBBFAC,,, | Performed by: NURSE PRACTITIONER

## 2024-12-09 PROCEDURE — 99213 OFFICE O/P EST LOW 20 MIN: CPT | Mod: S$PBB,,, | Performed by: NURSE PRACTITIONER

## 2024-12-09 PROCEDURE — 99215 OFFICE O/P EST HI 40 MIN: CPT | Mod: PBBFAC | Performed by: NURSE PRACTITIONER

## 2024-12-11 NOTE — PROGRESS NOTES
CARISA Ayala   [facility]  [address] [facility] [place] [address]  [phone]      PATIENT NAME: Silvana Sarah  : 1960  DATE: 24  MRN: [id number]      Billing Provider: CARISA Ayala  Level of Service:   Patient PCP Information       Provider PCP Type    CARISA Cárdenas General            Reason for Visit / Chief Complaint: Diabetic Foot Ulcer (Left foot)       History of Present Illness / Problem Focused Workflow     Silvana Sarah is a 63 yo female presents for follow up on chronic non healing wound to left heel. Wound continues to improve with NPWT. Continue with current plan of care.     8/15/24  62 yo female presents with complaints of ulcer to left heel and right great toe. Wound on left heel with with necrotic tissue and slough, bedside debridement today. Left foot is edematous and tender. Recent cultures reviewed. Ceftin to pharmacy. Santyl and vashe moistened drawtex to wound bed. Supplies ordered from [employer]. Wound on right great toe will moderate serous drainage. Aquacel ag applied. Pertinent PMH includes diabetes, hypertension, peripheral neuropathy, gastroparesis, and rheumatoid arthritis. Last HgA1C 7.2 2023, diabetes managed by PCP. Wound healing is complicated by multiple co-morbidities, poor vascular supply, immunosuppression, diabetes, edema, heavy drainage, decreased granulation tissue, necrosis, large surface area, large volume, advanced age, fragile skin, and infection.             Review of Systems     Review of Systems   Constitutional:  Positive for activity change. Negative for chills and fever.   Respiratory:  Negative for chest tightness and shortness of breath.    Cardiovascular:  Positive for leg swelling. Negative for chest pain and palpitations.   Musculoskeletal:  Positive for arthralgias and joint swelling.   Skin:  Positive for color change and wound.        wound   Neurological:   Positive for weakness.   Psychiatric/Behavioral:  Negative for agitation, behavioral problems, confusion and self-injury.        Medical / Social / Family History     Past Medical History:   Diagnosis Date    Anxiety and depression 2021    Diabetes mellitus     Dry eye syndrome, bilateral 10/06/2017    Essential (primary) hypertension     Gastroparesis due to DM     Generalized osteoarthritis 10/25/2021    Other mechanical complication of implanted electronic neurostimulator, generator, sequela 10/25/2021    Peripheral autonomic neuropathy due to DM     Rheumatoid arthritis        Past Surgical History:   Procedure Laterality Date     SECTION      DEBRIDEMENT OF LOWER EXTREMITY Left 2024    Procedure: DEBRIDEMENT, LOWER EXTREMITY;  Surgeon: David Aguilera MD;  Location: [facility] OR;  Service: General;  Laterality: Left;  Left foot    GASTRIC STIMULATOR IMPLANT SURGERY      HYSTERECTOMY      INCISION AND DRAINAGE OF ABSCESS Right 2022    Procedure: INCISION AND DRAINAGE, ABSCESS;  Surgeon: Kamari Gamble DO;  Location: [facility] OR;  Service: General;  Laterality: Right;  right hand dr gamble am    IRRIGATION AND DEBRIDEMENT OF UPPER EXTREMITY Right 2022    Procedure: IRRIGATION AND DEBRIDEMENT, UPPER EXTREMITY;  Surgeon: Kamari Gamble DO;  Location: [facility] OR;  Service: General;  Laterality: Right;  right hand I/D dr gamble today       Social History  Ms. Silvana Sarah  reports that she has never smoked. She has never used smokeless tobacco. She reports that she does not drink alcohol.    Family History  Ms.'s Silvana Sarah family history includes Cancer in her father; Diabetes in her son and son; Heart disease in her mother.    Medications and Allergies     Medications  No outpatient medications have been marked as taking for the 24 encounter (Office Visit) with Marybeth Orellana FNP.       Allergies  Review of patient's allergies indicates:    Allergen Reactions    Influenza virus vaccines     Penicillins        Physical Examination     Vitals:    12/23/24 1021   Resp: 20           Physical Exam  Vitals and nursing note reviewed.   Constitutional:       Appearance: Normal appearance.   HENT:      Head: Normocephalic.   Cardiovascular:      Rate and Rhythm: Normal rate and regular rhythm.      Pulses: Normal pulses.      Heart sounds: Normal heart sounds.   Pulmonary:      Effort: Pulmonary effort is normal. No respiratory distress.   Chest:      Chest wall: No tenderness.   Musculoskeletal:         General: Swelling and tenderness present.      Right lower leg: Edema present.      Left lower leg: Edema present.   Skin:     General: Skin is warm and dry.      Findings: Erythema present.      Comments: See LDA for photos/measurements   Neurological:      General: No focal deficit present.      Mental Status: She is alert and oriented to person, place, and time. Mental status is at baseline.   Psychiatric:         Mood and Affect: Mood normal.         Behavior: Behavior normal.         Thought Content: Thought content normal.         Judgment: Judgment normal.     Assessment and Plan             Wound 08/15/24 Diabetic Ulcer Left posterior Heel #1 (Active)   08/15/24  Heel   Present on Original Admission: Y   Primary Wound Type: Diabetic ulc   Side: Left   Orientation: posterior   Wound Approximate Age at First Assessment (Weeks): 9 weeks   Wound Number: #1   Is this injury device related?:    Incision Type:    Closure Method:    Wound Description (Comments):    Type:    Additional Comments:    Ankle-Brachial Index:    Pulses:    Removal Indication and Assessment:    Wound Outcome:    Wound Image    12/23/24 1031   Drainage Amount Moderate 12/23/24 1031   Drainage Characteristics/Odor Yellow 12/23/24 1031   Appearance Pink;Red;Moist;Granulating;Adipose 12/23/24 1031   Black (%), Wound Tissue Color 0 % 12/23/24 1031   Red (%), Wound Tissue Color 100 %  12/23/24 1031   Yellow (%), Wound Tissue Color 0 % 12/23/24 1031   Periwound Area Macerated;Pink 12/23/24 1031   Wound Edges Irregular 12/23/24 1031   Paniagua Classification (diabetic foot ulcers only) Grade 1 12/23/24 1031   Wound Length (cm) 1.8 cm 12/23/24 1031   Wound Width (cm) 2.2 cm 12/23/24 1031   Wound Depth (cm) 0.4 cm 12/23/24 1031   Wound Volume (cm^3) 1.584 cm^3 12/23/24 1031   Wound Surface Area (cm^2) 3.96 cm^2 12/23/24 1031   Care Cleansed with:;Antimicrobial agent 12/23/24 1031   Dressing Applied 12/23/24 1031   Periwound Care Moisture barrier applied;Skin barrier film applied 12/23/24 1031           Problem List Items Addressed This Visit          Endocrine    Diabetic ulcer of left heel associated with type 2 diabetes mellitus, with fat layer exposed - Primary    Overview                                  Current Assessment & Plan     Clean wound with Vashe and pat dry. Apply Easkins ring to periwound. Apply wound vac at 125mmHg. Home health to change wound vac twice per week.     Elevate legs when sitting  Avoid pressure on wound.   Diabetes:  Monitor glucose closely. Check fasting glucose and 2 hours after meals. HgA1C goal <7, fasting glucose , and 2 hours after meals <180  Hypertension:  Check blood pressure twice daily, goal <120/80  Diet:   Increase protein intake, avoid fried, fatty foods and foods high in simple carbs.   Vitamins:  Take vitamin C 1000 mg, zinc 50mg, vitamin d 5000 units, and a daily multivitamin. Lucho is a good source of protein and nutrients to aid in wound healing.   Monitor closely for s/s of infection including fever, chills, increase in pain, odor from wound, and increased redness from foot. Go to ER if any complications develop.             Future Appointments   Date Time Provider Department Center   1/14/2025 10:00 AM Marybeth Orellana FNP [facility]             Signature:  CARISA Ayala  [facility]  [address] [facility] [address]  [phone]    Date of encounter: 12/23/24

## 2024-12-18 ENCOUNTER — DOCUMENT SCAN (OUTPATIENT)
Dept: HOME HEALTH SERVICES | Facility: HOSPITAL | Age: 64
End: 2024-12-18
Payer: COMMERCIAL

## 2024-12-23 ENCOUNTER — OFFICE VISIT (OUTPATIENT)
Dept: WOUND CARE | Facility: CLINIC | Age: 64
End: 2024-12-23
Attending: FAMILY MEDICINE
Payer: COMMERCIAL

## 2024-12-23 VITALS — RESPIRATION RATE: 20 BRPM

## 2024-12-23 DIAGNOSIS — L97.422 DIABETIC ULCER OF LEFT HEEL ASSOCIATED WITH TYPE 2 DIABETES MELLITUS, WITH FAT LAYER EXPOSED: Primary | ICD-10-CM

## 2024-12-23 DIAGNOSIS — E11.621 DIABETIC ULCER OF LEFT HEEL ASSOCIATED WITH TYPE 2 DIABETES MELLITUS, WITH FAT LAYER EXPOSED: Primary | ICD-10-CM

## 2024-12-23 PROCEDURE — 99213 OFFICE O/P EST LOW 20 MIN: CPT | Mod: S$PBB,,, | Performed by: NURSE PRACTITIONER

## 2024-12-23 PROCEDURE — 99999 PR PBB SHADOW E&M-EST. PATIENT-LVL IV: CPT | Mod: PBBFAC,,, | Performed by: NURSE PRACTITIONER

## 2024-12-23 PROCEDURE — 99214 OFFICE O/P EST MOD 30 MIN: CPT | Mod: PBBFAC | Performed by: NURSE PRACTITIONER

## 2024-12-30 NOTE — PROGRESS NOTES
CARISA Ayala   [facility]  [address] [facility] [place] [address]  [phone]      PATIENT NAME: Silvana Sarah  : 1960  DATE: 25  MRN: [id number]      Billing Provider: CARISA Ayala  Level of Service:   Patient PCP Information       Provider PCP Type    CARISA Cárdenas General            Reason for Visit / Chief Complaint: Diabetic ulcer of left heel associated with type 2 diabetes       History of Present Illness / Problem Focused Workflow     Silvana Sarah is a 63 yo female presents for follow up on chronic non healing wound to left heel. Wound continues to improve with NPWT. Continue with current plan of care. He has new wound on right great toe. She reports she noticed some dry skin to right great toe and she tried to pull it off then a few days later noticed the necrotic tissue to toe. Exposed necrotic wound. Bedside debridement today with bone removal. Wound is malodorous. Rocpehin given IM today.Bone cultures pending.  Rocephin IM x 3 days a home, will likely need 6 weeks of antibiotics.     8/15/24  62 yo female presents with complaints of ulcer to left heel and right great toe. Wound on left heel with with necrotic tissue and slough, bedside debridement today. Left foot is edematous and tender. Recent cultures reviewed. Ceftin to pharmacy. Santyl and vashe moistened drawtex to wound bed. Supplies ordered from [employer]. Wound on right great toe will moderate serous drainage. Aquacel ag applied. Pertinent PMH includes diabetes, hypertension, peripheral neuropathy, gastroparesis, and rheumatoid arthritis. Last HgA1C 7.2 2023, diabetes managed by PCP. Wound healing is complicated by multiple co-morbidities, poor vascular supply, immunosuppression, diabetes, edema, heavy drainage, decreased granulation tissue, necrosis, large surface area, large volume, advanced age, fragile skin, and infection.              Review of Systems     Review of Systems   Constitutional:  Positive for activity change. Negative for chills and fever.   Respiratory:  Negative for chest tightness and shortness of breath.    Cardiovascular:  Positive for leg swelling. Negative for chest pain and palpitations.   Musculoskeletal:  Positive for arthralgias and joint swelling.   Skin:  Positive for color change and wound.        wound   Neurological:  Positive for weakness.   Psychiatric/Behavioral:  Negative for agitation, behavioral problems, confusion and self-injury.        Medical / Social / Family History     Past Medical History:   Diagnosis Date    Anxiety and depression 2021    Diabetes mellitus     Dry eye syndrome, bilateral 10/06/2017    Essential (primary) hypertension     Gastroparesis due to DM     Generalized osteoarthritis 10/25/2021    Other mechanical complication of implanted electronic neurostimulator, generator, sequela 10/25/2021    Peripheral autonomic neuropathy due to DM     Rheumatoid arthritis        Past Surgical History:   Procedure Laterality Date     SECTION      DEBRIDEMENT OF LOWER EXTREMITY Left 2024    Procedure: DEBRIDEMENT, LOWER EXTREMITY;  Surgeon: David Aguilera MD;  Location: [facility] [place];  Service: General;  Laterality: Left;  Left foot    GASTRIC STIMULATOR IMPLANT SURGERY      HYSTERECTOMY      INCISION AND DRAINAGE OF ABSCESS Right 2022    Procedure: INCISION AND DRAINAGE, ABSCESS;  Surgeon: Kamari Sandoval DO;  Location: [facility] OR;  Service: General;  Laterality: Right;  right hand dr sandoval am    IRRIGATION AND DEBRIDEMENT OF UPPER EXTREMITY Right 2022    Procedure: IRRIGATION AND DEBRIDEMENT, UPPER EXTREMITY;  Surgeon: Kamari Sandoval DO;  Location: [facility] [place];  Service: General;  Laterality: Right;  right hand I/D dr sandoval today       Social History  Ms. Silvana Sarah  reports that she has never smoked. She has never used smokeless  tobacco. She reports that she does not drink alcohol.    Family History  Ms.'s Silvana Sarah family history includes Cancer in her father; Diabetes in her son and son; Heart disease in her mother.    Medications and Allergies     Medications  Outpatient Medications Marked as Taking for the 25 encounter (Office Visit) with Marybeth Orellana FNP   Medication Sig Dispense Refill    b complex vitamins capsule Take 1 capsule by mouth once daily.      clindamycin (CLEOCIN) 300 MG capsule Take 1 capsule (300 mg total) by mouth every 6 (six) hours. for 10 days 40 capsule 0    cyanocobalamin 1,000 mcg/mL injection Inject 1,000 mcg into the muscle every 30 days.      esomeprazole (NEXIUM) 20 MG capsule Take 20 mg by mouth once daily.      etanercept (ENBREL MINI) 50 mg/mL (1 mL) Crtg 1 mL by abdominal subcutaneous route every .       ferrous sulfate (FEOSOL) Tab tablet Take 1 tablet by mouth once daily.      gabapentin (NEURONTIN) 600 MG tablet Take 2 tablets 3 times a day by oral route for 30 days.      hydrOXYchloroQUINE (PLAQUENIL) 200 mg tablet Take 200 mg by mouth 2 (two) times daily.       insulin regular 100 unit/mL Inj injection 3 (three) times daily before meals.      LEVEMIR U-100 INSULIN 100 unit/mL injection SMARTSI Unit(s) SUB-Q Daily      lisinopriL (PRINIVIL,ZESTRIL) 2.5 MG tablet Take 2.5 mg by mouth every evening.      lysine (L-LYSINE) 500 mg Tab Take 500 mg by mouth 2 (two) times a day.      nystatin (MYCOSTATIN) 100,000 unit/mL suspension Take 6 mLs by mouth 3 (three) times daily before meals.       oxyCODONE-acetaminophen (PERCOCET)  mg per tablet Take 1 tablet by mouth 4 (four) times daily.      predniSONE (DELTASONE) 5 MG tablet Take 5 mg by mouth once daily.       promethazine (PHENERGAN) 25 MG tablet Take 25 mg by mouth every 6 (six) hours as needed for Nausea.       triamterene-hydrochlorothiazide 37.5-25 mg (DYAZIDE) 37.5-25 mg per capsule Take 1 capsule by  mouth once daily.       zinc gluconate 50 mg tablet Take 50 mg by mouth once daily.       Current Facility-Administered Medications for the 1/14/25 encounter (Office Visit) with Marybeth Orellana FNP   Medication Dose Route Frequency Provider Last Rate Last Admin    [COMPLETED] cefTRIAXone injection 1 g  1 g Intramuscular Once Marybeth Orellana FNP   1 g at 01/14/25 1142       Allergies  Review of patient's allergies indicates:   Allergen Reactions    Influenza virus vaccines     Penicillins        Physical Examination     Vitals:    01/14/25 1047   BP: 133/67   Pulse: 82   Resp: 18   Temp: 97 °F (36.1 °C)             Physical Exam  Vitals and nursing note reviewed.   Constitutional:       Appearance: Normal appearance.   HENT:      Head: Normocephalic.   Cardiovascular:      Rate and Rhythm: Normal rate and regular rhythm.      Pulses: Normal pulses.      Heart sounds: Normal heart sounds.   Pulmonary:      Effort: Pulmonary effort is normal. No respiratory distress.   Chest:      Chest wall: No tenderness.   Musculoskeletal:         General: Swelling and tenderness present.      Right lower leg: Edema present.      Left lower leg: Edema present.   Skin:     General: Skin is warm and dry.      Findings: Erythema present.      Comments: See LDA for photos/measurements   Neurological:      General: No focal deficit present.      Mental Status: She is alert and oriented to person, place, and time. Mental status is at baseline.   Psychiatric:         Mood and Affect: Mood normal.         Behavior: Behavior normal.         Thought Content: Thought content normal.         Judgment: Judgment normal.     Assessment and Plan             Wound 08/15/24 Diabetic Ulcer Left posterior Heel #1 (Active)   08/15/24  Heel   Present on Original Admission: Y   Primary Wound Type: Diabetic ulc   Side: Left   Orientation: posterior   Wound Approximate Age at First Assessment (Weeks): 9 weeks   Wound Number: #1   Is this  injury device related?:    Incision Type:    Closure Method:    Wound Description (Comments):    Type:    Additional Comments:    Ankle-Brachial Index:    Pulses:    Removal Indication and Assessment:    Wound Outcome:    Wound Image    01/14/25 1101   Dressing Appearance Intact;Moist drainage 01/14/25 1101   Drainage Amount Moderate 01/14/25 1101   Drainage Characteristics/Odor Serosanguineous 01/14/25 1101   Appearance Red;Moist;Granulating 01/14/25 1101   Tissue loss description Full thickness 01/14/25 1101   Black (%), Wound Tissue Color 0 % 01/14/25 1101   Red (%), Wound Tissue Color 100 % 01/14/25 1101   Yellow (%), Wound Tissue Color 0 % 01/14/25 1101   Periwound Area Intact;Moist 01/14/25 1101   Wound Edges Defined 01/14/25 1101   Paniagua Classification (diabetic foot ulcers only) Grade 1 01/14/25 1101   Wound Length (cm) 2 cm 01/14/25 1101   Wound Width (cm) 3.2 cm 01/14/25 1101   Wound Depth (cm) 0.4 cm 01/14/25 1101   Wound Volume (cm^3) 2.56 cm^3 01/14/25 1101   Wound Surface Area (cm^2) 6.4 cm^2 01/14/25 1101   Care Cleansed with:;Antimicrobial agent 01/14/25 1101   Dressing Applied 01/14/25 1101            Wound 01/14/25 1054 Diabetic Ulcer Right posterior Toe, first (Active)   01/14/25 1054 Toe, first   Present on Original Admission: Y   Primary Wound Type: Diabetic ulc   Side: Right   Orientation: posterior   Wound Approximate Age at First Assessment (Weeks): 2 weeks   Wound Number:    Is this injury device related?:    Incision Type:    Closure Method:    Wound Description (Comments):    Type:    Additional Comments:    Ankle-Brachial Index:    Pulses:    Removal Indication and Assessment:    Wound Outcome:    Wound Image     01/14/25 1135   Dressing Appearance Intact;Moist drainage 01/14/25 1055   Drainage Amount Small 01/14/25 1055   Drainage Characteristics/Odor Serosanguineous 01/14/25 1055   Appearance Black;Necrotic 01/14/25 1055   Tissue loss description Full thickness 01/14/25 1055   Black  (%), Wound Tissue Color 100 % 01/14/25 1055   Red (%), Wound Tissue Color 0 % 01/14/25 1055   Yellow (%), Wound Tissue Color 0 % 01/14/25 1055   Periwound Area Intact;Moist 01/14/25 1055   Wound Edges Defined 01/14/25 1055   Paniagua Classification (diabetic foot ulcers only) Grade 2 01/14/25 1055   Wound Length (cm) 2 cm 01/14/25 1055   Wound Width (cm) 3 cm 01/14/25 1055   Wound Depth (cm) 0 cm 01/14/25 1055   Wound Volume (cm^3) 0 cm^3 01/14/25 1055   Wound Surface Area (cm^2) 6 cm^2 01/14/25 1055   Care Cleansed with:;Antimicrobial agent 01/14/25 1055   Dressing Applied;Gauze, wet to moist;Gauze;Island/border 01/14/25 1055             Problem List Items Addressed This Visit          Endocrine    Diabetic ulcer of left heel associated with type 2 diabetes mellitus, with fat layer exposed - Primary    Overview                                  Current Assessment & Plan     Left heel: Clean wound with Vashe and pat dry. Apply Easkins ring to periwound. Apply wound vac at 125mmHg. Home health to change wound vac twice per week.     Right first toe: Clean with Vashe and pat dry. Apply Aquacel Ag to wound, cover with gauze, wrap with rolled gauze and secure with paper tape. Perform wound care daily and as needed with soilage.    Elevate legs when sitting  Avoid pressure on wound.   Diabetes:  Monitor glucose closely. Check fasting glucose and 2 hours after meals. HgA1C goal <7, fasting glucose , and 2 hours after meals <180  Hypertension:  Check blood pressure twice daily, goal <120/80  Diet:   Increase protein intake, avoid fried, fatty foods and foods high in simple carbs.   Vitamins:  Take vitamin C 1000 mg, zinc 50mg, vitamin d 5000 units, and a daily multivitamin. Lucho is a good source of protein and nutrients to aid in wound healing.   Monitor closely for s/s of infection including fever, chills, increase in pain, odor from wound, and increased redness from foot. Go to ER if any complications develop.           Diabetic ulcer of toe of right foot associated with type 2 diabetes mellitus, with necrosis of bone    Overview                                Current Assessment & Plan     Left heel: Clean wound with Vashe and pat dry. Apply Easkins ring to periwound. Apply wound vac at 125mmHg. Home health to change wound vac twice per week.     Right first toe: Clean with Vashe and pat dry. Apply Aquacel Ag to wound, cover with gauze, wrap with rolled gauze and secure with paper tape. Perform wound care daily and as needed with soilage.    Elevate legs when sitting  Avoid pressure on wound.   Diabetes:  Monitor glucose closely. Check fasting glucose and 2 hours after meals. HgA1C goal <7, fasting glucose , and 2 hours after meals <180  Hypertension:  Check blood pressure twice daily, goal <120/80  Diet:   Increase protein intake, avoid fried, fatty foods and foods high in simple carbs.   Vitamins:  Take vitamin C 1000 mg, zinc 50mg, vitamin d 5000 units, and a daily multivitamin. Lucho is a good source of protein and nutrients to aid in wound healing.   Monitor closely for s/s of infection including fever, chills, increase in pain, odor from wound, and increased redness from foot. Go to ER if any complications develop.          Relevant Orders    Culture, Anaerobe    Culture, Wound       Future Appointments   Date Time Provider Department Center   1/28/2025 10:00 AM Marybeth Orellana FNP [facility]            Signature:  CARISA Ayala  [facility]  [address] [facility] [address]  [phone]    Date of encounter: 1/14/25

## 2024-12-30 NOTE — PATIENT INSTRUCTIONS
Left heel: Clean wound with Vashe and pat dry. Apply Easkins ring to periwound. Apply wound vac at 125mmHg. Home health to change wound vac twice per week.     Right first toe: Clean with Vashe and pat dry. Apply Aquacel Ag to wound, cover with gauze, wrap with rolled gauze and secure with paper tape. Perform wound care daily and as needed with soilage.    Elevate legs when sitting  Avoid pressure on wound.   Diabetes:  Monitor glucose closely. Check fasting glucose and 2 hours after meals. HgA1C goal <7, fasting glucose , and 2 hours after meals <180  Hypertension:  Check blood pressure twice daily, goal <120/80  Diet:   Increase protein intake, avoid fried, fatty foods and foods high in simple carbs.   Vitamins:  Take vitamin C 1000 mg, zinc 50mg, vitamin d 5000 units, and a daily multivitamin. Lucho is a good source of protein and nutrients to aid in wound healing.   Monitor closely for s/s of infection including fever, chills, increase in pain, odor from wound, and increased redness from foot. Go to ER if any complications develop.

## 2025-01-01 ENCOUNTER — LAB REQUISITION (OUTPATIENT)
Dept: LAB | Facility: HOSPITAL | Age: 65
End: 2025-01-01
Attending: NURSE PRACTITIONER
Payer: COMMERCIAL

## 2025-01-01 ENCOUNTER — RESULTS FOLLOW-UP (OUTPATIENT)
Dept: WOUND CARE | Facility: CLINIC | Age: 65
End: 2025-01-01

## 2025-01-01 ENCOUNTER — HOSPITAL ENCOUNTER (EMERGENCY)
Facility: HOSPITAL | Age: 65
End: 2025-04-11
Attending: EMERGENCY MEDICINE | Admitting: INTERNAL MEDICINE
Payer: COMMERCIAL

## 2025-01-01 VITALS
OXYGEN SATURATION: 83 % | WEIGHT: 163 LBS | TEMPERATURE: 98 F | HEIGHT: 70 IN | RESPIRATION RATE: 30 BRPM | BODY MASS INDEX: 23.34 KG/M2 | DIASTOLIC BLOOD PRESSURE: 63 MMHG | SYSTOLIC BLOOD PRESSURE: 84 MMHG | HEART RATE: 101 BPM

## 2025-01-01 DIAGNOSIS — R07.9 CHEST PAIN: ICD-10-CM

## 2025-01-01 DIAGNOSIS — L97.518 NON-PRESSURE CHRONIC ULCER OF OTHER PART OF RIGHT FOOT WITH OTHER SPECIFIED SEVERITY: ICD-10-CM

## 2025-01-01 DIAGNOSIS — R57.8: Primary | ICD-10-CM

## 2025-01-01 DIAGNOSIS — R57.9 SHOCK: ICD-10-CM

## 2025-01-01 LAB
ALBUMIN SERPL BCP-MCNC: 1.7 G/DL (ref 3.4–4.8)
ALBUMIN/GLOB SERPL: 0.6 {RATIO}
ALP SERPL-CCNC: 113 U/L (ref 40–150)
ALP SERPL-CCNC: 151 U/L (ref 40–150)
ALP SERPL-CCNC: 151 U/L (ref 40–150)
ALT SERPL W P-5'-P-CCNC: 11 U/L
ALT SERPL W P-5'-P-CCNC: 22 U/L
ALT SERPL W P-5'-P-CCNC: 56 U/L
ANION GAP SERPL CALCULATED.3IONS-SCNC: 13 MMOL/L (ref 7–16)
ANION GAP SERPL CALCULATED.3IONS-SCNC: 16 MMOL/L (ref 7–16)
ANION GAP SERPL CALCULATED.3IONS-SCNC: 16 MMOL/L (ref 7–16)
ANISOCYTOSIS BLD QL SMEAR: ABNORMAL
APTT PPP: 39.2 SECONDS (ref 25.2–37.3)
AST SERPL W P-5'-P-CCNC: 110 U/L (ref 11–45)
AST SERPL W P-5'-P-CCNC: 13 U/L (ref 11–45)
AST SERPL W P-5'-P-CCNC: 86 U/L (ref 11–45)
BASOPHILS # BLD AUTO: 0.02 K/UL (ref 0–0.2)
BASOPHILS # BLD AUTO: 0.04 K/UL (ref 0–0.2)
BASOPHILS # BLD AUTO: 0.05 K/UL (ref 0–0.2)
BASOPHILS NFR BLD AUTO: 0.2 % (ref 0–1)
BASOPHILS NFR BLD AUTO: 0.4 % (ref 0–1)
BASOPHILS NFR BLD AUTO: 0.4 % (ref 0–1)
BILIRUB SERPL-MCNC: 0.4 MG/DL
BILIRUB SERPL-MCNC: 0.4 MG/DL
BILIRUB SERPL-MCNC: 0.8 MG/DL
BUN SERPL-MCNC: 19 MG/DL (ref 10–20)
BUN SERPL-MCNC: 21 MG/DL (ref 10–20)
BUN SERPL-MCNC: 35 MG/DL (ref 10–20)
BUN/CREAT SERPL: 18 (ref 6–20)
BUN/CREAT SERPL: 23 (ref 6–20)
BUN/CREAT SERPL: 24 (ref 6–20)
CALCIUM SERPL-MCNC: 7.7 MG/DL (ref 8.4–10.2)
CALCIUM SERPL-MCNC: 7.8 MG/DL (ref 8.4–10.2)
CALCIUM SERPL-MCNC: 7.8 MG/DL (ref 8.4–10.2)
CHLORIDE SERPL-SCNC: 103 MMOL/L (ref 98–107)
CHLORIDE SERPL-SCNC: 104 MMOL/L (ref 98–107)
CHLORIDE SERPL-SCNC: 105 MMOL/L (ref 98–107)
CO2 SERPL-SCNC: 21 MMOL/L (ref 23–31)
CO2 SERPL-SCNC: 23 MMOL/L (ref 23–31)
CO2 SERPL-SCNC: 26 MMOL/L (ref 23–31)
CREAT SERPL-MCNC: 0.79 MG/DL (ref 0.55–1.02)
CREAT SERPL-MCNC: 1.15 MG/DL (ref 0.55–1.02)
CREAT SERPL-MCNC: 1.54 MG/DL (ref 0.55–1.02)
CRP SERPL HS-MCNC: 131.19 MG/L
CRP SERPL HS-MCNC: 92.89 MG/L
DIFFERENTIAL METHOD BLD: ABNORMAL
EGFR (NO RACE VARIABLE) (RUSH/TITUS): 38 ML/MIN/1.73M2
EGFR (NO RACE VARIABLE) (RUSH/TITUS): 54 ML/MIN/1.73M2
EGFR (NO RACE VARIABLE) (RUSH/TITUS): 85 ML/MIN/1.73M2
EOSINOPHIL # BLD AUTO: 0.17 K/UL (ref 0–0.5)
EOSINOPHIL # BLD AUTO: 0.21 K/UL (ref 0–0.5)
EOSINOPHIL # BLD AUTO: 0.36 K/UL (ref 0–0.5)
EOSINOPHIL NFR BLD AUTO: 1.2 % (ref 1–4)
EOSINOPHIL NFR BLD AUTO: 1.6 % (ref 1–4)
EOSINOPHIL NFR BLD AUTO: 3.5 % (ref 1–4)
EOSINOPHIL NFR BLD MANUAL: 1 % (ref 1–4)
EOSINOPHIL NFR BLD MANUAL: 2 % (ref 1–4)
ERYTHROCYTE [DISTWIDTH] IN BLOOD BY AUTOMATED COUNT: 17.9 % (ref 11.5–14.5)
ERYTHROCYTE [DISTWIDTH] IN BLOOD BY AUTOMATED COUNT: 18.1 % (ref 11.5–14.5)
ERYTHROCYTE [DISTWIDTH] IN BLOOD BY AUTOMATED COUNT: 20.7 % (ref 11.5–14.5)
GLOBULIN SER-MCNC: 2.7 G/DL (ref 2–4)
GLOBULIN SER-MCNC: 2.7 G/DL (ref 2–4)
GLOBULIN SER-MCNC: 2.9 G/DL (ref 2–4)
GLUCOSE SERPL-MCNC: 115 MG/DL (ref 82–115)
GLUCOSE SERPL-MCNC: 174 MG/DL (ref 70–105)
GLUCOSE SERPL-MCNC: 236 MG/DL (ref 82–115)
GLUCOSE SERPL-MCNC: 94 MG/DL (ref 82–115)
HCO3 UR-SCNC: 20.1 MMOL/L (ref 24–28)
HCT VFR BLD AUTO: 28.2 % (ref 38–47)
HCT VFR BLD AUTO: 29.4 % (ref 38–47)
HCT VFR BLD AUTO: 30.2 % (ref 38–47)
HCT VFR BLD CALC: 34 % (ref 35–51)
HGB BLD-MCNC: 8.1 G/DL (ref 12–16)
HGB BLD-MCNC: 8.4 G/DL (ref 12–16)
HGB BLD-MCNC: 8.5 G/DL (ref 12–16)
HYPOCHROMIA BLD QL SMEAR: NORMAL
IMM GRANULOCYTES # BLD AUTO: 0.74 K/UL (ref 0–0.04)
IMM GRANULOCYTES NFR BLD: 5.4 % (ref 0–0.4)
INR BLD: 1.39
LDH SERPL L TO P-CCNC: 6.2 MMOL/L (ref 0.3–1.2)
LYMPHOCYTES # BLD AUTO: 2.32 K/UL (ref 1–4.8)
LYMPHOCYTES # BLD AUTO: 2.66 K/UL (ref 1–4.8)
LYMPHOCYTES # BLD AUTO: 3.1 K/UL (ref 1–4.8)
LYMPHOCYTES NFR BLD AUTO: 17.8 % (ref 27–41)
LYMPHOCYTES NFR BLD AUTO: 19.2 % (ref 27–41)
LYMPHOCYTES NFR BLD AUTO: 30.4 % (ref 27–41)
LYMPHOCYTES NFR BLD MANUAL: 15 % (ref 27–41)
LYMPHOCYTES NFR BLD MANUAL: 35 % (ref 27–41)
MCH RBC QN AUTO: 22.1 PG (ref 27–31)
MCH RBC QN AUTO: 22.8 PG (ref 27–31)
MCH RBC QN AUTO: 23 PG (ref 27–31)
MCHC RBC AUTO-ENTMCNC: 28.1 G/DL (ref 32–36)
MCHC RBC AUTO-ENTMCNC: 28.6 G/DL (ref 32–36)
MCHC RBC AUTO-ENTMCNC: 28.7 G/DL (ref 32–36)
MCV RBC AUTO: 77.4 FL (ref 80–96)
MCV RBC AUTO: 80.1 FL (ref 80–96)
MCV RBC AUTO: 81.2 FL (ref 80–96)
METAMYELOCYTES NFR BLD MANUAL: 2 %
MONOCYTES # BLD AUTO: 0.71 K/UL (ref 0–0.8)
MONOCYTES # BLD AUTO: 0.71 K/UL (ref 0–0.8)
MONOCYTES # BLD AUTO: 1.1 K/UL (ref 0–0.8)
MONOCYTES NFR BLD AUTO: 5.1 % (ref 2–6)
MONOCYTES NFR BLD AUTO: 7 % (ref 2–6)
MONOCYTES NFR BLD AUTO: 8.4 % (ref 2–6)
MONOCYTES NFR BLD MANUAL: 3 % (ref 2–6)
MONOCYTES NFR BLD MANUAL: 6 % (ref 2–6)
MPC BLD CALC-MCNC: 10.8 FL (ref 9.4–12.4)
MPC BLD CALC-MCNC: 12 FL (ref 9.4–12.4)
MPC BLD CALC-MCNC: ABNORMAL G/DL
NEUTROPHILS # BLD AUTO: 5.99 K/UL (ref 1.8–7.7)
NEUTROPHILS # BLD AUTO: 9.38 K/UL (ref 1.8–7.7)
NEUTROPHILS # BLD AUTO: 9.49 K/UL (ref 1.8–7.7)
NEUTROPHILS NFR BLD AUTO: 58.7 % (ref 53–65)
NEUTROPHILS NFR BLD AUTO: 68.7 % (ref 53–65)
NEUTROPHILS NFR BLD AUTO: 72 % (ref 53–65)
NEUTS BAND NFR BLD MANUAL: 4 % (ref 1–5)
NEUTS BAND NFR BLD MANUAL: 4 % (ref 1–5)
NEUTS SEG NFR BLD MANUAL: 53 % (ref 50–62)
NEUTS SEG NFR BLD MANUAL: 75 % (ref 50–62)
NRBC # BLD AUTO: 0.02 X10E3/UL
NRBC BLD MANUAL-RTO: NORMAL %
NRBC, AUTO (.00): 0.1 %
OVALOCYTES BLD QL SMEAR: ABNORMAL
PCO2 BLDA: 48 MMHG (ref 41–51)
PH SMN: 7.23 [PH] (ref 7.32–7.42)
PLATELET # BLD AUTO: 189 K/UL (ref 150–400)
PLATELET # BLD AUTO: 246 K/UL (ref 150–400)
PLATELET # BLD AUTO: 88 K/UL (ref 150–400)
PLATELET MORPHOLOGY: ABNORMAL
PO2 BLDA: 14 MMHG (ref 25–40)
POC BASE EXCESS: -7.3 MMOL/L (ref -2–3)
POC CO2: 21.6 MMOL/L
POC IONIZED CALCIUM: 1.09 MMOL/L (ref 1.15–1.35)
POC SATURATED O2: 12 % (ref 40–70)
POCT GLUCOSE: 228 MG/DL (ref 60–95)
POLYCHROMASIA BLD QL SMEAR: ABNORMAL
POTASSIUM BLD-SCNC: 5.1 MMOL/L (ref 3.4–4.5)
POTASSIUM SERPL-SCNC: 3.5 MMOL/L (ref 3.5–5.1)
POTASSIUM SERPL-SCNC: 3.6 MMOL/L (ref 3.5–5.1)
POTASSIUM SERPL-SCNC: 5 MMOL/L (ref 3.5–5.1)
PROT SERPL-MCNC: 4.4 G/DL (ref 5.8–7.6)
PROT SERPL-MCNC: 4.4 G/DL (ref 5.8–7.6)
PROT SERPL-MCNC: 4.6 G/DL (ref 5.8–7.6)
PROTHROMBIN TIME: 16.9 SECONDS (ref 11.7–14.7)
RBC # BLD AUTO: 3.52 M/UL (ref 4.2–5.4)
RBC # BLD AUTO: 3.72 M/UL (ref 4.2–5.4)
RBC # BLD AUTO: 3.8 M/UL (ref 4.2–5.4)
SODIUM BLD-SCNC: 134 MMOL/L (ref 136–145)
SODIUM SERPL-SCNC: 137 MMOL/L (ref 136–145)
SODIUM SERPL-SCNC: 138 MMOL/L (ref 136–145)
SODIUM SERPL-SCNC: 139 MMOL/L (ref 136–145)
WBC # BLD AUTO: 10.2 K/UL (ref 4.5–11)
WBC # BLD AUTO: 13.03 K/UL (ref 4.5–11)
WBC # BLD AUTO: 13.82 K/UL (ref 4.5–11)

## 2025-01-01 PROCEDURE — 27200966 HC CLOSED SUCTION SYSTEM

## 2025-01-01 PROCEDURE — 85610 PROTHROMBIN TIME: CPT | Performed by: EMERGENCY MEDICINE

## 2025-01-01 PROCEDURE — 36415 COLL VENOUS BLD VENIPUNCTURE: CPT | Performed by: EMERGENCY MEDICINE

## 2025-01-01 PROCEDURE — 27000221 HC OXYGEN, UP TO 24 HOURS

## 2025-01-01 PROCEDURE — 80053 COMPREHEN METABOLIC PANEL: CPT | Performed by: EMERGENCY MEDICINE

## 2025-01-01 PROCEDURE — 99900035 HC TECH TIME PER 15 MIN (STAT)

## 2025-01-01 PROCEDURE — 96365 THER/PROPH/DIAG IV INF INIT: CPT

## 2025-01-01 PROCEDURE — 25000003 PHARM REV CODE 250: Performed by: INTERNAL MEDICINE

## 2025-01-01 PROCEDURE — 25000003 PHARM REV CODE 250

## 2025-01-01 PROCEDURE — 86141 C-REACTIVE PROTEIN HS: CPT | Performed by: NURSE PRACTITIONER

## 2025-01-01 PROCEDURE — 92950 HEART/LUNG RESUSCITATION CPR: CPT

## 2025-01-01 PROCEDURE — 99291 CRITICAL CARE FIRST HOUR: CPT

## 2025-01-01 PROCEDURE — 82962 GLUCOSE BLOOD TEST: CPT

## 2025-01-01 PROCEDURE — 85025 COMPLETE CBC W/AUTO DIFF WBC: CPT | Performed by: NURSE PRACTITIONER

## 2025-01-01 PROCEDURE — 80053 COMPREHEN METABOLIC PANEL: CPT | Performed by: NURSE PRACTITIONER

## 2025-01-01 PROCEDURE — 82330 ASSAY OF CALCIUM: CPT

## 2025-01-01 PROCEDURE — 25000003 PHARM REV CODE 250: Performed by: EMERGENCY MEDICINE

## 2025-01-01 PROCEDURE — 85730 THROMBOPLASTIN TIME PARTIAL: CPT | Performed by: EMERGENCY MEDICINE

## 2025-01-01 PROCEDURE — 63600175 PHARM REV CODE 636 W HCPCS: Performed by: EMERGENCY MEDICINE

## 2025-01-01 PROCEDURE — 83605 ASSAY OF LACTIC ACID: CPT

## 2025-01-01 PROCEDURE — 96360 HYDRATION IV INFUSION INIT: CPT | Mod: 59

## 2025-01-01 PROCEDURE — 63600175 PHARM REV CODE 636 W HCPCS: Performed by: INTERNAL MEDICINE

## 2025-01-01 PROCEDURE — 63600175 PHARM REV CODE 636 W HCPCS

## 2025-01-01 PROCEDURE — 85025 COMPLETE CBC W/AUTO DIFF WBC: CPT | Performed by: EMERGENCY MEDICINE

## 2025-01-01 PROCEDURE — 31500 INSERT EMERGENCY AIRWAY: CPT

## 2025-01-01 PROCEDURE — 82947 ASSAY GLUCOSE BLOOD QUANT: CPT

## 2025-01-01 PROCEDURE — 93010 ELECTROCARDIOGRAM REPORT: CPT | Mod: ,,, | Performed by: INTERNAL MEDICINE

## 2025-01-01 PROCEDURE — 27100108

## 2025-01-01 PROCEDURE — 96366 THER/PROPH/DIAG IV INF ADDON: CPT

## 2025-01-01 PROCEDURE — 96368 THER/DIAG CONCURRENT INF: CPT | Mod: 59

## 2025-01-01 PROCEDURE — 85014 HEMATOCRIT: CPT

## 2025-01-01 PROCEDURE — 84132 ASSAY OF SERUM POTASSIUM: CPT

## 2025-01-01 PROCEDURE — 93005 ELECTROCARDIOGRAM TRACING: CPT

## 2025-01-01 PROCEDURE — 82803 BLOOD GASES ANY COMBINATION: CPT

## 2025-01-01 PROCEDURE — 84295 ASSAY OF SERUM SODIUM: CPT

## 2025-01-01 RX ORDER — SODIUM CHLORIDE 9 MG/ML
INJECTION, SOLUTION INTRAVENOUS
Status: DISCONTINUED
Start: 2025-01-01 | End: 2025-01-01 | Stop reason: HOSPADM

## 2025-01-01 RX ORDER — SODIUM BICARBONATE 1 MEQ/ML
SYRINGE (ML) INTRAVENOUS CODE/TRAUMA/SEDATION MEDICATION
Status: COMPLETED | OUTPATIENT
Start: 2025-01-01 | End: 2025-01-01

## 2025-01-01 RX ORDER — PROPOFOL 10 MG/ML
VIAL (ML) INTRAVENOUS
Status: DISCONTINUED
Start: 2025-01-01 | End: 2025-01-01 | Stop reason: HOSPADM

## 2025-01-01 RX ORDER — ONDANSETRON HYDROCHLORIDE 2 MG/ML
INJECTION, SOLUTION INTRAVENOUS
Status: COMPLETED
Start: 2025-01-01 | End: 2025-01-01

## 2025-01-01 RX ORDER — NALOXONE HCL 0.4 MG/ML
VIAL (ML) INJECTION
Status: COMPLETED
Start: 2025-01-01 | End: 2025-01-01

## 2025-01-01 RX ORDER — MIDAZOLAM HYDROCHLORIDE 2 MG/2ML
INJECTION, SOLUTION INTRAMUSCULAR; INTRAVENOUS
Status: DISCONTINUED
Start: 2025-01-01 | End: 2025-01-01 | Stop reason: WASHOUT

## 2025-01-01 RX ORDER — IBUPROFEN 200 MG
24 TABLET ORAL
Status: DISCONTINUED | OUTPATIENT
Start: 2025-01-01 | End: 2025-01-01 | Stop reason: HOSPADM

## 2025-01-01 RX ORDER — EPINEPHRINE 1 MG/ML
INJECTION INTRAMUSCULAR; INTRAVENOUS; SUBCUTANEOUS
Status: DISCONTINUED
Start: 2025-01-01 | End: 2025-01-01 | Stop reason: HOSPADM

## 2025-01-01 RX ORDER — EPINEPHRINE 0.1 MG/ML
INJECTION INTRAVENOUS CODE/TRAUMA/SEDATION MEDICATION
Status: COMPLETED | OUTPATIENT
Start: 2025-01-01 | End: 2025-01-01

## 2025-01-01 RX ORDER — INSULIN GLARGINE 100 [IU]/ML
10 INJECTION, SOLUTION SUBCUTANEOUS NIGHTLY
Status: DISCONTINUED | OUTPATIENT
Start: 2025-01-01 | End: 2025-01-01 | Stop reason: HOSPADM

## 2025-01-01 RX ORDER — SODIUM CHLORIDE 0.9 % (FLUSH) 0.9 %
10 SYRINGE (ML) INJECTION EVERY 12 HOURS PRN
Status: DISCONTINUED | OUTPATIENT
Start: 2025-01-01 | End: 2025-01-01 | Stop reason: HOSPADM

## 2025-01-01 RX ORDER — ROCURONIUM BROMIDE 10 MG/ML
INJECTION, SOLUTION INTRAVENOUS
Status: COMPLETED
Start: 2025-01-01 | End: 2025-01-01

## 2025-01-01 RX ORDER — HEPARIN SODIUM 5000 [USP'U]/ML
5000 INJECTION, SOLUTION INTRAVENOUS; SUBCUTANEOUS EVERY 8 HOURS
Status: DISCONTINUED | OUTPATIENT
Start: 2025-01-01 | End: 2025-01-01 | Stop reason: HOSPADM

## 2025-01-01 RX ORDER — NALOXONE HCL 0.4 MG/ML
0.02 VIAL (ML) INJECTION
Status: DISCONTINUED | OUTPATIENT
Start: 2025-01-01 | End: 2025-01-01 | Stop reason: HOSPADM

## 2025-01-01 RX ORDER — MIDAZOLAM HYDROCHLORIDE 2 MG/2ML
2 INJECTION, SOLUTION INTRAMUSCULAR; INTRAVENOUS
Status: DISCONTINUED | OUTPATIENT
Start: 2025-01-01 | End: 2025-01-01 | Stop reason: HOSPADM

## 2025-01-01 RX ORDER — MUPIROCIN 20 MG/G
OINTMENT TOPICAL 2 TIMES DAILY
Status: DISCONTINUED | OUTPATIENT
Start: 2025-01-01 | End: 2025-01-01 | Stop reason: HOSPADM

## 2025-01-01 RX ORDER — NOREPINEPHRINE BITARTRATE/D5W 4MG/250ML
0-.2 PLASTIC BAG, INJECTION (ML) INTRAVENOUS CONTINUOUS
Status: DISCONTINUED | OUTPATIENT
Start: 2025-01-01 | End: 2025-01-01 | Stop reason: HOSPADM

## 2025-01-01 RX ORDER — IBUPROFEN 200 MG
16 TABLET ORAL
Status: DISCONTINUED | OUTPATIENT
Start: 2025-01-01 | End: 2025-01-01 | Stop reason: HOSPADM

## 2025-01-01 RX ORDER — NOREPINEPHRINE BITARTRATE/D5W 4MG/250ML
PLASTIC BAG, INJECTION (ML) INTRAVENOUS
Status: COMPLETED
Start: 2025-01-01 | End: 2025-01-01

## 2025-01-01 RX ORDER — EPINEPHRINE 0.1 MG/ML
INJECTION INTRAVENOUS
Status: DISCONTINUED
Start: 2025-01-01 | End: 2025-01-01 | Stop reason: HOSPADM

## 2025-01-01 RX ORDER — PHENYLEPHRINE HCL IN 0.9% NACL 20MG/250ML
0-3 PLASTIC BAG, INJECTION (ML) INTRAVENOUS CONTINUOUS
Status: DISCONTINUED | OUTPATIENT
Start: 2025-01-01 | End: 2025-01-01

## 2025-01-01 RX ORDER — ATROPINE SULFATE 0.1 MG/ML
INJECTION INTRAVENOUS CODE/TRAUMA/SEDATION MEDICATION
Status: COMPLETED | OUTPATIENT
Start: 2025-01-01 | End: 2025-01-01

## 2025-01-01 RX ORDER — INSULIN ASPART 100 [IU]/ML
0-10 INJECTION, SOLUTION INTRAVENOUS; SUBCUTANEOUS
Status: DISCONTINUED | OUTPATIENT
Start: 2025-01-01 | End: 2025-01-01 | Stop reason: HOSPADM

## 2025-01-01 RX ORDER — ETOMIDATE 2 MG/ML
INJECTION INTRAVENOUS
Status: COMPLETED
Start: 2025-01-01 | End: 2025-01-01

## 2025-01-01 RX ORDER — TIZANIDINE 4 MG/1
4 TABLET ORAL
COMMUNITY
Start: 2025-01-01

## 2025-01-01 RX ORDER — GLUCAGON 1 MG
1 KIT INJECTION
Status: DISCONTINUED | OUTPATIENT
Start: 2025-01-01 | End: 2025-01-01 | Stop reason: HOSPADM

## 2025-01-01 RX ADMIN — ONDANSETRON 4 MG: 2 INJECTION INTRAMUSCULAR; INTRAVENOUS at 03:04

## 2025-01-01 RX ADMIN — EPINEPHRINE 1 MG: 0.1 INJECTION, SOLUTION ENDOTRACHEAL; INTRACARDIAC; INTRAVENOUS at 05:04

## 2025-01-01 RX ADMIN — NOREPINEPHRINE BITARTRATE 3 MCG/KG/MIN: 4 INJECTION, SOLUTION INTRAVENOUS at 03:04

## 2025-01-01 RX ADMIN — EPINEPHRINE 0.02 MCG/KG/MIN: 1 INJECTION INTRAMUSCULAR; INTRAVENOUS; SUBCUTANEOUS at 04:04

## 2025-01-01 RX ADMIN — EPINEPHRINE 1 MG: 0.1 INJECTION, SOLUTION ENDOTRACHEAL; INTRACARDIAC; INTRAVENOUS at 04:04

## 2025-01-01 RX ADMIN — NALOXONE HYDROCHLORIDE 0.4 MG: 0.4 INJECTION, SOLUTION INTRAMUSCULAR; INTRAVENOUS; SUBCUTANEOUS at 03:04

## 2025-01-01 RX ADMIN — SODIUM BICARBONATE 50 MEQ: 84 INJECTION INTRAVENOUS at 04:04

## 2025-01-01 RX ADMIN — ETOMIDATE 20 MG: 2 INJECTION INTRAVENOUS at 04:04

## 2025-01-01 RX ADMIN — SODIUM CHLORIDE, POTASSIUM CHLORIDE, SODIUM LACTATE AND CALCIUM CHLORIDE 1000 ML: 600; 310; 30; 20 INJECTION, SOLUTION INTRAVENOUS at 02:04

## 2025-01-01 RX ADMIN — ATROPINE SULFATE 1 MG: 0.1 INJECTION PARENTERAL at 05:04

## 2025-01-01 RX ADMIN — NOREPINEPHRINE BITARTRATE 0.5 MCG/KG/MIN: 4 INJECTION, SOLUTION INTRAVENOUS at 03:04

## 2025-01-01 RX ADMIN — ROCURONIUM BROMIDE 60 MG: 10 INJECTION, SOLUTION INTRAVENOUS at 04:04

## 2025-01-01 RX ADMIN — ROCURONIUM BROMIDE: 10 INJECTION, SOLUTION INTRAVENOUS at 04:04

## 2025-01-01 RX ADMIN — ETOMIDATE: 2 INJECTION INTRAVENOUS at 04:04

## 2025-01-01 RX ADMIN — NOREPINEPHRINE BITARTRATE 1 MCG/KG/MIN: 4 INJECTION, SOLUTION INTRAVENOUS at 02:04

## 2025-01-01 RX ADMIN — SODIUM CHLORIDE: 9 INJECTION, SOLUTION INTRAVENOUS at 04:04

## 2025-01-01 RX ADMIN — PHENYLEPHRINE HYDROCHLORIDE 1 MCG/KG/MIN: 10 INJECTION INTRAVENOUS at 03:04

## 2025-01-10 ENCOUNTER — HOSPITAL ENCOUNTER (EMERGENCY)
Facility: HOSPITAL | Age: 65
Discharge: HOME | End: 2025-01-10
Payer: COMMERCIAL

## 2025-01-10 VITALS
HEIGHT: 69 IN | SYSTOLIC BLOOD PRESSURE: 132 MMHG | DIASTOLIC BLOOD PRESSURE: 82 MMHG | WEIGHT: 190 LBS | TEMPERATURE: 98 F | HEART RATE: 84 BPM | OXYGEN SATURATION: 94 % | RESPIRATION RATE: 14 BRPM | BODY MASS INDEX: 28.14 KG/M2

## 2025-01-10 DIAGNOSIS — S99.921A RIGHT FOOT INJURY: Primary | ICD-10-CM

## 2025-01-10 DIAGNOSIS — S91.101A OPEN WOUND OF RIGHT GREAT TOE, INITIAL ENCOUNTER: ICD-10-CM

## 2025-01-10 DIAGNOSIS — S99.921A RIGHT FOOT INJURY: ICD-10-CM

## 2025-01-10 LAB
ALBUMIN SERPL BCP-MCNC: 2.6 G/DL (ref 3.4–4.8)
ALBUMIN/GLOB SERPL: 0.7 {RATIO}
ALP SERPL-CCNC: 100 U/L (ref 40–150)
ALT SERPL W P-5'-P-CCNC: 9 U/L
ANION GAP SERPL CALCULATED.3IONS-SCNC: 14 MMOL/L (ref 7–16)
AST SERPL W P-5'-P-CCNC: 93 U/L (ref 5–34)
BASOPHILS # BLD AUTO: 0.04 K/UL (ref 0–0.2)
BASOPHILS NFR BLD AUTO: 0.3 % (ref 0–1)
BILIRUB SERPL-MCNC: 0.6 MG/DL
BUN SERPL-MCNC: 24 MG/DL (ref 10–20)
BUN/CREAT SERPL: 19 (ref 6–20)
CALCIUM SERPL-MCNC: 8.5 MG/DL (ref 8.4–10.2)
CHLORIDE SERPL-SCNC: 102 MMOL/L (ref 98–107)
CO2 SERPL-SCNC: 26 MMOL/L (ref 23–31)
CREAT SERPL-MCNC: 1.27 MG/DL (ref 0.55–1.02)
DIFFERENTIAL METHOD BLD: ABNORMAL
EGFR (NO RACE VARIABLE) (RUSH/TITUS): 48 ML/MIN/1.73M2
EOSINOPHIL # BLD AUTO: 0.01 K/UL (ref 0–0.5)
EOSINOPHIL NFR BLD AUTO: 0.1 % (ref 1–4)
ERYTHROCYTE [DISTWIDTH] IN BLOOD BY AUTOMATED COUNT: 13.8 % (ref 11.5–14.5)
GLOBULIN SER-MCNC: 3.9 G/DL (ref 2–4)
GLUCOSE SERPL-MCNC: 263 MG/DL (ref 82–115)
HCT VFR BLD AUTO: 35 % (ref 38–47)
HGB BLD-MCNC: 10.5 G/DL (ref 12–16)
IMM GRANULOCYTES # BLD AUTO: 0.26 K/UL (ref 0–0.04)
IMM GRANULOCYTES NFR BLD: 1.7 % (ref 0–0.4)
LACTATE SERPL-SCNC: 0.9 MMOL/L (ref 0.5–2.2)
LYMPHOCYTES # BLD AUTO: 0.87 K/UL (ref 1–4.8)
LYMPHOCYTES NFR BLD AUTO: 5.6 % (ref 27–41)
MCH RBC QN AUTO: 25.9 PG (ref 27–31)
MCHC RBC AUTO-ENTMCNC: 30 G/DL (ref 32–36)
MCV RBC AUTO: 86.4 FL (ref 80–96)
MONOCYTES # BLD AUTO: 0.58 K/UL (ref 0–0.8)
MONOCYTES NFR BLD AUTO: 3.7 % (ref 2–6)
MPC BLD CALC-MCNC: 10.4 FL (ref 9.4–12.4)
NEUTROPHILS # BLD AUTO: 13.83 K/UL (ref 1.8–7.7)
NEUTROPHILS NFR BLD AUTO: 88.6 % (ref 53–65)
NRBC # BLD AUTO: 0 X10E3/UL
NRBC, AUTO (.00): 0 %
PLATELET # BLD AUTO: 442 K/UL (ref 150–400)
POTASSIUM SERPL-SCNC: 5.3 MMOL/L (ref 3.5–5.1)
PROT SERPL-MCNC: 6.5 G/DL (ref 5.8–7.6)
RBC # BLD AUTO: 4.05 M/UL (ref 4.2–5.4)
SODIUM SERPL-SCNC: 137 MMOL/L (ref 136–145)
WBC # BLD AUTO: 15.59 K/UL (ref 4.5–11)

## 2025-01-10 PROCEDURE — 36415 COLL VENOUS BLD VENIPUNCTURE: CPT

## 2025-01-10 PROCEDURE — 99284 EMERGENCY DEPT VISIT MOD MDM: CPT | Mod: 25

## 2025-01-10 PROCEDURE — 80053 COMPREHEN METABOLIC PANEL: CPT

## 2025-01-10 PROCEDURE — 96360 HYDRATION IV INFUSION INIT: CPT

## 2025-01-10 PROCEDURE — 83605 ASSAY OF LACTIC ACID: CPT

## 2025-01-10 PROCEDURE — 85025 COMPLETE CBC W/AUTO DIFF WBC: CPT

## 2025-01-10 PROCEDURE — 25000003 PHARM REV CODE 250

## 2025-01-10 PROCEDURE — 87040 BLOOD CULTURE FOR BACTERIA: CPT

## 2025-01-10 RX ORDER — CLINDAMYCIN HYDROCHLORIDE 300 MG/1
300 CAPSULE ORAL EVERY 6 HOURS
Qty: 40 CAPSULE | Refills: 0 | Status: SHIPPED | OUTPATIENT
Start: 2025-01-10 | End: 2025-01-20

## 2025-01-10 RX ADMIN — SODIUM CHLORIDE 1000 ML: 9 INJECTION, SOLUTION INTRAVENOUS at 08:01

## 2025-01-11 NOTE — ED PROVIDER NOTES
Encounter Date: 1/10/2025       History     Chief Complaint   Patient presents with    Wound Infection     64-year old female presents to the emergency department for evaluation of right great toe wound. States that she first noticed the wound last week and it is progressed significant since then. States that she has an upcoming appointment with wound care on Tuesday, but came to ED because she would like to be evaluated by Dr. Aguilera. Reports a history of DM type II as well as current wound to left heel with wound vac in place that is currently being managed by Dr. Aguilera. Denies hyperglycemia, fever, chills, nausea, vomiting, bleeding or drainage from left great toe foot wound.    The history is provided by the patient. No  was used.     Review of patient's allergies indicates:   Allergen Reactions    Influenza virus vaccines     Penicillins      Past Medical History:   Diagnosis Date    Anxiety and depression 2021    Diabetes mellitus     Dry eye syndrome, bilateral 10/06/2017    Essential (primary) hypertension     Gastroparesis due to DM     Generalized osteoarthritis 10/25/2021    Other mechanical complication of implanted electronic neurostimulator, generator, sequela 10/25/2021    Peripheral autonomic neuropathy due to DM     Rheumatoid arthritis      Past Surgical History:   Procedure Laterality Date     SECTION      DEBRIDEMENT OF LOWER EXTREMITY Left 2024    Procedure: DEBRIDEMENT, LOWER EXTREMITY;  Surgeon: David Aguilera MD;  Location: [facility] OR;  Service: General;  Laterality: Left;  Left foot    GASTRIC STIMULATOR IMPLANT SURGERY      HYSTERECTOMY      INCISION AND DRAINAGE OF ABSCESS Right 2022    Procedure: INCISION AND DRAINAGE, ABSCESS;  Surgeon: Kamari Sandoval DO;  Location: [facility] OR;  Service: General;  Laterality: Right;  right hand dr sandoval am    IRRIGATION AND DEBRIDEMENT OF UPPER EXTREMITY Right 2022    Procedure: IRRIGATION AND  DEBRIDEMENT, UPPER EXTREMITY;  Surgeon: Kamari Sandoval DO;  Location: [facility] [place] OR;  Service: General;  Laterality: Right;  right hand I/D dr sandoval today     Family History   Problem Relation Name Age of Onset    Heart disease Mother      Cancer Father      Diabetes Son      Diabetes Son       Social History     Tobacco Use    Smoking status: Never    Smokeless tobacco: Never   Substance Use Topics    Alcohol use: Never     Review of Systems   Constitutional:  Negative for chills and fever.   Skin:  Positive for wound (Reports right great toe foot wound).   Neurological:  Negative for tremors and weakness.   All other systems reviewed and are negative.      Physical Exam     Initial Vitals [01/10/25 1753]   BP Pulse Resp Temp SpO2   128/81 92 18 98.3 °F (36.8 °C) 97 %      MAP       --         Physical Exam    Vitals reviewed.  Constitutional: She appears well-nourished. No distress.   HENT:   Head: Normocephalic.   Eyes: Conjunctivae are normal. Right eye exhibits no discharge. Left eye exhibits no discharge.   Neck: Neck supple.   Normal range of motion.  Cardiovascular:  Normal rate, regular rhythm and normal heart sounds.           Pulmonary/Chest: Breath sounds normal. No respiratory distress. She has no wheezes. She has no rhonchi. She exhibits no tenderness.   Abdominal: Abdomen is soft. Bowel sounds are normal. She exhibits no distension. There is no abdominal tenderness. There is no guarding.   Musculoskeletal:         General: No tenderness. Normal range of motion.      Cervical back: Normal range of motion and neck supple.     Lymphadenopathy:     She has no cervical adenopathy.   Neurological: She is alert and oriented to person, place, and time. She has normal strength. No sensory deficit. GCS score is 15. GCS eye subscore is 4. GCS verbal subscore is 5. GCS motor subscore is 6.   Skin: Skin is warm and dry. Capillary refill takes less than 2 seconds.   Approximately 2cm in diameter right great toe  ulcer noted with no active bleeding or drainage.   Psychiatric: She has a normal mood and affect. Her behavior is normal.         Medical Screening Exam   See Full Note    ED Course   Procedures  Labs Reviewed   COMPREHENSIVE METABOLIC PANEL - Abnormal       Result Value    Sodium 137      Potassium 5.3 (*)     Chloride 102      CO2 26      Anion Gap 14      Glucose 263 (*)     BUN 24 (*)     Creatinine 1.27 (*)     BUN/Creatinine Ratio 19      Calcium 8.5      Total Protein 6.5      Albumin 2.6 (*)     Globulin 3.9      A/G Ratio 0.7      Bilirubin, Total 0.6      Alk Phos 100      ALT 9      AST 93 (*)     eGFR 48 (*)    CBC WITH DIFFERENTIAL - Abnormal    WBC 15.59 (*)     RBC 4.05 (*)     Hemoglobin 10.5 (*)     Hematocrit 35.0 (*)     MCV 86.4      MCH 25.9 (*)     MCHC 30.0 (*)     RDW 13.8      Platelet Count 442 (*)     MPV 10.4      Neutrophils % 88.6 (*)     Lymphocytes % 5.6 (*)     Monocytes % 3.7      Eosinophils % 0.1 (*)     Basophils % 0.3      Immature Granulocytes % 1.7 (*)     nRBC, Auto 0.0      Neutrophils, Abs 13.83 (*)     Lymphocytes, Absolute 0.87 (*)     Monocytes, Absolute 0.58      Eosinophils, Absolute 0.01      Basophils, Absolute 0.04      Immature Granulocytes, Absolute 0.26 (*)     nRBC, Absolute 0.00      Diff Type Auto     LACTIC ACID, PLASMA - Normal    Lactic Acid 0.9     CULTURE, BLOOD   CULTURE, BLOOD   CBC W/ AUTO DIFFERENTIAL    Narrative:     The following orders were created for panel order CBC auto differential.  Procedure                               Abnormality         Status                     ---------                               -----------         ------                     CBC with Differential[[id number]]       Abnormal            Final result                 Please view results for these tests on the individual orders.          Imaging Results               X-Ray Foot Complete Right (Final result)  Result time 01/10/25 19:34:25      Final result by Lyndon  Almaz SALDANA MD (01/10/25 19:34:25)                   Impression:      Oblique fracture through the mid shaft of the 5th metatarsal with mild medial displacement of the distal fracture fragment.  Oblique fracture through the mid shaft of the 3rd proximal phalanx of the right foot with no significant displacement and alignment maintained.  Fracture through the base of the proximal phalanges of the 2nd digit of the right foot with displacement of the fragment laterally.  This fracture is intra-articular.  There is an irregularity of the contour of the proximal phalanx of the 4th digit.  Could not exclude an incomplete fracture through this region.  See film annotation.  Diffuse osteopenia of the osseous structures of the right foot.  Mild vascular calcification is noted.  Degenerative changes of the tailocuboid joint are noted.  A calcaneal spur is noted along the plantar surface without erosion.    This report was flagged in Epic as abnormal.      Electronically signed by: Almaz Haney  Date:    01/10/2025  Time:    19:34               Narrative:    EXAMINATION:  XR FOOT COMPLETE 3 VIEW RIGHT    CLINICAL HISTORY:  . Unspecified injury of right foot, initial encounter    TECHNIQUE:  AP, lateral, and oblique views of the right foot were performed.    COMPARISON:  None    FINDINGS:  See below                                       Medications   sodium chloride 0.9% bolus 1,000 mL 1,000 mL (0 mLs Intravenous Stopped 1/10/25 2100)     Medical Decision Making  64-year old female presents to the emergency department for evaluation of right great toe wound. States that she first noticed the wound last week and it is progressed significant since then. States that she has an upcoming appointment with wound care on Tuesday, but came to ED because she would like to be evaluated by Dr. Aguilera. Reports a history of DM type II as well as current wound to left heel with wound vac in place that is currently being managed by Dr. Aguilera.  Denies hyperglycemia, fever, chills, nausea, vomiting, bleeding or drainage from left great toe foot wound.  Ordered and reviewed labs  Ordered and reviewed xray right foot as well as radiologist's interpretation with results significant for oblique fracture through the mid shaft of the 5th metatarsal with mild medial displacement of the distal fracture fragment.  Oblique fracture through the mid shaft of the 3rd proximal phalanx of the right foot with no significant displacement and alignment maintained.  Fracture through the base of the proximal phalanges of the 2nd digit of the right foot with displacement of the fragment laterally.  This fracture is intra-articular.  There is an irregularity of the contour of the proximal phalanx of the 4th digit.  Could not exclude an incomplete fracture through this region.  See film annotation.  Diffuse osteopenia of the osseous structures of the right foot.  Mild vascular calcification is noted.  Degenerative changes of the tailocuboid joint are noted.  A calcaneal spur is noted along the plantar surface without erosion.  Spoke with patient regarding regarding results, who reports that foot fractures are chronic and currently being followed by Dr. Ku. Declines walking boot, orthopedic referral.  1L NS IV ordered in ED  Spoke with Dr. Aguilera regarding patient who instructed to have patient follow up with wound care on Tuesday for wound re-evaluation.  Follow-up and return precautions discussed with patient, who verbalized understanding  Prescriptions provided  Diagnosis: Open wound of right great toe    Amount and/or Complexity of Data Reviewed  Labs: ordered.  Radiology: ordered.    Risk  Prescription drug management.                                      Clinical Impression:   Final diagnoses:  [S99.921A] Right foot injury (Primary)  [S99.921A] Right foot injury - right great toe wound  [S91.101A] Open wound of right great toe, initial encounter        ED Disposition  Condition    Discharge Stable          ED Prescriptions       Medication Sig Dispense Start Date End Date Auth. Provider    clindamycin (CLEOCIN) 300 MG capsule Take 1 capsule (300 mg total) by mouth every 6 (six) hours. for 10 days 40 capsule 1/10/2025 1/20/2025 Neftali Beltrán, LLOYD          Follow-up Information    None          Neftali Beltrán, LLOYD  01/10/25 2121

## 2025-01-11 NOTE — DISCHARGE INSTRUCTIONS
Take medication as ordered. Follow up with wound clinic on Tuesday as scheduled. Return to the emergency department for new or worsening symptoms.

## 2025-01-14 ENCOUNTER — OFFICE VISIT (OUTPATIENT)
Dept: WOUND CARE | Facility: CLINIC | Age: 65
End: 2025-01-14
Attending: FAMILY MEDICINE
Payer: COMMERCIAL

## 2025-01-14 VITALS
SYSTOLIC BLOOD PRESSURE: 133 MMHG | HEART RATE: 82 BPM | TEMPERATURE: 97 F | DIASTOLIC BLOOD PRESSURE: 67 MMHG | RESPIRATION RATE: 18 BRPM

## 2025-01-14 DIAGNOSIS — L97.514 DIABETIC ULCER OF TOE OF RIGHT FOOT ASSOCIATED WITH TYPE 2 DIABETES MELLITUS, WITH NECROSIS OF BONE: ICD-10-CM

## 2025-01-14 DIAGNOSIS — E11.621 DIABETIC ULCER OF LEFT HEEL ASSOCIATED WITH TYPE 2 DIABETES MELLITUS, WITH FAT LAYER EXPOSED: Primary | ICD-10-CM

## 2025-01-14 DIAGNOSIS — E11.621 DIABETIC ULCER OF TOE OF RIGHT FOOT ASSOCIATED WITH TYPE 2 DIABETES MELLITUS, WITH NECROSIS OF BONE: ICD-10-CM

## 2025-01-14 DIAGNOSIS — L97.422 DIABETIC ULCER OF LEFT HEEL ASSOCIATED WITH TYPE 2 DIABETES MELLITUS, WITH FAT LAYER EXPOSED: Primary | ICD-10-CM

## 2025-01-14 PROCEDURE — 87076 CULTURE ANAEROBE IDENT EACH: CPT | Mod: ,,, | Performed by: CLINICAL MEDICAL LABORATORY

## 2025-01-14 PROCEDURE — 87186 SC STD MICRODIL/AGAR DIL: CPT | Mod: XU,,, | Performed by: CLINICAL MEDICAL LABORATORY

## 2025-01-14 PROCEDURE — 99499 UNLISTED E&M SERVICE: CPT | Mod: S$PBB,,, | Performed by: NURSE PRACTITIONER

## 2025-01-14 PROCEDURE — 99999PBSHW PR PBB SHADOW TECHNICAL ONLY FILED TO HB: Mod: PBBFAC,,,

## 2025-01-14 PROCEDURE — 96372 THER/PROPH/DIAG INJ SC/IM: CPT | Mod: PBBFAC | Performed by: NURSE PRACTITIONER

## 2025-01-14 PROCEDURE — 87075 CULTR BACTERIA EXCEPT BLOOD: CPT | Mod: ,,, | Performed by: CLINICAL MEDICAL LABORATORY

## 2025-01-14 PROCEDURE — 87077 CULTURE AEROBIC IDENTIFY: CPT | Mod: XU,,, | Performed by: CLINICAL MEDICAL LABORATORY

## 2025-01-14 PROCEDURE — 99215 OFFICE O/P EST HI 40 MIN: CPT | Mod: PBBFAC | Performed by: NURSE PRACTITIONER

## 2025-01-14 PROCEDURE — 87070 CULTURE OTHR SPECIMN AEROBIC: CPT | Mod: ,,, | Performed by: CLINICAL MEDICAL LABORATORY

## 2025-01-14 PROCEDURE — 11044 DBRDMT BONE 1ST 20 SQ CM/<: CPT | Mod: PBBFAC | Performed by: NURSE PRACTITIONER

## 2025-01-14 PROCEDURE — 99999 PR PBB SHADOW E&M-EST. PATIENT-LVL V: CPT | Mod: PBBFAC,,, | Performed by: NURSE PRACTITIONER

## 2025-01-14 RX ORDER — CEFTRIAXONE 1 G/1
1 INJECTION, POWDER, FOR SOLUTION INTRAMUSCULAR; INTRAVENOUS ONCE
Status: COMPLETED | OUTPATIENT
Start: 2025-01-14 | End: 2025-01-14

## 2025-01-14 RX ORDER — LIDOCAINE HYDROCHLORIDE 10 MG/ML
2 INJECTION, SOLUTION EPIDURAL; INFILTRATION; INTRACAUDAL; PERINEURAL DAILY
Qty: 6 ML | Refills: 0 | Status: SHIPPED | OUTPATIENT
Start: 2025-01-14 | End: 2025-01-17

## 2025-01-14 RX ORDER — LIDOCAINE HYDROCHLORIDE 10 MG/ML
2 INJECTION, SOLUTION EPIDURAL; INFILTRATION; INTRACAUDAL; PERINEURAL DAILY
Qty: 6 ML | Refills: 0 | Status: SHIPPED | OUTPATIENT
Start: 2025-01-14 | End: 2025-01-14 | Stop reason: SDUPTHER

## 2025-01-14 RX ORDER — CEFTRIAXONE 1 G/1
1 INJECTION, POWDER, FOR SOLUTION INTRAMUSCULAR; INTRAVENOUS DAILY
Qty: 3 G | Refills: 0 | Status: SHIPPED | OUTPATIENT
Start: 2025-01-14 | End: 2025-01-17

## 2025-01-14 RX ORDER — CEFTRIAXONE 1 G/1
1 INJECTION, POWDER, FOR SOLUTION INTRAMUSCULAR; INTRAVENOUS DAILY
Qty: 3 G | Refills: 0 | Status: SHIPPED | OUTPATIENT
Start: 2025-01-14 | End: 2025-01-14 | Stop reason: SDUPTHER

## 2025-01-14 RX ADMIN — CEFTRIAXONE SODIUM 1 G: 1 INJECTION, POWDER, FOR SOLUTION INTRAMUSCULAR; INTRAVENOUS at 11:01

## 2025-01-14 NOTE — PROCEDURES
"Debridement    Date/Time: 1/14/2025 10:00 AM    Performed by: Marybeth Orellana FNP  Authorized by: Marybeth Orellana FNP    Time out: Immediately prior to procedure a "time out" was called to verify the correct patient, procedure, equipment, support staff and site/side marked as required.    Consent Done?:  Yes (Written)    Wound Details:    Location:  Right foot    Location:  Right 1st Toe    Type of Debridement:  Excisional       Length (cm):  2       Width (cm):  3       Depth (cm):  0.5       Area (sq cm):  6       Percent Debrided (%):  100       Total Area Debrided (sq cm):  6    Depth of debridement:  Bone    Tissue debrided:  Adipose, Dermis, Epidermis, Subcutaneous, Bone and Muscle    Devitalized tissue debrided:  Biofilm    Instruments:  Blade  Bleeding:  Minimal  Hemostasis Achieved: Yes  Method Used:  Pressure  Patient tolerance:  Patient tolerated the procedure well with no immediate complications  1st Wound Pain Assessment: 0     Assistant MEIR King RN    "

## 2025-01-16 LAB
BACTERIA BLD CULT: NORMAL
BACTERIA BLD CULT: NORMAL

## 2025-01-17 RX ORDER — DOXYCYCLINE 100 MG/1
100 CAPSULE ORAL EVERY 12 HOURS
Qty: 20 CAPSULE | Refills: 0 | Status: ON HOLD | OUTPATIENT
Start: 2025-01-17 | End: 2025-01-28 | Stop reason: HOSPADM

## 2025-01-17 NOTE — PROGRESS NOTES
CARISA Ayala   [facility]  [address] [facility] [place] [address]  [phone]      PATIENT NAME: Silvana Sarah  : 1960  DATE: 25  MRN: [id number]      Billing Provider: CARISA Ayala  Level of Service:   Patient PCP Information       Provider PCP Type    CARISA Cárdenas General            Reason for Visit / Chief Complaint: Diabetic ulcer of left heel associated with type 2 diabetes       History of Present Illness / Problem Focused Workflow     Silvana Sarah is a 65 yo female presents for follow up on chronic non healing wound to left heel. Wound continues to improve with NPWT. Maceration periwound and biofilm to wound bed, bedside debridement today. Continue NPWT at 125mmHg. Right great toe is worse with gangrene, she is scheduled for amputation today.     8/15/24  62 yo female presents with complaints of ulcer to left heel and right great toe. Wound on left heel with with necrotic tissue and slough, bedside debridement today. Left foot is edematous and tender. Recent cultures reviewed. Ceftin to pharmacy. Santyl and vashe moistened drawtex to wound bed. Supplies ordered from [employer]. Wound on right great toe will moderate serous drainage. Aquacel ag applied. Pertinent PMH includes diabetes, hypertension, peripheral neuropathy, gastroparesis, and rheumatoid arthritis. Last HgA1C 7.2 2023, diabetes managed by PCP. Wound healing is complicated by multiple co-morbidities, poor vascular supply, immunosuppression, diabetes, edema, heavy drainage, decreased granulation tissue, necrosis, large surface area, large volume, advanced age, fragile skin, and infection.             Review of Systems     Review of Systems   Constitutional:  Positive for activity change. Negative for chills and fever.   Respiratory:  Negative for chest tightness and shortness of breath.    Cardiovascular:  Positive for leg swelling. Negative for  chest pain and palpitations.   Musculoskeletal:  Positive for arthralgias and joint swelling.   Skin:  Positive for color change and wound.        wound   Neurological:  Positive for weakness.   Psychiatric/Behavioral:  Negative for agitation, behavioral problems, confusion and self-injury.        Medical / Social / Family History     Past Medical History:   Diagnosis Date    Anxiety and depression 2021    Diabetes mellitus     Dry eye syndrome, bilateral 10/06/2017    Essential (primary) hypertension     Gastroparesis due to DM     Generalized osteoarthritis 10/25/2021    Other mechanical complication of implanted electronic neurostimulator, generator, sequela 10/25/2021    Peripheral autonomic neuropathy due to DM     Rheumatoid arthritis        Past Surgical History:   Procedure Laterality Date     SECTION      DEBRIDEMENT OF LOWER EXTREMITY Left 2024    Procedure: DEBRIDEMENT, LOWER EXTREMITY;  Surgeon: David Aguilera MD;  Location: [facility] [place];  Service: General;  Laterality: Left;  Left foot    GASTRIC STIMULATOR IMPLANT SURGERY      HYSTERECTOMY      INCISION AND DRAINAGE OF ABSCESS Right 2022    Procedure: INCISION AND DRAINAGE, ABSCESS;  Surgeon: Kamari Sandoval DO;  Location: [facility] [place];  Service: General;  Laterality: Right;  right hand dr sandoval am    IRRIGATION AND DEBRIDEMENT OF UPPER EXTREMITY Right 2022    Procedure: IRRIGATION AND DEBRIDEMENT, UPPER EXTREMITY;  Surgeon: Kamari Sandoval DO;  Location: [facility] [place];  Service: General;  Laterality: Right;  right hand I/D dr sandoval today       Social History  Ms. Silvana Sarah  reports that she has never smoked. She has never used smokeless tobacco. She reports that she does not drink alcohol.    Family History  Ms.'s Silvana Sarah family history includes Cancer in her father; Diabetes in her son and son; Heart disease in her mother.    Medications and Allergies     Medications  Outpatient  Medications Marked as Taking for the 25 encounter (Office Visit) with Marybeth Orellana FNP   Medication Sig Dispense Refill    b complex vitamins capsule Take 1 capsule by mouth once daily.      cyanocobalamin 1,000 mcg/mL injection Inject 1,000 mcg into the muscle every 30 days.      esomeprazole (NEXIUM) 20 MG capsule Take 20 mg by mouth once daily.      etanercept (ENBREL MINI) 50 mg/mL (1 mL) Crtg 1 mL by abdominal subcutaneous route every .       ferrous sulfate (FEOSOL) Tab tablet Take 1 tablet by mouth once daily.      gabapentin (NEURONTIN) 600 MG tablet Take 2 tablets 3 times a day by oral route for 30 days.      hydrOXYchloroQUINE (PLAQUENIL) 200 mg tablet Take 200 mg by mouth 2 (two) times daily.       insulin regular 100 unit/mL Inj injection 3 (three) times daily before meals.      LEVEMIR U-100 INSULIN 100 unit/mL injection SMARTSI Unit(s) SUB-Q Daily      lisinopriL (PRINIVIL,ZESTRIL) 2.5 MG tablet Take 2.5 mg by mouth every evening.      lysine (L-LYSINE) 500 mg Tab Take 500 mg by mouth 2 (two) times a day.      metroNIDAZOLE (FLAGYL) 500 MG tablet Take 1 tablet (500 mg total) by mouth every 12 (twelve) hours. for 14 days 28 tablet 0    nystatin (MYCOSTATIN) 100,000 unit/mL suspension Take 6 mLs by mouth 3 (three) times daily before meals.       oxyCODONE-acetaminophen (PERCOCET)  mg per tablet Take 1 tablet by mouth 4 (four) times daily.      predniSONE (DELTASONE) 5 MG tablet Take 5 mg by mouth once daily.       promethazine (PHENERGAN) 25 MG tablet Take 25 mg by mouth every 6 (six) hours as needed for Nausea.       triamterene-hydrochlorothiazide 37.5-25 mg (DYAZIDE) 37.5-25 mg per capsule Take 1 capsule by mouth once daily.       zinc gluconate 50 mg tablet Take 50 mg by mouth once daily.         Allergies  Review of patient's allergies indicates:   Allergen Reactions    Influenza virus vaccines     Penicillins        Physical Examination     Vitals:     01/28/25 0946   BP: 114/70   Pulse: 101   Resp: 18   Temp: 98 °F (36.7 °C)               Physical Exam  Vitals and nursing note reviewed.   Constitutional:       Appearance: Normal appearance.   HENT:      Head: Normocephalic.   Cardiovascular:      Rate and Rhythm: Normal rate and regular rhythm.      Pulses: Normal pulses.      Heart sounds: Normal heart sounds.   Pulmonary:      Effort: Pulmonary effort is normal. No respiratory distress.   Chest:      Chest wall: No tenderness.   Musculoskeletal:         General: Swelling and tenderness present.      Right lower leg: Edema present.      Left lower leg: Edema present.   Skin:     General: Skin is warm and dry.      Findings: Erythema present.      Comments: See LDA for photos/measurements   Neurological:      General: No focal deficit present.      Mental Status: She is alert and oriented to person, place, and time. Mental status is at baseline.   Psychiatric:         Mood and Affect: Mood normal.         Behavior: Behavior normal.         Thought Content: Thought content normal.         Judgment: Judgment normal.     Assessment and Plan             Wound 08/15/24 Diabetic Ulcer Left posterior Heel #1 (Active)   08/15/24  Heel   Present on Original Admission: Y   Primary Wound Type: Diabetic ulc   Side: Left   Orientation: posterior   Wound Approximate Age at First Assessment (Weeks): 9 weeks   Wound Number: #1   Is this injury device related?:    Incision Type:    Closure Method:    Wound Description (Comments):    Type:    Additional Comments:    Ankle-Brachial Index:    Pulses:    Removal Indication and Assessment:    Wound Outcome:    Wound Image    01/28/25 0957   Dressing Appearance Moist drainage;Intact 01/28/25 0957   Drainage Amount Moderate 01/28/25 0957   Drainage Characteristics/Odor Serosanguineous 01/28/25 0957   Appearance Moist;Red;Granulating 01/28/25 0957   Tissue loss description Full thickness 01/28/25 0957   Black (%), Wound Tissue Color 0  % 01/28/25 0957   Red (%), Wound Tissue Color 100 % 01/28/25 0957   Yellow (%), Wound Tissue Color 0 % 01/28/25 0957   Periwound Area Moist 01/28/25 0957   Wound Edges Defined 01/28/25 0957   [place] Classification (diabetic foot ulcers only) Grade 1 01/28/25 0957   Wound Length (cm) 0.9 cm 01/28/25 0957   Wound Width (cm) 2 cm 01/28/25 0957   Wound Depth (cm) 0.5 cm 01/28/25 0957   Wound Volume (cm^3) 0.9 cm^3 01/28/25 0957   Wound Surface Area (cm^2) 1.8 cm^2 01/28/25 0957   Undermining (depth (cm)/location) O.8cm @0700 01/28/25 0957   Care Cleansed with:;Antimicrobial agent 01/28/25 0957   Dressing Applied;Other (comment) 01/28/25 0957            Wound 01/14/25 1054 Diabetic Ulcer Right posterior Toe, first (Active)   01/14/25 1054 Toe, first   Present on Original Admission: Y   Primary Wound Type: Diabetic ulc   Side: Right   Orientation: posterior   Wound Approximate Age at First Assessment (Weeks): 2 weeks   Wound Number:    Is this injury device related?:    Incision Type:    Closure Method:    Wound Description (Comments):    Type:    Additional Comments:    Ankle-Brachial Index:    Pulses:    Removal Indication and Assessment:    Wound Outcome:    Wound Image     01/28/25 0950   Dressing Appearance Dry;Intact 01/28/25 0950   Drainage Amount None 01/28/25 0950   Appearance Black;Eschar 01/28/25 0950   Tissue loss description Full thickness 01/28/25 0950   Black (%), Wound Tissue Color 100 % 01/28/25 0950   Red (%), Wound Tissue Color 0 % 01/28/25 0950   Yellow (%), Wound Tissue Color 0 % 01/28/25 0950   Periwound Area Macerated 01/28/25 0950   Wound Edges Irregular 01/28/25 0950   Wound Length (cm) 5 cm 01/28/25 0950   Wound Width (cm) 4.5 cm 01/28/25 0950   Wound Depth (cm) 0 cm 01/28/25 0950   Wound Volume (cm^3) 0 cm^3 01/28/25 0950   Wound Surface Area (cm^2) 22.5 cm^2 01/28/25 0950   Care Cleansed with:;Antimicrobial agent 01/28/25 0950   Dressing Applied;Gauze;Rolled gauze 01/28/25 0950                Problem List Items Addressed This Visit          Endocrine    Diabetic ulcer of left heel associated with type 2 diabetes mellitus, with fat layer exposed - Primary    Overview                                        Current Assessment & Plan     Left heel: Clean wound with Vashe and pat dry. Apply Easkins ring to periwound. Apply wound vac at 125mmHg. Home health to change wound vac twice per week.     Elevate legs when sitting  Avoid pressure on wound.   Diabetes:  Monitor glucose closely. Check fasting glucose and 2 hours after meals. HgA1C goal <7, fasting glucose , and 2 hours after meals <180  Hypertension:  Check blood pressure twice daily, goal <120/80  Diet:   Increase protein intake, avoid fried, fatty foods and foods high in simple carbs.   Vitamins:  Take vitamin C 1000 mg, zinc 50mg, vitamin d 5000 units, and a daily multivitamin. Luhco is a good source of protein and nutrients to aid in wound healing.   Monitor closely for s/s of infection including fever, chills, increase in pain, odor from wound, and increased redness from foot. Go to ER if any complications develo         Diabetic ulcer of toe of right foot associated with type 2 diabetes mellitus, with necrosis of bone    Overview                      Current Assessment & Plan     Amputation today per Dr. Aguilera.             Future Appointments   Date Time Provider Department Center   2/4/2025 10:00 AM Marybeth Orellana FNP [facility]            Signature:  CARISA Ayala  [facility]  [address] [facility] [place] [address]  [phone]    Date of encounter: 1/28/25

## 2025-01-17 NOTE — PATIENT INSTRUCTIONS
Left heel: Clean wound with Vashe and pat dry. Apply Easkins ring to periwound. Apply wound vac at 125mmHg. Home health to change wound vac twice per week.     Right first toe: Clean with Vashe and pat dry. Cover with dry gauze, wrap with rolled gauze and secure with paper tape. Perform wound care daily and as needed with soilage.    Elevate legs when sitting  Avoid pressure on wound.   Diabetes:  Monitor glucose closely. Check fasting glucose and 2 hours after meals. HgA1C goal <7, fasting glucose , and 2 hours after meals <180  Hypertension:  Check blood pressure twice daily, goal <120/80  Diet:   Increase protein intake, avoid fried, fatty foods and foods high in simple carbs.   Vitamins:  Take vitamin C 1000 mg, zinc 50mg, vitamin d 5000 units, and a daily multivitamin. Lucho is a good source of protein and nutrients to aid in wound healing.   Monitor closely for s/s of infection including fever, chills, increase in pain, odor from wound, and increased redness from foot. Go to ER if any complications develop.

## 2025-01-18 LAB
MICROORGANISM SPEC CULT: ABNORMAL
MICROORGANISM SPEC CULT: ABNORMAL

## 2025-01-19 LAB — BACTERIA SPEC ANAEROBE CULT: ABNORMAL

## 2025-01-21 ENCOUNTER — TELEPHONE (OUTPATIENT)
Dept: WOUND CARE | Facility: CLINIC | Age: 65
End: 2025-01-21
Payer: COMMERCIAL

## 2025-01-21 RX ORDER — METRONIDAZOLE 500 MG/1
500 TABLET ORAL EVERY 12 HOURS
Qty: 28 TABLET | Refills: 0 | Status: SHIPPED | OUTPATIENT
Start: 2025-01-21 | End: 2025-02-04

## 2025-01-21 NOTE — TELEPHONE ENCOUNTER
Pt called and informed of culture results and order for Doxycycline and flagyl. She states she picked up the doxycycline this weekend and it has been hard on her stomach but she will try to take it and the flagyl. CARISA Ayala notified.

## 2025-01-21 NOTE — TELEPHONE ENCOUNTER
Called patient to inform her of culture resutls and new order for doxycyline. There was no answer, voicemail left to call the clinic.

## 2025-01-21 NOTE — PROGRESS NOTES
Pt notified. She states the doxycycline has been hard on her stomach but she will try to take it and the flagyl

## 2025-01-27 ENCOUNTER — OFFICE VISIT (OUTPATIENT)
Dept: SURGERY | Facility: CLINIC | Age: 65
End: 2025-01-27
Attending: SURGERY
Payer: COMMERCIAL

## 2025-01-27 DIAGNOSIS — E11.621 DIABETIC ULCER OF TOE OF RIGHT FOOT ASSOCIATED WITH TYPE 2 DIABETES MELLITUS, WITH NECROSIS OF BONE: Primary | ICD-10-CM

## 2025-01-27 DIAGNOSIS — Z01.818 PRE-PROCEDURAL EXAMINATION: ICD-10-CM

## 2025-01-27 DIAGNOSIS — L97.514 DIABETIC ULCER OF TOE OF RIGHT FOOT ASSOCIATED WITH TYPE 2 DIABETES MELLITUS, WITH NECROSIS OF BONE: Primary | ICD-10-CM

## 2025-01-27 PROCEDURE — 99999 PR PBB SHADOW E&M-EST. PATIENT-LVL V: CPT | Mod: PBBFAC,,, | Performed by: SURGERY

## 2025-01-27 PROCEDURE — 99215 OFFICE O/P EST HI 40 MIN: CPT | Mod: PBBFAC | Performed by: SURGERY

## 2025-01-27 RX ORDER — SODIUM CHLORIDE 9 MG/ML
INJECTION, SOLUTION INTRAVENOUS CONTINUOUS
Status: CANCELLED | OUTPATIENT
Start: 2025-01-27

## 2025-01-27 RX ORDER — CEFAZOLIN SODIUM 2 G/50ML
2 SOLUTION INTRAVENOUS
Status: CANCELLED | OUTPATIENT
Start: 2025-01-27

## 2025-01-27 NOTE — PROGRESS NOTES
General Surgery History and Physical      Patient ID: Silvana Sarah is a 64 y.o. female.    Chief Complaint: Wound Check (Right toe wound )      HPI:  64-year-old female familiar to the service history of diabetic peripheral vascular disease status post debridement of her left heel in the past.  Few weeks ago she had a scab and little ulceration of the distal part of the right toe and she has been on local wound care and antibiotics however there is a worsening necrotic ulceration and gangrene formation at the distal half of her toe with some purplish discoloration of the proximal half as well.  No pain no drainage no redness she has been started on daptomycin IV antibiotics for it as oral medications were not tolerated.  She has a wound care appointment tomorrow and she knows that the toe needs to be removed.    Review of Systems   Constitutional:  Negative for activity change, appetite change, fatigue and fever.   HENT:  Negative for trouble swallowing.    Respiratory:  Negative for cough and shortness of breath.    Cardiovascular:  Negative for chest pain and palpitations.   Gastrointestinal:  Negative for abdominal distention, abdominal pain, blood in stool, constipation and diarrhea.   Genitourinary:  Negative for flank pain.   Musculoskeletal:  Negative for neck pain and neck stiffness.   Skin:  Positive for color change and wound.   Neurological:  Negative for weakness.       Current Outpatient Medications   Medication Sig Dispense Refill    b complex vitamins capsule Take 1 capsule by mouth once daily.      collagenase (SANTYL) ointment Apply topically once daily. (Patient not taking: Reported on 1/14/2025) 30 g 1    cyanocobalamin 1,000 mcg/mL injection Inject 1,000 mcg into the muscle every 30 days.      doxycycline (VIBRAMYCIN) 100 MG Cap Take 1 capsule (100 mg total) by mouth every 12 (twelve) hours. for 10  days 20 capsule 0    esomeprazole (NEXIUM) 20 MG capsule Take 20 mg by mouth once daily.      etanercept (ENBREL MINI) 50 mg/mL (1 mL) Crtg 1 mL by abdominal subcutaneous route every .       ferrous sulfate (FEOSOL) Tab tablet Take 1 tablet by mouth once daily.      gabapentin (NEURONTIN) 600 MG tablet Take 2 tablets 3 times a day by oral route for 30 days.      hydrOXYchloroQUINE (PLAQUENIL) 200 mg tablet Take 200 mg by mouth 2 (two) times daily.       insulin regular 100 unit/mL Inj injection 3 (three) times daily before meals.      LEVEMIR U-100 INSULIN 100 unit/mL injection SMARTSI Unit(s) SUB-Q Daily      lisinopriL (PRINIVIL,ZESTRIL) 2.5 MG tablet Take 2.5 mg by mouth every evening.      lysine (L-LYSINE) 500 mg Tab Take 500 mg by mouth 2 (two) times a day.      metroNIDAZOLE (FLAGYL) 500 MG tablet Take 1 tablet (500 mg total) by mouth every 12 (twelve) hours. for 14 days 28 tablet 0    nystatin (MYCOSTATIN) 100,000 unit/mL suspension Take 6 mLs by mouth 3 (three) times daily before meals.       oxyCODONE (ROXICODONE) 10 mg Tab immediate release tablet Take 10 mg by mouth every 8 (eight) hours as needed. (Patient not taking: Reported on 8/15/2024)      oxyCODONE-acetaminophen (PERCOCET)  mg per tablet Take 1 tablet by mouth 4 (four) times daily.      predniSONE (DELTASONE) 5 MG tablet Take 5 mg by mouth once daily.       promethazine (PHENERGAN) 25 MG tablet Take 25 mg by mouth every 6 (six) hours as needed for Nausea.       triamterene-hydrochlorothiazide 37.5-25 mg (DYAZIDE) 37.5-25 mg per capsule Take 1 capsule by mouth once daily.       zinc gluconate 50 mg tablet Take 50 mg by mouth once daily.       No current facility-administered medications for this visit.       Review of patient's allergies indicates:   Allergen Reactions    Influenza virus vaccines     Penicillins        Past Medical History:   Diagnosis Date    Anxiety and depression 2021    Diabetes mellitus     Dry eye  syndrome, bilateral 10/06/2017    Essential (primary) hypertension     Gastroparesis due to DM     Generalized osteoarthritis 10/25/2021    Other mechanical complication of implanted electronic neurostimulator, generator, sequela 10/25/2021    Peripheral autonomic neuropathy due to DM     Rheumatoid arthritis        Past Surgical History:   Procedure Laterality Date     SECTION      DEBRIDEMENT OF LOWER EXTREMITY Left 2024    Procedure: DEBRIDEMENT, LOWER EXTREMITY;  Surgeon: David Aguilera MD;  Location: [facility] OR;  Service: General;  Laterality: Left;  Left foot    GASTRIC STIMULATOR IMPLANT SURGERY  2010    HYSTERECTOMY      INCISION AND DRAINAGE OF ABSCESS Right 2022    Procedure: INCISION AND DRAINAGE, ABSCESS;  Surgeon: Kamari Sandoval DO;  Location: [facility] OR;  Service: General;  Laterality: Right;  right hand dr sandoval am    IRRIGATION AND DEBRIDEMENT OF UPPER EXTREMITY Right 2022    Procedure: IRRIGATION AND DEBRIDEMENT, UPPER EXTREMITY;  Surgeon: Kamari Sandoval DO;  Location: [facility];  Service: General;  Laterality: Right;  right hand I/D dr sandoval today       Family History   Problem Relation Name Age of Onset    Heart disease Mother      Cancer Father      Diabetes Son      Diabetes Son         Social History     Socioeconomic History    Marital status:    Tobacco Use    Smoking status: Never    Smokeless tobacco: Never   Substance and Sexual Activity    Alcohol use: Never       There were no vitals filed for this visit.    Physical Exam  Constitutional:       General: She is not in acute distress.  HENT:      Head: Normocephalic.   Cardiovascular:      Rate and Rhythm: Normal rate and regular rhythm.      Pulses: Normal pulses.   Pulmonary:      Effort: Pulmonary effort is normal. No respiratory distress.      Breath sounds: Normal breath sounds.   Abdominal:      General: Abdomen is flat. There is no distension.      Palpations: Abdomen is soft.       Tenderness: There is no abdominal tenderness.   Musculoskeletal:         General: Normal range of motion.   Skin:     General: Skin is warm.      Findings: Lesion (Dry gangrene with some associated bogginess and purple discoloration of the proximal half of the right 1st toe) present.   Neurological:      General: No focal deficit present.      Mental Status: She is oriented to person, place, and time.         Assessment & Plan:    Diabetic ulcer of toe of right foot associated with type 2 diabetes mellitus, with necrosis of bone    Pre-procedural examination  -     CBC Auto Differential; Future; Expected date: 01/27/2025  -     Comprehensive Metabolic Panel; Future; Expected date: 01/27/2025        Patient with a worsening gangrene of the right 1st toe.  Patient will go to the operating room tomorrow after her wound care appointment later in the day for a right 1st toe amputation.  Risks and benefits explained to the patient clear risk of bleeding, infection, failure of the wound to heal, possible need for higher amputation were all explained to the patient.  All questions were answered.

## 2025-01-27 NOTE — PATIENT INSTRUCTIONS
[facility]  Instructions for surgery        Your surgery is scheduled for 1/28/2025 at [facility] on the 1st floor of the [facility] building.Your arrival time is 8 a.m   You will be notified the day before  surgery verifying your arrival time for surgery.    Your preop lab work will be done today. Lab is on the 1st floor of the clinic. EKG is on 2nd floor of the clinic.                                                                                                                                                                                                                                                                                                                                                                           Day of Surgery Instructions      Bring a list of all your medications with you the day of your surgery. You can also give the list to your doctor or nurse during your final clinic appointment before surgery.      Do not eat any solid foods or drink any liquids after 12:00 AM (midnight). This includes gum, hard candy, mints, and chewing tobacco.  Medications: Take any medications specified with a small sip of water the morning of your surgery.  Brush your teeth: You may brush your teeth and rinse your mouth. Do not swallow any water or toothpaste.  Clothing: A button front shirt and loose-fitting clothes are the most comfortable before and after surgery. We also recommend low-heeled shoes.  Hair: Avoid buns, ponytails, or hairpieces at the back of the head. Remove or avoid any clips, pins or bands that bind hair. Do not use hairspray. Before going to surgery, you will need to remove any wigs or hairpieces.  We will cover your hair during surgery. Your privacy regarding personal appearance will be respected.  Fingernails: Please be sure to remove all nail polish before you arrive for surgery. We understand that tips, wraps, gels, etc., are  expensive; however, we ask these products to be removed from at least one finger on each hand. Your fingertips are used to accurately monitor your oxygen level during surgery by a device called an oximeter.  Glasses and Contact Lenses: Wear glasses when possible. If contact lenses must be worn, bring a lens case and solution. If glasses are worn, bring a case for them.  Hearing Aids: If you rely on a hearing aid, wear it to the hospital on the day of surgery. This will ensure you can hear and understand everything we need to communicate with you.  Valuables: Jewelry, including body piercings, Dentures, money, and credit cards should be left at home. [facility] is not responsible for valuables that are not secured in our surgery center.  Makeup, Perfume, Creams, Lotions and Deodorants: Do not use any of these products on the day of surgery. Remove false eyelashes prior to surgery.  Implanted Medical Devices: If you have an implanted device, such as a pacemaker or AICD, bring the device information card (if you have it) with you.  Medical Equipment: If you have been fitted for a brace to wear after surgery or you have been given crutches, bring those with you to the surgery center.  Shower: Take a shower with Hibiclens® (chlorhexidine) (available over the counter). This reduces the chance of infection. PLEASE USE CHLORHEXIDINE WASH THE NIGHT BEFORE SURGERY AND THE MORNING OF SURGERY.  ONLY 2 VISITORS ARE ALLOWED WITH YOU ON DAY OF SURGERY.        Medication instructions:  You may take blood pressure medication with a small drink of water the morning of surgery.    Stop your blood thinner  days before surgery    IF YOU ARE ON ANY OF THESE BLOOD THINNERS, MAKE SURE YOUR PHYSICIAN IS AWARE.  Eliquis/Apixaban            Wafarin/Coumadin,Jantoven  Xarelto/Rivaroxaban      Pletal/Cilostazol  Plavix/Clopidogrel          Pradaxa/Dibigatran      If you are diabetic      Follow the diabetic medicine instructions you received  during your pre-operative visit.  DO NOT take your insulin or diabetic medications the morning of surgery.  When you arrive at the surgical center, be sure to tell the nurse you are diabetic.    The following blood sugar medications have to be stopped prior to surgery:    Hold 24 hours prior to surgery:    Libraglutide - Saxenda, Victoza  Lixisenatide --Adlxyin  Exenatide  --  Byetta  Empaglifozin--Jardiance  Sitaglitin--Januvia    Hold 1 week prior to surgery:    Semaglutide - Ozempic, Wegouy, Rybelsus  Dulaglutide - Trulicity  Tirzepatide - Mounjaro  Exenatide (extended release inj)-- Bydureon BCise      Hold 48 hours prior to surgery:    Metformin, Glucovance, Metaglip, Fortamet, Glucophage, Riomet, Avandamet, Glimepiride            Other Items to bring with you and know    Insurance card  Identification card such as 's license, passport, or other picture ID  Copy of your advance directives  List of medications and allergies, if not already provided  Name and phone number of person to contact if your condition changes significantly. YOU CANNOT DRIVE YOURSELF HOME FROM THE HOSPITAL THE DAY OF SURGERY.  PLEASE UNDERSTAND THAT OUR OFFICE DOES NOT GIVE PATHOLOGY RESULTS OR TEST RESULTS OVER THE PHONE. THIS WILL BE DISCUSSED WITH YOU ON YOUR FOLLOW UP APPOINTMENT.  IF YOU SUBMIT FMLA FORMS, IT WILL TAKE 3-7 DAYS TO COMPLETE THESE          Alcohol and Surgery  We want to help you prepare for and recover from surgery as quickly and safely as possible. Be open and honest with your provider about how many drinks you have per day. Excessive alcohol use is defined as drinking more than three drinks per day. It can affect the outcome of your surgery. Binge drinking (consuming large amounts of alcohol infrequently, such as on weekends) can also affect the outcome of your surgery.  Alcohol withdrawal  If you drink more than three drinks a day, you could have a complication, called alcohol withdrawal, after  surgery.  Alcohol withdrawal is a set of symptoms that people have when they suddenly stop drinking after using alcohol  for a long time. During withdrawal, a person's central nervous system overreacts. This can cause mild symptoms such as shakiness, sweating or hallucinating. It can also cause other more serious side effects. If not treated properly, alcohol withdrawal can cause potentially life-threatening complications after surgery. This can include tremors, seizures, hallucinations, delirium tremors, and even death. Untreated alcohol withdrawal often leads to a longer stay in the hospital, potentially in the Intensive Care Unit.  Chronic heavy drinking also can interfere with several organ systems and biochemical processes in the body.  This interference can cause serious, even life-threatening complications.  Your care team can offer alcohol withdrawal treatment to help:  Decrease the risk of seizures and delirium tremors after surgery  Decrease the risk we will need to restrain you for your own safety or the safety of others  Decrease your risk of falling after surgery  Reduce the use of potent sedative medications  Reduce the time you stay in the hospital after surgery  Reduce the time you might spend on a mechanical ventilator to help you breathe  Lower incidence of organ failure and biochemical complications  Talk to a member of your care team or your primary care physician about your alcohol use if you feel you may be at risk of any of these complications.        Smoking and Surgery  Quitting smoking is extremely important for a successful surgery and recovery. Cigarette smoking compromises your immune system. This increases your risk of an infection after surgery. Quitting the habit before surgery will decrease the surgical risks associated with smoking.

## 2025-01-27 NOTE — H&P (VIEW-ONLY)
General Surgery History and Physical      Patient ID: Silvana Sarah is a 64 y.o. female.    Chief Complaint: Wound Check (Right toe wound )      HPI:  64-year-old female familiar to the service history of diabetic peripheral vascular disease status post debridement of her left heel in the past.  Few weeks ago she had a scab and little ulceration of the distal part of the right toe and she has been on local wound care and antibiotics however there is a worsening necrotic ulceration and gangrene formation at the distal half of her toe with some purplish discoloration of the proximal half as well.  No pain no drainage no redness she has been started on daptomycin IV antibiotics for it as oral medications were not tolerated.  She has a wound care appointment tomorrow and she knows that the toe needs to be removed.    Review of Systems   Constitutional:  Negative for activity change, appetite change, fatigue and fever.   HENT:  Negative for trouble swallowing.    Respiratory:  Negative for cough and shortness of breath.    Cardiovascular:  Negative for chest pain and palpitations.   Gastrointestinal:  Negative for abdominal distention, abdominal pain, blood in stool, constipation and diarrhea.   Genitourinary:  Negative for flank pain.   Musculoskeletal:  Negative for neck pain and neck stiffness.   Skin:  Positive for color change and wound.   Neurological:  Negative for weakness.       Current Outpatient Medications   Medication Sig Dispense Refill    b complex vitamins capsule Take 1 capsule by mouth once daily.      collagenase (SANTYL) ointment Apply topically once daily. (Patient not taking: Reported on 1/14/2025) 30 g 1    cyanocobalamin 1,000 mcg/mL injection Inject 1,000 mcg into the muscle every 30 days.      doxycycline (VIBRAMYCIN) 100 MG Cap Take 1 capsule (100 mg total) by mouth every 12 (twelve) hours. for 10  days 20 capsule 0    esomeprazole (NEXIUM) 20 MG capsule Take 20 mg by mouth once daily.      etanercept (ENBREL MINI) 50 mg/mL (1 mL) Crtg 1 mL by abdominal subcutaneous route every .       ferrous sulfate (FEOSOL) Tab tablet Take 1 tablet by mouth once daily.      gabapentin (NEURONTIN) 600 MG tablet Take 2 tablets 3 times a day by oral route for 30 days.      hydrOXYchloroQUINE (PLAQUENIL) 200 mg tablet Take 200 mg by mouth 2 (two) times daily.       insulin regular 100 unit/mL Inj injection 3 (three) times daily before meals.      LEVEMIR U-100 INSULIN 100 unit/mL injection SMARTSI Unit(s) SUB-Q Daily      lisinopriL (PRINIVIL,ZESTRIL) 2.5 MG tablet Take 2.5 mg by mouth every evening.      lysine (L-LYSINE) 500 mg Tab Take 500 mg by mouth 2 (two) times a day.      metroNIDAZOLE (FLAGYL) 500 MG tablet Take 1 tablet (500 mg total) by mouth every 12 (twelve) hours. for 14 days 28 tablet 0    nystatin (MYCOSTATIN) 100,000 unit/mL suspension Take 6 mLs by mouth 3 (three) times daily before meals.       oxyCODONE (ROXICODONE) 10 mg Tab immediate release tablet Take 10 mg by mouth every 8 (eight) hours as needed. (Patient not taking: Reported on 8/15/2024)      oxyCODONE-acetaminophen (PERCOCET)  mg per tablet Take 1 tablet by mouth 4 (four) times daily.      predniSONE (DELTASONE) 5 MG tablet Take 5 mg by mouth once daily.       promethazine (PHENERGAN) 25 MG tablet Take 25 mg by mouth every 6 (six) hours as needed for Nausea.       triamterene-hydrochlorothiazide 37.5-25 mg (DYAZIDE) 37.5-25 mg per capsule Take 1 capsule by mouth once daily.       zinc gluconate 50 mg tablet Take 50 mg by mouth once daily.       No current facility-administered medications for this visit.       Review of patient's allergies indicates:   Allergen Reactions    Influenza virus vaccines     Penicillins        Past Medical History:   Diagnosis Date    Anxiety and depression 2021    Diabetes mellitus     Dry eye  syndrome, bilateral 10/06/2017    Essential (primary) hypertension     Gastroparesis due to DM     Generalized osteoarthritis 10/25/2021    Other mechanical complication of implanted electronic neurostimulator, generator, sequela 10/25/2021    Peripheral autonomic neuropathy due to DM     Rheumatoid arthritis        Past Surgical History:   Procedure Laterality Date     SECTION      DEBRIDEMENT OF LOWER EXTREMITY Left 2024    Procedure: DEBRIDEMENT, LOWER EXTREMITY;  Surgeon: David Aguilera MD;  Location: [facility] OR;  Service: General;  Laterality: Left;  Left foot    GASTRIC STIMULATOR IMPLANT SURGERY  2010    HYSTERECTOMY      INCISION AND DRAINAGE OF ABSCESS Right 2022    Procedure: INCISION AND DRAINAGE, ABSCESS;  Surgeon: Kamari Sandoval DO;  Location: [facility] OR;  Service: General;  Laterality: Right;  right hand dr sandoval am    IRRIGATION AND DEBRIDEMENT OF UPPER EXTREMITY Right 2022    Procedure: IRRIGATION AND DEBRIDEMENT, UPPER EXTREMITY;  Surgeon: Kamari Sandoval DO;  Location: [facility];  Service: General;  Laterality: Right;  right hand I/D dr sandoval today       Family History   Problem Relation Name Age of Onset    Heart disease Mother      Cancer Father      Diabetes Son      Diabetes Son         Social History     Socioeconomic History    Marital status:    Tobacco Use    Smoking status: Never    Smokeless tobacco: Never   Substance and Sexual Activity    Alcohol use: Never       There were no vitals filed for this visit.    Physical Exam  Constitutional:       General: She is not in acute distress.  HENT:      Head: Normocephalic.   Cardiovascular:      Rate and Rhythm: Normal rate and regular rhythm.      Pulses: Normal pulses.   Pulmonary:      Effort: Pulmonary effort is normal. No respiratory distress.      Breath sounds: Normal breath sounds.   Abdominal:      General: Abdomen is flat. There is no distension.      Palpations: Abdomen is soft.       Tenderness: There is no abdominal tenderness.   Musculoskeletal:         General: Normal range of motion.   Skin:     General: Skin is warm.      Findings: Lesion (Dry gangrene with some associated bogginess and purple discoloration of the proximal half of the right 1st toe) present.   Neurological:      General: No focal deficit present.      Mental Status: She is oriented to person, place, and time.         Assessment & Plan:    Diabetic ulcer of toe of right foot associated with type 2 diabetes mellitus, with necrosis of bone    Pre-procedural examination  -     CBC Auto Differential; Future; Expected date: 01/27/2025  -     Comprehensive Metabolic Panel; Future; Expected date: 01/27/2025        Patient with a worsening gangrene of the right 1st toe.  Patient will go to the operating room tomorrow after her wound care appointment later in the day for a right 1st toe amputation.  Risks and benefits explained to the patient clear risk of bleeding, infection, failure of the wound to heal, possible need for higher amputation were all explained to the patient.  All questions were answered.

## 2025-01-28 ENCOUNTER — OFFICE VISIT (OUTPATIENT)
Dept: WOUND CARE | Facility: CLINIC | Age: 65
End: 2025-01-28
Attending: FAMILY MEDICINE
Payer: COMMERCIAL

## 2025-01-28 ENCOUNTER — HOSPITAL ENCOUNTER (OUTPATIENT)
Facility: HOSPITAL | Age: 65
Discharge: HOME | End: 2025-01-28
Attending: SURGERY | Admitting: SURGERY
Payer: COMMERCIAL

## 2025-01-28 ENCOUNTER — ANESTHESIA (OUTPATIENT)
Dept: SURGERY | Facility: HOSPITAL | Age: 65
End: 2025-01-28
Payer: COMMERCIAL

## 2025-01-28 ENCOUNTER — ANESTHESIA EVENT (OUTPATIENT)
Dept: SURGERY | Facility: HOSPITAL | Age: 65
End: 2025-01-28
Payer: COMMERCIAL

## 2025-01-28 VITALS
TEMPERATURE: 98 F | BODY MASS INDEX: 23.91 KG/M2 | OXYGEN SATURATION: 95 % | WEIGHT: 167 LBS | HEART RATE: 94 BPM | SYSTOLIC BLOOD PRESSURE: 130 MMHG | DIASTOLIC BLOOD PRESSURE: 60 MMHG | RESPIRATION RATE: 16 BRPM | HEIGHT: 70 IN

## 2025-01-28 VITALS
TEMPERATURE: 98 F | SYSTOLIC BLOOD PRESSURE: 114 MMHG | RESPIRATION RATE: 18 BRPM | HEART RATE: 101 BPM | DIASTOLIC BLOOD PRESSURE: 70 MMHG

## 2025-01-28 DIAGNOSIS — E11.621 DIABETIC ULCER OF LEFT HEEL ASSOCIATED WITH TYPE 2 DIABETES MELLITUS, WITH FAT LAYER EXPOSED: Primary | ICD-10-CM

## 2025-01-28 DIAGNOSIS — E11.621 DIABETIC ULCER OF TOE OF RIGHT FOOT ASSOCIATED WITH TYPE 2 DIABETES MELLITUS, WITH NECROSIS OF BONE: Primary | ICD-10-CM

## 2025-01-28 DIAGNOSIS — L97.422 DIABETIC ULCER OF LEFT HEEL ASSOCIATED WITH TYPE 2 DIABETES MELLITUS, WITH FAT LAYER EXPOSED: ICD-10-CM

## 2025-01-28 DIAGNOSIS — E11.9 DIABETES: ICD-10-CM

## 2025-01-28 DIAGNOSIS — L97.514 DIABETIC ULCER OF TOE OF RIGHT FOOT ASSOCIATED WITH TYPE 2 DIABETES MELLITUS, WITH NECROSIS OF BONE: ICD-10-CM

## 2025-01-28 DIAGNOSIS — L97.514 DIABETIC ULCER OF TOE OF RIGHT FOOT ASSOCIATED WITH TYPE 2 DIABETES MELLITUS, WITH NECROSIS OF BONE: Primary | ICD-10-CM

## 2025-01-28 DIAGNOSIS — L97.422 DIABETIC ULCER OF LEFT HEEL ASSOCIATED WITH TYPE 2 DIABETES MELLITUS, WITH FAT LAYER EXPOSED: Primary | ICD-10-CM

## 2025-01-28 DIAGNOSIS — E11.621 DIABETIC ULCER OF LEFT HEEL ASSOCIATED WITH TYPE 2 DIABETES MELLITUS, WITH FAT LAYER EXPOSED: ICD-10-CM

## 2025-01-28 DIAGNOSIS — E11.621 DIABETIC ULCER OF TOE OF RIGHT FOOT ASSOCIATED WITH TYPE 2 DIABETES MELLITUS, WITH NECROSIS OF BONE: ICD-10-CM

## 2025-01-28 LAB
ALBUMIN SERPL BCP-MCNC: 2.5 G/DL (ref 3.4–4.8)
ALBUMIN/GLOB SERPL: 0.6 {RATIO}
ALP SERPL-CCNC: 92 U/L (ref 40–150)
ALT SERPL W P-5'-P-CCNC: 16 U/L
ANION GAP SERPL CALCULATED.3IONS-SCNC: 17 MMOL/L (ref 7–16)
AST SERPL W P-5'-P-CCNC: 43 U/L (ref 5–34)
BASOPHILS # BLD AUTO: 0.06 K/UL (ref 0–0.2)
BASOPHILS NFR BLD AUTO: 0.4 % (ref 0–1)
BILIRUB SERPL-MCNC: 0.3 MG/DL
BUN SERPL-MCNC: 23 MG/DL (ref 10–20)
BUN/CREAT SERPL: 16 (ref 6–20)
CALCIUM SERPL-MCNC: 9.1 MG/DL (ref 8.4–10.2)
CHLORIDE SERPL-SCNC: 101 MMOL/L (ref 98–107)
CO2 SERPL-SCNC: 24 MMOL/L (ref 23–31)
CREAT SERPL-MCNC: 1.42 MG/DL (ref 0.55–1.02)
DIFFERENTIAL METHOD BLD: ABNORMAL
EGFR (NO RACE VARIABLE) (RUSH/TITUS): 42 ML/MIN/1.73M2
EOSINOPHIL # BLD AUTO: 0.08 K/UL (ref 0–0.5)
EOSINOPHIL NFR BLD AUTO: 0.5 % (ref 1–4)
ERYTHROCYTE [DISTWIDTH] IN BLOOD BY AUTOMATED COUNT: 14 % (ref 11.5–14.5)
GLOBULIN SER-MCNC: 4 G/DL (ref 2–4)
GLUCOSE SERPL-MCNC: 87 MG/DL (ref 82–115)
GLUCOSE SERPL-MCNC: 90 MG/DL (ref 70–105)
HCT VFR BLD AUTO: 34.1 % (ref 38–47)
HGB BLD-MCNC: 9.9 G/DL (ref 12–16)
IMM GRANULOCYTES # BLD AUTO: 0.31 K/UL (ref 0–0.04)
IMM GRANULOCYTES NFR BLD: 1.8 % (ref 0–0.4)
LYMPHOCYTES # BLD AUTO: 4.02 K/UL (ref 1–4.8)
LYMPHOCYTES NFR BLD AUTO: 23.8 % (ref 27–41)
MCH RBC QN AUTO: 24.9 PG (ref 27–31)
MCHC RBC AUTO-ENTMCNC: 29 G/DL (ref 32–36)
MCV RBC AUTO: 85.7 FL (ref 80–96)
MONOCYTES # BLD AUTO: 1.59 K/UL (ref 0–0.8)
MONOCYTES NFR BLD AUTO: 9.4 % (ref 2–6)
MPC BLD CALC-MCNC: 10.6 FL (ref 9.4–12.4)
NEUTROPHILS # BLD AUTO: 10.83 K/UL (ref 1.8–7.7)
NEUTROPHILS NFR BLD AUTO: 64.1 % (ref 53–65)
NRBC # BLD AUTO: 0 X10E3/UL
NRBC, AUTO (.00): 0 %
PLATELET # BLD AUTO: 460 K/UL (ref 150–400)
POTASSIUM SERPL-SCNC: 4.3 MMOL/L (ref 3.5–5.1)
PROT SERPL-MCNC: 6.5 G/DL (ref 5.8–7.6)
RBC # BLD AUTO: 3.98 M/UL (ref 4.2–5.4)
SODIUM SERPL-SCNC: 138 MMOL/L (ref 136–145)
WBC # BLD AUTO: 16.89 K/UL (ref 4.5–11)

## 2025-01-28 PROCEDURE — 71000033 HC RECOVERY, INTIAL HOUR: Performed by: SURGERY

## 2025-01-28 PROCEDURE — 63600175 PHARM REV CODE 636 W HCPCS: Performed by: SURGERY

## 2025-01-28 PROCEDURE — 93005 ELECTROCARDIOGRAM TRACING: CPT

## 2025-01-28 PROCEDURE — 63600175 PHARM REV CODE 636 W HCPCS

## 2025-01-28 PROCEDURE — 71000015 HC POSTOP RECOV 1ST HR: Performed by: SURGERY

## 2025-01-28 PROCEDURE — D9220A PRA ANESTHESIA: Mod: CRNA,,,

## 2025-01-28 PROCEDURE — 99499 UNLISTED E&M SERVICE: CPT | Mod: S$PBB,,, | Performed by: NURSE PRACTITIONER

## 2025-01-28 PROCEDURE — 25000003 PHARM REV CODE 250

## 2025-01-28 PROCEDURE — 36415 COLL VENOUS BLD VENIPUNCTURE: CPT | Performed by: SURGERY

## 2025-01-28 PROCEDURE — 85025 COMPLETE CBC W/AUTO DIFF WBC: CPT | Performed by: SURGERY

## 2025-01-28 PROCEDURE — 63600175 PHARM REV CODE 636 W HCPCS: Mod: JZ,TB | Performed by: ANESTHESIOLOGY

## 2025-01-28 PROCEDURE — 82962 GLUCOSE BLOOD TEST: CPT

## 2025-01-28 PROCEDURE — 37000008 HC ANESTHESIA 1ST 15 MINUTES: Performed by: SURGERY

## 2025-01-28 PROCEDURE — 27000177 HC AIRWAY, LARYNGEAL MASK: Performed by: ANESTHESIOLOGY

## 2025-01-28 PROCEDURE — 27000655: Performed by: ANESTHESIOLOGY

## 2025-01-28 PROCEDURE — 99999 PR PBB SHADOW E&M-EST. PATIENT-LVL V: CPT | Mod: PBBFAC,,, | Performed by: NURSE PRACTITIONER

## 2025-01-28 PROCEDURE — 36000707: Performed by: SURGERY

## 2025-01-28 PROCEDURE — D9220A PRA ANESTHESIA: Mod: ANES,,, | Performed by: ANESTHESIOLOGY

## 2025-01-28 PROCEDURE — 36000706: Performed by: SURGERY

## 2025-01-28 PROCEDURE — 80053 COMPREHEN METABOLIC PANEL: CPT | Performed by: SURGERY

## 2025-01-28 PROCEDURE — 37000009 HC ANESTHESIA EA ADD 15 MINS: Performed by: SURGERY

## 2025-01-28 PROCEDURE — 11042 DBRDMT SUBQ TIS 1ST 20SQCM/<: CPT | Mod: PBBFAC | Performed by: NURSE PRACTITIONER

## 2025-01-28 PROCEDURE — 88311 DECALCIFY TISSUE: CPT | Mod: TC,SUR | Performed by: SURGERY

## 2025-01-28 PROCEDURE — 99215 OFFICE O/P EST HI 40 MIN: CPT | Mod: PBBFAC,25 | Performed by: NURSE PRACTITIONER

## 2025-01-28 PROCEDURE — 27000716 HC OXISENSOR PROBE, ANY SIZE: Performed by: ANESTHESIOLOGY

## 2025-01-28 RX ORDER — FENTANYL CITRATE 50 UG/ML
INJECTION, SOLUTION INTRAMUSCULAR; INTRAVENOUS
Status: DISCONTINUED | OUTPATIENT
Start: 2025-01-28 | End: 2025-01-28

## 2025-01-28 RX ORDER — BUPIVACAINE HYDROCHLORIDE 2.5 MG/ML
INJECTION, SOLUTION EPIDURAL; INFILTRATION; INTRACAUDAL
Status: DISCONTINUED | OUTPATIENT
Start: 2025-01-28 | End: 2025-01-28 | Stop reason: HOSPADM

## 2025-01-28 RX ORDER — ONDANSETRON HYDROCHLORIDE 2 MG/ML
INJECTION, SOLUTION INTRAVENOUS
Status: DISCONTINUED | OUTPATIENT
Start: 2025-01-28 | End: 2025-01-28

## 2025-01-28 RX ORDER — MIDAZOLAM HYDROCHLORIDE 1 MG/ML
INJECTION INTRAMUSCULAR; INTRAVENOUS
Status: DISCONTINUED | OUTPATIENT
Start: 2025-01-28 | End: 2025-01-28

## 2025-01-28 RX ORDER — PROPOFOL 10 MG/ML
VIAL (ML) INTRAVENOUS
Status: DISCONTINUED | OUTPATIENT
Start: 2025-01-28 | End: 2025-01-28

## 2025-01-28 RX ORDER — PHENYLEPHRINE HYDROCHLORIDE 10 MG/ML
INJECTION INTRAVENOUS
Status: DISCONTINUED | OUTPATIENT
Start: 2025-01-28 | End: 2025-01-28

## 2025-01-28 RX ORDER — IPRATROPIUM BROMIDE AND ALBUTEROL SULFATE 2.5; .5 MG/3ML; MG/3ML
3 SOLUTION RESPIRATORY (INHALATION) ONCE
Status: DISCONTINUED | OUTPATIENT
Start: 2025-01-28 | End: 2025-01-28 | Stop reason: HOSPADM

## 2025-01-28 RX ORDER — HYDROMORPHONE HYDROCHLORIDE 2 MG/ML
0.5 INJECTION, SOLUTION INTRAMUSCULAR; INTRAVENOUS; SUBCUTANEOUS EVERY 5 MIN PRN
Status: DISCONTINUED | OUTPATIENT
Start: 2025-01-28 | End: 2025-01-28 | Stop reason: HOSPADM

## 2025-01-28 RX ORDER — CEFAZOLIN 2 G/1
2 INJECTION, POWDER, FOR SOLUTION INTRAMUSCULAR; INTRAVENOUS
Status: DISCONTINUED | OUTPATIENT
Start: 2025-01-28 | End: 2025-01-28 | Stop reason: HOSPADM

## 2025-01-28 RX ORDER — SODIUM CHLORIDE 9 MG/ML
INJECTION, SOLUTION INTRAVENOUS CONTINUOUS
Status: DISCONTINUED | OUTPATIENT
Start: 2025-01-28 | End: 2025-01-28 | Stop reason: HOSPADM

## 2025-01-28 RX ORDER — DIPHENHYDRAMINE HYDROCHLORIDE 50 MG/ML
25 INJECTION INTRAMUSCULAR; INTRAVENOUS EVERY 6 HOURS PRN
Status: DISCONTINUED | OUTPATIENT
Start: 2025-01-28 | End: 2025-01-28 | Stop reason: HOSPADM

## 2025-01-28 RX ORDER — OXYCODONE HYDROCHLORIDE 10 MG/1
10 TABLET ORAL EVERY 6 HOURS PRN
Qty: 10 TABLET | Refills: 0 | Status: SHIPPED | OUTPATIENT
Start: 2025-01-28

## 2025-01-28 RX ORDER — ONDANSETRON HYDROCHLORIDE 2 MG/ML
4 INJECTION, SOLUTION INTRAVENOUS DAILY PRN
Status: DISCONTINUED | OUTPATIENT
Start: 2025-01-28 | End: 2025-01-28 | Stop reason: HOSPADM

## 2025-01-28 RX ORDER — MEPERIDINE HYDROCHLORIDE 25 MG/ML
25 INJECTION INTRAMUSCULAR; INTRAVENOUS; SUBCUTANEOUS EVERY 10 MIN PRN
Status: DISCONTINUED | OUTPATIENT
Start: 2025-01-28 | End: 2025-01-28 | Stop reason: HOSPADM

## 2025-01-28 RX ORDER — MORPHINE SULFATE 10 MG/ML
4 INJECTION INTRAMUSCULAR; INTRAVENOUS; SUBCUTANEOUS EVERY 5 MIN PRN
Status: DISCONTINUED | OUTPATIENT
Start: 2025-01-28 | End: 2025-01-28 | Stop reason: HOSPADM

## 2025-01-28 RX ORDER — LIDOCAINE HYDROCHLORIDE 20 MG/ML
INJECTION INTRAVENOUS
Status: DISCONTINUED | OUTPATIENT
Start: 2025-01-28 | End: 2025-01-28

## 2025-01-28 RX ADMIN — HYDROMORPHONE HYDROCHLORIDE 0.5 MG: 2 INJECTION, SOLUTION INTRAMUSCULAR; INTRAVENOUS; SUBCUTANEOUS at 02:01

## 2025-01-28 RX ADMIN — PHENYLEPHRINE HYDROCHLORIDE 100 MCG: 10 INJECTION INTRAVENOUS at 01:01

## 2025-01-28 RX ADMIN — PHENYLEPHRINE HYDROCHLORIDE 100 MCG: 10 INJECTION INTRAVENOUS at 02:01

## 2025-01-28 RX ADMIN — PHENYLEPHRINE HYDROCHLORIDE 200 MCG: 10 INJECTION INTRAVENOUS at 01:01

## 2025-01-28 RX ADMIN — ONDANSETRON 8 MG: 2 INJECTION INTRAMUSCULAR; INTRAVENOUS at 01:01

## 2025-01-28 RX ADMIN — LIDOCAINE HYDROCHLORIDE 80 MG: 20 INJECTION, SOLUTION INTRAVENOUS at 01:01

## 2025-01-28 RX ADMIN — FENTANYL CITRATE 50 MCG: 50 INJECTION, SOLUTION INTRAMUSCULAR; INTRAVENOUS at 01:01

## 2025-01-28 RX ADMIN — MIDAZOLAM HYDROCHLORIDE 2 MG: 1 INJECTION, SOLUTION INTRAMUSCULAR; INTRAVENOUS at 01:01

## 2025-01-28 RX ADMIN — PROMETHAZINE HYDROCHLORIDE 25 MG: 25 INJECTION INTRAMUSCULAR; INTRAVENOUS at 01:01

## 2025-01-28 RX ADMIN — SODIUM CHLORIDE: 9 INJECTION, SOLUTION INTRAVENOUS at 01:01

## 2025-01-28 RX ADMIN — PROPOFOL 120 MG: 10 INJECTION, EMULSION INTRAVENOUS at 01:01

## 2025-01-28 NOTE — PROCEDURES
"Debridement    Date/Time: 1/28/2025 10:00 AM    Performed by: Marybeth Orellana FNP  Authorized by: Marybeth Orellana FNP    Time out: Immediately prior to procedure a "time out" was called to verify the correct patient, procedure, equipment, support staff and site/side marked as required.    Consent Done?:  Yes (Written)    Wound Details:    Location:  Left foot    Location:  Left Heel    Type of Debridement:  Excisional       Length (cm):  1       Width (cm):  2.1       Depth (cm):  0.4       Area (sq cm):  2.1       Percent Debrided (%):  100       Total Area Debrided (sq cm):  2.1    Depth of debridement:  Subcutaneous tissue    Tissue debrided:  Adipose, Dermis, Epidermis and Subcutaneous    Devitalized tissue debrided:  Biofilm, Callus, Exudate, Fibrin and Clots    Instruments:  Curette  Bleeding:  Minimal  Hemostasis Achieved: Yes  Method Used:  Pressure  Patient tolerance:  Patient tolerated the procedure well with no immediate complications  1st Wound Pain Assessment: 0     Assistant. MEIR King RN    "

## 2025-01-28 NOTE — DISCHARGE SUMMARY
[facility]  Discharge Note  Short Stay    Procedure(s) (LRB):  AMPUTATION, TOE (Right)      OUTCOME: Patient tolerated treatment/procedure well without complication and is now ready for discharge.    DISPOSITION: Home or Self Care    FINAL DIAGNOSIS:  Diabetic ulcer of toe of right foot associated with type 2 diabetes mellitus, with necrosis of bone    FOLLOWUP: In clinic    DISCHARGE INSTRUCTIONS:    Discharge Procedure Orders   Diet Adult Regular     Remove dressing in 24 hours     Notify your health care provider if you experience any of the following:  temperature >100.4     Notify your health care provider if you experience any of the following:  persistent nausea and vomiting or diarrhea     Notify your health care provider if you experience any of the following:  severe uncontrolled pain     Notify your health care provider if you experience any of the following:  redness, tenderness, or signs of infection (pain, swelling, redness, odor or green/yellow discharge around incision site)     Notify your health care provider if you experience any of the following:  difficulty breathing or increased cough     Notify your health care provider if you experience any of the following:  severe persistent headache     Notify your health care provider if you experience any of the following:  worsening rash     Notify your health care provider if you experience any of the following:  persistent dizziness, light-headedness, or visual disturbances     Notify your health care provider if you experience any of the following:  increased confusion or weakness     Notify your health care provider if you experience any of the following:     Shower on day dressing removed (No bath)   Order Comments: Take off dressing in two days time then okay to shower and get it wet.        TIME SPENT ON DISCHARGE: 5 minutes

## 2025-01-28 NOTE — TRANSFER OF CARE
"Anesthesia Transfer of Care Note    Patient: Silvana Sarah    Procedure(s) Performed: Procedure(s) (LRB):  AMPUTATION, TOE (Right)    Patient location: PACU    Anesthesia Type: general    Transport from OR: Transported from OR on 6-10 L/min O2 by face mask with adequate spontaneous ventilation    Post pain: adequate analgesia    Post assessment: no apparent anesthetic complications    Post vital signs: stable    Level of consciousness: responds to stimulation    Nausea/Vomiting: no nausea/vomiting    Complications: none    Transfer of care protocol was followed      Last vitals: Visit Vitals  BP (!) 133/53 (BP Location: Right arm, Patient Position: Lying)   Pulse 81   Temp 36.8 °C (98.2 °F) (Oral)   Resp 12   Ht 5' 10" (1.778 m)   Wt 75.8 kg (167 lb)   SpO2 99%   Breastfeeding No   BMI 23.96 kg/m²     "

## 2025-01-28 NOTE — OP NOTE
[facility]  Surgery Department  Operative Note    SUMMARY     Date of Procedure: 1/28/2025     Procedure: Procedure(s) (LRB):  AMPUTATION, TOE (Right)     Surgeons and Role:     * David Aguilera MD - Primary    Assisting Surgeon: None    Pre-Operative Diagnosis: Diabetic ulcer of toe of right foot associated with type 2 diabetes mellitus, with necrosis of bone [E11.621, L97.514]    Post-Operative Diagnosis: Post-Op Diagnosis Codes:     * Diabetic ulcer of toe of right foot associated with type 2 diabetes mellitus, with necrosis of bone [E11.621, L97.514]    Anesthesia: General    Procedures Performed:  Right 1st toe amputation    Significant Findings of the Procedure:  Minimal bleeding appreciated the base around the tissues to the area.    Procedure in Detail:  After informed consent patient brought to the OR prepped and draped in the usual sterile fashion.  Started off by doing an ankle block and a toe block on the patient 1% lidocaine with epinephrine injected around the ankle as well as the patient's toe with the base to allow for less pain medication.  We then circumferentially around the base of the 1st toe divided it.  We went down through the MTP joint space and removed the distal toe.  Mild oozing appreciated.  We then bit back the head of the metatarsal head to allow for granulation of the 1st toe.  We then irrigated the wound out ensured hemostasis and closed it with some interrupted 2-0 nylons.  Patient tolerated the procedure well.    Complications: No    Estimated Blood Loss (EBL): 10 mL           Implants: * No implants in log *    Specimens:   Specimen (24h ago, onward)       Start     Ordered    01/28/25 5359  Surgical Pathology  RELEASE UPON ORDERING         01/28/25 5300                            Condition: Good    Disposition: PACU - hemodynamically stable.    Attestation: I was present and scrubbed for the entire procedure.

## 2025-01-28 NOTE — ASSESSMENT & PLAN NOTE
Left heel: Clean wound with Vashe and pat dry. Apply Easkins ring to periwound. Apply wound vac at 125mmHg. Home health to change wound vac twice per week.     Elevate legs when sitting  Avoid pressure on wound.   Diabetes:  Monitor glucose closely. Check fasting glucose and 2 hours after meals. HgA1C goal <7, fasting glucose , and 2 hours after meals <180  Hypertension:  Check blood pressure twice daily, goal <120/80  Diet:   Increase protein intake, avoid fried, fatty foods and foods high in simple carbs.   Vitamins:  Take vitamin C 1000 mg, zinc 50mg, vitamin d 5000 units, and a daily multivitamin. Lucho is a good source of protein and nutrients to aid in wound healing.   Monitor closely for s/s of infection including fever, chills, increase in pain, odor from wound, and increased redness from foot. Go to ER if any complications develo

## 2025-01-28 NOTE — ANESTHESIA PREPROCEDURE EVALUATION
01/28/2025  Silvana Sarah is a 64 y.o., female.      Pre-op Assessment    I have reviewed the Patient Summary Reports.     I have reviewed the Nursing Notes. I have reviewed the NPO Status.   I have reviewed the Medications.     Review of Systems  Anesthesia Hx:             Denies Family Hx of Anesthesia complications.    Denies Personal Hx of Anesthesia complications.                    Social:  Non-Smoker, No Alcohol Use       Cardiovascular:     Hypertension              ECG has been reviewed.                      Hypertension         Hepatic/GI:     GERD         Gerd          Musculoskeletal:  Arthritis        Arthritis          Neurological:    Neuromuscular Disease,           Arthritis                         Neuromuscular Disease   Endocrine:  Diabetes    Diabetes                      Dermatological:   Diabetic ulcer of toe of right foot associated with type 2 diabetes mellitus   Psych:  Psychiatric History anxiety depression                Physical Exam  General: Well nourished, Cooperative, Alert and Oriented    Airway:  Mallampati: II / II  Mouth Opening: Normal  TM Distance: Normal  Neck ROM: Normal ROM    Dental:  Intact    Chest/Lungs:  Clear to auscultation    Heart:  Rate: Normal  Rhythm: Regular Rhythm  Sounds: Normal        Chemistry        Component Value Date/Time     01/10/2025 1834    K 5.3 (H) 01/10/2025 1834     01/10/2025 1834    CO2 26 01/10/2025 1834    BUN 24 (H) 01/10/2025 1834    CREATININE 1.27 (H) 01/10/2025 1834     (H) 01/10/2025 1834        Component Value Date/Time    CALCIUM 8.5 01/10/2025 1834    ALKPHOS 100 01/10/2025 1834    AST 93 (H) 01/10/2025 1834    ALT 9 01/10/2025 1834    BILITOT 0.6 01/10/2025 1834    ESTGFRAFRICA 63 02/03/2021 1211    EGFRNONAA 51 (L) 06/13/2022 1300        Lab Results   Component Value Date    WBC 15.59 (H) 01/10/2025    HGB  10.5 (L) 01/10/2025    HCT 35.0 (L) 01/10/2025     (H) 01/10/2025     Results for orders placed or performed in visit on 09/19/24   EKG 12-lead    Collection Time: 09/19/24  4:28 PM   Result Value Ref Range    QRS Duration 96 ms    [facility] QTC Calculation 421 ms    Narrative    Test Reason : E11.621,L97.422,    Vent. Rate : 087 BPM     Atrial Rate : 087 BPM     P-R Int : 156 ms          QRS Dur : 096 ms      QT Int : 350 ms       P-R-T Axes : 059 170 003 degrees     QTc Int : 421 ms    Normal sinus rhythm  Incomplete right bundle branch block  Right ventricular hypertrophy  Possible Inferior infarct ,age undetermined  Anterolateral infarct ,age undetermined  Abnormal ECG  No previous ECGs available  Confirmed by Olya CHRISTENSEN, Kyler PABLO ([id number]) on 9/24/2024 10:09:40 AM    Referred By: KRISTOPHER HUERTA           Confirmed By:Kyler Dobson MD         Anesthesia Plan  Type of Anesthesia, risks & benefits discussed:    Anesthesia Type: Gen Supraglottic Airway  Intra-op Monitoring Plan: Standard ASA Monitors  Post Op Pain Control Plan: multimodal analgesia  Induction:  IV  Airway Plan: Direct  Informed Consent: Informed consent signed with the Patient and all parties understand the risks and agree with anesthesia plan.  All questions answered.   ASA Score: 4  Day of Surgery Review of History & Physical: H&P Update referred to the surgeon/provider.I have interviewed and examined the patient. I have reviewed the patient's H&P dated: There are no significant changes.     Ready For Surgery From Anesthesia Perspective.     .

## 2025-01-28 NOTE — ANESTHESIA PROCEDURE NOTES
LMA    Date/Time: 1/28/2025 1:35 PM    Performed by: Roberto Carlos Goss II, CRNA  Authorized by: Nestor Smith MD    Intubation:     Induction:  Intravenous    Intubated:  Postinduction    Mask Ventilation:  Easy with oral airway    Attempts:  1    Attempted By:  CRNA    Difficult Airway Encountered?: No      Complications:  None    Airway Device:  Supraglottic airway/LMA    Airway Device Size:  4.0    Style/Cuff Inflation:  Cuffed (inflated to minimal occlusive pressure)    Secured at:  The lips    Placement Verified By:  Capnometry    Complicating Factors:  None    Findings Post-Intubation:  BS equal bilateral and atraumatic/condition of teeth unchanged

## 2025-01-29 ENCOUNTER — LAB REQUISITION (OUTPATIENT)
Dept: LAB | Facility: HOSPITAL | Age: 65
End: 2025-01-29
Attending: NURSE PRACTITIONER
Payer: COMMERCIAL

## 2025-01-29 DIAGNOSIS — E10.621 TYPE 1 DIABETES MELLITUS WITH FOOT ULCER (CODE): ICD-10-CM

## 2025-01-29 LAB
ALBUMIN SERPL BCP-MCNC: 2.2 G/DL (ref 3.4–4.8)
ALBUMIN/GLOB SERPL: 0.6 {RATIO}
ALP SERPL-CCNC: 265 U/L (ref 40–150)
ALT SERPL W P-5'-P-CCNC: 49 U/L
ANION GAP SERPL CALCULATED.3IONS-SCNC: 15 MMOL/L (ref 7–16)
ANISOCYTOSIS BLD QL SMEAR: ABNORMAL
AST SERPL W P-5'-P-CCNC: 169 U/L (ref 5–34)
BASOPHILS # BLD AUTO: 0.04 K/UL (ref 0–0.2)
BASOPHILS NFR BLD AUTO: 0.3 % (ref 0–1)
BILIRUB SERPL-MCNC: 0.4 MG/DL
BUN SERPL-MCNC: 26 MG/DL (ref 10–20)
BUN/CREAT SERPL: 17 (ref 6–20)
CALCIUM SERPL-MCNC: 8.4 MG/DL (ref 8.4–10.2)
CHLORIDE SERPL-SCNC: 100 MMOL/L (ref 98–107)
CO2 SERPL-SCNC: 28 MMOL/L (ref 23–31)
CREAT SERPL-MCNC: 1.53 MG/DL (ref 0.55–1.02)
CRP SERPL HS-MCNC: 112.53 MG/L
DIFFERENTIAL METHOD BLD: ABNORMAL
EGFR (NO RACE VARIABLE) (RUSH/TITUS): 38 ML/MIN/1.73M2
EOSINOPHIL # BLD AUTO: 0.17 K/UL (ref 0–0.5)
EOSINOPHIL NFR BLD AUTO: 1.1 % (ref 1–4)
ERYTHROCYTE [DISTWIDTH] IN BLOOD BY AUTOMATED COUNT: 14.8 % (ref 11.5–14.5)
GLOBULIN SER-MCNC: 3.5 G/DL (ref 2–4)
GLUCOSE SERPL-MCNC: 139 MG/DL (ref 82–115)
GLUCOSE SERPL-MCNC: 82 MG/DL (ref 70–105)
HCT VFR BLD AUTO: 32.2 % (ref 38–47)
HGB BLD-MCNC: 9.1 G/DL (ref 12–16)
HYPOCHROMIA BLD QL SMEAR: ABNORMAL
LYMPHOCYTES # BLD AUTO: 3.7 K/UL (ref 1–4.8)
LYMPHOCYTES NFR BLD AUTO: 24.4 % (ref 27–41)
LYMPHOCYTES NFR BLD MANUAL: 28 % (ref 27–41)
MCH RBC QN AUTO: 25 PG (ref 27–31)
MCHC RBC AUTO-ENTMCNC: 28.3 G/DL (ref 32–36)
MCV RBC AUTO: 88.5 FL (ref 80–96)
MONOCYTES # BLD AUTO: 1.71 K/UL (ref 0–0.8)
MONOCYTES NFR BLD AUTO: 11.3 % (ref 2–6)
MONOCYTES NFR BLD MANUAL: 9 % (ref 2–6)
MPC BLD CALC-MCNC: 11.3 FL (ref 9.4–12.4)
NEUTROPHILS # BLD AUTO: 9.52 K/UL (ref 1.8–7.7)
NEUTROPHILS NFR BLD AUTO: 62.9 % (ref 53–65)
NEUTS SEG NFR BLD MANUAL: 63 % (ref 50–62)
NRBC BLD MANUAL-RTO: ABNORMAL %
PLATELET # BLD AUTO: 408 K/UL (ref 150–400)
PLATELET MORPHOLOGY: NORMAL
POIKILOCYTOSIS BLD QL SMEAR: ABNORMAL
POTASSIUM SERPL-SCNC: 5.5 MMOL/L (ref 3.5–5.1)
PROT SERPL-MCNC: 5.7 G/DL (ref 5.8–7.6)
RBC # BLD AUTO: 3.64 M/UL (ref 4.2–5.4)
SODIUM SERPL-SCNC: 137 MMOL/L (ref 136–145)
WBC # BLD AUTO: 15.14 K/UL (ref 4.5–11)

## 2025-01-29 PROCEDURE — 86141 C-REACTIVE PROTEIN HS: CPT | Performed by: NURSE PRACTITIONER

## 2025-01-29 PROCEDURE — 85025 COMPLETE CBC W/AUTO DIFF WBC: CPT | Performed by: NURSE PRACTITIONER

## 2025-01-29 PROCEDURE — 80053 COMPREHEN METABOLIC PANEL: CPT | Performed by: NURSE PRACTITIONER

## 2025-01-29 NOTE — PROGRESS NOTES
CARISA Ayala   [facility]  [id number]  [facility] [place] [address]  [phone]      PATIENT NAME: Silvana Sarah  : 1960  DATE: 25  MRN: [id number]      Billing Provider: CARISA Ayala  Level of Service:   Patient PCP Information       Provider PCP Type    CARISA Cárdenas General            Reason for Visit / Chief Complaint: Diabetic Foot Ulcer and Wound Check (Left heel and right first toe)       History of Present Illness / Problem Focused Workflow     Silvana Sarah is a 63 yo female presents for follow up on chronic non healing wound to left heel and right great toe amputation site. She is status post amputation of right great toe per Dr. Aguilera on 2025. Amputation site with necrosis. Discussed with Dr. Aguilera and patient to see Dr. Aguilera following wound care appointment. Left heel continues to show improvement. Wet to dry applied today. Home Health to continue NPWT to left heel.      8/15/24  62 yo female presents with complaints of ulcer to left heel and right great toe. Wound on left heel with with necrotic tissue and slough, bedside debridement today. Left foot is edematous and tender. Recent cultures reviewed. Ceftin to pharmacy. Santyl and vashe moistened drawtex to wound bed. Supplies ordered from [employer]. Wound on right great toe will moderate serous drainage. Aquacel ag applied. Pertinent PMH includes diabetes, hypertension, peripheral neuropathy, gastroparesis, and rheumatoid arthritis. Last HgA1C 7.2 2023, diabetes managed by PCP. Wound healing is complicated by multiple co-morbidities, poor vascular supply, immunosuppression, diabetes, edema, heavy drainage, decreased granulation tissue, necrosis, large surface area, large volume, advanced age, fragile skin, and infection.             Review of Systems     Review of Systems   Constitutional:  Positive for activity change. Negative for chills and fever.    Respiratory:  Negative for chest tightness and shortness of breath.    Cardiovascular:  Positive for leg swelling. Negative for chest pain and palpitations.   Musculoskeletal:  Positive for arthralgias and joint swelling.   Skin:  Positive for color change and wound.        wound   Neurological:  Positive for weakness.   Psychiatric/Behavioral:  Negative for agitation, behavioral problems, confusion and self-injury.        Medical / Social / Family History     Past Medical History:   Diagnosis Date    Anxiety and depression 2021    Diabetes mellitus     Dry eye syndrome, bilateral 10/06/2017    Essential (primary) hypertension     Gastroparesis due to DM     Generalized osteoarthritis 10/25/2021    Other mechanical complication of implanted electronic neurostimulator, generator, sequela 10/25/2021    Peripheral autonomic neuropathy due to DM     Rheumatoid arthritis        Past Surgical History:   Procedure Laterality Date     SECTION      DEBRIDEMENT OF LOWER EXTREMITY Left 2024    Procedure: DEBRIDEMENT, LOWER EXTREMITY;  Surgeon: David Aguilera MD;  Location: [facility];  Service: General;  Laterality: Left;  Left foot    GASTRIC STIMULATOR IMPLANT SURGERY      HYSTERECTOMY      INCISION AND DRAINAGE OF ABSCESS Right 2022    Procedure: INCISION AND DRAINAGE, ABSCESS;  Surgeon: Kamari Sandoval DO;  Location: [facility];  Service: General;  Laterality: Right;  right hand dr sandoval am    IRRIGATION AND DEBRIDEMENT OF UPPER EXTREMITY Right 2022    Procedure: IRRIGATION AND DEBRIDEMENT, UPPER EXTREMITY;  Surgeon: Kamari Sandoval DO;  Location: [facility];  Service: General;  Laterality: Right;  right hand I/D dr sandvoal today    TOE AMPUTATION Right 2025    Procedure: AMPUTATION, TOE;  Surgeon: David Aguilera MD;  Location: [facility];  Service: General;  Laterality: Right;       Social History  Ms. Silvana Sarah  reports that she has never  smoked. She has never used smokeless tobacco. She reports that she does not drink alcohol.    Family History  Ms.'s Silvana Sarah family history includes Cancer in her father; Diabetes in her son and son; Heart disease in her mother.    Medications and Allergies     Medications  Outpatient Medications Marked as Taking for the 25 encounter (Office Visit) with Marybeth Orellana FNP   Medication Sig Dispense Refill    b complex vitamins capsule Take 1 capsule by mouth once daily.      cyanocobalamin 1,000 mcg/mL injection Inject 1,000 mcg into the muscle every 30 days.      esomeprazole (NEXIUM) 20 MG capsule Take 20 mg by mouth once daily.      etanercept (ENBREL MINI) 50 mg/mL (1 mL) Crtg 1 mL by abdominal subcutaneous route every .       ferrous sulfate (FEOSOL) Tab tablet Take 1 tablet by mouth once daily.      gabapentin (NEURONTIN) 600 MG tablet Take 2 tablets 3 times a day by oral route for 30 days.      hydrOXYchloroQUINE (PLAQUENIL) 200 mg tablet Take 200 mg by mouth 2 (two) times daily.       insulin regular 100 unit/mL Inj injection 3 (three) times daily before meals.      LEVEMIR U-100 INSULIN 100 unit/mL injection SMARTSI Unit(s) SUB-Q Daily      lisinopriL (PRINIVIL,ZESTRIL) 2.5 MG tablet Take 2.5 mg by mouth every evening.      lysine (L-LYSINE) 500 mg Tab Take 500 mg by mouth 2 (two) times a day.      metroNIDAZOLE (FLAGYL) 500 MG tablet Take 1 tablet (500 mg total) by mouth every 12 (twelve) hours. for 14 days 28 tablet 0    nystatin (MYCOSTATIN) 100,000 unit/mL suspension Take 6 mLs by mouth 3 (three) times daily before meals.       oxyCODONE (ROXICODONE) 10 mg Tab immediate release tablet Take 1 tablet (10 mg total) by mouth every 6 (six) hours as needed for Pain. 10 tablet 0    oxyCODONE-acetaminophen (PERCOCET)  mg per tablet Take 1 tablet by mouth 4 (four) times daily.      predniSONE (DELTASONE) 5 MG tablet Take 5 mg by mouth once daily.       promethazine  (PHENERGAN) 25 MG tablet Take 25 mg by mouth every 6 (six) hours as needed for Nausea.       triamterene-hydrochlorothiazide 37.5-25 mg (DYAZIDE) 37.5-25 mg per capsule Take 1 capsule by mouth once daily.       zinc gluconate 50 mg tablet Take 50 mg by mouth once daily.         Allergies  Review of patient's allergies indicates:   Allergen Reactions    Influenza virus vaccines     Penicillins        Physical Examination     Vitals:    02/04/25 1036   BP: 113/65   Pulse: 95   Resp: 18   Temp: 97.8 °F (36.6 °C)                 Physical Exam  Vitals and nursing note reviewed.   Constitutional:       Appearance: Normal appearance.   HENT:      Head: Normocephalic.   Cardiovascular:      Rate and Rhythm: Normal rate and regular rhythm.      Pulses: Normal pulses.      Heart sounds: Normal heart sounds.   Pulmonary:      Effort: Pulmonary effort is normal. No respiratory distress.   Chest:      Chest wall: No tenderness.   Musculoskeletal:         General: Swelling and tenderness present.      Right lower leg: Edema present.      Left lower leg: Edema present.   Skin:     General: Skin is warm and dry.      Findings: Erythema present.      Comments: See LDA for photos/measurements   Neurological:      General: No focal deficit present.      Mental Status: She is alert and oriented to person, place, and time. Mental status is at baseline.   Psychiatric:         Mood and Affect: Mood normal.         Behavior: Behavior normal.         Thought Content: Thought content normal.         Judgment: Judgment normal.     Assessment and Plan             Wound 08/15/24 Diabetic Ulcer Left posterior Heel #1 (Active)   08/15/24  Heel   Present on Original Admission: Y   Primary Wound Type: Diabetic ulc   Side: Left   Orientation: posterior   Wound Approximate Age at First Assessment (Weeks): 9 weeks   Wound Number: #1   Is this injury device related?:    Incision Type:    Closure Method:    Wound Description (Comments):    Type:     Additional Comments:    Ankle-Brachial Index:    Pulses:    Removal Indication and Assessment:    Wound Outcome:    Wound Image    02/04/25 1048   Dressing Appearance Intact;Moist drainage 02/04/25 1048   Drainage Amount Moderate 02/04/25 1048   Drainage Characteristics/Odor Serosanguineous 02/04/25 1048   Appearance Yellow;Slough;Moist 02/04/25 1048   Tissue loss description Full thickness 02/04/25 1048   Black (%), Wound Tissue Color 0 % 02/04/25 1048   Red (%), Wound Tissue Color 0 % 02/04/25 1048   Yellow (%), Wound Tissue Color 100 % 02/04/25 1048   Periwound Area Intact;Moist 02/04/25 1048   Wound Edges Defined 02/04/25 1048   [place] Classification (diabetic foot ulcers only) Grade 1 02/04/25 1048   Wound Length (cm) 1.1 cm 02/04/25 1048   Wound Width (cm) 2.1 cm 02/04/25 1048   Wound Depth (cm) 0.4 cm 02/04/25 1048   Wound Volume (cm^3) 0.924 cm^3 02/04/25 1048   Wound Surface Area (cm^2) 2.31 cm^2 02/04/25 1048   Care Cleansed with:;Antimicrobial agent 02/04/25 1048   Dressing Applied;Gauze, wet to moist;Gauze;Rolled gauze 02/04/25 1048            Wound 01/28/25 Incision Right anterior Toe, first (Active)   01/28/25  Toe, first   Present on Original Admission: Y   Primary Wound Type: Incision   Side: Right   Orientation: anterior   Wound Approximate Age at First Assessment (Weeks):    Wound Number:    Is this injury device related?:    Incision Type:    Closure Method: Sutures   Wound Description (Comments):    Type:    Additional Comments:    Ankle-Brachial Index:    Pulses:    Removal Indication and Assessment:    Wound Outcome: Amputation   Wound Image    02/04/25 1041   Dressing Appearance Intact;Moist drainage 02/04/25 1041   Drainage Amount Small 02/04/25 1041   Drainage Characteristics/Odor Serosanguineous 02/04/25 1041   Appearance Sutures intact;Moist;Red;Black 02/04/25 1041   Periwound Area Intact;Redness 02/04/25 1041   Wound Edges Defined 02/04/25 1041   Care Cleansed with:;Antimicrobial agent  02/04/25 1041   Dressing Applied;Gauze, wet to moist;Gauze;Rolled gauze 02/04/25 1041                 Problem List Items Addressed This Visit          Endocrine    Diabetic ulcer of left heel associated with type 2 diabetes mellitus, with fat layer exposed - Primary    Overview                                    Current Assessment & Plan     Left heel: Clean wound with Vashe and pat dry. Apply Easkins ring to periwound. Apply wound vac at 125mmHg. Home health to change wound vac twice per week.     Right first toe: Clean with Vashe and pat dry. Cover with dry gauze, wrap with rolled gauze and secure with paper tape. Perform wound care daily and as needed with soilage.    Elevate legs when sitting  Avoid pressure on wound.   Diabetes:  Monitor glucose closely. Check fasting glucose and 2 hours after meals. HgA1C goal <7, fasting glucose , and 2 hours after meals <180  Hypertension:  Check blood pressure twice daily, goal <120/80  Diet:   Increase protein intake, avoid fried, fatty foods and foods high in simple carbs.   Vitamins:  Take vitamin C 1000 mg, zinc 50mg, vitamin d 5000 units, and a daily multivitamin. Lucho is a good source of protein and nutrients to aid in wound healing.   Monitor closely for s/s of infection including fever, chills, increase in pain, odor from wound, and increased redness from foot. Go to ER if any complications develop.          Diabetic ulcer of toe of right foot associated with type 2 diabetes mellitus, with necrosis of bone    Overview                  Current Assessment & Plan     Left heel: Clean wound with Vashe and pat dry. Apply Easkins ring to periwound. Apply wound vac at 125mmHg. Home health to change wound vac twice per week.     Right first toe: Clean with Vashe and pat dry. Cover with dry gauze, wrap with rolled gauze and secure with paper tape. Perform wound care daily and as needed with soilage.    Elevate legs when sitting  Avoid pressure on wound.    Diabetes:  Monitor glucose closely. Check fasting glucose and 2 hours after meals. HgA1C goal <7, fasting glucose , and 2 hours after meals <180  Hypertension:  Check blood pressure twice daily, goal <120/80  Diet:   Increase protein intake, avoid fried, fatty foods and foods high in simple carbs.   Vitamins:  Take vitamin C 1000 mg, zinc 50mg, vitamin d 5000 units, and a daily multivitamin. Lucho is a good source of protein and nutrients to aid in wound healing.   Monitor closely for s/s of infection including fever, chills, increase in pain, odor from wound, and increased redness from foot. Go to ER if any complications develop.             No future appointments.           Signature:  CARISA Ayala  [facility]  [address] [facility] [address]  [phone]    Date of encounter: 2/4/25

## 2025-01-31 LAB
OHS QRS DURATION: 110 MS
OHS QTC CALCULATION: 449 MS

## 2025-02-03 ENCOUNTER — LAB REQUISITION (OUTPATIENT)
Dept: LAB | Facility: HOSPITAL | Age: 65
End: 2025-02-03
Attending: NURSE PRACTITIONER
Payer: COMMERCIAL

## 2025-02-03 DIAGNOSIS — N18.31 CHRONIC KIDNEY DISEASE, STAGE 3A: ICD-10-CM

## 2025-02-03 DIAGNOSIS — I12.9 HYPERTENSIVE CHRONIC KIDNEY DISEASE WITH STAGE 1 THROUGH STAGE 4 CHRONIC KIDNEY DISEASE, OR UNSPECIFIED CHRONIC KIDNEY DISEASE: ICD-10-CM

## 2025-02-03 DIAGNOSIS — E10.621 TYPE 1 DIABETES MELLITUS WITH FOOT ULCER (CODE): ICD-10-CM

## 2025-02-03 LAB
ALBUMIN SERPL BCP-MCNC: 1.8 G/DL (ref 3.4–4.8)
ALBUMIN/GLOB SERPL: 0.6 {RATIO}
ALP SERPL-CCNC: 117 U/L (ref 40–150)
ALT SERPL W P-5'-P-CCNC: 37 U/L
ANION GAP SERPL CALCULATED.3IONS-SCNC: 12 MMOL/L (ref 7–16)
AST SERPL W P-5'-P-CCNC: 16 U/L (ref 5–34)
BASOPHILS # BLD AUTO: 0.03 K/UL (ref 0–0.2)
BASOPHILS NFR BLD AUTO: 0.3 % (ref 0–1)
BILIRUB SERPL-MCNC: 0.4 MG/DL
BUN SERPL-MCNC: 13 MG/DL (ref 10–20)
BUN/CREAT SERPL: 13 (ref 6–20)
CALCIUM SERPL-MCNC: 8.5 MG/DL (ref 8.4–10.2)
CHLORIDE SERPL-SCNC: 99 MMOL/L (ref 98–107)
CO2 SERPL-SCNC: 32 MMOL/L (ref 23–31)
CREAT SERPL-MCNC: 1 MG/DL (ref 0.55–1.02)
CRP SERPL HS-MCNC: 94.46 MG/L
DIFFERENTIAL METHOD BLD: ABNORMAL
EGFR (NO RACE VARIABLE) (RUSH/TITUS): 64 ML/MIN/1.73M2
EOSINOPHIL # BLD AUTO: 0.09 K/UL (ref 0–0.5)
EOSINOPHIL NFR BLD AUTO: 0.9 % (ref 1–4)
ERYTHROCYTE [DISTWIDTH] IN BLOOD BY AUTOMATED COUNT: 14.6 % (ref 11.5–14.5)
ESTROGEN SERPL-MCNC: NORMAL PG/ML
GLOBULIN SER-MCNC: 3.2 G/DL (ref 2–4)
GLUCOSE SERPL-MCNC: 123 MG/DL (ref 82–115)
HCT VFR BLD AUTO: 28.6 % (ref 38–47)
HGB BLD-MCNC: 8.4 G/DL (ref 12–16)
INSULIN SERPL-ACNC: NORMAL U[IU]/ML
LAB AP GROSS DESCRIPTION: NORMAL
LAB AP LABORATORY NOTES: NORMAL
LYMPHOCYTES # BLD AUTO: 1.56 K/UL (ref 1–4.8)
LYMPHOCYTES NFR BLD AUTO: 16.3 % (ref 27–41)
MCH RBC QN AUTO: 25.2 PG (ref 27–31)
MCHC RBC AUTO-ENTMCNC: 29.4 G/DL (ref 32–36)
MCV RBC AUTO: 85.9 FL (ref 80–96)
MONOCYTES # BLD AUTO: 1.11 K/UL (ref 0–0.8)
MONOCYTES NFR BLD AUTO: 11.6 % (ref 2–6)
MPC BLD CALC-MCNC: 10.3 FL (ref 9.4–12.4)
NEUTROPHILS # BLD AUTO: 6.8 K/UL (ref 1.8–7.7)
NEUTROPHILS NFR BLD AUTO: 70.9 % (ref 53–65)
PLATELET # BLD AUTO: 326 K/UL (ref 150–400)
POTASSIUM SERPL-SCNC: 4.6 MMOL/L (ref 3.5–5.1)
PROT SERPL-MCNC: 5 G/DL (ref 5.8–7.6)
RBC # BLD AUTO: 3.33 M/UL (ref 4.2–5.4)
SODIUM SERPL-SCNC: 138 MMOL/L (ref 136–145)
T3RU NFR SERPL: NORMAL %
WBC # BLD AUTO: 9.59 K/UL (ref 4.5–11)

## 2025-02-03 PROCEDURE — 80053 COMPREHEN METABOLIC PANEL: CPT | Performed by: NURSE PRACTITIONER

## 2025-02-03 PROCEDURE — 86141 C-REACTIVE PROTEIN HS: CPT | Performed by: NURSE PRACTITIONER

## 2025-02-03 PROCEDURE — 85025 COMPLETE CBC W/AUTO DIFF WBC: CPT | Performed by: NURSE PRACTITIONER

## 2025-02-03 NOTE — ANESTHESIA POSTPROCEDURE EVALUATION
Anesthesia Post Evaluation    Patient: Silvana Sarah    Procedure(s) Performed: Procedure(s) (LRB):  AMPUTATION, TOE (Right)    Final Anesthesia Type: general      Patient location during evaluation: PACU  Patient participation: Yes- Able to Participate  Level of consciousness: awake and alert  Post-procedure vital signs: reviewed and stable  Pain management: adequate  Airway patency: patent  RENA mitigation strategies: Multimodal analgesia  PONV status at discharge: No PONV  Anesthetic complications: no      Cardiovascular status: blood pressure returned to baseline  Respiratory status: unassisted  Hydration status: euvolemic  Follow-up not needed.              Vitals Value Taken Time   /47 01/28/25 1510   Temp 36.8 °C (98.2 °F) 01/28/25 1426   Pulse 89 01/28/25 1510   Resp 16 01/28/25 1510   SpO2 98 % 01/28/25 1510         Event Time   Out of Recovery 15:06:00         Pain/Anabela Score: No data recorded

## 2025-02-04 ENCOUNTER — OFFICE VISIT (OUTPATIENT)
Dept: WOUND CARE | Facility: CLINIC | Age: 65
End: 2025-02-04
Payer: COMMERCIAL

## 2025-02-04 ENCOUNTER — OFFICE VISIT (OUTPATIENT)
Dept: SURGERY | Facility: CLINIC | Age: 65
End: 2025-02-04
Attending: SURGERY
Payer: COMMERCIAL

## 2025-02-04 VITALS
DIASTOLIC BLOOD PRESSURE: 65 MMHG | SYSTOLIC BLOOD PRESSURE: 113 MMHG | RESPIRATION RATE: 18 BRPM | TEMPERATURE: 98 F | HEART RATE: 95 BPM

## 2025-02-04 DIAGNOSIS — E11.621 DIABETIC ULCER OF LEFT HEEL ASSOCIATED WITH TYPE 2 DIABETES MELLITUS, WITH FAT LAYER EXPOSED: Primary | ICD-10-CM

## 2025-02-04 DIAGNOSIS — L97.514 DIABETIC ULCER OF TOE OF RIGHT FOOT ASSOCIATED WITH TYPE 2 DIABETES MELLITUS, WITH NECROSIS OF BONE: ICD-10-CM

## 2025-02-04 DIAGNOSIS — E11.621 DIABETIC ULCER OF TOE OF RIGHT FOOT ASSOCIATED WITH TYPE 2 DIABETES MELLITUS, WITH NECROSIS OF BONE: ICD-10-CM

## 2025-02-04 DIAGNOSIS — Z01.818 PRE-PROCEDURAL EXAMINATION: Primary | ICD-10-CM

## 2025-02-04 DIAGNOSIS — L97.422 DIABETIC ULCER OF LEFT HEEL ASSOCIATED WITH TYPE 2 DIABETES MELLITUS, WITH FAT LAYER EXPOSED: Primary | ICD-10-CM

## 2025-02-04 PROCEDURE — 99999 PR PBB SHADOW E&M-EST. PATIENT-LVL V: CPT | Mod: PBBFAC,,, | Performed by: NURSE PRACTITIONER

## 2025-02-04 PROCEDURE — 99999 PR PBB SHADOW E&M-EST. PATIENT-LVL V: CPT | Mod: PBBFAC,,, | Performed by: SURGERY

## 2025-02-04 PROCEDURE — 99213 OFFICE O/P EST LOW 20 MIN: CPT | Mod: S$PBB,,, | Performed by: NURSE PRACTITIONER

## 2025-02-04 PROCEDURE — 99215 OFFICE O/P EST HI 40 MIN: CPT | Mod: PBBFAC | Performed by: SURGERY

## 2025-02-04 PROCEDURE — 99213 OFFICE O/P EST LOW 20 MIN: CPT | Mod: S$PBB,,, | Performed by: SURGERY

## 2025-02-04 PROCEDURE — 99215 OFFICE O/P EST HI 40 MIN: CPT | Mod: PBBFAC | Performed by: NURSE PRACTITIONER

## 2025-02-04 NOTE — PATIENT INSTRUCTIONS
[facility]  Instructions for surgery  [address]  [place] [address]      Your surgery is scheduled for 2/6/2025 at [facility].  You will be notified the day before surgery verifying your arrival time for surgery.    Your preop lab work will be done today. Lab is on the 1st floor of the clinic. EKG is on 2nd floor of the clinic.                                                                                                                                                                                                                                                                                                                                                                           Day of Surgery Instructions      Bring a list of all your medications with you the day of your surgery. You can also give the list to your doctor or nurse during your final clinic appointment before surgery.      Do not eat any solid foods or drink any liquids after 12:00 AM (midnight). This includes gum, hard candy, mints, and chewing tobacco.  Medications: Take any medications specified with a small sip of water the morning of your surgery.  Brush your teeth: You may brush your teeth and rinse your mouth. Do not swallow any water or toothpaste.  Clothing: A button front shirt and loose-fitting clothes are the most comfortable before and after surgery. We also recommend low-heeled shoes.  Hair: Avoid buns, ponytails, or hairpieces at the back of the head. Remove or avoid any clips, pins or bands that bind hair. Do not use hairspray. Before going to surgery, you will need to remove any wigs or hairpieces.  We will cover your hair during surgery. Your privacy regarding personal appearance will be respected.  Fingernails: Please be sure to remove all nail polish before you arrive for surgery. We understand that tips, wraps, gels, etc., are expensive; however, we ask these products to be removed from at least  one finger on each hand. Your fingertips are used to accurately monitor your oxygen level during surgery by a device called an oximeter.  Glasses and Contact Lenses: Wear glasses when possible. If contact lenses must be worn, bring a lens case and solution. If glasses are worn, bring a case for them.  Hearing Aids: If you rely on a hearing aid, wear it to the hospital on the day of surgery. This will ensure you can hear and understand everything we need to communicate with you.  Valuables: Jewelry, including body piercings, Dentures, money, and credit cards should be left at home. [facility] is not responsible for valuables that are not secured in our surgery center.  Makeup, Perfume, Creams, Lotions and Deodorants: Do not use any of these products on the day of surgery. Remove false eyelashes prior to surgery.  Implanted Medical Devices: If you have an implanted device, such as a pacemaker or AICD, bring the device information card (if you have it) with you.  Medical Equipment: If you have been fitted for a brace to wear after surgery or you have been given crutches, bring those with you to the surgery center.  Shower: Take a shower with Hibiclens® (chlorhexidine) (available over the counter). This reduces the chance of infection. PLEASE USE CHLORHEXIDINE WASH THE NIGHT BEFORE SURGERY AND THE MORNING OF SURGERY.  ONLY 2 VISITORS ARE ALLOWED WITH YOU ON DAY OF SURGERY.        Medication instructions:  You may take blood pressure medication with a small drink of water the morning of surgery.    Stop your blood thinner   days before surgery    IF YOU ARE ON ANY OF THESE BLOOD THINNERS, MAKE SURE YOUR PHYSICIAN IS AWARE.  Eliquis/Apixaban            Wafarin/Coumadin,Jantoven  Xarelto/Rivaroxaban      Pletal/Cilostazol  Plavix/Clopidogrel          Pradaxa/Dibigatran      If you are diabetic      Follow the diabetic medicine instructions you received during your pre-operative visit.  DO NOT take your insulin or diabetic  medications the morning of surgery.  When you arrive at the surgical center, be sure to tell the nurse you are diabetic.    The following blood sugar medications have to be stopped prior to surgery:    Hold 24 hours prior to surgery:    Libraglutide - Saxenda, Victoza  Lixisenatide --Adlxyin  Exenatide  --  Byetta  Empaglifozin--Jardiance  Sitaglitin--Januvia    Hold 1 week prior to surgery:    Semaglutide - Ozempic, Wegouy, Rybelsus  Dulaglutide - Trulicity  Tirzepatide - Mounjaro  Exenatide (extended release inj)-- Bydureon BCise      Hold 48 hours prior to surgery:    Metformin, Glucovance, Metaglip, Fortamet, Glucophage, Riomet, Avandamet, Glimepiride            Other Items to bring with you and know    Insurance card  Identification card such as 's license, passport, or other picture ID  Copy of your advance directives  List of medications and allergies, if not already provided  Name and phone number of person to contact if your condition changes significantly. YOU CANNOT DRIVE YOURSELF HOME FROM THE HOSPITAL THE DAY OF SURGERY.  PLEASE UNDERSTAND THAT OUR OFFICE DOES NOT GIVE PATHOLOGY RESULTS OR TEST RESULTS OVER THE PHONE. THIS WILL BE DISCUSSED WITH YOU ON YOUR FOLLOW UP APPOINTMENT.  IF YOU SUBMIT FMLA FORMS, IT WILL TAKE 3-7 DAYS TO COMPLETE THESE          Alcohol and Surgery  We want to help you prepare for and recover from surgery as quickly and safely as possible. Be open and honest with your provider about how many drinks you have per day. Excessive alcohol use is defined as drinking more than three drinks per day. It can affect the outcome of your surgery. Binge drinking (consuming large amounts of alcohol infrequently, such as on weekends) can also affect the outcome of your surgery.  Alcohol withdrawal  If you drink more than three drinks a day, you could have a complication, called alcohol withdrawal, after surgery.  Alcohol withdrawal is a set of symptoms that people have when they suddenly  stop drinking after using alcohol  for a long time. During withdrawal, a person's central nervous system overreacts. This can cause mild symptoms such as shakiness, sweating or hallucinating. It can also cause other more serious side effects. If not treated properly, alcohol withdrawal can cause potentially life-threatening complications after surgery. This can include tremors, seizures, hallucinations, delirium tremors, and even death. Untreated alcohol withdrawal often leads to a longer stay in the hospital, potentially in the Intensive Care Unit.  Chronic heavy drinking also can interfere with several organ systems and biochemical processes in the body.  This interference can cause serious, even life-threatening complications.  Your care team can offer alcohol withdrawal treatment to help:  Decrease the risk of seizures and delirium tremors after surgery  Decrease the risk we will need to restrain you for your own safety or the safety of others  Decrease your risk of falling after surgery  Reduce the use of potent sedative medications  Reduce the time you stay in the hospital after surgery  Reduce the time you might spend on a mechanical ventilator to help you breathe  Lower incidence of organ failure and biochemical complications  Talk to a member of your care team or your primary care physician about your alcohol use if you feel you may be at risk of any of these complications.        Smoking and Surgery  Quitting smoking is extremely important for a successful surgery and recovery. Cigarette smoking compromises your immune system. This increases your risk of an infection after surgery. Quitting the habit before surgery will decrease the surgical risks associated with smoking.

## 2025-02-04 NOTE — PROGRESS NOTES
General Surgery History and Physical      Patient ID: Silvana Sarah is a 64 y.o. female.    Chief Complaint: Wound Check      HPI:  64-year-old female status post right 1st toe amputation last week.  It was partially closed and at the time of operation minimal bleeding was appreciated.  Follow up in Wound Care today found to have a necrotic base of the toe with black eschar and mild erythema around the area.  Little bit of soreness in minimal amount of drainage.  No fever no chills.      Review of Systems   Constitutional:  Positive for activity change.   Skin:  Positive for wound.       Current Outpatient Medications   Medication Sig Dispense Refill    b complex vitamins capsule Take 1 capsule by mouth once daily.      cyanocobalamin 1,000 mcg/mL injection Inject 1,000 mcg into the muscle every 30 days.      esomeprazole (NEXIUM) 20 MG capsule Take 20 mg by mouth once daily.      etanercept (ENBREL MINI) 50 mg/mL (1 mL) Crtg 1 mL by abdominal subcutaneous route every .       ferrous sulfate (FEOSOL) Tab tablet Take 1 tablet by mouth once daily.      gabapentin (NEURONTIN) 600 MG tablet Take 2 tablets 3 times a day by oral route for 30 days.      hydrOXYchloroQUINE (PLAQUENIL) 200 mg tablet Take 200 mg by mouth 2 (two) times daily.       insulin regular 100 unit/mL Inj injection 3 (three) times daily before meals.      LEVEMIR U-100 INSULIN 100 unit/mL injection SMARTSI Unit(s) SUB-Q Daily      lisinopriL (PRINIVIL,ZESTRIL) 2.5 MG tablet Take 2.5 mg by mouth every evening.      lysine (L-LYSINE) 500 mg Tab Take 500 mg by mouth 2 (two) times a day.      metroNIDAZOLE (FLAGYL) 500 MG tablet Take 1 tablet (500 mg total) by mouth every 12 (twelve) hours. for 14 days 28 tablet 0    nystatin (MYCOSTATIN) 100,000 unit/mL suspension Take 6 mLs by mouth 3 (three) times daily before meals.       oxyCODONE (ROXICODONE) 10  mg Tab immediate release tablet Take 1 tablet (10 mg total) by mouth every 6 (six) hours as needed for Pain. 10 tablet 0    oxyCODONE-acetaminophen (PERCOCET)  mg per tablet Take 1 tablet by mouth 4 (four) times daily.      predniSONE (DELTASONE) 5 MG tablet Take 5 mg by mouth once daily.       promethazine (PHENERGAN) 25 MG tablet Take 25 mg by mouth every 6 (six) hours as needed for Nausea.       triamterene-hydrochlorothiazide 37.5-25 mg (DYAZIDE) 37.5-25 mg per capsule Take 1 capsule by mouth once daily.       zinc gluconate 50 mg tablet Take 50 mg by mouth once daily.       No current facility-administered medications for this visit.       Review of patient's allergies indicates:   Allergen Reactions    Influenza virus vaccines     Penicillins        Past Medical History:   Diagnosis Date    Anxiety and depression 2021    Diabetes mellitus     Dry eye syndrome, bilateral 10/06/2017    Essential (primary) hypertension     Gastroparesis due to DM     Generalized osteoarthritis 10/25/2021    Other mechanical complication of implanted electronic neurostimulator, generator, sequela 10/25/2021    Peripheral autonomic neuropathy due to DM     Rheumatoid arthritis        Past Surgical History:   Procedure Laterality Date     SECTION      DEBRIDEMENT OF LOWER EXTREMITY Left 2024    Procedure: DEBRIDEMENT, LOWER EXTREMITY;  Surgeon: David Aguilera MD;  Location: [facility] OR;  Service: General;  Laterality: Left;  Left foot    GASTRIC STIMULATOR IMPLANT SURGERY      HYSTERECTOMY      INCISION AND DRAINAGE OF ABSCESS Right 2022    Procedure: INCISION AND DRAINAGE, ABSCESS;  Surgeon: Kamari Sandoval DO;  Location: [facility] OR;  Service: General;  Laterality: Right;  right hand dr mavis severino    IRRIGATION AND DEBRIDEMENT OF UPPER EXTREMITY Right 2022    Procedure: IRRIGATION AND DEBRIDEMENT, UPPER EXTREMITY;  Surgeon: Kamari Sandoval DO;  Location: [facility] OR;  Service:  General;  Laterality: Right;  right hand I/D dr gamble today    TOE AMPUTATION Right 1/28/2025    Procedure: AMPUTATION, TOE;  Surgeon: David Aguilera MD;  Location: [facility] [place];  Service: General;  Laterality: Right;       Family History   Problem Relation Name Age of Onset    Heart disease Mother      Cancer Father      Diabetes Son      Diabetes Son         Social History     Socioeconomic History    Marital status:    Tobacco Use    Smoking status: Never    Smokeless tobacco: Never   Substance and Sexual Activity    Alcohol use: Never       There were no vitals filed for this visit.    Physical Exam  Skin:     Findings: Lesion (Base of the right 1st toe has some stitches that are still intact.  Black eschar discoloration of the medial and the lateral part of the incision in about a 1 x 1 cm area.  Erythema around the area as well.  This extends for about a cm.) present.         Assessment & Plan:    Pre-procedural examination  -     Basic Metabolic Panel; Future; Expected date: 02/04/2025  -     CBC Auto Differential; Future; Expected date: 02/04/2025  -     Cancel: Ambulatory Referral to External Surgery    Diabetic ulcer of toe of right foot associated with type 2 diabetes mellitus, with necrosis of bone  -     Ambulatory Referral to External Surgery        Patient will need to go to the operating room for debridement of the right foot.  Will likely if the opened the incision excise necrotic area and then leave it open.  Risks and benefits explained including risk of higher amputation were all explained to the patient.  All questions answered.

## 2025-02-06 ENCOUNTER — OUTSIDE PLACE OF SERVICE (OUTPATIENT)
Dept: SURGERY | Facility: CLINIC | Age: 65
End: 2025-02-06
Payer: MEDICARE

## 2025-02-06 ENCOUNTER — OUTSIDE PLACE OF SERVICE (OUTPATIENT)
Dept: SURGERY | Facility: CLINIC | Age: 65
End: 2025-02-06
Payer: COMMERCIAL

## 2025-02-06 ENCOUNTER — LAB REQUISITION (OUTPATIENT)
Dept: LAB | Facility: HOSPITAL | Age: 65
End: 2025-02-06
Attending: SURGERY
Payer: COMMERCIAL

## 2025-02-06 DIAGNOSIS — L97.514 NON-PRESSURE CHRONIC ULCER OF OTHER PART OF RIGHT FOOT WITH NECROSIS OF BONE: ICD-10-CM

## 2025-02-06 DIAGNOSIS — E11.621 TYPE 2 DIABETES MELLITUS WITH FOOT ULCER (CODE): ICD-10-CM

## 2025-02-06 PROCEDURE — 88304 TISSUE EXAM BY PATHOLOGIST: CPT | Mod: TC,SUR | Performed by: SURGERY

## 2025-02-06 PROCEDURE — 88311 DECALCIFY TISSUE: CPT | Mod: 26,,, | Performed by: PATHOLOGY

## 2025-02-06 PROCEDURE — 88304 TISSUE EXAM BY PATHOLOGIST: CPT | Mod: 26,,, | Performed by: PATHOLOGY

## 2025-02-10 ENCOUNTER — LAB REQUISITION (OUTPATIENT)
Dept: LAB | Facility: HOSPITAL | Age: 65
End: 2025-02-10
Attending: NURSE PRACTITIONER
Payer: COMMERCIAL

## 2025-02-10 DIAGNOSIS — E10.621 TYPE 1 DIABETES MELLITUS WITH FOOT ULCER (CODE): ICD-10-CM

## 2025-02-10 DIAGNOSIS — I12.9 HYPERTENSIVE CHRONIC KIDNEY DISEASE WITH STAGE 1 THROUGH STAGE 4 CHRONIC KIDNEY DISEASE, OR UNSPECIFIED CHRONIC KIDNEY DISEASE: ICD-10-CM

## 2025-02-10 LAB — CK SERPL-CCNC: 26 U/L (ref 29–168)

## 2025-02-10 PROCEDURE — 82550 ASSAY OF CK (CPK): CPT | Performed by: NURSE PRACTITIONER

## 2025-02-13 ENCOUNTER — OFFICE VISIT (OUTPATIENT)
Dept: SURGERY | Facility: CLINIC | Age: 65
End: 2025-02-13
Attending: SURGERY
Payer: COMMERCIAL

## 2025-02-13 VITALS
OXYGEN SATURATION: 99 % | SYSTOLIC BLOOD PRESSURE: 105 MMHG | DIASTOLIC BLOOD PRESSURE: 47 MMHG | BODY MASS INDEX: 23.91 KG/M2 | WEIGHT: 167 LBS | HEART RATE: 97 BPM | HEIGHT: 70 IN

## 2025-02-13 DIAGNOSIS — Z09 FOLLOW-UP EXAMINATION, FOLLOWING OTHER SURGERY: Primary | ICD-10-CM

## 2025-02-13 PROCEDURE — 99024 POSTOP FOLLOW-UP VISIT: CPT | Mod: ,,, | Performed by: SURGERY

## 2025-02-13 PROCEDURE — 99214 OFFICE O/P EST MOD 30 MIN: CPT | Mod: PBBFAC | Performed by: SURGERY

## 2025-02-13 PROCEDURE — 99999 PR PBB SHADOW E&M-EST. PATIENT-LVL IV: CPT | Mod: PBBFAC,,, | Performed by: SURGERY

## 2025-02-13 NOTE — PROGRESS NOTES
Post-operative Note    HPI:  The patient is status post debridement of right 1st toe amputation site.  The medial aspect of the wound still has some blackened discoloration.  Minimal red ring.  Nontender no purulence.    PHYSICAL EXAM:    Blackened discoloration of the skin.    Recent Results (from the past 3 weeks)   CBC with Differential    Collection Time: 01/28/25  1:08 PM   Result Value Ref Range    WBC 16.89 (H) 4.50 - 11.00 K/uL    RBC 3.98 (L) 4.20 - 5.40 M/uL    Hemoglobin 9.9 (L) 12.0 - 16.0 g/dL    Hematocrit 34.1 (L) 38.0 - 47.0 %    MCV 85.7 80.0 - 96.0 fL    MCH 24.9 (L) 27.0 - 31.0 pg    MCHC 29.0 (L) 32.0 - 36.0 g/dL    RDW 14.0 11.5 - 14.5 %    Platelet Count 460 (H) 150 - 400 K/uL    MPV 10.6 9.4 - 12.4 fL    Neutrophils % 64.1 53.0 - 65.0 %    Lymphocytes % 23.8 (L) 27.0 - 41.0 %    Monocytes % 9.4 (H) 2.0 - 6.0 %    Eosinophils % 0.5 (L) 1.0 - 4.0 %    Basophils % 0.4 0.0 - 1.0 %    Immature Granulocytes % 1.8 (H) 0.0 - 0.4 %    nRBC, Auto 0.0 <=0.0 %    Neutrophils, Abs 10.83 (H) 1.80 - 7.70 K/uL    Lymphocytes, Absolute 4.02 1.00 - 4.80 K/uL    Monocytes, Absolute 1.59 (H) 0.00 - 0.80 K/uL    Eosinophils, Absolute 0.08 0.00 - 0.50 K/uL    Basophils, Absolute 0.06 0.00 - 0.20 K/uL    Immature Granulocytes, Absolute 0.31 (H) 0.00 - 0.04 K/uL    nRBC, Absolute 0.00 <=0.00 x10e3/uL    Diff Type Auto    Comprehensive Metabolic Panel    Collection Time: 01/28/25  1:08 PM   Result Value Ref Range    Sodium 138 136 - 145 mmol/L    Potassium 4.3 3.5 - 5.1 mmol/L    Chloride 101 98 - 107 mmol/L    CO2 24 23 - 31 mmol/L    Anion Gap 17 (H) 7 - 16 mmol/L    Glucose 87 82 - 115 mg/dL    BUN 23 (H) 10 - 20 mg/dL    Creatinine 1.42 (H) 0.55 - 1.02 mg/dL    BUN/Creatinine Ratio 16 6 - 20    Calcium 9.1 8.4 - 10.2 mg/dL    Total Protein 6.5 5.8 - 7.6 g/dL    Albumin 2.5 (L) 3.4 - 4.8 g/dL    Globulin 4.0 2.0 - 4.0 g/dL    A/G  Ratio 0.6     Bilirubin, Total 0.3 <=1.5 mg/dL    Alk Phos 92 40 - 150 U/L    ALT 16 <=55 U/L    AST 43 (H) 5 - 34 U/L    eGFR 42 (L) >=60 mL/min/1.73m2   POCT glucose    Collection Time: 01/28/25  1:10 PM   Result Value Ref Range    POC Glucose 90 70 - 105 mg/dL   EKG 12-lead    Collection Time: 01/28/25  1:14 PM   Result Value Ref Range    QRS Duration 110 ms    [facility] QTC Calculation 449 ms   Surgical Pathology    Collection Time: 01/28/25  1:57 PM   Result Value Ref Range    Case Report       Surgical Pathology                                Case: [id number]                                   Authorizing Provider:  David Aguilera MD       Collected:           01/28/2025 01:57 PM          Ordering Location:     [facility] -     Received:            01/28/2025 03:49 PM                                 [facility]                                                              Pathologist:           Juancarlos Dumont MD                                                      Specimen:    Toe, Right Foot, 1st toe on right foot                                                     Final Diagnosis       A. Right great toe, amputation:  - Skin and underlying soft tissue and bone with acute inflammation and necrosis  - Skin and soft tissue margin of resection appears viable      Gross Description       A. Toe, Right Foot: 1st toe on right foot  The specimen is received in formalin designated 1st toe on right foot and consists of a toe measuring 5.5 x 3.0 x 2.9 cm.  The skin on the dorsal surface of the toe is white-tan and sloughing.  There is a necrotic area extending from the dorsal surface to the tip of the toe and the plantar surface measuring 7.2 cm in greatest dimension.  The nail is white-tan and partially absent.  The skin on the bottom of the toe is pink-tan to necrotic gray-black.  The skin on the tip toe is disrupted and there is exposed bone.  Sectioning demonstrates underlying gray-black to maroon  underlying tissue and brittle gray bone.  Representative sections are submitted as follows: A1 representative section of necrotic lesion following decalcification, A2 surgical margin.    Grossing was completed by Anthony Gardiner.      Microscopic Description       A microscopic examination was performed and the diagnosis reflects the findings.          Laboratory Notes       If this report includes immunohistochemical (IHC) test results, please note the following: IHC studies were interpreted in conjunction with appropriate positive and negative controls which demonstrate the expected positive and negative reactivity. This laboratory is regulated under CLIA as qualified to perform high-complexity testing. IHC tests are used for clinical purposes. They should not be regarded as investigational or research.       POCT glucose    Collection Time: 01/28/25  2:28 PM   Result Value Ref Range    POC Glucose 82 70 - 105 mg/dL   Comprehensive Metabolic Panel    Collection Time: 01/29/25  1:45 PM   Result Value Ref Range    Sodium 137 136 - 145 mmol/L    Potassium 5.5 (H) 3.5 - 5.1 mmol/L    Chloride 100 98 - 107 mmol/L    CO2 28 23 - 31 mmol/L    Anion Gap 15 7 - 16 mmol/L    Glucose 139 (H) 82 - 115 mg/dL    BUN 26 (H) 10 - 20 mg/dL    Creatinine 1.53 (H) 0.55 - 1.02 mg/dL    BUN/Creatinine Ratio 17 6 - 20    Calcium 8.4 8.4 - 10.2 mg/dL    Total Protein 5.7 (L) 5.8 - 7.6 g/dL    Albumin 2.2 (L) 3.4 - 4.8 g/dL    Globulin 3.5 2.0 - 4.0 g/dL    A/G Ratio 0.6     Bilirubin, Total 0.4 <=1.5 mg/dL    Alk Phos 265 (H) 40 - 150 U/L    ALT 49 <=55 U/L     (H) 5 - 34 U/L    eGFR 38 (L) >=60 mL/min/1.73m2   CRP, High Sensitivity    Collection Time: 01/29/25  1:45 PM   Result Value Ref Range    CRP, High Senstivity 112.53 (H) <0.50 mg/L   CBC with Differential    Collection Time: 01/29/25  1:45 PM   Result Value Ref Range    WBC 15.14 (H) 4.50 - 11.00 K/uL    RBC 3.64 (L) 4.20 - 5.40 M/uL    Hemoglobin 9.1 (L) 12.0 - 16.0 g/dL     Hematocrit 32.2 (L) 38.0 - 47.0 %    MCV 88.5 80.0 - 96.0 fL    MCH 25.0 (L) 27.0 - 31.0 pg    MCHC 28.3 (L) 32.0 - 36.0 g/dL    RDW 14.8 (H) 11.5 - 14.5 %    Platelet Count 408 (H) 150 - 400 K/uL    MPV 11.3 9.4 - 12.4 fL    Neutrophils % 62.9 53.0 - 65.0 %    Lymphocytes % 24.4 (L) 27.0 - 41.0 %    Neutrophils, Abs 9.52 (H) 1.80 - 7.70 K/uL    Lymphocytes, Absolute 3.70 1.00 - 4.80 K/uL    Diff Type Manual     Monocytes % 11.3 (H) 2.0 - 6.0 %    Eosinophils % 1.1 1.0 - 4.0 %    Basophils % 0.3 0.0 - 1.0 %    Monocytes, Absolute 1.71 (H) 0.00 - 0.80 K/uL    Eosinophils, Absolute 0.17 0.00 - 0.50 K/uL    Basophils, Absolute 0.04 0.00 - 0.20 K/uL   Manual Differential    Collection Time: 01/29/25  1:45 PM   Result Value Ref Range    Segmented Neutrophils, Man % 63 (H) 50 - 62 %    Lymphocytes, Man % 28 27 - 41 %    Monocytes, Man % 9 (H) 2 - 6 %    nRBC, Manual      Platelet Morphology Normal Normal    Anisocytosis Few     Poikilocytosis Few     Hypochromic Few    Comprehensive Metabolic Panel    Collection Time: 02/03/25  2:30 PM   Result Value Ref Range    Sodium 138 136 - 145 mmol/L    Potassium 4.6 3.5 - 5.1 mmol/L    Chloride 99 98 - 107 mmol/L    CO2 32 (H) 23 - 31 mmol/L    Anion Gap 12 7 - 16 mmol/L    Glucose 123 (H) 82 - 115 mg/dL    BUN 13 10 - 20 mg/dL    Creatinine 1.00 0.55 - 1.02 mg/dL    BUN/Creatinine Ratio 13 6 - 20    Calcium 8.5 8.4 - 10.2 mg/dL    Total Protein 5.0 (L) 5.8 - 7.6 g/dL    Albumin 1.8 (L) 3.4 - 4.8 g/dL    Globulin 3.2 2.0 - 4.0 g/dL    A/G Ratio 0.6     Bilirubin, Total 0.4 <=1.5 mg/dL    Alk Phos 117 40 - 150 U/L    ALT 37 <=55 U/L    AST 16 5 - 34 U/L    eGFR 64 >=60 mL/min/1.73m2   CRP, High Sensitivity    Collection Time: 02/03/25  2:30 PM   Result Value Ref Range    CRP, High Senstivity 94.46 (H) <0.50 mg/L   CBC with Differential    Collection Time: 02/03/25  2:30 PM   Result Value Ref Range    WBC 9.59 4.50 - 11.00 K/uL    RBC 3.33 (L) 4.20 - 5.40 M/uL    Hemoglobin 8.4  (L) 12.0 - 16.0 g/dL    Hematocrit 28.6 (L) 38.0 - 47.0 %    MCV 85.9 80.0 - 96.0 fL    MCH 25.2 (L) 27.0 - 31.0 pg    MCHC 29.4 (L) 32.0 - 36.0 g/dL    RDW 14.6 (H) 11.5 - 14.5 %    Platelet Count 326 150 - 400 K/uL    MPV 10.3 9.4 - 12.4 fL    Neutrophils % 70.9 (H) 53.0 - 65.0 %    Lymphocytes % 16.3 (L) 27.0 - 41.0 %    Neutrophils, Abs 6.80 1.80 - 7.70 K/uL    Lymphocytes, Absolute 1.56 1.00 - 4.80 K/uL    Diff Type Scan Smear     Monocytes % 11.6 (H) 2.0 - 6.0 %    Eosinophils % 0.9 (L) 1.0 - 4.0 %    Basophils % 0.3 0.0 - 1.0 %    Monocytes, Absolute 1.11 (H) 0.00 - 0.80 K/uL    Eosinophils, Absolute 0.09 0.00 - 0.50 K/uL    Basophils, Absolute 0.03 0.00 - 0.20 K/uL   Surgical Pathology    Collection Time: 02/06/25  1:58 PM   Result Value Ref Range    Case Report       Surgical Pathology                                Case: [id number]                                   Authorizing Provider:  David Aguilera MD       Collected:           02/06/2025 01:58 PM          Ordering Location:     [facility] -     Received:            02/06/2025 01:59 PM                                 Laboratory                                                                   Pathologist:           Waldo Collins MD                                                           Specimen:    Debridement, Right first toe amputation site                                               Final Diagnosis       A. Right 1st toe amputation site, excisional debridement:  - Skin and subcutaneous tissue with gangrenous necrosis  - Bone with purulent chronic osteomyelitis      Gross Description       A. Debridement: Right first toe amputation site  The specimen is received in formalin designated Right first toe amputation site and consists of debrided skin with underlying soft tissue and multiple bone fragments.  The skin debridement measures 3.5 x 2.3 x 1.0 cm in depth.  The skin surface is sloughing and variegated white, pink-purple, and  black.  There are 5 black sutures attached.  Sectioning reveals underlying firm maroon-black to red soft tissue.  The bone fragments measure 4.8 x 3.3 x 1.0 cm in aggregate, largest measuring 1.2 cm in greatest dimension.  The bone fragments are brittle and variegated white-tan, red, and brown.  No other abnormalities are identified.  A representative section from the skin debridement is submitted in cassette A1.  A representative portion of the bone fragments is submitted in cassette A2 following decalcification.    Grossing was completed by Hang Niño.      Microscopic Description       A microscopic examination was performed and the diagnosis reflects the findings.          Laboratory Notes       If this report includes immunohistochemical (IHC) test results, please note the following: IHC studies were interpreted in conjunction with appropriate positive and negative controls which demonstrate the expected positive and negative reactivity. This laboratory is regulated under CLIA as qualified to perform high-complexity testing. IHC tests are used for clinical purposes. They should not be regarded as investigational or research.       CK    Collection Time: 02/10/25  2:15 PM   Result Value Ref Range    CK 26 (L) 29 - 168 U/L        ASSESSMENT:      The patient is  she is having some continued necrotic areas of the medial aspect of her black skin.       PLAN:      Follow up 2 weeks.    Come back in 2 weeks and see if the area can demarcate in the meantime.  Continue wet-to-dry in the meantime.  Come back sooner for any worsening acute issues.  All questions answered

## 2025-02-18 ENCOUNTER — TELEPHONE (OUTPATIENT)
Dept: WOUND CARE | Facility: CLINIC | Age: 65
End: 2025-02-18
Payer: MEDICARE

## 2025-02-18 NOTE — TELEPHONE ENCOUNTER
Returned pt call. Pt states wound vac came off over the weekend and she did wet to moist dressing until home health reapplied the vac yesterday. Since the vac was applied she has been having increased pain. Patient reports she has an appointment with Dr. Aguilera Thursday 2/20/25 to assess wounds. SERGEI Orellana FNP notified with new order received to remove wound vac, cleanse with vashe, apply vashe moistened gauze to wound bed, cover with dry gauze, wrap with kerlix, and secure with paper tape. Perform wound care daily and as needed. Pt verbalizes understanding.

## 2025-02-20 ENCOUNTER — OFFICE VISIT (OUTPATIENT)
Dept: SURGERY | Facility: CLINIC | Age: 65
End: 2025-02-20
Attending: SURGERY
Payer: COMMERCIAL

## 2025-02-20 DIAGNOSIS — L97.422 DIABETIC ULCER OF LEFT HEEL ASSOCIATED WITH TYPE 2 DIABETES MELLITUS, WITH FAT LAYER EXPOSED: ICD-10-CM

## 2025-02-20 DIAGNOSIS — Z09 FOLLOW-UP EXAMINATION, FOLLOWING OTHER SURGERY: ICD-10-CM

## 2025-02-20 DIAGNOSIS — L97.514 DIABETIC ULCER OF TOE OF RIGHT FOOT ASSOCIATED WITH TYPE 2 DIABETES MELLITUS, WITH NECROSIS OF BONE: Primary | ICD-10-CM

## 2025-02-20 DIAGNOSIS — E11.621 DIABETIC ULCER OF LEFT HEEL ASSOCIATED WITH TYPE 2 DIABETES MELLITUS, WITH FAT LAYER EXPOSED: ICD-10-CM

## 2025-02-20 DIAGNOSIS — E11.621 DIABETIC ULCER OF TOE OF RIGHT FOOT ASSOCIATED WITH TYPE 2 DIABETES MELLITUS, WITH NECROSIS OF BONE: Primary | ICD-10-CM

## 2025-02-20 PROCEDURE — 99215 OFFICE O/P EST HI 40 MIN: CPT | Mod: PBBFAC | Performed by: SURGERY

## 2025-02-20 RX ORDER — CLINDAMYCIN PHOSPHATE 900 MG/50ML
900 INJECTION, SOLUTION INTRAVENOUS
Status: CANCELLED | OUTPATIENT
Start: 2025-02-20

## 2025-02-20 RX ORDER — SODIUM CHLORIDE 9 MG/ML
INJECTION, SOLUTION INTRAVENOUS CONTINUOUS
Status: CANCELLED | OUTPATIENT
Start: 2025-02-20

## 2025-02-20 NOTE — PROGRESS NOTES
General Surgery History and Physical      Patient ID: Silvana Sarah is a 64 y.o. female.    Chief Complaint: Wound Check      HPI:  64-year-old female familiar to the service multiple debridements to her right 1st toe after an amputation in a few weeks ago.  She keeps getting necrotic gangrenous changes to the skin on the proximal aspect.  We have been watching this for almost 2 weeks now in an area demarcated nicely on the medial aspect of the right foot.  Little bit of green drainage recently.  No fever no chills.  No severe pain.  She had her wound VAC taken off on her other side due to her RA causing a flare up and was having severe ankle pain.    Review of Systems   Constitutional:  Negative for activity change, appetite change, fatigue and fever.   HENT:  Negative for trouble swallowing.    Respiratory:  Negative for cough and shortness of breath.    Cardiovascular:  Negative for chest pain and palpitations.   Gastrointestinal:  Negative for abdominal distention, abdominal pain, blood in stool, constipation and diarrhea.   Genitourinary:  Negative for flank pain.   Musculoskeletal:  Negative for neck pain and neck stiffness.   Skin:  Positive for wound.   Neurological:  Negative for weakness.       Current Medications[1]    Review of patient's allergies indicates:   Allergen Reactions    Influenza virus vaccines     Penicillins        Past Medical History:   Diagnosis Date    Anxiety and depression 2021    Diabetes mellitus     Dry eye syndrome, bilateral 10/06/2017    Essential (primary) hypertension     Gastroparesis due to DM     Generalized osteoarthritis 10/25/2021    Other mechanical complication of implanted electronic neurostimulator, generator, sequela 10/25/2021    Peripheral autonomic neuropathy due to DM     Rheumatoid arthritis        Past Surgical History:   Procedure Laterality Date      SECTION      DEBRIDEMENT OF LOWER EXTREMITY Left 9/27/2024    Procedure: DEBRIDEMENT, LOWER EXTREMITY;  Surgeon: David Aguilera MD;  Location: [facility];  Service: General;  Laterality: Left;  Left foot    GASTRIC STIMULATOR IMPLANT SURGERY  2010    HYSTERECTOMY      INCISION AND DRAINAGE OF ABSCESS Right 5/13/2022    Procedure: INCISION AND DRAINAGE, ABSCESS;  Surgeon: Kamari Sandoval DO;  Location: [facility] OR;  Service: General;  Laterality: Right;  right hand dr sandoval am    IRRIGATION AND DEBRIDEMENT OF UPPER EXTREMITY Right 5/16/2022    Procedure: IRRIGATION AND DEBRIDEMENT, UPPER EXTREMITY;  Surgeon: Kamari Sandoval DO;  Location: [facility] OR;  Service: General;  Laterality: Right;  right hand I/D dr sandoval today    TOE AMPUTATION Right 1/28/2025    Procedure: AMPUTATION, TOE;  Surgeon: David Aguilera MD;  Location: [facility];  Service: General;  Laterality: Right;       Family History   Problem Relation Name Age of Onset    Heart disease Mother      Cancer Father      Diabetes Son      Diabetes Son         Social History[2]    There were no vitals filed for this visit.    Physical Exam  Constitutional:       General: She is not in acute distress.  HENT:      Head: Normocephalic.   Cardiovascular:      Rate and Rhythm: Normal rate and regular rhythm.      Pulses: Normal pulses.   Pulmonary:      Effort: Pulmonary effort is normal. No respiratory distress.      Breath sounds: Normal breath sounds.   Abdominal:      General: Abdomen is flat. There is no distension.      Palpations: Abdomen is soft.      Tenderness: There is no abdominal tenderness.   Musculoskeletal:         General: Normal range of motion.   Skin:     General: Skin is warm.      Findings: Lesion (Proximally 8 x 5 cm area on the medial aspect of the right foot with some blackened discolored skin.) present.   Neurological:      General: No focal deficit present.      Mental Status: She is oriented to person, place, and time.          Assessment & Plan:    Follow-up examination, following other surgery  -     CBC Auto Differential; Future; Expected date: 2025  -     Basic Metabolic Panel; Future; Expected date: 2025    Diabetic ulcer of toe of right foot associated with type 2 diabetes mellitus, with necrosis of bone    Diabetic ulcer of left heel associated with type 2 diabetes mellitus, with fat layer exposed        Patient to go to the operating room tomorrow for debridement of the right foot.  Risks and benefits explained including risk of bleeding, infection, recurrence, need for additional operations.  All questions answered.          [1]   Current Outpatient Medications   Medication Sig Dispense Refill    b complex vitamins capsule Take 1 capsule by mouth once daily.      cyanocobalamin 1,000 mcg/mL injection Inject 1,000 mcg into the muscle every 30 days.      esomeprazole (NEXIUM) 20 MG capsule Take 20 mg by mouth once daily.      etanercept (ENBREL MINI) 50 mg/mL (1 mL) Crtg 1 mL by abdominal subcutaneous route every .       ferrous sulfate (FEOSOL) Tab tablet Take 1 tablet by mouth once daily.      gabapentin (NEURONTIN) 600 MG tablet Take 2 tablets 3 times a day by oral route for 30 days.      hydrOXYchloroQUINE (PLAQUENIL) 200 mg tablet Take 200 mg by mouth 2 (two) times daily.       insulin regular 100 unit/mL Inj injection 3 (three) times daily before meals.      LEVEMIR U-100 INSULIN 100 unit/mL injection SMARTSI Unit(s) SUB-Q Daily      lisinopriL (PRINIVIL,ZESTRIL) 2.5 MG tablet Take 2.5 mg by mouth every evening.      lysine (L-LYSINE) 500 mg Tab Take 500 mg by mouth 2 (two) times a day.      nystatin (MYCOSTATIN) 100,000 unit/mL suspension Take 6 mLs by mouth 3 (three) times daily before meals.       oxyCODONE (ROXICODONE) 10 mg Tab immediate release tablet Take 1 tablet (10 mg total) by mouth every 6 (six) hours as needed for Pain. 10 tablet 0    oxyCODONE-acetaminophen (PERCOCET)  mg per  tablet Take 1 tablet by mouth 4 (four) times daily.      predniSONE (DELTASONE) 5 MG tablet Take 5 mg by mouth once daily.       promethazine (PHENERGAN) 25 MG tablet Take 25 mg by mouth every 6 (six) hours as needed for Nausea.       triamterene-hydrochlorothiazide 37.5-25 mg (DYAZIDE) 37.5-25 mg per capsule Take 1 capsule by mouth once daily.       zinc gluconate 50 mg tablet Take 50 mg by mouth once daily.       No current facility-administered medications for this visit.   [2]   Social History  Socioeconomic History    Marital status:    Tobacco Use    Smoking status: Never    Smokeless tobacco: Never   Substance and Sexual Activity    Alcohol use: Never

## 2025-02-20 NOTE — PATIENT INSTRUCTIONS
[facility]  Instructions for surgery        Your surgery is scheduled for 2/21/2025 at [facility] on the 1st floor of [facility].Your arrival time is 9 a.m   You will be notified the day before  surgery verifying your arrival time for surgery.    Your preop lab work will be done . Lab is on the 1st floor of the clinic. EKG is on 2nd floor of the clinic.                                                                                                                                                                                                                                                                                                                                                                           Day of Surgery Instructions      Bring a list of all your medications with you the day of your surgery. You can also give the list to your doctor or nurse during your final clinic appointment before surgery.      Do not eat any solid foods or drink any liquids after 12:00 AM (midnight). This includes gum, hard candy, mints, and chewing tobacco.  Medications: Take any medications specified with a small sip of water the morning of your surgery.  Brush your teeth: You may brush your teeth and rinse your mouth. Do not swallow any water or toothpaste.  Clothing: A button front shirt and loose-fitting clothes are the most comfortable before and after surgery. We also recommend low-heeled shoes.  Hair: Avoid buns, ponytails, or hairpieces at the back of the head. Remove or avoid any clips, pins or bands that bind hair. Do not use hairspray. Before going to surgery, you will need to remove any wigs or hairpieces.  We will cover your hair during surgery. Your privacy regarding personal appearance will be respected.  Fingernails: Please be sure to remove all nail polish before you arrive for surgery. We understand that tips, wraps, gels, etc., are expensive;  however, we ask these products to be removed from at least one finger on each hand. Your fingertips are used to accurately monitor your oxygen level during surgery by a device called an oximeter.  Glasses and Contact Lenses: Wear glasses when possible. If contact lenses must be worn, bring a lens case and solution. If glasses are worn, bring a case for them.  Hearing Aids: If you rely on a hearing aid, wear it to the hospital on the day of surgery. This will ensure you can hear and understand everything we need to communicate with you.  Valuables: Jewelry, including body piercings, Dentures, money, and credit cards should be left at home. [facility] is not responsible for valuables that are not secured in our surgery center.  Makeup, Perfume, Creams, Lotions and Deodorants: Do not use any of these products on the day of surgery. Remove false eyelashes prior to surgery.  Implanted Medical Devices: If you have an implanted device, such as a pacemaker or AICD, bring the device information card (if you have it) with you.  Medical Equipment: If you have been fitted for a brace to wear after surgery or you have been given crutches, bring those with you to the surgery center.  Shower: Take a shower with Hibiclens® (chlorhexidine) (available over the counter). This reduces the chance of infection. PLEASE USE CHLORHEXIDINE WASH THE NIGHT BEFORE SURGERY AND THE MORNING OF SURGERY.  ONLY 2 VISITORS ARE ALLOWED WITH YOU ON DAY OF SURGERY.        Medication instructions:  You may take blood pressure medication with a small drink of water the morning of surgery.    Stop your blood thinner  days before surgery    IF YOU ARE ON ANY OF THESE BLOOD THINNERS, MAKE SURE YOUR PHYSICIAN IS AWARE.  Eliquis/Apixaban            Wafarin/Coumadin,Jantoven  Xarelto/Rivaroxaban      Pletal/Cilostazol  Plavix/Clopidogrel          Pradaxa/Dibigatran      If you are diabetic      Follow the diabetic medicine instructions you received during your  pre-operative visit.  DO NOT take your insulin or diabetic medications the morning of surgery.  When you arrive at the surgical center, be sure to tell the nurse you are diabetic.    The following blood sugar medications have to be stopped prior to surgery:    Hold 24 hours prior to surgery:    Libraglutide - Saxenda, Victoza  Lixisenatide --Adlxyin  Exenatide  --  Byetta  Empaglifozin--Jardiance  Sitaglitin--Januvia    Hold 1 week prior to surgery:    Semaglutide - Ozempic, Wegouy, Rybelsus  Dulaglutide - Trulicity  Tirzepatide - Mounjaro  Exenatide (extended release inj)-- Bydureon BCise      Hold 48 hours prior to surgery:    Metformin, Glucovance, Metaglip, Fortamet, Glucophage, Riomet, Avandamet, Glimepiride            Other Items to bring with you and know    Insurance card  Identification card such as 's license, passport, or other picture ID  Copy of your advance directives  List of medications and allergies, if not already provided  Name and phone number of person to contact if your condition changes significantly. YOU CANNOT DRIVE YOURSELF HOME FROM THE HOSPITAL THE DAY OF SURGERY.  PLEASE UNDERSTAND THAT OUR OFFICE DOES NOT GIVE PATHOLOGY RESULTS OR TEST RESULTS OVER THE PHONE. THIS WILL BE DISCUSSED WITH YOU ON YOUR FOLLOW UP APPOINTMENT.  IF YOU SUBMIT FMLA FORMS, IT WILL TAKE 3-7 DAYS TO COMPLETE THESE          Alcohol and Surgery  We want to help you prepare for and recover from surgery as quickly and safely as possible. Be open and honest with your provider about how many drinks you have per day. Excessive alcohol use is defined as drinking more than three drinks per day. It can affect the outcome of your surgery. Binge drinking (consuming large amounts of alcohol infrequently, such as on weekends) can also affect the outcome of your surgery.  Alcohol withdrawal  If you drink more than three drinks a day, you could have a complication, called alcohol withdrawal, after surgery.  Alcohol  withdrawal is a set of symptoms that people have when they suddenly stop drinking after using alcohol  for a long time. During withdrawal, a person's central nervous system overreacts. This can cause mild symptoms such as shakiness, sweating or hallucinating. It can also cause other more serious side effects. If not treated properly, alcohol withdrawal can cause potentially life-threatening complications after surgery. This can include tremors, seizures, hallucinations, delirium tremors, and even death. Untreated alcohol withdrawal often leads to a longer stay in the hospital, potentially in the Intensive Care Unit.  Chronic heavy drinking also can interfere with several organ systems and biochemical processes in the body.  This interference can cause serious, even life-threatening complications.  Your care team can offer alcohol withdrawal treatment to help:  Decrease the risk of seizures and delirium tremors after surgery  Decrease the risk we will need to restrain you for your own safety or the safety of others  Decrease your risk of falling after surgery  Reduce the use of potent sedative medications  Reduce the time you stay in the hospital after surgery  Reduce the time you might spend on a mechanical ventilator to help you breathe  Lower incidence of organ failure and biochemical complications  Talk to a member of your care team or your primary care physician about your alcohol use if you feel you may be at risk of any of these complications.        Smoking and Surgery  Quitting smoking is extremely important for a successful surgery and recovery. Cigarette smoking compromises your immune system. This increases your risk of an infection after surgery. Quitting the habit before surgery will decrease the surgical risks associated with smoking.

## 2025-02-20 NOTE — H&P (VIEW-ONLY)
General Surgery History and Physical      Patient ID: Silvana Sarah is a 64 y.o. female.    Chief Complaint: Wound Check      HPI:  64-year-old female familiar to the service multiple debridements to her right 1st toe after an amputation in a few weeks ago.  She keeps getting necrotic gangrenous changes to the skin on the proximal aspect.  We have been watching this for almost 2 weeks now in an area demarcated nicely on the medial aspect of the right foot.  Little bit of green drainage recently.  No fever no chills.  No severe pain.  She had her wound VAC taken off on her other side due to her RA causing a flare up and was having severe ankle pain.    Review of Systems   Constitutional:  Negative for activity change, appetite change, fatigue and fever.   HENT:  Negative for trouble swallowing.    Respiratory:  Negative for cough and shortness of breath.    Cardiovascular:  Negative for chest pain and palpitations.   Gastrointestinal:  Negative for abdominal distention, abdominal pain, blood in stool, constipation and diarrhea.   Genitourinary:  Negative for flank pain.   Musculoskeletal:  Negative for neck pain and neck stiffness.   Skin:  Positive for wound.   Neurological:  Negative for weakness.       Current Medications[1]    Review of patient's allergies indicates:   Allergen Reactions    Influenza virus vaccines     Penicillins        Past Medical History:   Diagnosis Date    Anxiety and depression 2021    Diabetes mellitus     Dry eye syndrome, bilateral 10/06/2017    Essential (primary) hypertension     Gastroparesis due to DM     Generalized osteoarthritis 10/25/2021    Other mechanical complication of implanted electronic neurostimulator, generator, sequela 10/25/2021    Peripheral autonomic neuropathy due to DM     Rheumatoid arthritis        Past Surgical History:   Procedure Laterality Date      SECTION      DEBRIDEMENT OF LOWER EXTREMITY Left 9/27/2024    Procedure: DEBRIDEMENT, LOWER EXTREMITY;  Surgeon: David Aguilera MD;  Location: [facility];  Service: General;  Laterality: Left;  Left foot    GASTRIC STIMULATOR IMPLANT SURGERY  2010    HYSTERECTOMY      INCISION AND DRAINAGE OF ABSCESS Right 5/13/2022    Procedure: INCISION AND DRAINAGE, ABSCESS;  Surgeon: Kamari Sandoval DO;  Location: [facility] OR;  Service: General;  Laterality: Right;  right hand dr sandoval am    IRRIGATION AND DEBRIDEMENT OF UPPER EXTREMITY Right 5/16/2022    Procedure: IRRIGATION AND DEBRIDEMENT, UPPER EXTREMITY;  Surgeon: Kamari Sandoval DO;  Location: [facility] OR;  Service: General;  Laterality: Right;  right hand I/D dr sandoval today    TOE AMPUTATION Right 1/28/2025    Procedure: AMPUTATION, TOE;  Surgeon: David Aguilera MD;  Location: [facility];  Service: General;  Laterality: Right;       Family History   Problem Relation Name Age of Onset    Heart disease Mother      Cancer Father      Diabetes Son      Diabetes Son         Social History[2]    There were no vitals filed for this visit.    Physical Exam  Constitutional:       General: She is not in acute distress.  HENT:      Head: Normocephalic.   Cardiovascular:      Rate and Rhythm: Normal rate and regular rhythm.      Pulses: Normal pulses.   Pulmonary:      Effort: Pulmonary effort is normal. No respiratory distress.      Breath sounds: Normal breath sounds.   Abdominal:      General: Abdomen is flat. There is no distension.      Palpations: Abdomen is soft.      Tenderness: There is no abdominal tenderness.   Musculoskeletal:         General: Normal range of motion.   Skin:     General: Skin is warm.      Findings: Lesion (Proximally 8 x 5 cm area on the medial aspect of the right foot with some blackened discolored skin.) present.   Neurological:      General: No focal deficit present.      Mental Status: She is oriented to person, place, and time.          Assessment & Plan:    Follow-up examination, following other surgery  -     CBC Auto Differential; Future; Expected date: 2025  -     Basic Metabolic Panel; Future; Expected date: 2025    Diabetic ulcer of toe of right foot associated with type 2 diabetes mellitus, with necrosis of bone    Diabetic ulcer of left heel associated with type 2 diabetes mellitus, with fat layer exposed        Patient to go to the operating room tomorrow for debridement of the right foot.  Risks and benefits explained including risk of bleeding, infection, recurrence, need for additional operations.  All questions answered.          [1]   Current Outpatient Medications   Medication Sig Dispense Refill    b complex vitamins capsule Take 1 capsule by mouth once daily.      cyanocobalamin 1,000 mcg/mL injection Inject 1,000 mcg into the muscle every 30 days.      esomeprazole (NEXIUM) 20 MG capsule Take 20 mg by mouth once daily.      etanercept (ENBREL MINI) 50 mg/mL (1 mL) Crtg 1 mL by abdominal subcutaneous route every .       ferrous sulfate (FEOSOL) Tab tablet Take 1 tablet by mouth once daily.      gabapentin (NEURONTIN) 600 MG tablet Take 2 tablets 3 times a day by oral route for 30 days.      hydrOXYchloroQUINE (PLAQUENIL) 200 mg tablet Take 200 mg by mouth 2 (two) times daily.       insulin regular 100 unit/mL Inj injection 3 (three) times daily before meals.      LEVEMIR U-100 INSULIN 100 unit/mL injection SMARTSI Unit(s) SUB-Q Daily      lisinopriL (PRINIVIL,ZESTRIL) 2.5 MG tablet Take 2.5 mg by mouth every evening.      lysine (L-LYSINE) 500 mg Tab Take 500 mg by mouth 2 (two) times a day.      nystatin (MYCOSTATIN) 100,000 unit/mL suspension Take 6 mLs by mouth 3 (three) times daily before meals.       oxyCODONE (ROXICODONE) 10 mg Tab immediate release tablet Take 1 tablet (10 mg total) by mouth every 6 (six) hours as needed for Pain. 10 tablet 0    oxyCODONE-acetaminophen (PERCOCET)  mg per  tablet Take 1 tablet by mouth 4 (four) times daily.      predniSONE (DELTASONE) 5 MG tablet Take 5 mg by mouth once daily.       promethazine (PHENERGAN) 25 MG tablet Take 25 mg by mouth every 6 (six) hours as needed for Nausea.       triamterene-hydrochlorothiazide 37.5-25 mg (DYAZIDE) 37.5-25 mg per capsule Take 1 capsule by mouth once daily.       zinc gluconate 50 mg tablet Take 50 mg by mouth once daily.       No current facility-administered medications for this visit.   [2]   Social History  Socioeconomic History    Marital status:    Tobacco Use    Smoking status: Never    Smokeless tobacco: Never   Substance and Sexual Activity    Alcohol use: Never

## 2025-02-21 ENCOUNTER — HOSPITAL ENCOUNTER (OUTPATIENT)
Facility: HOSPITAL | Age: 65
Discharge: HOME | End: 2025-02-21
Attending: SURGERY | Admitting: SURGERY
Payer: COMMERCIAL

## 2025-02-21 ENCOUNTER — ANESTHESIA EVENT (OUTPATIENT)
Dept: SURGERY | Facility: HOSPITAL | Age: 65
End: 2025-02-21
Payer: COMMERCIAL

## 2025-02-21 ENCOUNTER — ANESTHESIA (OUTPATIENT)
Dept: SURGERY | Facility: HOSPITAL | Age: 65
End: 2025-02-21
Payer: MEDICARE

## 2025-02-21 VITALS
DIASTOLIC BLOOD PRESSURE: 64 MMHG | HEART RATE: 92 BPM | HEIGHT: 70 IN | WEIGHT: 161 LBS | SYSTOLIC BLOOD PRESSURE: 127 MMHG | RESPIRATION RATE: 18 BRPM | OXYGEN SATURATION: 98 % | BODY MASS INDEX: 23.05 KG/M2 | TEMPERATURE: 99 F

## 2025-02-21 DIAGNOSIS — E11.621 DIABETIC ULCER OF TOE OF RIGHT FOOT ASSOCIATED WITH TYPE 2 DIABETES MELLITUS, WITH NECROSIS OF BONE: Primary | ICD-10-CM

## 2025-02-21 DIAGNOSIS — Z09 FOLLOW-UP EXAMINATION, FOLLOWING OTHER SURGERY: ICD-10-CM

## 2025-02-21 DIAGNOSIS — L97.514 DIABETIC ULCER OF TOE OF RIGHT FOOT ASSOCIATED WITH TYPE 2 DIABETES MELLITUS, WITH NECROSIS OF BONE: Primary | ICD-10-CM

## 2025-02-21 LAB
GLUCOSE SERPL-MCNC: 137 MG/DL (ref 70–105)
GLUCOSE SERPL-MCNC: 154 MG/DL (ref 70–105)

## 2025-02-21 PROCEDURE — 63600175 PHARM REV CODE 636 W HCPCS: Performed by: ANESTHESIOLOGY

## 2025-02-21 PROCEDURE — 63600175 PHARM REV CODE 636 W HCPCS: Performed by: NURSE ANESTHETIST, CERTIFIED REGISTERED

## 2025-02-21 PROCEDURE — 37000009 HC ANESTHESIA EA ADD 15 MINS: Performed by: SURGERY

## 2025-02-21 PROCEDURE — 88304 TISSUE EXAM BY PATHOLOGIST: CPT | Mod: 26,,, | Performed by: PATHOLOGY

## 2025-02-21 PROCEDURE — 36000707: Performed by: SURGERY

## 2025-02-21 PROCEDURE — 25000003 PHARM REV CODE 250: Performed by: SURGERY

## 2025-02-21 PROCEDURE — D9220A PRA ANESTHESIA: Mod: CRNA,,, | Performed by: NURSE ANESTHETIST, CERTIFIED REGISTERED

## 2025-02-21 PROCEDURE — 82962 GLUCOSE BLOOD TEST: CPT

## 2025-02-21 PROCEDURE — 36000706: Performed by: SURGERY

## 2025-02-21 PROCEDURE — 71000033 HC RECOVERY, INTIAL HOUR: Performed by: SURGERY

## 2025-02-21 PROCEDURE — 71000015 HC POSTOP RECOV 1ST HR: Performed by: SURGERY

## 2025-02-21 PROCEDURE — 27000655: Performed by: ANESTHESIOLOGY

## 2025-02-21 PROCEDURE — 88311 DECALCIFY TISSUE: CPT | Mod: 26,,, | Performed by: PATHOLOGY

## 2025-02-21 PROCEDURE — 11044 DBRDMT BONE 1ST 20 SQ CM/<: CPT | Mod: ,,, | Performed by: SURGERY

## 2025-02-21 PROCEDURE — 88304 TISSUE EXAM BY PATHOLOGIST: CPT | Mod: TC,SUR | Performed by: SURGERY

## 2025-02-21 PROCEDURE — D9220A PRA ANESTHESIA: Mod: ANES,,, | Performed by: ANESTHESIOLOGY

## 2025-02-21 PROCEDURE — 63600175 PHARM REV CODE 636 W HCPCS: Performed by: SURGERY

## 2025-02-21 PROCEDURE — 37000008 HC ANESTHESIA 1ST 15 MINUTES: Performed by: SURGERY

## 2025-02-21 PROCEDURE — 27000716 HC OXISENSOR PROBE, ANY SIZE: Performed by: ANESTHESIOLOGY

## 2025-02-21 PROCEDURE — 11047 DBRDMT BONE EACH ADDL: CPT | Mod: ,,, | Performed by: SURGERY

## 2025-02-21 PROCEDURE — 27000177 HC AIRWAY, LARYNGEAL MASK: Performed by: ANESTHESIOLOGY

## 2025-02-21 RX ORDER — MORPHINE SULFATE 10 MG/ML
4 INJECTION INTRAMUSCULAR; INTRAVENOUS; SUBCUTANEOUS EVERY 5 MIN PRN
Status: DISCONTINUED | OUTPATIENT
Start: 2025-02-21 | End: 2025-02-21 | Stop reason: HOSPADM

## 2025-02-21 RX ORDER — DIPHENHYDRAMINE HYDROCHLORIDE 50 MG/ML
25 INJECTION INTRAMUSCULAR; INTRAVENOUS EVERY 6 HOURS PRN
Status: DISCONTINUED | OUTPATIENT
Start: 2025-02-21 | End: 2025-02-21 | Stop reason: HOSPADM

## 2025-02-21 RX ORDER — HYDROMORPHONE HYDROCHLORIDE 2 MG/ML
0.5 INJECTION, SOLUTION INTRAMUSCULAR; INTRAVENOUS; SUBCUTANEOUS EVERY 5 MIN PRN
Status: DISCONTINUED | OUTPATIENT
Start: 2025-02-21 | End: 2025-02-21 | Stop reason: HOSPADM

## 2025-02-21 RX ORDER — PHENYLEPHRINE HYDROCHLORIDE 10 MG/ML
INJECTION INTRAVENOUS
Status: DISCONTINUED | OUTPATIENT
Start: 2025-02-21 | End: 2025-02-21

## 2025-02-21 RX ORDER — MEPERIDINE HYDROCHLORIDE 25 MG/ML
25 INJECTION INTRAMUSCULAR; INTRAVENOUS; SUBCUTANEOUS EVERY 10 MIN PRN
Status: DISCONTINUED | OUTPATIENT
Start: 2025-02-21 | End: 2025-02-21 | Stop reason: HOSPADM

## 2025-02-21 RX ORDER — ONDANSETRON HYDROCHLORIDE 2 MG/ML
4 INJECTION, SOLUTION INTRAVENOUS DAILY PRN
Status: DISCONTINUED | OUTPATIENT
Start: 2025-02-21 | End: 2025-02-21 | Stop reason: HOSPADM

## 2025-02-21 RX ORDER — LIDOCAINE HYDROCHLORIDE 20 MG/ML
INJECTION, SOLUTION EPIDURAL; INFILTRATION; INTRACAUDAL; PERINEURAL
Status: DISCONTINUED | OUTPATIENT
Start: 2025-02-21 | End: 2025-02-21

## 2025-02-21 RX ORDER — CLINDAMYCIN PHOSPHATE 900 MG/50ML
900 INJECTION, SOLUTION INTRAVENOUS
Status: COMPLETED | OUTPATIENT
Start: 2025-02-21 | End: 2025-02-21

## 2025-02-21 RX ORDER — IPRATROPIUM BROMIDE AND ALBUTEROL SULFATE 2.5; .5 MG/3ML; MG/3ML
3 SOLUTION RESPIRATORY (INHALATION) ONCE
Status: DISCONTINUED | OUTPATIENT
Start: 2025-02-21 | End: 2025-02-21 | Stop reason: HOSPADM

## 2025-02-21 RX ORDER — SODIUM CHLORIDE 9 MG/ML
INJECTION, SOLUTION INTRAVENOUS CONTINUOUS
Status: DISCONTINUED | OUTPATIENT
Start: 2025-02-21 | End: 2025-02-21 | Stop reason: HOSPADM

## 2025-02-21 RX ORDER — HYDROCODONE BITARTRATE AND ACETAMINOPHEN 7.5; 325 MG/1; MG/1
1 TABLET ORAL EVERY 6 HOURS PRN
Qty: 15 TABLET | Refills: 0 | Status: SHIPPED | OUTPATIENT
Start: 2025-02-21

## 2025-02-21 RX ORDER — ACETAMINOPHEN 10 MG/ML
1000 INJECTION, SOLUTION INTRAVENOUS ONCE
Status: DISCONTINUED | OUTPATIENT
Start: 2025-02-21 | End: 2025-02-21 | Stop reason: HOSPADM

## 2025-02-21 RX ORDER — PROPOFOL 10 MG/ML
VIAL (ML) INTRAVENOUS
Status: DISCONTINUED | OUTPATIENT
Start: 2025-02-21 | End: 2025-02-21

## 2025-02-21 RX ORDER — FENTANYL CITRATE 50 UG/ML
INJECTION, SOLUTION INTRAMUSCULAR; INTRAVENOUS
Status: DISCONTINUED | OUTPATIENT
Start: 2025-02-21 | End: 2025-02-21

## 2025-02-21 RX ORDER — ONDANSETRON HYDROCHLORIDE 2 MG/ML
INJECTION, SOLUTION INTRAVENOUS
Status: DISCONTINUED | OUTPATIENT
Start: 2025-02-21 | End: 2025-02-21

## 2025-02-21 RX ADMIN — PHENYLEPHRINE HYDROCHLORIDE 100 MCG: 10 INJECTION INTRAVENOUS at 01:02

## 2025-02-21 RX ADMIN — FENTANYL CITRATE 25 MCG: 50 INJECTION, SOLUTION INTRAMUSCULAR; INTRAVENOUS at 01:02

## 2025-02-21 RX ADMIN — SODIUM CHLORIDE: 9 INJECTION, SOLUTION INTRAVENOUS at 10:02

## 2025-02-21 RX ADMIN — SODIUM CHLORIDE: 9 INJECTION, SOLUTION INTRAVENOUS at 01:02

## 2025-02-21 RX ADMIN — LIDOCAINE HYDROCHLORIDE 100 MG: 20 INJECTION, SOLUTION EPIDURAL; INFILTRATION; INTRACAUDAL; PERINEURAL at 01:02

## 2025-02-21 RX ADMIN — CLINDAMYCIN PHOSPHATE 900 MG: 900 INJECTION, SOLUTION INTRAVENOUS at 01:02

## 2025-02-21 RX ADMIN — HYDROMORPHONE HYDROCHLORIDE 0.5 MG: 2 INJECTION, SOLUTION INTRAMUSCULAR; INTRAVENOUS; SUBCUTANEOUS at 02:02

## 2025-02-21 RX ADMIN — ONDANSETRON 4 MG: 2 INJECTION INTRAMUSCULAR; INTRAVENOUS at 01:02

## 2025-02-21 RX ADMIN — PROPOFOL 130 MG: 10 INJECTION, EMULSION INTRAVENOUS at 01:02

## 2025-02-21 RX ADMIN — HYDROMORPHONE HYDROCHLORIDE 0.5 MG: 2 INJECTION, SOLUTION INTRAMUSCULAR; INTRAVENOUS; SUBCUTANEOUS at 01:02

## 2025-02-21 RX ADMIN — ONDANSETRON 4 MG: 2 INJECTION INTRAMUSCULAR; INTRAVENOUS at 02:02

## 2025-02-21 NOTE — OR NURSING
1343 Rec'd pt to PACU asleep with oral airway in place. No signs of distress noted, VSS. Right foot dressing C/D/I. No needs. Will continue to monitor.     1350  Oral airway removed, respirations even and unlabored. Reoriented to surroundings. No needs.     1356 Pt c/o pain. IV dilaudid given, see MAR for admin.     1427 Pt c/o slight nausea. IV zofran given, see MAR for admin.     1432 Out of PACU. VSS. No signs of bleeding/distress noted.     1435 Pt to ASC 5 awake and alert with no distress noted, respirations even and unlabored. Family at bedside. Bedside report given to A Cathy RN. States pain improving, denies other needs. /48, P 89, R 16, O2 97% RA.    Anesthesia Pre Eval Note    Anesthesia ROS/Med Hx    Overall Review:  EKG was reviewed, Echo was reviewed and Stress test was reviewed       Pulmonary Review:  Comments: Hypoxia    Positive for COPD - Severity: moderate    The patient is a former smoker.  (Quit 01/03/83)    Neuro/Psych Review:    Positive for CVA (2015)  Positive for psychiatric history - Anxiety    Cardiovascular Review:  Comments: LVEF 67% with RWMA's  Normal RV size, function  Recent increasing LE edema      Positive for CAD (Severe MV disease including 70% left main)  Positive for CABG/stent (12/2020 PTCA + stent LAD, RCA)  Positive for angina  Positive for dysrhythmias (First degree AV block)  Positive for hypertension  Positive for hyperlipidemia  NHYA Class: II    GI/HEPATIC/RENAL Review:  Comments: H/O GI bleed  BPH    Positive for GERD  Positive for renal disease (Mass on kidney) - chronic renal insufficiency    End/Other Review:  Comments: Anti-coagulated    Positive for obesity class I - 30.00 - 34.99  Positive for anemia  Positive for arthritis  Positive for chronic pain  Additional Results:  EKG:  Encounter Date: 08/20/23  -Electrocardiogram 12-Lead:        Result                      Value                           Ventricular Rate EKG/M*     62                              Atrial Rate (BPM)           62                              ID-Interval (MSEC)          214                             QRS-Interval (MSEC)         94                              QT-Interval (MSEC)          424                             QTc                         431                             P Axis (Degrees)            27                              R Axis (Degrees)            -38                             T Axis (Degrees)            7                               REPORT TEXT                                             Sinus rhythm   with 1st degree AV block   Left axis deviation   Inferior infarct   (cited on or before   18-JUL-2023)   Abnormal  ECG   Confirmed by MARKIE DIAL ATUL ([id number]) on 8/21/2023 8:15:58 PM    Echo:  Ejection Fraction       Date                     Value               Ref Range           Status                08/21/2023               67                  %                   Final            ----------Stress Test:  No valid procedures specified.     ALLERGIES:   -- Perflutren Lipid Microsphere -- Other (See Comments)    --  (Definity) Lower back pain.       Last Labs        Component                Value               Date/Time                  WBC                      7.1                 08/20/2023 1525            RBC                      4.60                08/20/2023 1525            HGB                      14.6                08/20/2023 1525            HCT                      42.6                08/20/2023 1525            MCV                      92.6                08/20/2023 1525            MCH                      31.7                08/20/2023 1525            MCHC                     34.3                08/20/2023 1525            RDW-CV                   14.6                08/20/2023 1525            Sodium                   134 (L)             08/20/2023 1525            Potassium                4.4                 08/21/2023 0340            Chloride                 105                 08/20/2023 1525            Carbon Dioxide           24                  08/20/2023 1525            Glucose                  106 (H)             08/20/2023 1525            BUN                      17                  08/20/2023 1525            Creatinine               0.90                08/20/2023 1525            Glomerular Filtrati*     86                  08/20/2023 1525            Calcium                  8.7                 08/20/2023 1525            PLT                      196                 08/20/2023 1525            PTT                      40 (H)              02/14/2023 1547            INR                      1.1                  08/20/2023 1525            INR                      2.5                 07/19/2023 1254        Past Medical History:  No date: Anemia  No date: Anxiety disorder  No date: Arthritis  No date: BPH (benign prostatic hyperplasia)  No date: CAD (coronary artery disease)  No date: Chronic kidney disease      Comment:  mass on kidney  No date: Chronic pain of both knees  No date: Closed fracture of right wrist  No date: CVA (cerebral vascular accident) (CMD)      Comment:  2015  No date: GERD (gastroesophageal reflux disease)  03/08/2023: GI bleeding  No date: Hard of hearing  No date: Heart block      Comment:  stent placement  No date: Hernia, inguinal      Comment:  x4  No date: HTN (hypertension)  No date: Hypercholesteremia  No date: Wears dentures  No date: Wears hearing aid      Comment:  Bilateral    Past Surgical History:  No date: Cardiac catherization      Comment:  ~12/2020  No date: Eye surgery      Comment:  metal removed  No date: Hernia repair      Comment:  4 hernias repaired, Inguinal and umbilical  02/03/2023: Joint replacement; Left      Comment:  left total knee arthroplasty. Dr MANISH Diaz       Prior to Admission medications :  Medication amLODIPine (NORVASC) 5 MG tablet, Sig Take 5 mg by mouth daily., Start Date 6/1/23, End Date , Taking? , Authorizing Provider Provider, Outside    Medication neomycin-polymyxin-hydroCORTisone (CORTISPORIN) 3.5-79499-8 otic solution, Sig INSTILL 3 DROPS TO AFFECTED EAR FOUR TIMES DAILY, Start Date 6/21/23, End Date , Taking? , Authorizing Provider Provider, Outside    Medication citalopram (CeleXA) 20 MG tablet, Sig Take 1 tablet by mouth daily., Start Date 7/20/23, End Date , Taking? , Authorizing Provider Toby Norris MD    Medication metoPROLOL succinate (TOPROL-XL) 25 MG 24 hr tablet, Sig TAKE 1 TABLET BY MOUTH EVERY DAY, Start Date 7/7/23, End Date , Taking? , Authorizing Provider Toby Norris MD    Medication albuterol 108 (90 Base) MCG/ACT  inhaler, Sig Inhale 2 puffs into the lungs every 4 hours as needed for Shortness of Breath or Wheezing., Start Date 5/2/23, End Date , Taking? , Authorizing Provider Deangelo Ruiz PA-C    Medication promethazine-dextromethorphan (PROMETHAZINE-DM) 6.25-15 MG/5ML syrup, Sig Take 5 mLs by mouth 4 times daily as needed for Cough., Start Date 5/2/23, End Date , Taking? , Authorizing Provider Deangelo Ruiz PA-C    Medication warfarin (COUMADIN) 5 MG tablet, Sig Take 1 tablet by mouth daily., Start Date 4/20/23, End Date , Taking? , Authorizing Provider John Alejandro MD    Medication ferrous sulfate 325 (65 FE) MG tablet, Sig TAKE 1 TABLET BY MOUTH IN THE MORNING AND 1 TABLET IN THE EVENING. TAKE WITH MEALS, Start Date 3/24/23, End Date , Taking? , Authorizing Provider Toby Norris MD    Medication spironolactone (ALDACTONE) 25 MG tablet, Sig Take 1 tablet by mouth daily., Start Date 3/13/23, End Date 3/12/24, Taking? , Authorizing Provider Elizabeth Degroot MD    Medication finasteride (PROSCAR) 5 MG tablet, Sig Take 1 tablet by mouth daily., Start Date 3/13/23, End Date 4/20/23, Taking? , Authorizing Provider Eliazbeth Degroot MD    Medication aspirin 81 MG chewable tablet, Sig Chew 1 tablet by mouth daily. Do not start before March 14, 2023.  Patient taking differently: Chew 81 mg by mouth daily. Reports taking, Start Date 3/14/23, End Date 8/18/23, Taking? , Authorizing Provider Elizabeth Degroot MD    Medication isosorbide mononitrate (IMDUR) 30 MG 24 hr tablet, Sig TAKE 1 TABLET EVERY DAY, Start Date 3/2/23, End Date , Taking? , Authorizing Provider Toby Norris MD    Medication atorvastatin (LIPITOR) 40 MG tablet, Sig Take 1 tablet by mouth daily., Start Date 2/22/23, End Date , Taking? , Authorizing Provider Toby Norris MD    Medication tamsulosin (FLOMAX) 0.4 MG Cap, Sig Take 1 capsule by mouth every evening. TAKE 1  CAPSULE EVERY DAY, Start Date 2/16/23, End Date , Taking? , Authorizing Provider Morgan Diaz MD    Medication omeprazole (PrilOSEC) 20 MG capsule, Sig Take 20 mg by mouth nightly., Start Date 3/26/22, End Date , Taking? , Authorizing Provider Provider, Outside         Patient Vitals in the past 24 hrs:  08/22/23 0500, Weight:102 kg (224 lb 13.9 oz)  08/22/23 0035, BP:120/74, Pulse:63, Resp:16, SpO2:93 %  08/21/23 1938, Temp:36.9 °C (98.4 °F), Temp src:Oral  08/21/23 1600, BP:113/62, Pulse:76, Resp:18, SpO2:100 %  08/21/23 0917, Temp:36.4 °C (97.5 °F), Temp src:Oral      Relevant Problems   No relevant active problems       Physical Exam     Airway   Mallampati: II    Cardiovascular    Cardio Rhythm: Regular    Pulmonary Exam    Breath sounds clear to auscultation:  Yes      Anesthesia Plan:  No phone call attempted due to:  Inpatient    ASA Status: 4  Anesthesia Type: General    Induction: Intravenous  Preferred Airway Type: ETTPatient does not have a difficult airway or is not at risk of aspiration.   Maintenance: Inhalational  Premedication: IV  Patient does not have an implantable electronic device requiring post procedure programming.     Post-op Pain Management: Per Surgeon      Checklist  Reviewed: Lab Results, EKG, Beta Blocker Status, Patient Summary, DNR Status, Medications, Allergies, Past Med History, Anesthesia Record, Problem list, NPO Status, Consultations and Chest X-Rays  Monitors  Discussed risks of the following Invasive monitors with patient: Arterial Line, Central Line, INES and Pulmonary Artery Catheter    Consent/Risks Discussed Statement:  The proposed anesthetic plan, including its risks and benefits, have been discussed with the Patient along with the risks and benefits of alternatives. Questions were encouraged and answered and the patient and/or representative understands and agrees to proceed.    I have discussed elements of the patient's history or examination, as noted above  and/or as follows, that place the patient at higher risk of complications; BMI> 30 (obesity), heart disease, hematological disease, kidney disease, neurological disease, age, pulmonary disease, vascular disease and smoking.    I discussed with the patient (and/or patient's legal representative) the risks and benefits of the proposed anesthesia plan, General, which may include services performed by other anesthesia providers.    Alternative anesthesia plans, if available, were reviewed with the patient (and/or patient's legal representative). Discussion has been held with the patient (and/or patient's legal representative) regarding risks of anesthesia, which include allergic reaction, aspiration, eye injury, intra-operative awareness, organ damage, oral injury, nerve injury, sore throat, nausea, hypotension, dental injury, bleeding/hematoma, depressed breathing, ICU admit, infection, need for blood transfusion, post-op intubation and vomiting and emergent situations that may require change in anesthesia plan.    The patient (and/or patient's legal representative) has indicated understanding, his/her questions have been answered, and he/she wishes to proceed with the planned anesthetic.    Informed Consent for Blood: Consented

## 2025-02-21 NOTE — OP NOTE
[facility] - Periop Services  Surgery Department  Operative Note    SUMMARY     Date of Procedure: 2/21/2025     Procedure: Procedure(s) (LRB):  DEBRIDEMENT, FOOT (Right)     Surgeons and Role:     * David Aguilera MD - Primary    Assisting Surgeon: None    Pre-Operative Diagnosis: Diabetic ulcer of toe of right foot associated with type 2 diabetes mellitus, with necrosis of bone [E11.621, L97.514]    Post-Operative Diagnosis: Post-Op Diagnosis Codes:     * Diabetic ulcer of toe of right foot associated with type 2 diabetes mellitus, with necrosis of bone [E11.621, L97.514]    Anesthesia: General    Procedures Performed:  Debridement of right foot including bone proximally 8 x 5 x 2 cm area    Significant Findings of the Procedure:  Decent bleeding tissue in the proximal aspect of the amputation area.    Procedure in Detail:  After informed consent patient brought to the OR prepped and draped in the usual sterile fashion.  Started off by surgically excise and cutting out the necrotic tissues.  Underlying tissues was relatively necrotic as well.  Full-thickness including the skin, subcu fat muscles tendons and some bone.  The 1st metatarsal head was also bit back as well all this was cleaned back to decent looking healthy tissue.  We then cut out this 8 x 5 x 2 cm area.  We then irrigated the area out.  We closed some of the soft tissue overlying the bone hopefully protected.  We then did a wet-to-dry.  Patient tolerated the procedure well.    Complications: No    Estimated Blood Loss (EBL): 10 cc           Implants: * No implants in log *    Specimens:   Specimen (24h ago, onward)       Start     Ordered    02/21/25 1326  Surgical Pathology  RELEASE UPON ORDERING         02/21/25 1326                            Condition: Good    Disposition: PACU - hemodynamically stable.    Attestation: I was present and scrubbed for the entire procedure.

## 2025-02-21 NOTE — DISCHARGE SUMMARY
[facility] - Periop Services  Discharge Note  Short Stay    Procedure(s) (LRB):  DEBRIDEMENT, FOOT (Right)      OUTCOME: Patient tolerated treatment/procedure well without complication and is now ready for discharge.    DISPOSITION: Home or Self Care    FINAL DIAGNOSIS:  right diabetic foot infection      FOLLOWUP: In clinic    DISCHARGE INSTRUCTIONS:    Discharge Procedure Orders   Diet Adult Regular     Remove dressing in 24 hours     Notify your health care provider if you experience any of the following:  temperature >100.4     Notify your health care provider if you experience any of the following:  persistent nausea and vomiting or diarrhea     Notify your health care provider if you experience any of the following:  severe uncontrolled pain     Notify your health care provider if you experience any of the following:  redness, tenderness, or signs of infection (pain, swelling, redness, odor or green/yellow discharge around incision site)     Notify your health care provider if you experience any of the following:  difficulty breathing or increased cough     Notify your health care provider if you experience any of the following:  severe persistent headache     Notify your health care provider if you experience any of the following:  worsening rash     Notify your health care provider if you experience any of the following:  persistent dizziness, light-headedness, or visual disturbances     Notify your health care provider if you experience any of the following:  increased confusion or weakness     Notify your health care provider if you experience any of the following:     Shower on day dressing removed (No bath)        TIME SPENT ON DISCHARGE: 5 minutes

## 2025-02-21 NOTE — TRANSFER OF CARE
"Anesthesia Transfer of Care Note    Patient: Silvana Sarah    Procedure(s) Performed: Procedure(s) (LRB):  DEBRIDEMENT, FOOT (Right)    Patient location: PACU    Anesthesia Type: general    Transport from OR: Transported from OR on 6-10 L/min O2 by face mask with adequate spontaneous ventilation    Post pain: adequate analgesia    Post assessment: no apparent anesthetic complications    Post vital signs: stable    Level of consciousness: awake and alert    Nausea/Vomiting: no nausea/vomiting    Complications: none    Transfer of care protocol was followed      Last vitals: Visit Vitals  BP (!) 103/54   Pulse 79   Temp 37.1 °C (98.8 °F) (Oral)   Resp (!) 9   Ht 5' 10" (1.778 m)   Wt 73 kg (161 lb)   SpO2 97%   Breastfeeding No   BMI 23.10 kg/m²     "

## 2025-02-21 NOTE — ANESTHESIA PROCEDURE NOTES
Intubation    Date/Time: 2/21/2025 1:10 PM    Performed by: Paola Rodriguez CRNA  Authorized by: Nestor Smith MD    Intubation:     Induction:  Intravenous    Intubated:  Postinduction    Mask Ventilation:  Easy mask    Attempts:  1    Attempted By:  CRNA    Method of Intubation:  Other (see comments)    Laryngeal View Grade: Grade IIA - cords partially seen      Difficult Airway Encountered?: No      Complications:  None    Airway Device:  Supraglottic airway/LMA    Airway Device Size:  4.0    Style/Cuff Inflation:  Cuffed    Placement Verified By:  Capnometry    Complicating Factors:  None    Findings Post-Intubation:  BS equal bilateral  Notes:      Head and neck neutral dentition unchanged.

## 2025-02-21 NOTE — DISCHARGE SUMMARY
[facility] - [facility]iop [facility]  Discharge Note  Short Stay    Procedure(s) (LRB):  DEBRIDEMENT, FOOT (Right)      OUTCOME: Patient tolerated treatment/procedure well without complication and is now ready for discharge.    DISPOSITION: Home or Self Care    FINAL DIAGNOSIS:  diabetic right foot infection    FOLLOWUP: None    DISCHARGE INSTRUCTIONS:    Discharge Procedure Orders   Diet Adult Regular     Remove dressing in 24 hours     Notify your health care provider if you experience any of the following:  temperature >100.4     Notify your health care provider if you experience any of the following:  persistent nausea and vomiting or diarrhea     Notify your health care provider if you experience any of the following:  severe uncontrolled pain     Notify your health care provider if you experience any of the following:  redness, tenderness, or signs of infection (pain, swelling, redness, odor or green/yellow discharge around incision site)     Notify your health care provider if you experience any of the following:  difficulty breathing or increased cough     Notify your health care provider if you experience any of the following:  severe persistent headache     Notify your health care provider if you experience any of the following:  worsening rash     Notify your health care provider if you experience any of the following:  persistent dizziness, light-headedness, or visual disturbances     Notify your health care provider if you experience any of the following:  increased confusion or weakness     Notify your health care provider if you experience any of the following:     Shower on day dressing removed (No bath)        TIME SPENT ON DISCHARGE: 5 minutes

## 2025-02-22 NOTE — ANESTHESIA POSTPROCEDURE EVALUATION
Anesthesia Post Evaluation    Patient: Silvana Sarah    Procedure(s) Performed: Procedure(s) (LRB):  DEBRIDEMENT, FOOT (Right)    Final Anesthesia Type: general      Patient location during evaluation: PACU  Patient participation: Yes- Able to Participate  Level of consciousness: awake and alert  Post-procedure vital signs: reviewed and stable  Pain management: adequate  Airway patency: patent  RENA mitigation strategies: Multimodal analgesia  PONV status at discharge: No PONV  Anesthetic complications: no      Cardiovascular status: blood pressure returned to baseline  Respiratory status: unassisted  Hydration status: euvolemic  Follow-up not needed.              Vitals Value Taken Time   /64 02/21/25 15:06   Temp 37.1 °C (98.8 °F) 02/21/25 13:45   Pulse 92 02/21/25 15:06   Resp 18 02/21/25 15:00   SpO2 97 % 02/21/25 15:06   Vitals shown include unfiled device data.      Event Time   Out of Recovery 14:32:00         Pain/Anabela Score: Pain Rating Prior to Med Admin: 7 (2/21/2025  2:01 PM)  Anabela Score: 10 (2/21/2025  2:35 PM)

## 2025-02-27 ENCOUNTER — OFFICE VISIT (OUTPATIENT)
Dept: SURGERY | Facility: CLINIC | Age: 65
End: 2025-02-27
Attending: SURGERY
Payer: COMMERCIAL

## 2025-02-27 VITALS — HEIGHT: 70 IN | BODY MASS INDEX: 23.1 KG/M2

## 2025-02-27 DIAGNOSIS — E11.621 DIABETIC ULCER OF TOE OF RIGHT FOOT ASSOCIATED WITH TYPE 2 DIABETES MELLITUS, WITH NECROSIS OF BONE: Primary | ICD-10-CM

## 2025-02-27 DIAGNOSIS — L97.514 DIABETIC ULCER OF TOE OF RIGHT FOOT ASSOCIATED WITH TYPE 2 DIABETES MELLITUS, WITH NECROSIS OF BONE: Primary | ICD-10-CM

## 2025-02-27 PROCEDURE — 11043 DBRDMT MUSC&/FSCA 1ST 20/<: CPT | Mod: PBBFAC | Performed by: SURGERY

## 2025-02-27 PROCEDURE — 99213 OFFICE O/P EST LOW 20 MIN: CPT | Mod: PBBFAC | Performed by: SURGERY

## 2025-02-27 PROCEDURE — 11043 DBRDMT MUSC&/FSCA 1ST 20/<: CPT | Mod: S$PBB,,, | Performed by: SURGERY

## 2025-02-27 PROCEDURE — 99999 PR PBB SHADOW E&M-EST. PATIENT-LVL III: CPT | Mod: PBBFAC,,, | Performed by: SURGERY

## 2025-02-27 PROCEDURE — 99024 POSTOP FOLLOW-UP VISIT: CPT | Mod: ,,, | Performed by: SURGERY

## 2025-02-28 NOTE — PROGRESS NOTES
Post-operative Note    HPI:  The patient is status post foot debridement a few weeks ago.  Doing well overall.  The right foot wound has some fibrinous material but no actual blackened discoloration and no purulence or drainage.    PHYSICAL EXAM:    Right foot 5 x 2 and 0.5 cm area with some fibrinous necrotic material will require some mild debridement    Recent Results (from the past 3 weeks)   CK    Collection Time: 02/10/25  2:15 PM   Result Value Ref Range    CK 26 (L) 29 - 168 U/L   Comprehensive Metabolic Panel    Collection Time: 02/20/25  2:42 PM   Result Value Ref Range    Sodium 136 136 - 145 mmol/L    Potassium 4.1 3.5 - 5.1 mmol/L    Chloride 102 98 - 107 mmol/L    CO2 25 23 - 31 mmol/L    Anion Gap 13 7 - 16 mmol/L    Glucose 182 (H) 82 - 115 mg/dL    BUN 22 (H) 10 - 20 mg/dL    Creatinine 0.90 0.55 - 1.02 mg/dL    BUN/Creatinine Ratio 24 (H) 6 - 20    Calcium 8.2 (L) 8.4 - 10.2 mg/dL    Total Protein 5.9 5.8 - 7.6 g/dL    Albumin 2.0 (L) 3.4 - 4.8 g/dL    Globulin 3.9 2.0 - 4.0 g/dL    A/G Ratio 0.5     Bilirubin, Total 0.3 <=1.5 mg/dL    Alk Phos 109 40 - 150 U/L    ALT 7 <=55 U/L    AST 19 5 - 34 U/L    eGFR 72 >=60 mL/min/1.73m2   CBC with Differential    Collection Time: 02/20/25  2:42 PM   Result Value Ref Range    WBC 15.99 (H) 4.50 - 11.00 K/uL    RBC 3.38 (L) 4.20 - 5.40 M/uL    Hemoglobin 8.1 (L) 12.0 - 16.0 g/dL    Hematocrit 28.2 (L) 38.0 - 47.0 %    MCV 83.4 80.0 - 96.0 fL    MCH 24.0 (L) 27.0 - 31.0 pg    MCHC 28.7 (L) 32.0 - 36.0 g/dL    RDW 15.1 (H) 11.5 - 14.5 %    Platelet Count 393 150 - 400 K/uL    MPV 10.7 9.4 - 12.4 fL    Neutrophils % 90.3 (H) 53.0 - 65.0 %    Lymphocytes % 4.5 (L) 27.0 - 41.0 %    Monocytes % 4.1 2.0 - 6.0 %    Eosinophils % 0.0 (L) 1.0 - 4.0 %    Basophils % 0.2 0.0 - 1.0 %    Immature Granulocytes % 0.9 (H) 0.0 - 0.4 %    nRBC, Auto 0.0 <=0.0 %    Neutrophils, Abs 14.43 (H)  1.80 - 7.70 K/uL    Lymphocytes, Absolute 0.72 (L) 1.00 - 4.80 K/uL    Monocytes, Absolute 0.66 0.00 - 0.80 K/uL    Eosinophils, Absolute 0.00 0.00 - 0.50 K/uL    Basophils, Absolute 0.03 0.00 - 0.20 K/uL    Immature Granulocytes, Absolute 0.15 (H) 0.00 - 0.04 K/uL    nRBC, Absolute 0.00 <=0.00 x10e3/uL    Diff Type Auto    POCT glucose    Collection Time: 02/21/25 10:27 AM   Result Value Ref Range    POC Glucose 137 (H) 70 - 105 mg/dL   Surgical Pathology    Collection Time: 02/21/25  1:26 PM   Result Value Ref Range    Case Report       Surgical Pathology                                Case: [id number]                                   Authorizing Provider:  David Aguilera MD       Collected:           02/21/2025 01:26 PM          Ordering Location:     [facility] -     Received:            02/21/2025 02:11 PM                                 [facility]                                                              Pathologist:           Juancarlos Dumont MD                                                      Specimen:    Foot, Right, right foot tissue and bone                                                    Final Diagnosis       A. 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 Foot, Right: right foot tissue and bone  The specimen is received in formalin designated right foot tissue and bone and consists of multiple white-tan, red-brown, pink-tan, and gray black tissue fragments that measure 7.2 x 4.5 x 2.8 cm in aggregate sectioning demonstrates yellow-tan to gray black underlying soft tissue and white-tan to gray bone.  A representative section is submitted in cassette A1 following decalcification.    Grossing was completed by Anthony Gardiner.      Microscopic Description       A microscopic examination was performed  and the diagnosis reflects the findings.          Laboratory Notes       If this report includes immunohistochemical (IHC) test results, please note the following: IHC studies were interpreted in conjunction with appropriate positive and negative controls which demonstrate the expected positive and negative reactivity. This laboratory is regulated under CLIA as qualified to perform high-complexity testing. IHC tests are used for clinical purposes. They should not be regarded as investigational or research.       POCT glucose    Collection Time: 02/21/25  1:44 PM   Result Value Ref Range    POC Glucose 154 (H) 70 - 105 mg/dL        ASSESSMENT:      The patient is doing well after surgery.       PLAN:      Follow up  2 week .    Wound care referral.  After verbal time-out site confirmation we surgically excise and cut out an proximally 3 x 2 cm area of just fibrinous necrotic tendons and muscles.  We got back to good healthy tissues and then placed a wet-to-dry.  She tolerated the procedure well

## 2025-03-03 NOTE — PROGRESS NOTES
CARISA Ayala   [facility]  [address] [facility] [place] [address]  [phone]      PATIENT NAME: Silvana Sarah  : 1960  DATE: 3/20/25  MRN: [id number]      Billing Provider: CARISA Ayala  Level of Service:   Patient PCP Information       Provider PCP Type    CARISA Cárdenas General            Reason for Visit / Chief Complaint: Diabetic Foot Ulcer and Wound Check (Left heel, right heel, right foot)       History of Present Illness / Problem Focused Workflow     Silvana Sarah is a 65 yo female presents for follow up on chronic non healing wound to left heel and right great toe amputation site. She is status post amputation of right great toe per Dr. Aguilera on 2025 with additional debridements on 2025. Left heel with slough and maceration, bedside debridement today. Reports that she had CT scan on left foot due to increased pain and swelling. She has f/u with Dr. Almazan on Monday. She had a fall earlier this week and has been unable to bear weight on left foot. Left ankle is edematous with purple discoloration and tenderness. X-ray ordered today.   Right foot with slough and necrotic tendon exposed. She has new wounds on right heel and right medial foot. Bedside debridement today. Continue using santyl at home. X-ray ordered. Discussed referral to vascular surgery due to impaired wound healing. Patient is agreeable, referral to Dr. Dobson.    Labs, x-rays, and wound cultures pending. Patient is on prednisone 20 mg daily for RA. Discussed effects of prednisone on wound healing and diabetes. Patient is going to try to taper dose to 10 mg and discuss treatment options with Dr. Almazan.     8/15/24  64 yo female presents with complaints of ulcer to left heel and right great toe. Wound on left heel with with necrotic tissue and slough, bedside debridement today. Left foot is edematous and tender. Recent cultures reviewed. Ceftin  to pharmacy. Santyl and vashe moistened drawtex to wound bed. Supplies ordered from [employer]. Wound on right great toe will moderate serous drainage. Aquacel ag applied. Pertinent PMH includes diabetes, hypertension, peripheral neuropathy, gastroparesis, and rheumatoid arthritis. Last HgA1C 7.2 2023, diabetes managed by PCP. Wound healing is complicated by multiple co-morbidities, poor vascular supply, immunosuppression, diabetes, edema, heavy drainage, decreased granulation tissue, necrosis, large surface area, large volume, advanced age, fragile skin, and infection.             Review of Systems     Review of Systems   Constitutional:  Positive for activity change. Negative for chills and fever.   Respiratory:  Negative for chest tightness and shortness of breath.    Cardiovascular:  Positive for leg swelling. Negative for chest pain and palpitations.   Musculoskeletal:  Positive for arthralgias and joint swelling.   Skin:  Positive for color change and wound.        wound   Neurological:  Positive for weakness.   Psychiatric/Behavioral:  Negative for agitation, behavioral problems, confusion and self-injury.        Medical / Social / Family History     Past Medical History:   Diagnosis Date    Anxiety and depression 2021    Diabetes mellitus     Dry eye syndrome, bilateral 10/06/2017    Essential (primary) hypertension     Gastroparesis due to DM     Generalized osteoarthritis 10/25/2021    Other mechanical complication of implanted electronic neurostimulator, generator, sequela 10/25/2021    Peripheral autonomic neuropathy due to DM     Rheumatoid arthritis        Past Surgical History:   Procedure Laterality Date     SECTION      DEBRIDEMENT OF FOOT Right 2025    Procedure: DEBRIDEMENT, FOOT;  Surgeon: David Aguilera MD;  Location: [facility];  Service: General;  Laterality: Right;    DEBRIDEMENT OF LOWER EXTREMITY Left 2024    Procedure: DEBRIDEMENT, LOWER  EXTREMITY;  Surgeon: David Aguilera MD;  Location: [facility];  Service: General;  Laterality: Left;  Left foot    GASTRIC STIMULATOR IMPLANT SURGERY  2010    HYSTERECTOMY      INCISION AND DRAINAGE OF ABSCESS Right 5/13/2022    Procedure: INCISION AND DRAINAGE, ABSCESS;  Surgeon: Kamari Gamble DO;  Location: [facility] OR;  Service: General;  Laterality: Right;  right hand dr gamble am    IRRIGATION AND DEBRIDEMENT OF UPPER EXTREMITY Right 5/16/2022    Procedure: IRRIGATION AND DEBRIDEMENT, UPPER EXTREMITY;  Surgeon: Kamari Gamble DO;  Location: [facility] OR;  Service: General;  Laterality: Right;  right hand I/D dr gamble today    TOE AMPUTATION Right 1/28/2025    Procedure: AMPUTATION, TOE;  Surgeon: David Aguilera MD;  Location: [facility] OR;  Service: General;  Laterality: Right;       Social History  Ms. Silvana Sarah  reports that she has never smoked. She has never used smokeless tobacco. She reports that she does not drink alcohol.    Family History  Ms.'s Silvana Sarah family history includes Cancer in her father; Diabetes in her son and son; Heart disease in her mother.    Medications and Allergies     Medications  Outpatient Medications Marked as Taking for the 3/20/25 encounter (Office Visit) with Marybeth Orellana FNP   Medication Sig Dispense Refill    b complex vitamins capsule Take 1 capsule by mouth once daily.      cyanocobalamin 1,000 mcg/mL injection Inject 1,000 mcg into the muscle every 30 days.      esomeprazole (NEXIUM) 20 MG capsule Take 20 mg by mouth once daily.      etanercept (ENBREL MINI) 50 mg/mL (1 mL) Crtg 1 mL by abdominal subcutaneous route every Sunday.       ferrous sulfate (FEOSOL) Tab tablet Take 1 tablet by mouth once daily.      gabapentin (NEURONTIN) 600 MG tablet Take 2 tablets 3 times a day by oral route for 30 days.      hydrOXYchloroQUINE (PLAQUENIL) 200 mg tablet Take 200 mg by mouth 2 (two) times daily.       insulin regular 100  unit/mL Inj injection 3 (three) times daily before meals.      ketorolac (TORADOL) 10 mg tablet Take 10 mg by mouth 3 (three) times daily as needed for Pain.      LEVEMIR U-100 INSULIN 100 unit/mL injection SMARTSI Unit(s) SUB-Q Daily      lisinopriL (PRINIVIL,ZESTRIL) 2.5 MG tablet Take 2.5 mg by mouth every evening.      lysine (L-LYSINE) 500 mg Tab Take 500 mg by mouth 2 (two) times a day.      nystatin (MYCOSTATIN) 100,000 unit/mL suspension Take 6 mLs by mouth 3 (three) times daily before meals.       oxyCODONE-acetaminophen (PERCOCET)  mg per tablet Take 1 tablet by mouth 4 (four) times daily.      predniSONE (DELTASONE) 5 MG tablet Take 5 mg by mouth once daily.       promethazine (PHENERGAN) 25 MG tablet Take 25 mg by mouth every 6 (six) hours as needed for Nausea.       triamterene-hydrochlorothiazide 37.5-25 mg (DYAZIDE) 37.5-25 mg per capsule Take 1 capsule by mouth once daily.       zinc gluconate 50 mg tablet Take 50 mg by mouth once daily.       Current Facility-Administered Medications for the 3/20/25 encounter (Office Visit) with Marybeth Orellana FNP   Medication Dose Route Frequency Provider Last Rate Last Admin    [COMPLETED] cefTRIAXone injection 1 g  1 g Intramuscular Once Marybeth Orellana FNP   1 g at 25 1431       Allergies  Review of patient's allergies indicates:   Allergen Reactions    Influenza virus vaccines     Penicillins        Physical Examination     Vitals:    25 1306   BP: (!) 143/87   Pulse: (!) 112   Resp: 18   Temp: 98.4 °F (36.9 °C)                   Physical Exam  Vitals and nursing note reviewed.   Constitutional:       Appearance: Normal appearance.   HENT:      Head: Normocephalic.   Cardiovascular:      Rate and Rhythm: Normal rate and regular rhythm.      Pulses: Normal pulses.      Heart sounds: Normal heart sounds.   Pulmonary:      Effort: Pulmonary effort is normal. No respiratory distress.   Chest:      Chest wall: No  tenderness.   Musculoskeletal:         General: Swelling and tenderness present.      Right lower leg: Edema present.      Left lower leg: Edema present.   Skin:     General: Skin is warm and dry.      Findings: Erythema present.      Comments: See LDA for photos/measurements   Neurological:      General: No focal deficit present.      Mental Status: She is alert and oriented to person, place, and time. Mental status is at baseline.   Psychiatric:         Mood and Affect: Mood normal.         Behavior: Behavior normal.         Thought Content: Thought content normal.         Judgment: Judgment normal.     Assessment and Plan             Wound 08/15/24 Diabetic Ulcer Left posterior Heel #1 (Active)   08/15/24  Heel   Present on Original Admission: Y   Primary Wound Type: Diabetic ulc   Side: Left   Orientation: posterior   Wound Approximate Age at First Assessment (Weeks): 9 weeks   Wound Number: #1   Is this injury device related?:    Incision Type:    Closure Method:    Wound Description (Comments):    Type:    Additional Comments:    Ankle-Brachial Index:    Pulses:    Removal Indication and Assessment:    Wound Outcome:    Wound Image   03/20/25 1411   Wound Length (cm) 0.7 cm 03/20/25 1310   Wound Width (cm) 0.5 cm 03/20/25 1310   Wound Depth (cm) 0.2 cm 03/20/25 1310   Wound Volume (cm^3) 0.037 cm^3 03/20/25 1310   Wound Surface Area (cm^2) 0.27 cm^2 03/20/25 1310            Wound 01/28/25 Incision Right anterior Toe, first (Active)   01/28/25  Toe, first   Present on Original Admission: Y   Primary Wound Type: Incision   Side: Right   Orientation: anterior   Wound Approximate Age at First Assessment (Weeks):    Wound Number:    Is this injury device related?:    Incision Type:    Closure Method: Sutures   Wound Description (Comments):    Type:    Additional Comments:    Ankle-Brachial Index:    Pulses:    Removal Indication and Assessment:    Wound Outcome: Amputated   Wound Image    03/20/25 1320   Dressing  Appearance Intact;Moist drainage 03/20/25 1320   Drainage Amount Moderate 03/20/25 1320   Drainage Characteristics/Odor Yellow 03/20/25 1320   Appearance Red;Yellow;Slough;Moist;Granulating;Tendon 03/20/25 1320   Periwound Area Intact 03/20/25 1320   Wound Edges Defined 03/20/25 1320   Care Soap and water;Cleansed with: 03/20/25 1320            Wound 03/20/25 1322 Diabetic Ulcer Right anterior;medial Foot (Active)   03/20/25 1322 Foot   Present on Original Admission: Y   Primary Wound Type: Diabetic ulc   Side: Right   Orientation: anterior;medial   Wound Approximate Age at First Assessment (Weeks): 9 weeks   Wound Number:    Is this injury device related?:    Incision Type:    Closure Method:    Wound Description (Comments):    Type:    Additional Comments:    Ankle-Brachial Index:    Pulses:    Removal Indication and Assessment:    Wound Outcome:    Wound Image   03/20/25 1412   Dressing Appearance Intact;Moist drainage 03/20/25 1323   Drainage Amount Moderate 03/20/25 1323   Drainage Characteristics/Odor Yellow 03/20/25 1323   Appearance Red;Yellow;Slough;Moist;Granulating;Tendon 03/20/25 1323   Tissue loss description Full thickness 03/20/25 1323   Black (%), Wound Tissue Color 0 % 03/20/25 1323   Red (%), Wound Tissue Color 20 % 03/20/25 1323   Yellow (%), Wound Tissue Color 80 % 03/20/25 1323   Periwound Area Intact 03/20/25 1323   Wound Edges Defined 03/20/25 1323   [place] Classification (diabetic foot ulcers only) Grade 1 03/20/25 1323   Wound Length (cm) 6.8 cm 03/20/25 1323   Wound Width (cm) 3.1 cm 03/20/25 1323   Wound Depth (cm) 1 cm 03/20/25 1323   Wound Volume (cm^3) 11.037 cm^3 03/20/25 1323   Wound Surface Area (cm^2) 16.56 cm^2 03/20/25 1323   Care Cleansed with:;Soap and water 03/20/25 1323            Wound 03/20/25 1326 Diabetic Ulcer Right posterior Heel (Active)   03/20/25 1326 Heel   Present on Original Admission: Y   Primary Wound Type: Diabetic ulc   Side: Right   Orientation: posterior    Wound Approximate Age at First Assessment (Weeks): 3 weeks   Wound Number:    Is this injury device related?:    Incision Type:    Closure Method:    Wound Description (Comments):    Type:    Additional Comments:    Ankle-Brachial Index:    Pulses:    Removal Indication and Assessment:    Wound Outcome:    Wound Image    03/20/25 1327   Dressing Appearance Intact;Dry 03/20/25 1327   Drainage Amount None 03/20/25 1327   Appearance Lopez;Black 03/20/25 1327   Tissue loss description Not applicable 03/20/25 1327   Black (%), Wound Tissue Color 100 % 03/20/25 1327   Red (%), Wound Tissue Color 0 % 03/20/25 1327   Yellow (%), Wound Tissue Color 0 % 03/20/25 1327   Periwound Area Intact 03/20/25 1327   Wound Edges Approximated;Defined 03/20/25 1327   Wound Length (cm) 1 cm 03/20/25 1327   Wound Width (cm) 1.1 cm 03/20/25 1327   Wound Depth (cm) 0 cm 03/20/25 1327   Wound Volume (cm^3) 0 cm^3 03/20/25 1327   Wound Surface Area (cm^2) 0.86 cm^2 03/20/25 1327   Care Cleansed with:;Soap and water 03/20/25 1327                   Problem List Items Addressed This Visit          Endocrine    Diabetic ulcer of left heel associated with type 2 diabetes mellitus, with fat layer exposed - Primary    Overview                                        Current Assessment & Plan   Left heel and Right medial foot: Clean wound with Vashe and pat dry. Apply Santyl to wound bed. Cover with dry dressing. Change daily and as needed.    Right heel and right lateral foot: Clean with Vashe and pat dry. Apply betadine daily and as needed.    Elevate legs when sitting  Avoid pressure on wound.   Diabetes:  Monitor glucose closely. Check fasting glucose and 2 hours after meals. HgA1C goal <7, fasting glucose , and 2 hours after meals <180  Hypertension:  Check blood pressure twice daily, goal <120/80  Diet:   Increase protein intake, avoid fried, fatty foods and foods high in simple carbs.   Vitamins:  Take vitamin C 1000 mg, zinc 50mg, vitamin  d 5000 units, and a daily multivitamin. Lucho is a good source of protein and nutrients to aid in wound healing.   Monitor closely for s/s of infection including fever, chills, increase in pain, odor from wound, and increased redness from foot. Go to ER if any complications develop.     Labs and xray ordered.   Culture collected today, clinic will call with results.  Rocephin 1 gram IM administered today.         Relevant Orders    CBC Auto Differential    Sedimentation Rate    CRP, High Sensitivity    Comprehensive Metabolic Panel    Diabetic ulcer of toe of right foot associated with type 2 diabetes mellitus, with necrosis of bone    Overview                          Current Assessment & Plan   Left heel and Right medial foot: Clean wound with Vashe and pat dry. Apply Santyl to wound bed. Cover with dry dressing. Change daily and as needed.    Right heel and right lateral foot: Clean with Vashe and pat dry. Apply betadine daily and as needed.    Elevate legs when sitting  Avoid pressure on wound.   Diabetes:  Monitor glucose closely. Check fasting glucose and 2 hours after meals. HgA1C goal <7, fasting glucose , and 2 hours after meals <180  Hypertension:  Check blood pressure twice daily, goal <120/80  Diet:   Increase protein intake, avoid fried, fatty foods and foods high in simple carbs.   Vitamins:  Take vitamin C 1000 mg, zinc 50mg, vitamin d 5000 units, and a daily multivitamin. Lucho is a good source of protein and nutrients to aid in wound healing.   Monitor closely for s/s of infection including fever, chills, increase in pain, odor from wound, and increased redness from foot. Go to ER if any complications develop.     Labs and xray ordered.   Culture collected today, clinic will call with results.  Rocephin 1 gram IM administered today.          Other Visit Diagnoses         Wound infection        Relevant Orders    Culture, Anaerobe    Culture, Wound    CBC Auto Differential    Sedimentation Rate     CRP, High Sensitivity    Comprehensive Metabolic Panel      Diabetic ulcer of right midfoot associated with diabetes mellitus due to underlying condition, limited to breakdown of skin        Relevant Orders    X-Ray Foot Complete 3 view Right    CBC Auto Differential    Sedimentation Rate    CRP, High Sensitivity    Comprehensive Metabolic Panel      PVD (peripheral vascular disease)        Relevant Orders    Ambulatory referral/consult to Interventional Cardiology            Future Appointments   Date Time Provider Department Center   3/27/2025  1:00 PM Marybeth Orellana FNP [facility]               Signature:  CARISA Ayala  [facility]  [address] [facility] [address]  [phone]    Date of encounter: 3/20/25

## 2025-03-03 NOTE — PATIENT INSTRUCTIONS
Left heel and Right medial foot: Clean wound with Vashe and pat dry. Apply Santyl to wound bed. Cover with dry dressing. Change daily and as needed.    Right heel and right lateral foot: Clean with Vashe and pat dry. Apply betadine daily and as needed.    Elevate legs when sitting  Avoid pressure on wound.   Diabetes:  Monitor glucose closely. Check fasting glucose and 2 hours after meals. HgA1C goal <7, fasting glucose , and 2 hours after meals <180  Hypertension:  Check blood pressure twice daily, goal <120/80  Diet:   Increase protein intake, avoid fried, fatty foods and foods high in simple carbs.   Vitamins:  Take vitamin C 1000 mg, zinc 50mg, vitamin d 5000 units, and a daily multivitamin. Lucho is a good source of protein and nutrients to aid in wound healing.   Monitor closely for s/s of infection including fever, chills, increase in pain, odor from wound, and increased redness from foot. Go to ER if any complications develop.     Labs and xray ordered.   Culture collected today, clinic will call with results.  Rocephin 1 gram IM administered today.

## 2025-03-05 ENCOUNTER — OFFICE VISIT (OUTPATIENT)
Dept: SURGERY | Facility: CLINIC | Age: 65
End: 2025-03-05
Attending: SURGERY
Payer: COMMERCIAL

## 2025-03-05 DIAGNOSIS — E11.621 DIABETIC ULCER OF TOE OF RIGHT FOOT ASSOCIATED WITH TYPE 2 DIABETES MELLITUS, WITH NECROSIS OF BONE: Primary | ICD-10-CM

## 2025-03-05 DIAGNOSIS — L97.514 DIABETIC ULCER OF TOE OF RIGHT FOOT ASSOCIATED WITH TYPE 2 DIABETES MELLITUS, WITH NECROSIS OF BONE: Primary | ICD-10-CM

## 2025-03-05 PROCEDURE — 99213 OFFICE O/P EST LOW 20 MIN: CPT | Mod: PBBFAC | Performed by: SURGERY

## 2025-03-05 PROCEDURE — 99999 PR PBB SHADOW E&M-EST. PATIENT-LVL III: CPT | Mod: PBBFAC,,, | Performed by: SURGERY

## 2025-03-05 PROCEDURE — 11043 DBRDMT MUSC&/FSCA 1ST 20/<: CPT | Mod: PBBFAC | Performed by: SURGERY

## 2025-03-05 NOTE — PROGRESS NOTES
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  Doing well overall.  Wet-to-dry in the area.  Slight area of necrotic fibrinous material that requires debridement.    PHYSICAL EXAM:    About a 3-1/2 x 2 and 0.5 cm area of fibrinous material debrided at the bedside.  This was done after verbal consent.    Recent Results (from the past 3 weeks)   Comprehensive Metabolic Panel    Collection Time: 02/20/25  2:42 PM   Result Value Ref Range    Sodium 136 136 - 145 mmol/L    Potassium 4.1 3.5 - 5.1 mmol/L    Chloride 102 98 - 107 mmol/L    CO2 25 23 - 31 mmol/L    Anion Gap 13 7 - 16 mmol/L    Glucose 182 (H) 82 - 115 mg/dL    BUN 22 (H) 10 - 20 mg/dL    Creatinine 0.90 0.55 - 1.02 mg/dL    BUN/Creatinine Ratio 24 (H) 6 - 20    Calcium 8.2 (L) 8.4 - 10.2 mg/dL    Total Protein 5.9 5.8 - 7.6 g/dL    Albumin 2.0 (L) 3.4 - 4.8 g/dL    Globulin 3.9 2.0 - 4.0 g/dL    A/G Ratio 0.5     Bilirubin, Total 0.3 <=1.5 mg/dL    Alk Phos 109 40 - 150 U/L    ALT 7 <=55 U/L    AST 19 5 - 34 U/L    eGFR 72 >=60 mL/min/1.73m2   CBC with Differential    Collection Time: 02/20/25  2:42 PM   Result Value Ref Range    WBC 15.99 (H) 4.50 - 11.00 K/uL    RBC 3.38 (L) 4.20 - 5.40 M/uL    Hemoglobin 8.1 (L) 12.0 - 16.0 g/dL    Hematocrit 28.2 (L) 38.0 - 47.0 %    MCV 83.4 80.0 - 96.0 fL    MCH 24.0 (L) 27.0 - 31.0 pg    MCHC 28.7 (L) 32.0 - 36.0 g/dL    RDW 15.1 (H) 11.5 - 14.5 %    Platelet Count 393 150 - 400 K/uL    MPV 10.7 9.4 - 12.4 fL    Neutrophils % 90.3 (H) 53.0 - 65.0 %    Lymphocytes % 4.5 (L) 27.0 - 41.0 %    Monocytes % 4.1 2.0 - 6.0 %    Eosinophils % 0.0 (L) 1.0 - 4.0 %    Basophils % 0.2 0.0 - 1.0 %    Immature Granulocytes % 0.9 (H) 0.0 - 0.4 %    nRBC, Auto 0.0 <=0.0 %    Neutrophils, Abs 14.43 (H) 1.80 - 7.70 K/uL    Lymphocytes, Absolute 0.72 (L) 1.00 - 4.80 K/uL    Monocytes, Absolute 0.66 0.00 - 0.80 K/uL     Eosinophils, Absolute 0.00 0.00 - 0.50 K/uL    Basophils, Absolute 0.03 0.00 - 0.20 K/uL    Immature Granulocytes, Absolute 0.15 (H) 0.00 - 0.04 K/uL    nRBC, Absolute 0.00 <=0.00 x10e3/uL    Diff Type Auto    POCT glucose    Collection Time: 02/21/25 10:27 AM   Result Value Ref Range    POC Glucose 137 (H) 70 - 105 mg/dL   Surgical Pathology    Collection Time: 02/21/25  1:26 PM   Result Value Ref Range    Case Report       Surgical Pathology                                Case: [id number]                                   Authorizing Provider:  David Aguilera MD       Collected:           02/21/2025 01:26 PM          Ordering Location:     [facility] -     Received:            02/21/2025 02:11 PM                                 [facility]                                                              Pathologist:           Juancarlos Dumont MD                                                      Specimen:    Foot, Right, right foot tissue and bone                                                    Final Diagnosis       A. 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 Foot, Right: right foot tissue and bone  The specimen is received in formalin designated right foot tissue and bone and consists of multiple white-tan, red-brown, pink-tan, and gray black tissue fragments that measure 7.2 x 4.5 x 2.8 cm in aggregate sectioning demonstrates yellow-tan to gray black underlying soft tissue and white-tan to gray bone.  A representative section is submitted in cassette A1 following decalcification.    Grossing was completed by Anthony Gardiner.      Microscopic Description       A microscopic examination was performed and the diagnosis reflects the findings.          Laboratory Notes       If this report includes  immunohistochemical (IHC) test results, please note the following: IHC studies were interpreted in conjunction with appropriate positive and negative controls which demonstrate the expected positive and negative reactivity. This laboratory is regulated under CLIA as qualified to perform high-complexity testing. IHC tests are used for clinical purposes. They should not be regarded as investigational or research.       POCT glucose    Collection Time: 02/21/25  1:44 PM   Result Value Ref Range    POC Glucose 154 (H) 70 - 105 mg/dL        ASSESSMENT:      The patient is doing well after surgery.       PLAN:      Follow up  one-week .    One-week for checkup

## 2025-03-12 ENCOUNTER — OFFICE VISIT (OUTPATIENT)
Dept: SURGERY | Facility: CLINIC | Age: 65
End: 2025-03-12
Attending: SURGERY
Payer: COMMERCIAL

## 2025-03-12 DIAGNOSIS — Z09 FOLLOW-UP EXAMINATION, FOLLOWING OTHER SURGERY: Primary | ICD-10-CM

## 2025-03-12 PROCEDURE — 99999 PR PBB SHADOW E&M-EST. PATIENT-LVL III: CPT | Mod: PBBFAC,,, | Performed by: SURGERY

## 2025-03-12 PROCEDURE — 99213 OFFICE O/P EST LOW 20 MIN: CPT | Mod: PBBFAC | Performed by: SURGERY

## 2025-03-12 PROCEDURE — 99024 POSTOP FOLLOW-UP VISIT: CPT | Mod: ,,, | Performed by: SURGERY

## 2025-03-12 NOTE — PROGRESS NOTES
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  Fibrinous material in the wound bed.    PHYSICAL EXAM:    Proximally 4 x 2 cm opening.  Fibrinous material pretty stuck in there    Recent Results (from the past 3 weeks)   Comprehensive Metabolic Panel    Collection Time: 02/20/25  2:42 PM   Result Value Ref Range    Sodium 136 136 - 145 mmol/L    Potassium 4.1 3.5 - 5.1 mmol/L    Chloride 102 98 - 107 mmol/L    CO2 25 23 - 31 mmol/L    Anion Gap 13 7 - 16 mmol/L    Glucose 182 (H) 82 - 115 mg/dL    BUN 22 (H) 10 - 20 mg/dL    Creatinine 0.90 0.55 - 1.02 mg/dL    BUN/Creatinine Ratio 24 (H) 6 - 20    Calcium 8.2 (L) 8.4 - 10.2 mg/dL    Total Protein 5.9 5.8 - 7.6 g/dL    Albumin 2.0 (L) 3.4 - 4.8 g/dL    Globulin 3.9 2.0 - 4.0 g/dL    A/G Ratio 0.5     Bilirubin, Total 0.3 <=1.5 mg/dL    Alk Phos 109 40 - 150 U/L    ALT 7 <=55 U/L    AST 19 5 - 34 U/L    eGFR 72 >=60 mL/min/1.73m2   CBC with Differential    Collection Time: 02/20/25  2:42 PM   Result Value Ref Range    WBC 15.99 (H) 4.50 - 11.00 K/uL    RBC 3.38 (L) 4.20 - 5.40 M/uL    Hemoglobin 8.1 (L) 12.0 - 16.0 g/dL    Hematocrit 28.2 (L) 38.0 - 47.0 %    MCV 83.4 80.0 - 96.0 fL    MCH 24.0 (L) 27.0 - 31.0 pg    MCHC 28.7 (L) 32.0 - 36.0 g/dL    RDW 15.1 (H) 11.5 - 14.5 %    Platelet Count 393 150 - 400 K/uL    MPV 10.7 9.4 - 12.4 fL    Neutrophils % 90.3 (H) 53.0 - 65.0 %    Lymphocytes % 4.5 (L) 27.0 - 41.0 %    Monocytes % 4.1 2.0 - 6.0 %    Eosinophils % 0.0 (L) 1.0 - 4.0 %    Basophils % 0.2 0.0 - 1.0 %    Immature Granulocytes % 0.9 (H) 0.0 - 0.4 %    nRBC, Auto 0.0 <=0.0 %    Neutrophils, Abs 14.43 (H) 1.80 - 7.70 K/uL    Lymphocytes, Absolute 0.72 (L) 1.00 - 4.80 K/uL    Monocytes, Absolute 0.66 0.00 - 0.80 K/uL    Eosinophils, Absolute 0.00 0.00 - 0.50 K/uL    Basophils, Absolute 0.03 0.00 - 0.20 K/uL    Immature Granulocytes, Absolute  0.15 (H) 0.00 - 0.04 K/uL    nRBC, Absolute 0.00 <=0.00 x10e3/uL    Diff Type Auto    POCT glucose    Collection Time: 02/21/25 10:27 AM   Result Value Ref Range    POC Glucose 137 (H) 70 - 105 mg/dL   Surgical Pathology    Collection Time: 02/21/25  1:26 PM   Result Value Ref Range    Case Report       Surgical Pathology                                Case: [id number]                                   Authorizing Provider:  David Aguilera MD       Collected:           02/21/2025 01:26 PM          Ordering Location:     [facility] -     Received:            02/21/2025 02:11 PM                                 [facility] Services                                                              Pathologist:           Juancarlos Dumont MD                                                      Specimen:    Foot, Right, right foot tissue and bone                                                    Final Diagnosis       A. 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 Foot, Right: right foot tissue and bone  The specimen is received in formalin designated right foot tissue and bone and consists of multiple white-tan, red-brown, pink-tan, and gray black tissue fragments that measure 7.2 x 4.5 x 2.8 cm in aggregate sectioning demonstrates yellow-tan to gray black underlying soft tissue and white-tan to gray bone.  A representative section is submitted in cassette A1 following decalcification.    Grossing was completed by Anthony Gardiner.      Microscopic Description       A microscopic examination was performed and the diagnosis reflects the findings.          Laboratory Notes       If this report includes immunohistochemical (IHC) test results, please note the following: IHC studies were interpreted in conjunction with appropriate positive and  negative controls which demonstrate the expected positive and negative reactivity. This laboratory is regulated under CLIA as qualified to perform high-complexity testing. IHC tests are used for clinical purposes. They should not be regarded as investigational or research.       POCT glucose    Collection Time: 02/21/25  1:44 PM   Result Value Ref Range    POC Glucose 154 (H) 70 - 105 mg/dL        ASSESSMENT:      The patient is doing well after surgery.       PLAN:      Follow up PRN.    Wound care follow up next week.  Instructed patient to start placing Santyl on there to hopefully loosen some of that area up and do some enzymatic debridement.  Will still need some picking at the area.  Come back and see me for operative fixation.  Otherwise it is granulating in pretty decently overall.

## 2025-03-17 ENCOUNTER — DOCUMENT SCAN (OUTPATIENT)
Dept: HOME HEALTH SERVICES | Facility: HOSPITAL | Age: 65
End: 2025-03-17
Payer: COMMERCIAL

## 2025-03-19 ENCOUNTER — DOCUMENT SCAN (OUTPATIENT)
Dept: HOME HEALTH SERVICES | Facility: HOSPITAL | Age: 65
End: 2025-03-19
Payer: COMMERCIAL

## 2025-03-20 ENCOUNTER — OFFICE VISIT (OUTPATIENT)
Dept: WOUND CARE | Facility: CLINIC | Age: 65
End: 2025-03-20
Payer: COMMERCIAL

## 2025-03-20 VITALS
SYSTOLIC BLOOD PRESSURE: 143 MMHG | HEART RATE: 112 BPM | RESPIRATION RATE: 18 BRPM | TEMPERATURE: 98 F | DIASTOLIC BLOOD PRESSURE: 87 MMHG

## 2025-03-20 DIAGNOSIS — I73.9 PVD (PERIPHERAL VASCULAR DISEASE): ICD-10-CM

## 2025-03-20 DIAGNOSIS — L97.514 DIABETIC ULCER OF TOE OF RIGHT FOOT ASSOCIATED WITH TYPE 2 DIABETES MELLITUS, WITH NECROSIS OF BONE: ICD-10-CM

## 2025-03-20 DIAGNOSIS — M25.572 PAIN IN LEFT ANKLE AND JOINTS OF LEFT FOOT: ICD-10-CM

## 2025-03-20 DIAGNOSIS — E11.621 DIABETIC ULCER OF TOE OF RIGHT FOOT ASSOCIATED WITH TYPE 2 DIABETES MELLITUS, WITH NECROSIS OF BONE: ICD-10-CM

## 2025-03-20 DIAGNOSIS — L97.422 DIABETIC ULCER OF LEFT HEEL ASSOCIATED WITH TYPE 2 DIABETES MELLITUS, WITH FAT LAYER EXPOSED: Primary | ICD-10-CM

## 2025-03-20 DIAGNOSIS — E08.621 DIABETIC ULCER OF RIGHT MIDFOOT ASSOCIATED WITH DIABETES MELLITUS DUE TO UNDERLYING CONDITION, LIMITED TO BREAKDOWN OF SKIN: ICD-10-CM

## 2025-03-20 DIAGNOSIS — T14.8XXA WOUND INFECTION: ICD-10-CM

## 2025-03-20 DIAGNOSIS — L08.9 WOUND INFECTION: ICD-10-CM

## 2025-03-20 DIAGNOSIS — L97.411 DIABETIC ULCER OF RIGHT MIDFOOT ASSOCIATED WITH DIABETES MELLITUS DUE TO UNDERLYING CONDITION, LIMITED TO BREAKDOWN OF SKIN: ICD-10-CM

## 2025-03-20 DIAGNOSIS — M25.571 PAIN IN RIGHT ANKLE AND JOINTS OF RIGHT FOOT: ICD-10-CM

## 2025-03-20 DIAGNOSIS — E11.621 DIABETIC ULCER OF LEFT HEEL ASSOCIATED WITH TYPE 2 DIABETES MELLITUS, WITH FAT LAYER EXPOSED: Primary | ICD-10-CM

## 2025-03-20 PROCEDURE — 11046 DBRDMT MUSC&/FSCA EA ADDL: CPT | Mod: PBBFAC | Performed by: NURSE PRACTITIONER

## 2025-03-20 PROCEDURE — 11042 DBRDMT SUBQ TIS 1ST 20SQCM/<: CPT | Mod: S$PBB,,, | Performed by: NURSE PRACTITIONER

## 2025-03-20 PROCEDURE — 99999PBSHW PR PBB SHADOW TECHNICAL ONLY FILED TO HB: Mod: PBBFAC,,,

## 2025-03-20 PROCEDURE — 96372 THER/PROPH/DIAG INJ SC/IM: CPT | Mod: PBBFAC | Performed by: NURSE PRACTITIONER

## 2025-03-20 PROCEDURE — 87070 CULTURE OTHR SPECIMN AEROBIC: CPT | Mod: ,,, | Performed by: CLINICAL MEDICAL LABORATORY

## 2025-03-20 PROCEDURE — 87075 CULTR BACTERIA EXCEPT BLOOD: CPT | Mod: ,,, | Performed by: CLINICAL MEDICAL LABORATORY

## 2025-03-20 PROCEDURE — 99215 OFFICE O/P EST HI 40 MIN: CPT | Mod: PBBFAC | Performed by: NURSE PRACTITIONER

## 2025-03-20 PROCEDURE — 11043 DBRDMT MUSC&/FSCA 1ST 20/<: CPT | Mod: S$PBB,59,, | Performed by: NURSE PRACTITIONER

## 2025-03-20 PROCEDURE — 99999 PR PBB SHADOW E&M-EST. PATIENT-LVL V: CPT | Mod: PBBFAC,,, | Performed by: NURSE PRACTITIONER

## 2025-03-20 PROCEDURE — 99499 UNLISTED E&M SERVICE: CPT | Mod: S$PBB,,, | Performed by: NURSE PRACTITIONER

## 2025-03-20 RX ORDER — CEFTRIAXONE 1 G/1
1 INJECTION, POWDER, FOR SOLUTION INTRAMUSCULAR; INTRAVENOUS ONCE
Status: COMPLETED | OUTPATIENT
Start: 2025-03-20 | End: 2025-03-20

## 2025-03-20 RX ORDER — KETOROLAC TROMETHAMINE 10 MG/1
10 TABLET, FILM COATED ORAL 3 TIMES DAILY PRN
COMMUNITY

## 2025-03-20 RX ADMIN — CEFTRIAXONE SODIUM 1 G: 1 INJECTION, POWDER, FOR SOLUTION INTRAMUSCULAR; INTRAVENOUS at 02:03

## 2025-03-20 NOTE — PROCEDURES
"Debridement    Date/Time: 3/20/2025 1:00 PM    Performed by: Marybeth Orellana FNP  Authorized by: Marybeth Orellana FNP    Time out: Immediately prior to procedure a "time out" was called to verify the correct patient, procedure, equipment, support staff and site/side marked as required.    Consent Done?:  Yes (Written)    Wound Details:    Location:  Left foot    Location:  Left Heel    Type of Debridement:  Excisional       Length (cm):  0.8       Width (cm):  0.6       Depth (cm):  0.5       Area (sq cm):  0.38       Percent Debrided (%):  100       Total Area Debrided (sq cm):  0.38    Depth of debridement:  Subcutaneous tissue    Tissue debrided:  Adipose, Dermis, Epidermis and Subcutaneous    Devitalized tissue debrided:  Biofilm, Clots, Callus, Exudate, Fibrin and Slough    Instruments:  Curette  Bleeding:  Moderate  Hemostasis Achieved: Yes  Method Used:  Pressure    Additional wounds:  1    2nd Wound Details:     Location:  Right foot    Location:  Right Midfoot    Location:  Right Midfoot    Type of Debridement:  Excisional       Length (cm):  7       Area (sq cm):  21.99       Width (cm):  4       Percent Debrided (%):  100       Depth (cm):  1       Total Area Debrided (sq cm):  21.99    Depth of debridement:  Muscle/fascia/tendon    Tissue debrided:  Adipose, Dermis, Epidermis, Subcutaneous, Muscle and Tendon    Devitalized tissue debrided:  Biofilm, Clots, Exudate, Fibrin, Slough and Necrotic/Eschar    Instruments:  Curette and Scissors  Bleeding:  Moderate  Hemostasis Achieved: Yes    Method Used:  Pressure  Patient tolerance:  Patient tolerated the procedure well with no immediate complications  1st Wound Pain Assessment: 0  2nd Wound Pain Assessment: 0     Assistant MEIR King RN    "

## 2025-03-20 NOTE — PROGRESS NOTES
Debridement Performed for Assessment: Wound# right medial foot  Performed By: Provider: Marybeth Vazquez NP  Assistant: PUJA Santiago, RN    Debridement: Surgical    Photo taken post procedure:    Time-Out Taken: Yes  Level: Skin/Subcutaneous Tissue  Post Debridement Measurements  Length: (cm) 6.9  Width: (cm) 3.2  Depth: (cm)       Area: (cm²) 22.08  Percent Debrided: 100%  Total Area Debrided: (sq cm) 22.08    Tissue and other material debrided:  Adipose, Dermis, Epidermis, Subcutaneous  Devitalized Tissue Debrided:Biofilm, Slough, Fibrin  Instrument: Curette  Bleeding: Moderate  Hemostasis Achieved: Pressure  Procedural Pain: Insensate  Post Procedural Pain: Insensate  Response to Treatment: Procedure was tolerated well    Devitalized materials/tissue Removed  the following was removed during debridement  subcutaneous      Post Debridement Diagnosis  Post debridement diagnosis  Same as Pre-operative debridement diagnosis, No Complications noted.      Grafts or implants applied  Was a graft or implant applied?  No      Procedure assistant  Procedure assisted by:  Assistant is the same as nurse listed above      Complications related to procedure  Did any complication occure during procedure?  No complications noted during or after procedure.      Specimen  Specimen collect during procedure?  No specimen collected      Anaesthesia:  Anesthesia used  None      Blood Loss:  Blood loss during procedure  less than 5 cc

## 2025-03-20 NOTE — PROGRESS NOTES
Debridement Performed for Assessment: Wound# 1  Performed By: Provider: Marybeth Vazquez NP  Assistant: PUJA Santiago, RN    Debridement: Surgical    Photo taken post procedure:    Time-Out Taken: Yes  Level: Skin/Subcutaneous Tissue  Post Debridement Measurements  Length: (cm) 0.8  Width: (cm) 0.6  Depth: (cm)       Area: (cm²) 0.48  Percent Debrided: 100%  Total Area Debrided: (sq cm) 0.48    Tissue and other material debrided:  Adipose, Dermis, Epidermis, Subcutaneous  Devitalized Tissue Debrided:Biofilm, Slough, Fibrin  Instrument: Curette  Bleeding: Moderate  Hemostasis Achieved: Pressure  Procedural Pain: Insensate  Post Procedural Pain: Insensate  Response to Treatment: Procedure was tolerated well    Devitalized materials/tissue Removed  the following was removed during debridement  subcutaneous      Post Debridement Diagnosis  Post debridement diagnosis  Same as Pre-operative debridement diagnosis, No Complications noted.      Grafts or implants applied  Was a graft or implant applied?  No      Procedure assistant  Procedure assisted by:  Assistant is the same as nurse listed above      Complications related to procedure  Did any complication occure during procedure?  No complications noted during or after procedure.      Specimen  Specimen collect during procedure?  No specimen collected      Anaesthesia:  Anesthesia used  None      Blood Loss:  Blood loss during procedure  less than 5 cc

## 2025-03-22 ENCOUNTER — DOCUMENT SCAN (OUTPATIENT)
Dept: HOME HEALTH SERVICES | Facility: HOSPITAL | Age: 65
End: 2025-03-22
Payer: COMMERCIAL

## 2025-03-22 LAB — MICROORGANISM SPEC CULT: ABNORMAL

## 2025-03-24 NOTE — PROGRESS NOTES
CARISA Ayala   [facility]  [address]  [facility] [place] [address]  [phone]      PATIENT NAME: Silvana Sarah  : 1960  DATE: 3/27/25  MRN: [id number]      Billing Provider: CARISA Ayala  Level of Service:   Patient PCP Information       Provider PCP Type    CARISA Cárdenas General            Reason for Visit / Chief Complaint: Diabetic Foot Ulcer and Wound Check (Right foot and left heel)       History of Present Illness / Problem Focused Workflow     Silvana Sarah is a 63 yo female presents for follow up on chronic non healing wounds to left heel and right foot/heel. Right foot continues to have slough to wound bed. Bedside debridement today. Continue IV antibiotics as prescribed. She has appointment with Dr. Dobson on Monday to evaluate for revascularization. Continue applying santyl. Tentatively plan to initiate NPWT at next visit.     Left heel is healing well and is smaller today. Reports she is having custom orthotic shoe made for Charcot's foot.        3/20/2025  63 yo female presents for follow up on chronic non healing wound to left heel and right great toe amputation site. She is status post amputation of right great toe per Dr. Aguilera on 2025 with additional debridements on 2025. Left heel with slough and maceration, bedside debridement today. Reports that she had CT scan on left foot due to increased pain and swelling. She has f/u with Dr. Almazan on Monday. She had a fall earlier this week and has been unable to bear weight on left foot. Left ankle is edematous with purple discoloration and tenderness. X-ray ordered today.   Right foot with slough and necrotic tendon exposed. She has new wounds on right heel and right medial foot. Bedside debridement today. Continue using santyl at home. X-ray ordered. Discussed referral to vascular surgery due to impaired wound healing. Patient is agreeable, referral to   [facility].    Labs, x-rays, and wound cultures pending. Patient is on prednisone 20 mg daily for RA. Discussed effects of prednisone on wound healing and diabetes. Patient is going to try to taper dose to 10 mg and discuss treatment options with Dr. Almazan.     8/15/24  64 yo female presents with complaints of ulcer to left heel and right great toe. Wound on left heel with with necrotic tissue and slough, bedside debridement today. Left foot is edematous and tender. Recent cultures reviewed. Ceftin to pharmacy. Santyl and vashe moistened drawtex to wound bed. Supplies ordered from [employer]. Wound on right great toe will moderate serous drainage. Aquacel ag applied. Pertinent PMH includes diabetes, hypertension, peripheral neuropathy, gastroparesis, and rheumatoid arthritis. Last HgA1C 7.2 1/2023, diabetes managed by PCP. Wound healing is complicated by multiple co-morbidities, poor vascular supply, immunosuppression, diabetes, edema, heavy drainage, decreased granulation tissue, necrosis, large surface area, large volume, advanced age, fragile skin, and infection.             Review of Systems     Review of Systems   Constitutional:  Positive for activity change. Negative for chills and fever.   Respiratory:  Negative for chest tightness and shortness of breath.    Cardiovascular:  Positive for leg swelling. Negative for chest pain and palpitations.   Musculoskeletal:  Positive for arthralgias and joint swelling.   Skin:  Positive for color change and wound.        wound   Neurological:  Positive for weakness.   Psychiatric/Behavioral:  Negative for agitation, behavioral problems, confusion and self-injury.        Medical / Social / Family History     Past Medical History:   Diagnosis Date    Anxiety and depression 07/24/2021    Diabetes mellitus     Dry eye syndrome, bilateral 10/06/2017    Essential (primary) hypertension     Gastroparesis due to DM     Generalized osteoarthritis 10/25/2021     Other mechanical complication of implanted electronic neurostimulator, generator, sequela 10/25/2021    Peripheral autonomic neuropathy due to DM     Rheumatoid arthritis        Past Surgical History:   Procedure Laterality Date     SECTION      DEBRIDEMENT OF FOOT Right 2025    Procedure: DEBRIDEMENT, FOOT;  Surgeon: David Aguilera MD;  Location: [facility];  Service: General;  Laterality: Right;    DEBRIDEMENT OF LOWER EXTREMITY Left 2024    Procedure: DEBRIDEMENT, LOWER EXTREMITY;  Surgeon: David Aguilera MD;  Location: [facility];  Service: General;  Laterality: Left;  Left foot    GASTRIC STIMULATOR IMPLANT SURGERY      HYSTERECTOMY      INCISION AND DRAINAGE OF ABSCESS Right 2022    Procedure: INCISION AND DRAINAGE, ABSCESS;  Surgeon: Kamari Gamble DO;  Location: [facility];  Service: General;  Laterality: Right;  right hand dr gamble am    IRRIGATION AND DEBRIDEMENT OF UPPER EXTREMITY Right 2022    Procedure: IRRIGATION AND DEBRIDEMENT, UPPER EXTREMITY;  Surgeon: Kamari Gamble DO;  Location: [facility];  Service: General;  Laterality: Right;  right hand I/D dr gamble today    TOE AMPUTATION Right 2025    Procedure: AMPUTATION, TOE;  Surgeon: David Aguilera MD;  Location: [facility];  Service: General;  Laterality: Right;       Social History  Ms. Silvana Sarah  reports that she has never smoked. She has never used smokeless tobacco. She reports that she does not drink alcohol.    Family History  Ms.'s Silvana Sarah family history includes Cancer in her father; Diabetes in her son and son; Heart disease in her mother.    Medications and Allergies     Medications  Outpatient Medications Marked as Taking for the 3/27/25 encounter (Office Visit) with Marybeth Orellana FNP   Medication Sig Dispense Refill    amitriptyline (ELAVIL) 10 MG tablet Take 10 mg by mouth every evening.      b complex vitamins capsule Take 1 capsule by mouth  once daily.      cyanocobalamin 1,000 mcg/mL injection Inject 1,000 mcg into the muscle every 30 days.      esomeprazole (NEXIUM) 20 MG capsule Take 20 mg by mouth once daily.      etanercept (ENBREL MINI) 50 mg/mL (1 mL) Crtg 1 mL by abdominal subcutaneous route every .       ferrous sulfate (FEOSOL) Tab tablet Take 1 tablet by mouth once daily.      gabapentin (NEURONTIN) 600 MG tablet Take 2 tablets 3 times a day by oral route for 30 days.      hydrOXYchloroQUINE (PLAQUENIL) 200 mg tablet Take 200 mg by mouth 2 (two) times daily.       insulin regular 100 unit/mL Inj injection 3 (three) times daily before meals.      ketorolac (TORADOL) 10 mg tablet Take 10 mg by mouth 3 (three) times daily as needed for Pain.      LEVEMIR U-100 INSULIN 100 unit/mL injection SMARTSI Unit(s) SUB-Q Daily      lisinopriL (PRINIVIL,ZESTRIL) 2.5 MG tablet Take 2.5 mg by mouth every evening.      lysine (L-LYSINE) 500 mg Tab Take 500 mg by mouth 2 (two) times a day.      nystatin (MYCOSTATIN) 100,000 unit/mL suspension Take 6 mLs by mouth 3 (three) times daily before meals.       oxyCODONE-acetaminophen (PERCOCET)  mg per tablet Take 1 tablet by mouth 4 (four) times daily.      predniSONE (DELTASONE) 5 MG tablet Take 5 mg by mouth once daily.       promethazine (PHENERGAN) 25 MG tablet Take 25 mg by mouth every 6 (six) hours as needed for Nausea.       triamterene-hydrochlorothiazide 37.5-25 mg (DYAZIDE) 37.5-25 mg per capsule Take 1 capsule by mouth once daily.       zinc gluconate 50 mg tablet Take 50 mg by mouth once daily.         Allergies  Review of patient's allergies indicates:   Allergen Reactions    Influenza virus vaccines     Penicillins        Physical Examination     Vitals:    25 1313   BP: 133/75   Pulse: 109   Resp: 18   Temp: 97.3 °F (36.3 °C)                     Physical Exam  Vitals and nursing note reviewed.   Constitutional:       Appearance: Normal appearance.   HENT:       Head: Normocephalic.   Cardiovascular:      Rate and Rhythm: Normal rate and regular rhythm.      Pulses: Normal pulses.      Heart sounds: Normal heart sounds.   Pulmonary:      Effort: Pulmonary effort is normal. No respiratory distress.   Chest:      Chest wall: No tenderness.   Musculoskeletal:         General: Swelling and tenderness present.      Right lower leg: Edema present.      Left lower leg: Edema present.   Skin:     General: Skin is warm and dry.      Findings: Erythema present.      Comments: See LDA for photos/measurements   Neurological:      General: No focal deficit present.      Mental Status: She is alert and oriented to person, place, and time. Mental status is at baseline.   Psychiatric:         Mood and Affect: Mood normal.         Behavior: Behavior normal.         Thought Content: Thought content normal.         Judgment: Judgment normal.     Assessment and Plan             Wound 08/15/24 Diabetic Ulcer Left posterior Heel #1 (Active)   08/15/24  Heel   Present on Original Admission: Y   Primary Wound Type: Diabetic ulc   Side: Left   Orientation: posterior   Wound Approximate Age at First Assessment (Weeks): 9 weeks   Wound Number: #1   Is this injury device related?:    Incision Type:    Closure Method:    Wound Description (Comments):    Type:    Additional Comments:    Ankle-Brachial Index:    Pulses:    Removal Indication and Assessment:    Wound Outcome:    Wound Image   03/27/25 1408   Dressing Appearance Intact;Moist drainage 03/27/25 1326   Drainage Amount Small 03/27/25 1326   Drainage Characteristics/Odor Serous 03/27/25 1326   Appearance Yellow;Slough 03/27/25 1326   Tissue loss description Full thickness 03/27/25 1326   Black (%), Wound Tissue Color 0 % 03/27/25 1326   Red (%), Wound Tissue Color 0 % 03/27/25 1326   Yellow (%), Wound Tissue Color 100 % 03/27/25 1326   Periwound Area Intact 03/27/25 1326   Wound Edges Defined 03/27/25 1326   Paniagua Classification (diabetic foot  ulcers only) Grade 1 03/27/25 1326   Wound Length (cm) 0.3 cm 03/27/25 1326   Wound Width (cm) 0.7 cm 03/27/25 1326   Wound Depth (cm) 0.2 cm 03/27/25 1326   Wound Volume (cm^3) 0.022 cm^3 03/27/25 1326   Wound Surface Area (cm^2) 0.16 cm^2 03/27/25 1326   Care Cleansed with:;Soap and water 03/27/25 1326   Dressing Applied;Calcium alginate;Gauze;Island/border;Silver 03/27/25 1326            Wound 03/20/25 1322 Diabetic Ulcer Right anterior;medial Foot (Active)   03/20/25 1322 Foot   Present on Original Admission: Y   Primary Wound Type: Diabetic ulc   Side: Right   Orientation: anterior;medial   Wound Approximate Age at First Assessment (Weeks): 9 weeks   Wound Number:    Is this injury device related?:    Incision Type:    Closure Method:    Wound Description (Comments):    Type:    Additional Comments:    Ankle-Brachial Index:    Pulses:    Removal Indication and Assessment:    Wound Outcome:    Wound Image    03/27/25 1406   Dressing Appearance Intact;Moist drainage 03/27/25 1330   Drainage Amount Large 03/27/25 1330   Drainage Characteristics/Odor Serosanguineous 03/27/25 1330   Appearance Red;Yellow;Slough;Moist;Granulating 03/27/25 1330   Tissue loss description Full thickness 03/27/25 1330   Black (%), Wound Tissue Color 0 % 03/27/25 1330   Red (%), Wound Tissue Color 30 % 03/27/25 1330   Yellow (%), Wound Tissue Color 70 % 03/27/25 1330   Periwound Area Intact;Moist 03/27/25 1330   Wound Edges Defined 03/27/25 1330   Paniagua Classification (diabetic foot ulcers only) Grade 1 03/27/25 1330   Wound Length (cm) 6.8 cm 03/27/25 1330   Wound Width (cm) 3.1 cm 03/27/25 1330   Wound Depth (cm) 1.3 cm 03/27/25 1330   Wound Volume (cm^3) 14.349 cm^3 03/27/25 1330   Wound Surface Area (cm^2) 16.56 cm^2 03/27/25 1330   Care Cleansed with:;Soap and water 03/27/25 1330   Dressing Applied;Calcium alginate;Gauze;Silver;Rolled gauze 03/27/25 1330            Wound 03/20/25 1326 Diabetic Ulcer Right posterior Heel (Active)    03/20/25 1326 Heel   Present on Original Admission: Y   Primary Wound Type: Diabetic ulc   Side: Right   Orientation: posterior   Wound Approximate Age at First Assessment (Weeks): 3 weeks   Wound Number:    Is this injury device related?:    Incision Type:    Closure Method:    Wound Description (Comments):    Type:    Additional Comments:    Ankle-Brachial Index:    Pulses:    Removal Indication and Assessment:    Wound Outcome:    Wound Image    03/27/25 1334   Dressing Appearance Dry;Intact 03/27/25 1334   Drainage Amount None 03/27/25 1334   Appearance Black;Tan 03/27/25 1334   Tissue loss description Not applicable 03/27/25 1334   Black (%), Wound Tissue Color 100 % 03/27/25 1334   Red (%), Wound Tissue Color 0 % 03/27/25 1334   Yellow (%), Wound Tissue Color 0 % 03/27/25 1334   Periwound Area Intact 03/27/25 1334   Wound Edges Defined 03/27/25 1334   Wound Length (cm) 1.7 cm 03/27/25 1334   Wound Width (cm) 1.1 cm 03/27/25 1334   Wound Depth (cm) 0 cm 03/27/25 1334   Wound Volume (cm^3) 0 cm^3 03/27/25 1334   Wound Surface Area (cm^2) 1.47 cm^2 03/27/25 1334   Care Cleansed with:;Soap and water 03/27/25 1334   Dressing Applied 03/27/25 1334                     Problem List Items Addressed This Visit          Endocrine    Diabetic ulcer of left heel associated with type 2 diabetes mellitus, with fat layer exposed - Primary    Overview                                        Diabetic ulcer of toe of right foot associated with type 2 diabetes mellitus, with necrosis of bone    Overview                                   Future Appointments   Date Time Provider Department Center   3/31/2025  3:00 PM Kyler Dobson MD [facility]   4/3/2025  9:00 AM [address]   4/3/2025 10:00 AM Marybeth Orellana FNP [facility]              Signature:  CARISA Ayala  [facility]  [address]  [facility] [place]  [address]  [phone]    Date of encounter: 3/27/25

## 2025-03-24 NOTE — PATIENT INSTRUCTIONS
Left heel and Right medial foot: Clean wound with Vashe and pat dry. Apply Santyl to wound bed. Cover with dry dressing. Change daily and as needed.    Right heel and right lateral foot: Clean with Vashe and pat dry. Apply betadine daily and as needed.    Elevate legs when sitting  Avoid pressure on wound.   Diabetes:  Monitor glucose closely. Check fasting glucose and 2 hours after meals. HgA1C goal <7, fasting glucose , and 2 hours after meals <180  Hypertension:  Check blood pressure twice daily, goal <120/80  Diet:   Increase protein intake, avoid fried, fatty foods and foods high in simple carbs.   Vitamins:  Take vitamin C 1000 mg, zinc 50mg, vitamin d 5000 units, and a daily multivitamin. Lucho is a good source of protein and nutrients to aid in wound healing.   Monitor closely for s/s of infection including fever, chills, increase in pain, odor from wound, and increased redness from foot. Go to ER if any complications develop.

## 2025-03-25 ENCOUNTER — TELEPHONE (OUTPATIENT)
Dept: WOUND CARE | Facility: CLINIC | Age: 65
End: 2025-03-25
Payer: COMMERCIAL

## 2025-03-25 ENCOUNTER — HOSPITAL ENCOUNTER (OUTPATIENT)
Dept: RADIOLOGY | Facility: HOSPITAL | Age: 65
Discharge: HOME | End: 2025-03-25
Attending: NURSE PRACTITIONER
Payer: COMMERCIAL

## 2025-03-25 DIAGNOSIS — M25.572 PAIN IN LEFT ANKLE AND JOINTS OF LEFT FOOT: ICD-10-CM

## 2025-03-25 PROCEDURE — 73610 X-RAY EXAM OF ANKLE: CPT | Mod: 26,LT,, | Performed by: RADIOLOGY

## 2025-03-25 PROCEDURE — 73610 X-RAY EXAM OF ANKLE: CPT | Mod: TC,LT

## 2025-03-25 NOTE — TELEPHONE ENCOUNTER
Patient called and states she hasn't heard from Dr. Dobson office. I informed her that his office tried calling to let her know of the appt scheduled for today and her phone wasn't working. I called Dr. Dobson office and made an appt for 3/31/25 at 3:00pm and the patient is aware. Patient also inquiring of wound culture results. I instructed the patient that I am faxing the results to [facility] so antibiotics can be ordered and I will also send a copy to [facility]. Patient verbalizes understanding.

## 2025-03-26 ENCOUNTER — TELEPHONE (OUTPATIENT)
Dept: WOUND CARE | Facility: CLINIC | Age: 65
End: 2025-03-26
Payer: COMMERCIAL

## 2025-03-26 ENCOUNTER — DOCUMENT SCAN (OUTPATIENT)
Dept: HOME HEALTH SERVICES | Facility: HOSPITAL | Age: 65
End: 2025-03-26
Payer: COMMERCIAL

## 2025-03-26 DIAGNOSIS — T14.8XXA WOUND INFECTION: Primary | ICD-10-CM

## 2025-03-26 DIAGNOSIS — L08.9 WOUND INFECTION: Primary | ICD-10-CM

## 2025-03-27 ENCOUNTER — OFFICE VISIT (OUTPATIENT)
Dept: WOUND CARE | Facility: CLINIC | Age: 65
End: 2025-03-27
Payer: COMMERCIAL

## 2025-03-27 VITALS
SYSTOLIC BLOOD PRESSURE: 133 MMHG | TEMPERATURE: 97 F | DIASTOLIC BLOOD PRESSURE: 75 MMHG | HEART RATE: 109 BPM | RESPIRATION RATE: 18 BRPM

## 2025-03-27 DIAGNOSIS — E11.621 DIABETIC ULCER OF LEFT HEEL ASSOCIATED WITH TYPE 2 DIABETES MELLITUS, WITH FAT LAYER EXPOSED: Primary | ICD-10-CM

## 2025-03-27 DIAGNOSIS — L97.422 DIABETIC ULCER OF LEFT HEEL ASSOCIATED WITH TYPE 2 DIABETES MELLITUS, WITH FAT LAYER EXPOSED: Primary | ICD-10-CM

## 2025-03-27 DIAGNOSIS — L97.514 DIABETIC ULCER OF TOE OF RIGHT FOOT ASSOCIATED WITH TYPE 2 DIABETES MELLITUS, WITH NECROSIS OF BONE: ICD-10-CM

## 2025-03-27 DIAGNOSIS — E11.621 DIABETIC ULCER OF TOE OF RIGHT FOOT ASSOCIATED WITH TYPE 2 DIABETES MELLITUS, WITH NECROSIS OF BONE: ICD-10-CM

## 2025-03-27 PROCEDURE — 11043 DBRDMT MUSC&/FSCA 1ST 20/<: CPT | Mod: PBBFAC | Performed by: NURSE PRACTITIONER

## 2025-03-27 PROCEDURE — 99215 OFFICE O/P EST HI 40 MIN: CPT | Mod: PBBFAC | Performed by: NURSE PRACTITIONER

## 2025-03-27 PROCEDURE — 99999 PR PBB SHADOW E&M-EST. PATIENT-LVL V: CPT | Mod: PBBFAC,,, | Performed by: NURSE PRACTITIONER

## 2025-03-27 PROCEDURE — 99499 UNLISTED E&M SERVICE: CPT | Mod: S$PBB,,, | Performed by: NURSE PRACTITIONER

## 2025-03-27 RX ORDER — AMITRIPTYLINE HYDROCHLORIDE 10 MG/1
10 TABLET, FILM COATED ORAL NIGHTLY
COMMUNITY

## 2025-03-27 NOTE — PROGRESS NOTES
Debridement Performed for Assessment: Wound# 1  Performed By: Provider: Marybeth Vazquez NP  Assistant: PUJA Santiago, RN    Debridement: Surgical    Photo taken post procedure:    Time-Out Taken: Yes  Level: Skin/Subcutaneous Tissue  Post Debridement Measurements  Length: (cm) 0.4  Width: (cm) 0.8  Depth: (cm)       Area: (cm²) 0.32  Percent Debrided: 100%  Total Area Debrided: (sq cm) 0.32    Tissue and other material debrided:  Adipose, Dermis, Epidermis, Subcutaneous  Devitalized Tissue Debrided:Biofilm, Slough, Fibrin  Instrument: Curette  Bleeding: Minimal  Hemostasis Achieved: Pressure  Procedural Pain: Insensate  Post Procedural Pain: Insensate  Response to Treatment: Procedure was tolerated well    Devitalized materials/tissue Removed  the following was removed during debridement  subcutaneous      Post Debridement Diagnosis  Post debridement diagnosis  Same as Pre-operative debridement diagnosis, No Complications noted.      Grafts or implants applied  Was a graft or implant applied?  No      Procedure assistant  Procedure assisted by:  Assistant is the same as nurse listed above      Complications related to procedure  Did any complication occure during procedure?  No complications noted during or after procedure.      Specimen  Specimen collect during procedure?  No specimen collected      Anaesthesia:  Anesthesia used  None      Blood Loss:  Blood loss during procedure  less than 5 cc

## 2025-03-27 NOTE — PROGRESS NOTES
Debridement Performed for Assessment: Wound# right anterior medial foot  Performed By: Provider: Marybeth Vazquez NP  Assistant: PUJA Santiago, RN    Debridement: Surgical    Photo taken post procedure:    Time-Out Taken: Yes  Level: Skin/Subcutaneous Tissue  Post Debridement Measurements  Length: (cm) 6.9  Width: (cm) 3.2  Depth: (cm)       Area: (cm²) 22.08  Percent Debrided: 100%  Total Area Debrided: (sq cm) 22.08    Tissue and other material debrided:  Adipose, Dermis, Epidermis, Subcutaneous  Devitalized Tissue Debrided:Biofilm, Slough, Fibrin  Instrument: Curette  Bleeding: Minimal  Hemostasis Achieved: Pressure  Procedural Pain: Insensate  Post Procedural Pain: Insensate  Response to Treatment: Procedure was tolerated well    Devitalized materials/tissue Removed  the following was removed during debridement  subcutaneous      Post Debridement Diagnosis  Post debridement diagnosis  Same as Pre-operative debridement diagnosis, No Complications noted.      Grafts or implants applied  Was a graft or implant applied?  No      Procedure assistant  Procedure assisted by:  Assistant is the same as nurse listed above      Complications related to procedure  Did any complication occure during procedure?  No complications noted during or after procedure.      Specimen  Specimen collect during procedure?  No specimen collected      Anaesthesia:  Anesthesia used  None      Blood Loss:  Blood loss during procedure  less than 5 cc

## 2025-03-27 NOTE — PROCEDURES
"Debridement    Date/Time: 3/27/2025 1:00 PM    Performed by: Marybeth Orellana FNP  Authorized by: Marybeth Orellana FNP    Time out: Immediately prior to procedure a "time out" was called to verify the correct patient, procedure, equipment, support staff and site/side marked as required.    Consent Done?:  Yes (Written)    Wound Details:    Location:  Right foot    Location:  Right 1st Metatarsal Head    Type of Debridement:  Excisional       Length (cm):  6.9       Width (cm):  3.2       Depth (cm):  0.5       Area (sq cm):  17.34       Percent Debrided (%):  100       Total Area Debrided (sq cm):  17.34    Depth of debridement:  Muscle/fascia/tendon    Tissue debrided:  Adipose, Dermis, Epidermis, Subcutaneous and Tendon    Devitalized tissue debrided:  Biofilm, Callus, Clots, Exudate, Fibrin and Slough    Instruments:  Curette and Scissors  Bleeding:  Moderate  Hemostasis Achieved: Yes  Method Used:  Pressure  Patient tolerance:  Patient tolerated the procedure well with no immediate complications  1st Wound Pain Assessment: 0     Assistant MEIR King RN    "

## 2025-04-01 ENCOUNTER — HOSPITAL ENCOUNTER (OUTPATIENT)
Dept: RADIOLOGY | Facility: HOSPITAL | Age: 65
Discharge: HOME | End: 2025-04-01
Attending: NURSE PRACTITIONER
Payer: COMMERCIAL

## 2025-04-01 DIAGNOSIS — L08.9 WOUND INFECTION: ICD-10-CM

## 2025-04-01 DIAGNOSIS — T14.8XXA WOUND INFECTION: ICD-10-CM

## 2025-04-01 PROCEDURE — 76937 US GUIDE VASCULAR ACCESS: CPT | Mod: TC

## 2025-04-01 PROCEDURE — C1751 CATH, INF, PER/CENT/MIDLINE: HCPCS

## 2025-04-01 NOTE — PROGRESS NOTES
PICC line insertion.  [facility]  Consent obtained for PICC line insertion.  A formal timeout was called all staff present agree to patient and procedure.  The right upper extremity was prepped with ChloraPrep and sterile field was established.  1% lidocaine was used as local anesthetic.  Under ultrasound guidance the basilic vein was localized and accessed with a micropuncture needle.  An 018 guidewire was passed through the vein to the level superior vena cava.  A 45 cm PICC line was then placed over the wire with its tip terminating at the atrial caval junction.  The wire was removed both ports were flushed with heparinized saline.  The PICC line was then cleaned and secured.  The patient tolerated the procedure well there were no immediate postprocedure complications.  Total fluoroscopy time was 1 minutes.

## 2025-04-03 ENCOUNTER — OFFICE VISIT (OUTPATIENT)
Dept: CARDIOLOGY | Facility: CLINIC | Age: 65
End: 2025-04-03
Payer: COMMERCIAL

## 2025-04-03 ENCOUNTER — TELEPHONE (OUTPATIENT)
Dept: CARDIOLOGY | Facility: CLINIC | Age: 65
End: 2025-04-03
Payer: COMMERCIAL

## 2025-04-03 VITALS
HEART RATE: 115 BPM | RESPIRATION RATE: 16 BRPM | DIASTOLIC BLOOD PRESSURE: 60 MMHG | HEIGHT: 70 IN | SYSTOLIC BLOOD PRESSURE: 90 MMHG | WEIGHT: 163 LBS | BODY MASS INDEX: 23.34 KG/M2

## 2025-04-03 DIAGNOSIS — E11.621 DIABETIC ULCER OF TOE OF RIGHT FOOT ASSOCIATED WITH TYPE 2 DIABETES MELLITUS, WITH NECROSIS OF BONE: ICD-10-CM

## 2025-04-03 DIAGNOSIS — I73.9 PERIPHERAL VASCULAR DISEASE, UNSPECIFIED: Primary | ICD-10-CM

## 2025-04-03 DIAGNOSIS — M79.89 LEG SWELLING: ICD-10-CM

## 2025-04-03 DIAGNOSIS — I10 PRIMARY HYPERTENSION: ICD-10-CM

## 2025-04-03 DIAGNOSIS — E78.49 OTHER HYPERLIPIDEMIA: ICD-10-CM

## 2025-04-03 DIAGNOSIS — I73.9 PVD (PERIPHERAL VASCULAR DISEASE): ICD-10-CM

## 2025-04-03 DIAGNOSIS — L97.514 DIABETIC ULCER OF TOE OF RIGHT FOOT ASSOCIATED WITH TYPE 2 DIABETES MELLITUS, WITH NECROSIS OF BONE: ICD-10-CM

## 2025-04-03 PROCEDURE — 99204 OFFICE O/P NEW MOD 45 MIN: CPT | Mod: ,,, | Performed by: STUDENT IN AN ORGANIZED HEALTH CARE EDUCATION/TRAINING PROGRAM

## 2025-04-03 NOTE — PATIENT INSTRUCTIONS
Stop Diazide  Take lisinopril  Start xarelto 2.5mg twice a day  CTA With run-off - in meridian  ECHO - in meridian  Plan for possible peripheral angiography after CTA with run-off

## 2025-04-03 NOTE — TELEPHONE ENCOUNTER
Spoke to pt. Pt. Thought apptr. Was in [place], advised to come to [facility] now for appt due other pt.'s with scheduled appt.s. Pt. Voiced understanding.----- Message from Tech Laturina sent at 4/3/2025  8:34 AM CDT -----  Who Called: Silvana Bowen is requesting assistance/information from provider's office.Preferred Method of Contact: Phone CallPatient's Preferred Phone Number on File: [phone] Best Call Back Number, if different:Additional Information: Patient rides by ambulance, she is still waiting on it to come pick her up, she will be late her appointment was at 8:30 a.m this morning.

## 2025-04-03 NOTE — PROGRESS NOTES
"PCP: Sera Byrd FNP    Referring Provider:     Subjective:   Silvana Sarah is a 64 y.o. female with hx of DM, HLD, dry gangrene s/p right toe amputation, left foot charcots disease, RA, HTN who presents for non -healing right foot wound.     Patient referred from wound care   Patient has been followed closely with wound care with [facility] however continues to have poor healing of the right toe amputation site. Issued ongoing since 1/2025.   She had non-compressible VALENTIN and US duplex suggestive of mild to moderate  PAD.    Today, her left foot is in a boot for charcots disease.   She has has bilateral +2 leg swelling that has been there since the start of the dry gangrene.       Fhx: non-contributary  Shx: Denies smoking, etoh or drug use    EKG - 4/3/25 - sinus tachycardia, PVC, RBBB        Lab Results   Component Value Date     03/25/2025    K 3.9 03/25/2025     03/25/2025    CO2 26 03/25/2025    BUN 24 (H) 03/25/2025    CREATININE 0.82 03/25/2025    CALCIUM 8.0 (L) 03/25/2025    ANIONGAP 11 03/25/2025    ESTGFRAFRICA 63 02/03/2021    EGFRNONAA 51 (L) 06/13/2022       No results found for: "CHOL"  No results found for: "HDL"  No results found for: "LDLCALC"  No results found for: "TRIG"  No results found for: "CHOLHDL"    Lab Results   Component Value Date    WBC 16.00 (H) 03/25/2025    HGB 8.3 (L) 03/25/2025    HCT 29.8 (L) 03/25/2025    MCV 81.6 03/25/2025     03/25/2025         Current Medications[1]    Review of Systems   Respiratory:  Negative for cough and shortness of breath.    Cardiovascular:  Positive for claudication. Negative for chest pain, palpitations, orthopnea, leg swelling and PND.         Objective:   BP 90/60   Pulse (!) 115   Resp 16   Ht 5' 10" (1.778 m)   Wt 73.9 kg (163 lb)   BMI 23.39 kg/m²     Physical Exam  Constitutional:       Appearance: Normal appearance.   Cardiovascular:      Rate and Rhythm: Normal rate and regular rhythm.      Pulses: " Normal pulses.      Heart sounds: Normal heart sounds. No murmur heard.  Pulmonary:      Effort: Pulmonary effort is normal.      Breath sounds: Normal breath sounds.   Musculoskeletal:         General: No swelling.   Neurological:      Mental Status: She is alert and oriented to person, place, and time.           Assessment:     1. Peripheral vascular disease, unspecified  CTA Runoff ABD PEL Bilat Lower Ext      2. PVD (peripheral vascular disease)  Ambulatory referral/consult to Interventional Cardiology      3. Leg swelling  Echo      4. Diabetic ulcer of toe of right foot associated with type 2 diabetes mellitus, with necrosis of bone        5. Other hyperlipidemia        6. Primary hypertension              Plan:   Diabetic ulcer of toe of right foot associated with type 2 diabetes mellitus, with necrosis of bone      CLI with dario V classification  VALENTIN with non compressible arteries - unable to obtain  Plan for CTA with runoff  Start xarelto 2.5mg bid  Will schedule peripheral angiography after CTA with run-off  Consider Semglutide for underlying DM - recent study with benefit noted with patient with PAD.     Leg swelling  +2 leg swelling  Low albumin   ECHO ordered    Other hyperlipidemia  Hx of Dm; not on statin therapy  Will start next visit    Primary hypertension  BP 90/60 reports orthostasis  Stop Diazide; restart lisinopril 2.5mg qd               [1]   Current Outpatient Medications:     amitriptyline (ELAVIL) 10 MG tablet, Take 10 mg by mouth every evening., Disp: , Rfl:     b complex vitamins capsule, Take 1 capsule by mouth once daily., Disp: , Rfl:     cyanocobalamin 1,000 mcg/mL injection, Inject 1,000 mcg into the muscle every 30 days., Disp: , Rfl:     esomeprazole (NEXIUM) 20 MG capsule, Take 20 mg by mouth once daily., Disp: , Rfl:     etanercept (ENBREL MINI) 50 mg/mL (1 mL) Crtg, 1 mL by abdominal subcutaneous route every Sunday. , Disp: , Rfl:     ferrous sulfate (FEOSOL) Tab tablet,  Take 1 tablet by mouth once daily., Disp: , Rfl:     gabapentin (NEURONTIN) 600 MG tablet, Take 2 tablets 3 times a day by oral route for 30 days., Disp: , Rfl:     HYDROcodone-acetaminophen (NORCO) 7.5-325 mg per tablet, Take 1 tablet by mouth every 6 (six) hours as needed for Pain. (Patient not taking: Reported on 3/20/2025), Disp: 15 tablet, Rfl: 0    hydrOXYchloroQUINE (PLAQUENIL) 200 mg tablet, Take 200 mg by mouth 2 (two) times daily. , Disp: , Rfl:     insulin regular 100 unit/mL Inj injection, 3 (three) times daily before meals., Disp: , Rfl:     ketorolac (TORADOL) 10 mg tablet, Take 10 mg by mouth 3 (three) times daily as needed for Pain., Disp: , Rfl:     LEVEMIR U-100 INSULIN 100 unit/mL injection, SMARTSI Unit(s) SUB-Q Daily, Disp: , Rfl:     lisinopriL (PRINIVIL,ZESTRIL) 2.5 MG tablet, Take 2.5 mg by mouth every evening., Disp: , Rfl:     lysine (L-LYSINE) 500 mg Tab, Take 500 mg by mouth 2 (two) times a day., Disp: , Rfl:     nystatin (MYCOSTATIN) 100,000 unit/mL suspension, Take 6 mLs by mouth 3 (three) times daily before meals. , Disp: , Rfl:     oxyCODONE (ROXICODONE) 10 mg Tab immediate release tablet, Take 1 tablet (10 mg total) by mouth every 6 (six) hours as needed for Pain. (Patient not taking: Reported on 3/20/2025), Disp: 10 tablet, Rfl: 0    oxyCODONE-acetaminophen (PERCOCET)  mg per tablet, Take 1 tablet by mouth 4 (four) times daily., Disp: , Rfl:     predniSONE (DELTASONE) 5 MG tablet, Take 5 mg by mouth once daily. , Disp: , Rfl:     promethazine (PHENERGAN) 25 MG tablet, Take 25 mg by mouth every 6 (six) hours as needed for Nausea. , Disp: , Rfl:     rivaroxaban (XARELTO) 2.5 mg Tab, Take 1 tablet (2.5 mg total) by mouth 2 (two) times daily., Disp: 180 tablet, Rfl: 3    zinc gluconate 50 mg tablet, Take 50 mg by mouth once daily., Disp: , Rfl:

## 2025-04-03 NOTE — ASSESSMENT & PLAN NOTE
CLI with dario V classification  VALENTIN with non compressible arteries - unable to obtain  Plan for CTA with runoff  Start xarelto 2.5mg bid  Will schedule peripheral angiography after CTA with run-off  Consider Semglutide for underlying DM - recent study with benefit noted with patient with PAD.

## 2025-04-11 PROBLEM — R57.9 SHOCK: Status: ACTIVE | Noted: 2025-01-01

## 2025-04-11 PROBLEM — R57.8 OBSTRUCTIVE CARDIOVASCULAR SHOCK: Status: ACTIVE | Noted: 2025-01-01

## 2025-04-11 NOTE — DISCHARGE SUMMARY
[facility] - Emergency Department  [facility]  Discharge Summary      Patient Name: Silvana Sarah  MRN: [id number]  [facility]: [id number]  Patient Class: IP- Inpatient  Admission Date: 4/11/2025  Hospital Length of Stay: 0 days  Discharge Date and Time: 04/11/2025 5:36 AM  Attending Physician: Cooper Drummond MD   Discharging Provider: Teresa Robles MD  Primary Care Provider: Sera Byrd FNP    Primary Care Team: Networked reference to record PCT     HPI:    The patient is a 65 y/o female,who presents to the ED  via EMS after overdosing on multiple medications- Neurontin, Norco and Tizanidine.    Patient was alert but lethargic upon arrival to the ED. Also noted to be hypotensive on presentation. As per ED nurse, it was very hard for them to get BP reading.  Was started on IV fluids  and Vasopressors without improvement. Had BP measurement but MAP was persistently below 50s reached to 30s. During evaluation, patient reported to have difficulty in breathing. Tried to put her on BIPAP but patient was not cooperative.   Then patient was intubated.Following patient had PEA. Epi was given and ACLS protocol started. Was able to have a rhythm after `15 mins of resuscitation. Then patient was transferred to the ICU for further management.     Note: Further history could not be taken from the patient. Patient's family not available in the room. From documentation, patient has past medical history of Type 1 DM, Depression and HTN.      * No surgery found *      Hospital Course:   The patient is a 65 y/o female,who presents to the ED  via EMS after overdosing on multiple medications- Neurontin, Norco and Tizanidine.    Patient was alert but lethargic upon arrival to the ED. Also noted to be hypotensive on presentation. As per ED nurse, it was very hard for them to get BP reading.  Was started on IV fluids  and Vasopressors without improvement. Had BP measurement but MAP was persistently below 50s  reached to 30s. During evaluation, patient reported to have difficulty in breathing. Tried to put her on BIPAP but patient was not cooperative.   Then patient was intubated.Following patient had PEA. Epi was given and ACLS protocol started. Was able to have a rhythm after `15 mins of resuscitation. Then patient was planned to the ICU for further management. Unfortunately , patient coded multiple times at ED  and was pronounced death at 5:18 am.        Goals of Care Treatment Preferences:  Code Status: Full Code         Consults:     Assessment & Plan  Shock  This patient has shock. The type of shock is unknown at this time. The patient has the following evidence of shock: persistent hypotension, altered mental status, and hypoxia. The patient will be admitted to an intensive care unit, they will be treated with Epinephrine, Norepinephrine and Phenylephrine.  -Unfortunately , patient coded multiple times at ED  and was pronounced death at 5:18 am.  Final Active Diagnoses:    Diagnosis Date Noted POA    PRINCIPAL PROBLEM:  Shock [R57.9] 2025 Yes      Problems Resolved During this Admission:       Discharged Condition:     Disposition:     Follow Up:    Patient Instructions:   No discharge procedures on file.    Significant Diagnostic Studies: N/A  .    Pending Diagnostic Studies:       Procedure Component Value Units Date/Time    Drug Screen, Urine [[id number]]     Order Status: Sent Lab Status: No result     Specimen: Urine, Clean Catch     EKG 12-lead [[id number]]     Order Status: Sent Lab Status: No result     EXTRA TUBES [[id number]] Collected: 25 0136    Order Status: Sent Lab Status: In process Updated: 25    Specimen: Blood, Venous     Narrative:      The following orders were created for panel order EXTRA TUBES.  Procedure                               Abnormality         Status                     ---------                               -----------         ------                      Gold Top Hold[[id number]]                                   In process                   Please view results for these tests on the individual orders.    Urinalysis, Reflex to Urine Culture [[id number]]     Order Status: Sent Lab Status: No result     Specimen: Urine            Medications:  None    Indwelling Lines/Drains at time of discharge:   Lines/Drains/Airways       Peripherally Inserted Central Catheter Line  Duration             PICC Double Lumen 04/01/25 1429 right basilic 9 days                             Teresa Robles MD  Department of Hospital Medicine  [facility] - Emergency Department

## 2025-04-11 NOTE — ED PROVIDER NOTES
Encounter Date: 2025    SCRIBE #1 NOTE: I, Dot Nash, am scribing for, and in the presence of,  Cooper Drummond MD. I have scribed the entire note.       History     Chief Complaint   Patient presents with    Altered Mental Status    Drug Overdose     This is a 63 y/o female,who presents to the ED via EMS with complaints of AMS. EMS states the pt's  called them due to the pt taking to much medications. She is lethargic upon arrival to the ED. EMS states the pt's  explained she takes Neurontin and Norco along with Tizanidine. Her  states she will take her medication and then forget she took them so she takes more. There is no Hx of chest pain or shortness of breath. There are no other complaints/pain in the ED at this time. She has a known hx of RA, diabetes, and HTN. There is no smoking hx on file.     The history is provided by the patient, the EMS personnel and the spouse.     Review of patient's allergies indicates:   Allergen Reactions    Influenza virus vaccines     Penicillins      Past Medical History:   Diagnosis Date    Anxiety and depression 2021    Diabetes mellitus     Dry eye syndrome, bilateral 10/06/2017    Essential (primary) hypertension     Gastroparesis due to DM     Generalized osteoarthritis 10/25/2021    Other mechanical complication of implanted electronic neurostimulator, generator, sequela 10/25/2021    Peripheral autonomic neuropathy due to DM     Rheumatoid arthritis      Past Surgical History:   Procedure Laterality Date     SECTION      DEBRIDEMENT OF FOOT Right 2025    Procedure: DEBRIDEMENT, FOOT;  Surgeon: David Aguilera MD;  Location: [facility] OR;  Service: General;  Laterality: Right;    DEBRIDEMENT OF LOWER EXTREMITY Left 2024    Procedure: DEBRIDEMENT, LOWER EXTREMITY;  Surgeon: David Aguilera MD;  Location: [facility] OR;  Service: General;  Laterality: Left;  Left foot    GASTRIC STIMULATOR IMPLANT SURGERY       HYSTERECTOMY      INCISION AND DRAINAGE OF ABSCESS Right 5/13/2022    Procedure: INCISION AND DRAINAGE, ABSCESS;  Surgeon: Kamari Sandoval DO;  Location: [facility] OR;  Service: General;  Laterality: Right;  right hand dr sandoval am    IRRIGATION AND DEBRIDEMENT OF UPPER EXTREMITY Right 5/16/2022    Procedure: IRRIGATION AND DEBRIDEMENT, UPPER EXTREMITY;  Surgeon: Kamari Sandoval DO;  Location: [facility] OR;  Service: General;  Laterality: Right;  right hand I/D dr sandoval today    TOE AMPUTATION Right 1/28/2025    Procedure: AMPUTATION, TOE;  Surgeon: David Aguilera MD;  Location: [facility] OR;  Service: General;  Laterality: Right;     Family History   Problem Relation Name Age of Onset    Heart disease Mother      Cancer Father      Diabetes Son      Diabetes Son       Social History[1]  Review of Systems   Constitutional:         Accidental overdose.      Respiratory:  Negative for shortness of breath.    Cardiovascular:  Negative for chest pain.   Neurological:         Lethargy.          Physical Exam     Initial Vitals   BP Pulse Resp Temp SpO2   04/11/25 0246 04/11/25 0139 04/11/25 0139 04/11/25 0139 04/11/25 0123   (!) 142/95 99 16 98 °F (36.7 °C) 97 %      MAP       --                Physical Exam    Nursing note and vitals reviewed.  Constitutional: She appears well-developed and well-nourished.   HENT:   Head: Normocephalic and atraumatic.   Eyes: EOM are normal. Pupils are equal, round, and reactive to light.   Neck: Neck supple. No thyromegaly present.   Normal range of motion.  Cardiovascular:  Normal rate, regular rhythm, normal heart sounds and intact distal pulses.           No murmur heard.  Pulmonary/Chest: Breath sounds normal. She has no wheezes.   Abdominal: Abdomen is soft. Bowel sounds are normal. There is no abdominal tenderness.   Musculoskeletal:         General: No tenderness or edema. Normal range of motion.      Cervical back: Normal range of motion and neck supple.      Lymphadenopathy:     She has no cervical adenopathy.   Neurological: She is alert. She has normal strength and normal reflexes. No cranial nerve deficit or sensory deficit. GCS score is 15. GCS eye subscore is 4. GCS verbal subscore is 5. GCS motor subscore is 6.   Pt is alert but lethargic at the same time.      Skin: Skin is warm and dry. Capillary refill takes less than 2 seconds. No rash noted.   Psychiatric: She has a normal mood and affect.         ED Course   Procedures  Labs Reviewed   COMPREHENSIVE METABOLIC PANEL - Abnormal       Result Value    Sodium 137      Potassium 5.0      Chloride 105      CO2 21 (*)     Anion Gap 16      Glucose 236 (*)     BUN 21 (*)     Creatinine 1.15 (*)     BUN/Creatinine Ratio 18      Calcium 7.8 (*)     Total Protein 4.4 (*)     Albumin 1.7 (*)     Globulin 2.7      A/G Ratio 0.6      Bilirubin, Total 0.8      Alk Phos 151 (*)     ALT 22      AST 86 (*)     eGFR 54 (*)    APTT - Abnormal    PTT 39.2 (*)    PROTIME-INR - Abnormal    PT 16.9 (*)     INR 1.39     CBC WITH DIFFERENTIAL - Abnormal    WBC 13.82 (*)     RBC 3.72 (*)     Hemoglobin 8.5 (*)     Hematocrit 30.2 (*)     MCV 81.2      MCH 22.8 (*)     MCHC 28.1 (*)     RDW 20.7 (*)     Platelet Count 88 (*)     MPV        Neutrophils % 68.7 (*)     Lymphocytes % 19.2 (*)     Monocytes % 5.1      Eosinophils % 1.2      Basophils % 0.4      Immature Granulocytes % 5.4 (*)     nRBC, Auto 0.1 (*)     Neutrophils, Abs 9.49 (*)     Lymphocytes, Absolute 2.66      Monocytes, Absolute 0.71      Eosinophils, Absolute 0.17      Basophils, Absolute 0.05      Immature Granulocytes, Absolute 0.74 (*)     nRBC, Absolute 0.02 (*)     Diff Type Scan Smear     MANUAL DIFFERENTIAL - Abnormal    Segmented Neutrophils, Man % 75 (*)     Bands, Man % 4      Lymphocytes, Man % 15 (*)     Monocytes, Man % 3      Eosinophils, Man % 1      Metamyelocytes, Man % 2      Platelet Morphology Decreased (*)     Anisocytosis 1+      Ovalocytes Few       Polychromasia Few     POCT GLUCOSE MONITORING CONTINUOUS - Abnormal    POC Glucose 174 (*)    CBC W/ AUTO DIFFERENTIAL    Narrative:     The following orders were created for panel order CBC auto differential.  Procedure                               Abnormality         Status                     ---------                               -----------         ------                     CBC with Differential[[id number]]       Abnormal            Final result               Manual Differential[[id number]]         Abnormal            Final result                 Please view results for these tests on the individual orders.   EXTRA TUBES    Narrative:     The following orders were created for panel order EXTRA TUBES.  Procedure                               Abnormality         Status                     ---------                               -----------         ------                     Gold Top Hold[[id number]]                                   In process                   Please view results for these tests on the individual orders.   GOLD TOP HOLD          Imaging Results    None          Medications   lactated ringers bolus 1,000 mL (0 mLs Intravenous Stopped 4/11/25 0332)   naloxone (NARCAN) 0.4 mg/mL injection (0.4 mg  Given 4/11/25 0332)   ondansetron 4 mg/2 mL injection (4 mg  Given 4/11/25 0332)   etomidate (AMIDATE) 2 mg/mL injection (  Given 4/11/25 0407)   rocuronium 10 mg/mL injection (  Given 4/11/25 0407)   EPINEPHrine 0.1 mg/mL injection (1 mg Intravenous Given 4/11/25 0414)   sodium bicarbonate 8.4 % (1 mEq/mL) injection (50 mEq Intravenous Given 4/11/25 0412)   EPINEPHrine 0.1 mg/mL injection (1 mg Intravenous Given 4/11/25 0423)   EPINEPHrine 0.1 mg/mL injection (1 mg Intravenous Given 4/11/25 0444)   sodium bicarbonate 8.4 % (1 mEq/mL) injection (50 mEq Intravenous Given 4/11/25 0442)   EPINEPHrine 0.1 mg/mL injection (1 mg Intravenous Given 4/11/25 0452)   EPINEPHrine 0.1 mg/mL injection (1 mg  Intravenous Given 4/11/25 0502)   atropine injection (1 mg Intravenous Given 4/11/25 0501)   EPINEPHrine 0.1 mg/mL injection (1 mg Intravenous Given 4/11/25 0505)   EPINEPHrine 0.1 mg/mL injection (1 mg Intravenous Given 4/11/25 0509)   EPINEPHrine 0.1 mg/mL injection (  Canceled Entry 4/11/25 0535)     Medical Decision Making  Amount and/or Complexity of Data Reviewed  Labs: ordered.    Risk  Prescription drug management.     63 y/o female,who presents to the ED via EMS with complaints of AMS. EMS states the pt's  called them due to the pt taking to much medications. She is lethargic upon arrival to the ED.          Attending Attestation:         Attending Critical Care:   Critical Care Times:   ==============================================================  Total Critical Care Time - exclusive of procedural time: 45 minutes.  ==============================================================  Critical care was necessary to treat or prevent imminent or life-threatening deterioration of the following conditions: cardiac arrest.   The following critical care procedures were done by me (see procedure notes): airway management and CPR *.   Critical care was time spent personally by me on the following activities: obtaining history from patient or relative, examination of patient, review of x-rays / CT sent with the patient, ordering lab, x-rays, and/or EKG, development of treatment plan with patient or relative, ordering and performing treatments and interventions, discussion with consultants, evaluation of patient's response to treatment and re-evaluation of patient's conition.   Critical Care Condition: critical   Critical Care Comments: Patient condition deteriorated very quickly with very low blood pressure despite multiple vasopressors.  She was intubated by the family medicine resident under supervision of the hospitalist (Dr. Valente) and CPR was started.  The patient had multiple episodes of regaining ROSC and  then losing the pulse.  Please refer to the code run sheet.  We continued repeating CPR for more than an hour and then the patient went into PEA and passed away.  The code was terminated because of futility.     Physician Attestation for Scribe:  Physician Attestation Statement for Scribe #1: I, Cooper Drummond MD, reviewed documentation, as scribed by Dot Nash in my presence, and it is both accurate and complete.                                    Clinical Impression:  Final diagnoses:  [R57.8] Obstructive cardiovascular shock (Primary)          ED Disposition Condition                         [1]   Social History  Tobacco Use    Smoking status: Never    Smokeless tobacco: Never   Substance Use Topics    Alcohol use: Never        Cooper Drummond MD  [phone]

## 2025-04-11 NOTE — ED NOTES
Spouse at bedside. Discussing with family via phone about  home arrangements. Coffee given to spouse per request. [employer] rep. Updated on situation.

## 2025-04-11 NOTE — ASSESSMENT & PLAN NOTE
This patient has shock. The type of shock is unknown at this time. The patient has the following evidence of shock: persistent hypotension, altered mental status, and hypoxia. The patient will be admitted to an intensive care unit, they will be treated with Epinephrine, Norepinephrine and Phenylephrine.  -Unfortunately , patient coded multiple times at ED  and was pronounced death at 5:18 am.

## 2025-04-11 NOTE — HOSPITAL COURSE
The patient is a 65 y/o female,who presents to the ED  via EMS after overdosing on multiple medications- Neurontin, Norco and Tizanidine.    Patient was alert but lethargic upon arrival to the ED. Also noted to be hypotensive on presentation. As per ED nurse, it was very hard for them to get BP reading.  Was started on IV fluids  and Vasopressors without improvement. Had BP measurement but MAP was persistently below 50s reached to 30s. During evaluation, patient reported to have difficulty in breathing. Tried to put her on BIPAP but patient was not cooperative.   Then patient was intubated.Following patient had PEA. Epi was given and ACLS protocol started. Was able to have a rhythm after `15 mins of resuscitation. Then patient was planned to the ICU for further management. Unfortunately , patient coded multiple times at ED  and was pronounced death at 5:18 am.

## 2025-04-11 NOTE — ED NOTES
Spouse states that he is still discussing with family about  home arrangement. [employer] rep. Updated.

## 2025-04-11 NOTE — ED NOTES
Pt belongings blanket, purse, teeth, glucose monitor, IV antibiotics, Insulin pump, diabetes bag and walking boot. Given to .

## 2025-04-11 NOTE — H&P
[facility] - Emergency Department  [facility] Medicine  History & Physical    Patient Name: Silvana Sarah  MRN: [id number]  Patient Class: IP- Inpatient  Admission Date: 4/11/2025  Attending Physician: Dr. Valente  Primary Care Provider: Sera Byrd FNP         Patient information was obtained from EMS personnel and ER records.     Subjective:     Principal Problem:Shock    Chief Complaint:   Chief Complaint   Patient presents with    Altered Mental Status    Drug Overdose        HPI:  The patient is a 65 y/o female,who presents to the ED  via EMS after overdosing on multiple medications- Neurontin, Norco and Tizanidine.    Patient was alert but lethargic upon arrival to the ED. Also noted to be hypotensive on presentation. As per ED nurse, it was very hard for them to get BP reading.  Was started on IV fluids  and Vasopressors without improvement. Had BP measurement but MAP was persistently below 50s reached to 30s. During evaluation, patient reported to have difficulty in breathing. Tried to put her on BIPAP but patient was not cooperative.   Then patient was intubated.Following patient had PEA. Epi was given and ACLS protocol started. Was able to have a rhythm after `15 mins of resuscitation. Then patient was planned to transfer to ICU.    Note: Further history could not be taken from the patient. Patient's family not available in the room. From documentation, patient has past medical history of Type 1 DM, Depression and HTN.      Past Medical History:   Diagnosis Date    Anxiety and depression 07/24/2021    Diabetes mellitus     Dry eye syndrome, bilateral 10/06/2017    Essential (primary) hypertension     Gastroparesis due to DM     Generalized osteoarthritis 10/25/2021    Other mechanical complication of implanted electronic neurostimulator, generator, sequela 10/25/2021    Peripheral autonomic neuropathy due to DM     Rheumatoid arthritis        Past Surgical History:   Procedure Laterality Date      SECTION      DEBRIDEMENT OF FOOT Right 2025    Procedure: DEBRIDEMENT, FOOT;  Surgeon: David Aguilera MD;  Location: [facility] OR;  Service: General;  Laterality: Right;    DEBRIDEMENT OF LOWER EXTREMITY Left 2024    Procedure: DEBRIDEMENT, LOWER EXTREMITY;  Surgeon: David Aguilera MD;  Location: [facility] OR;  Service: General;  Laterality: Left;  Left foot    GASTRIC STIMULATOR IMPLANT SURGERY      HYSTERECTOMY      INCISION AND DRAINAGE OF ABSCESS Right 2022    Procedure: INCISION AND DRAINAGE, ABSCESS;  Surgeon: Kamari Sandoval DO;  Location: [facility] OR;  Service: General;  Laterality: Right;  right hand dr sandoval am    IRRIGATION AND DEBRIDEMENT OF UPPER EXTREMITY Right 2022    Procedure: IRRIGATION AND DEBRIDEMENT, UPPER EXTREMITY;  Surgeon: Kamari Sandoval DO;  Location: [facility] OR;  Service: General;  Laterality: Right;  right hand I/D dr sandoval today    TOE AMPUTATION Right 2025    Procedure: AMPUTATION, TOE;  Surgeon: David Aguilera MD;  Location: [facility] [place];  Service: General;  Laterality: Right;       Review of patient's allergies indicates:   Allergen Reactions    Influenza virus vaccines     Penicillins        No current facility-administered medications on file prior to encounter.     Current Outpatient Medications on File Prior to Encounter   Medication Sig    amitriptyline (ELAVIL) 10 MG tablet Take 10 mg by mouth every evening.    b complex vitamins capsule Take 1 capsule by mouth once daily.    cyanocobalamin 1,000 mcg/mL injection Inject 1,000 mcg into the muscle every 30 days.    esomeprazole (NEXIUM) 20 MG capsule Take 20 mg by mouth once daily.    etanercept (ENBREL MINI) 50 mg/mL (1 mL) Crtg 1 mL by abdominal subcutaneous route every .     ferrous sulfate (FEOSOL) Tab tablet Take 1 tablet by mouth once daily.    gabapentin (NEURONTIN) 600 MG tablet Take 2 tablets 3 times a day by oral route for 30 days.     HYDROcodone-acetaminophen (NORCO) 7.5-325 mg per tablet Take 1 tablet by mouth every 6 (six) hours as needed for Pain. (Patient not taking: Reported on 3/20/2025)    hydrOXYchloroQUINE (PLAQUENIL) 200 mg tablet Take 200 mg by mouth 2 (two) times daily.     insulin regular 100 unit/mL Inj injection 3 (three) times daily before meals.    ketorolac (TORADOL) 10 mg tablet Take 10 mg by mouth 3 (three) times daily as needed for Pain.    LEVEMIR U-100 INSULIN 100 unit/mL injection SMARTSI Unit(s) SUB-Q Daily    lisinopriL (PRINIVIL,ZESTRIL) 2.5 MG tablet Take 2.5 mg by mouth every evening.    lysine (L-LYSINE) 500 mg Tab Take 500 mg by mouth 2 (two) times a day.    nystatin (MYCOSTATIN) 100,000 unit/mL suspension Take 6 mLs by mouth 3 (three) times daily before meals.     oxyCODONE (ROXICODONE) 10 mg Tab immediate release tablet Take 1 tablet (10 mg total) by mouth every 6 (six) hours as needed for Pain. (Patient not taking: Reported on 3/20/2025)    oxyCODONE-acetaminophen (PERCOCET)  mg per tablet Take 1 tablet by mouth 4 (four) times daily.    predniSONE (DELTASONE) 5 MG tablet Take 5 mg by mouth once daily.     promethazine (PHENERGAN) 25 MG tablet Take 25 mg by mouth every 6 (six) hours as needed for Nausea.     rivaroxaban (XARELTO) 2.5 mg Tab Take 1 tablet (2.5 mg total) by mouth 2 (two) times daily.    zinc gluconate 50 mg tablet Take 50 mg by mouth once daily.     Family History       Problem Relation (Age of Onset)    Cancer Father    Diabetes Son, Son    Heart disease Mother          Tobacco Use    Smoking status: Never    Smokeless tobacco: Never   Substance and Sexual Activity    Alcohol use: Never    Drug use: Not on file    Sexual activity: Not on file     Review of Systems   Unable to perform ROS: Acuity of condition     Objective:     Vital Signs (Most Recent):  Temp: 98 °F (36.7 °C) (25 0139)  Pulse: (!) 37 (25 0501)  Resp: (!) 30 (25 0343)  BP: (!) 84/63 (25  0343)  SpO2: (!) 83 % (04/11/25 0343) Vital Signs (24h Range):  Temp:  [98 °F (36.7 °C)] 98 °F (36.7 °C)  Pulse:  [] 37  Resp:  [15-34] 30  SpO2:  [78 %-99 %] 83 %  BP: ()/(30-95) 84/63     Weight: 73.9 kg (163 lb)  Body mass index is 23.39 kg/m².     Physical Exam  Vitals reviewed.   Constitutional:       General: She is in acute distress.      Appearance: She is diaphoretic.   HENT:      Head: Normocephalic.      Mouth/Throat:      Mouth: Mucous membranes are dry.   Eyes:      Pupils: Pupils are equal, round, and reactive to light.   Cardiovascular:      Rate and Rhythm: Tachycardia present.   Pulmonary:      Effort: Respiratory distress present.   Abdominal:      Palpations: Abdomen is soft.   Musculoskeletal:      Cervical back: Normal range of motion and neck supple.   Skin:     Capillary Refill: Capillary refill takes more than 3 seconds.      Comments: Cool skin              CRANIAL NERVES     CN III, IV, VI   Pupils are equal, round, and reactive to light.       Significant Labs: All pertinent labs within the past 24 hours have been reviewed.    Significant Imaging: I have reviewed all pertinent imaging results/findings within the past 24 hours.  Assessment/Plan:     Assessment & Plan  Shock  This patient has shock. The type of shock is unknown at this time. The patient has the following evidence of shock: persistent hypotension, altered mental status, and hypoxia. The patient will be admitted to an intensive care unit, they will be treated with Epinephrine, Norepinephrine and Phenylephrine.  VTE Risk Mitigation (From admission, onward)           Ordered     heparin (porcine) injection 5,000 Units  Every 8 hours         04/11/25 0500     IP VTE HIGH RISK PATIENT  Once         04/11/25 0500     Place sequential compression device  Until discontinued         04/11/25 0500                                    Teresa Robles MD  Department of Hospital Medicine  [facility] - Emergency  Department

## 2025-04-11 NOTE — HPI
The patient is a 65 y/o female,who presents to the ED  via EMS after overdosing on multiple medications- Neurontin, Norco and Tizanidine.    Patient was alert but lethargic upon arrival to the ED. Also noted to be hypotensive on presentation. As per ED nurse, it was very hard for them to get BP reading.  Was started on IV fluids  and Vasopressors without improvement. Had BP measurement but MAP was persistently below 50s reached to 30s. During evaluation, patient reported to have difficulty in breathing. Tried to put her on BIPAP but patient was not cooperative.   Then patient was intubated.Following patient had PEA. Epi was given and ACLS protocol started. Was able to have a rhythm after `15 mins of resuscitation. Then patient was transferred to the ICU for further management.     Note: Further history could not be taken from the patient. Patient's family not available in the room. From documentation, patient has past medical history of Type 1 DM, Depression and HTN.

## 2025-04-11 NOTE — SUBJECTIVE & OBJECTIVE
Past Medical History:   Diagnosis Date    Anxiety and depression 2021    Diabetes mellitus     Dry eye syndrome, bilateral 10/06/2017    Essential (primary) hypertension     Gastroparesis due to DM     Generalized osteoarthritis 10/25/2021    Other mechanical complication of implanted electronic neurostimulator, generator, sequela 10/25/2021    Peripheral autonomic neuropathy due to DM     Rheumatoid arthritis        Past Surgical History:   Procedure Laterality Date     SECTION      DEBRIDEMENT OF FOOT Right 2025    Procedure: DEBRIDEMENT, FOOT;  Surgeon: David Aguilera MD;  Location: [facility];  Service: General;  Laterality: Right;    DEBRIDEMENT OF LOWER EXTREMITY Left 2024    Procedure: DEBRIDEMENT, LOWER EXTREMITY;  Surgeon: David Aguilera MD;  Location: [facility];  Service: General;  Laterality: Left;  Left foot    GASTRIC STIMULATOR IMPLANT SURGERY      HYSTERECTOMY      INCISION AND DRAINAGE OF ABSCESS Right 2022    Procedure: INCISION AND DRAINAGE, ABSCESS;  Surgeon: Kamari Sandoval DO;  Location: [facility];  Service: General;  Laterality: Right;  right hand dr sandoval am    IRRIGATION AND DEBRIDEMENT OF UPPER EXTREMITY Right 2022    Procedure: IRRIGATION AND DEBRIDEMENT, UPPER EXTREMITY;  Surgeon: Kamari Sandoval DO;  Location: [facility];  Service: General;  Laterality: Right;  right hand I/D dr sandoval today    TOE AMPUTATION Right 2025    Procedure: AMPUTATION, TOE;  Surgeon: David Aguilera MD;  Location: [facility];  Service: General;  Laterality: Right;       Review of patient's allergies indicates:   Allergen Reactions    Influenza virus vaccines     Penicillins        No current facility-administered medications on file prior to encounter.     Current Outpatient Medications on File Prior to Encounter   Medication Sig    amitriptyline (ELAVIL) 10 MG tablet Take 10 mg by mouth every evening.    b complex vitamins capsule Take 1  capsule by mouth once daily.    cyanocobalamin 1,000 mcg/mL injection Inject 1,000 mcg into the muscle every 30 days.    esomeprazole (NEXIUM) 20 MG capsule Take 20 mg by mouth once daily.    etanercept (ENBREL MINI) 50 mg/mL (1 mL) Crtg 1 mL by abdominal subcutaneous route every .     ferrous sulfate (FEOSOL) Tab tablet Take 1 tablet by mouth once daily.    gabapentin (NEURONTIN) 600 MG tablet Take 2 tablets 3 times a day by oral route for 30 days.    HYDROcodone-acetaminophen (NORCO) 7.5-325 mg per tablet Take 1 tablet by mouth every 6 (six) hours as needed for Pain. (Patient not taking: Reported on 3/20/2025)    hydrOXYchloroQUINE (PLAQUENIL) 200 mg tablet Take 200 mg by mouth 2 (two) times daily.     insulin regular 100 unit/mL Inj injection 3 (three) times daily before meals.    ketorolac (TORADOL) 10 mg tablet Take 10 mg by mouth 3 (three) times daily as needed for Pain.    LEVEMIR U-100 INSULIN 100 unit/mL injection SMARTSI Unit(s) SUB-Q Daily    lisinopriL (PRINIVIL,ZESTRIL) 2.5 MG tablet Take 2.5 mg by mouth every evening.    lysine (L-LYSINE) 500 mg Tab Take 500 mg by mouth 2 (two) times a day.    nystatin (MYCOSTATIN) 100,000 unit/mL suspension Take 6 mLs by mouth 3 (three) times daily before meals.     oxyCODONE (ROXICODONE) 10 mg Tab immediate release tablet Take 1 tablet (10 mg total) by mouth every 6 (six) hours as needed for Pain. (Patient not taking: Reported on 3/20/2025)    oxyCODONE-acetaminophen (PERCOCET)  mg per tablet Take 1 tablet by mouth 4 (four) times daily.    predniSONE (DELTASONE) 5 MG tablet Take 5 mg by mouth once daily.     promethazine (PHENERGAN) 25 MG tablet Take 25 mg by mouth every 6 (six) hours as needed for Nausea.     rivaroxaban (XARELTO) 2.5 mg Tab Take 1 tablet (2.5 mg total) by mouth 2 (two) times daily.    zinc gluconate 50 mg tablet Take 50 mg by mouth once daily.     Family History       Problem Relation (Age of Onset)    Cancer Father    Diabetes Son,  Son    Heart disease Mother          Tobacco Use    Smoking status: Never    Smokeless tobacco: Never   Substance and Sexual Activity    Alcohol use: Never    Drug use: Not on file    Sexual activity: Not on file     Review of Systems   Unable to perform ROS: Acuity of condition     Objective:     Vital Signs (Most Recent):  Temp: 98 °F (36.7 °C) (04/11/25 0139)  Pulse: (!) 37 (04/11/25 0501)  Resp: (!) 30 (04/11/25 0343)  BP: (!) 84/63 (04/11/25 0343)  SpO2: (!) 83 % (04/11/25 0343) Vital Signs (24h Range):  Temp:  [98 °F (36.7 °C)] 98 °F (36.7 °C)  Pulse:  [] 37  Resp:  [15-34] 30  SpO2:  [78 %-99 %] 83 %  BP: ()/(30-95) 84/63     Weight: 73.9 kg (163 lb)  Body mass index is 23.39 kg/m².     Physical Exam  Vitals reviewed.   Constitutional:       General: She is in acute distress.      Appearance: She is diaphoretic.   HENT:      Head: Normocephalic.      Mouth/Throat:      Mouth: Mucous membranes are dry.   Eyes:      Pupils: Pupils are equal, round, and reactive to light.   Cardiovascular:      Rate and Rhythm: Tachycardia present.   Pulmonary:      Effort: Respiratory distress present.   Abdominal:      Palpations: Abdomen is soft.   Musculoskeletal:      Cervical back: Normal range of motion and neck supple.   Skin:     Capillary Refill: Capillary refill takes more than 3 seconds.      Comments: Cool skin              CRANIAL NERVES     CN III, IV, VI   Pupils are equal, round, and reactive to light.       Significant Labs: All pertinent labs within the past 24 hours have been reviewed.    Significant Imaging: I have reviewed all pertinent imaging results/findings within the past 24 hours.

## 2025-04-11 NOTE — ASSESSMENT & PLAN NOTE
This patient has shock. The type of shock is unknown at this time. The patient has the following evidence of shock: persistent hypotension, altered mental status, and hypoxia. The patient will be admitted to an intensive care unit, they will be treated with Epinephrine, Norepinephrine and Phenylephrine.

## 2025-04-11 NOTE — Clinical Note
Diagnosis: Obstructive cardiovascular shock [[id number]]   Reason for IP Medical Treatment  (Clinical interventions that can only be accomplished in the IP setting? ) :: Needs medical manageemnt for presenting illness

## 2025-04-12 RX ORDER — ROCURONIUM BROMIDE 10 MG/ML
INJECTION, SOLUTION INTRAVENOUS CODE/TRAUMA/SEDATION MEDICATION
Status: COMPLETED | OUTPATIENT
Start: 2025-01-01 | End: 2025-01-01

## 2025-04-12 RX ORDER — ETOMIDATE 2 MG/ML
INJECTION INTRAVENOUS CODE/TRAUMA/SEDATION MEDICATION
Status: COMPLETED | OUTPATIENT
Start: 2025-01-01 | End: 2025-01-01

## 2025-04-15 LAB
OHS QRS DURATION: 106 MS
OHS QTC CALCULATION: 449 MS

## 2025-05-16 ENCOUNTER — DOCUMENT SCAN (OUTPATIENT)
Dept: HOME HEALTH SERVICES | Facility: HOSPITAL | Age: 65
End: 2025-05-16
Payer: COMMERCIAL

## 2025-05-24 ENCOUNTER — DOCUMENT SCAN (OUTPATIENT)
Dept: HOME HEALTH SERVICES | Facility: HOSPITAL | Age: 65
End: 2025-05-24
Payer: COMMERCIAL

## (undated) DEVICE — BNDG COFLEX FOAM LF2 ST 4X5YD

## (undated) DEVICE — DERM CURETTE 5MM DISP

## (undated) DEVICE — BANDAGE GAUZE COT STRL 4.5X4.1

## (undated) DEVICE — CULTURETTE ANAEROBIC COLLECTOR

## (undated) DEVICE — GLOVE SENSICARE PI SURG 6.5

## (undated) DEVICE — TIP YANKAUERS BULB NO VENT

## (undated) DEVICE — NDL HYPODERMIC SAF 25G 1.5IN

## (undated) DEVICE — Device

## (undated) DEVICE — GLOVE SENSICARE PI GRN 7

## (undated) DEVICE — DRESSING SPONGE GAUZE STL 4X4

## (undated) DEVICE — TRAY SKIN PREP PROVIDONE IODINE STERILE LATEX FREE

## (undated) DEVICE — GLOVE SENSICARE PI SURG 7

## (undated) DEVICE — GLOVE SURGICAL PROTEXIS PI SIZE 6.5

## (undated) DEVICE — SUT ETHILON 2-0 FS 18IN BLK

## (undated) DEVICE — ELECTRODE BLADE INSULATED 1 IN

## (undated) DEVICE — GLOVE SURGICAL PROTEXIS PI SIZE 7.5

## (undated) DEVICE — GLOVE SURGICAL PROTEXIS PI BLUE SIZE 8.0

## (undated) DEVICE — SOL NACL IRR 1000ML BTL

## (undated) DEVICE — BANDAGE ELASTIC 4IN X 5YD W/CLIP (STERILE)

## (undated) DEVICE — GLOVE SURGICAL PROTEXIS PI BLUE SIZE 7.0

## (undated) DEVICE — CDS ENT

## (undated) DEVICE — SWAB AEROBIC CULTURETTE

## (undated) DEVICE — GLOVE SENSICARE PI SURG 6

## (undated) DEVICE — SUT VICRYL 3-0 27 SH

## (undated) DEVICE — BANDAGE KERLIX AMD  4.5IN  SPONGE ROLL

## (undated) DEVICE — BLADE BOVIE INSULATED COATED 2.75IN

## (undated) DEVICE — HANDLE YANKAUER SUCTION K80 LATEX FREE

## (undated) DEVICE — NDL ECLIPSE SAFETY 25GX1IN

## (undated) DEVICE — HEMOSTAT SURGICEL 4X8IN

## (undated) DEVICE — TRAP FLUID SMOKE EVACUATOR

## (undated) DEVICE — TRAY SKIN SCRUB WET 4 COMPART

## (undated) DEVICE — SYR 10CC LUER LOCK

## (undated) DEVICE — SOL IRRIGATION SALINE 0.9% 1000ML BOTTLE

## (undated) DEVICE — GLOVE SENSICARE PI GRN 6.5

## (undated) DEVICE — COLLECTOR SPECIMEN ANAEROBIC

## (undated) DEVICE — PENCIL ZIP PEN SMOKE EVAC 10FT

## (undated) DEVICE — STRIP PACKING IODOFORM 1/4X5YD

## (undated) DEVICE — CULTURETTE AEROBIC SWAB

## (undated) DEVICE — CDS EXTREMITY

## (undated) DEVICE — GLOVE SURGICAL PROTEXIS PI BLUE SIZE 7.5

## (undated) DEVICE — SYRINGE 10-12CC LURE -LOK TIP

## (undated) DEVICE — DRESSING TRANS 2X2 TEGADERM

## (undated) DEVICE — GLOVE SENSICARE PI SURG 7.5